# Patient Record
Sex: MALE | Race: WHITE | Employment: OTHER | URBAN - METROPOLITAN AREA
[De-identification: names, ages, dates, MRNs, and addresses within clinical notes are randomized per-mention and may not be internally consistent; named-entity substitution may affect disease eponyms.]

---

## 2017-07-13 ENCOUNTER — HOSPITAL ENCOUNTER (EMERGENCY)
Age: 77
Discharge: HOME | End: 2017-07-13
Attending: EMERGENCY MEDICINE
Payer: MEDICARE

## 2017-07-13 VITALS
BODY MASS INDEX: 24.52 KG/M2 | HEIGHT: 73 IN | HEART RATE: 89 BPM | RESPIRATION RATE: 20 BRPM | OXYGEN SATURATION: 95 % | SYSTOLIC BLOOD PRESSURE: 146 MMHG | TEMPERATURE: 97.7 F | DIASTOLIC BLOOD PRESSURE: 78 MMHG | WEIGHT: 185 LBS

## 2017-07-13 DIAGNOSIS — R33.9 URINARY RETENTION: Primary | ICD-10-CM

## 2017-07-13 LAB
ALBUMIN SERPL BCP-MCNC: 3.7 G/DL (ref 3.5–5)
ALBUMIN/GLOB SERPL: 1 {RATIO} (ref 1.1–2.2)
ALP SERPL-CCNC: 69 U/L (ref 45–117)
ALT SERPL-CCNC: 21 U/L (ref 12–78)
ANION GAP BLD CALC-SCNC: 14 MMOL/L (ref 5–15)
APPEARANCE UR: CLEAR
AST SERPL W P-5'-P-CCNC: 21 U/L (ref 15–37)
BACTERIA URNS QL MICRO: NEGATIVE /HPF
BASOPHILS # BLD AUTO: 0 K/UL (ref 0–0.1)
BASOPHILS # BLD: 0 % (ref 0–1)
BILIRUB SERPL-MCNC: 0.7 MG/DL (ref 0.2–1)
BILIRUB UR QL: NEGATIVE
BUN SERPL-MCNC: 12 MG/DL (ref 6–20)
BUN/CREAT SERPL: 9 (ref 12–20)
CALCIUM SERPL-MCNC: 9.1 MG/DL (ref 8.5–10.1)
CHLORIDE SERPL-SCNC: 105 MMOL/L (ref 97–108)
CO2 SERPL-SCNC: 23 MMOL/L (ref 21–32)
COLOR UR: NORMAL
CREAT SERPL-MCNC: 1.37 MG/DL (ref 0.7–1.3)
EOSINOPHIL # BLD: 0 K/UL (ref 0–0.4)
EOSINOPHIL NFR BLD: 0 % (ref 0–7)
EPITH CASTS URNS QL MICRO: NORMAL /LPF
ERYTHROCYTE [DISTWIDTH] IN BLOOD BY AUTOMATED COUNT: 13.8 % (ref 11.5–14.5)
GLOBULIN SER CALC-MCNC: 3.7 G/DL (ref 2–4)
GLUCOSE SERPL-MCNC: 95 MG/DL (ref 65–100)
GLUCOSE UR STRIP.AUTO-MCNC: NEGATIVE MG/DL
HCT VFR BLD AUTO: 46.4 % (ref 36.6–50.3)
HGB BLD-MCNC: 15.8 G/DL (ref 12.1–17)
HGB UR QL STRIP: NEGATIVE
HYALINE CASTS URNS QL MICRO: NORMAL /LPF (ref 0–5)
KETONES UR QL STRIP.AUTO: NEGATIVE MG/DL
LEUKOCYTE ESTERASE UR QL STRIP.AUTO: NEGATIVE
LYMPHOCYTES # BLD AUTO: 13 % (ref 12–49)
LYMPHOCYTES # BLD: 1.2 K/UL (ref 0.8–3.5)
MCH RBC QN AUTO: 32.6 PG (ref 26–34)
MCHC RBC AUTO-ENTMCNC: 34.1 G/DL (ref 30–36.5)
MCV RBC AUTO: 95.7 FL (ref 80–99)
MONOCYTES # BLD: 0.8 K/UL (ref 0–1)
MONOCYTES NFR BLD AUTO: 9 % (ref 5–13)
NEUTS SEG # BLD: 7.2 K/UL (ref 1.8–8)
NEUTS SEG NFR BLD AUTO: 78 % (ref 32–75)
NITRITE UR QL STRIP.AUTO: NEGATIVE
PH UR STRIP: 6.5 [PH] (ref 5–8)
PLATELET # BLD AUTO: 174 K/UL (ref 150–400)
POTASSIUM SERPL-SCNC: 3.7 MMOL/L (ref 3.5–5.1)
PROT SERPL-MCNC: 7.4 G/DL (ref 6.4–8.2)
PROT UR STRIP-MCNC: NEGATIVE MG/DL
RBC # BLD AUTO: 4.85 M/UL (ref 4.1–5.7)
RBC #/AREA URNS HPF: NORMAL /HPF (ref 0–5)
SODIUM SERPL-SCNC: 142 MMOL/L (ref 136–145)
SP GR UR REFRACTOMETRY: 1.01 (ref 1–1.03)
UROBILINOGEN UR QL STRIP.AUTO: 0.2 EU/DL (ref 0.2–1)
WBC # BLD AUTO: 9.2 K/UL (ref 4.1–11.1)
WBC URNS QL MICRO: NORMAL /HPF (ref 0–4)

## 2017-07-13 PROCEDURE — 96360 HYDRATION IV INFUSION INIT: CPT

## 2017-07-13 PROCEDURE — 99284 EMERGENCY DEPT VISIT MOD MDM: CPT

## 2017-07-13 PROCEDURE — 81001 URINALYSIS AUTO W/SCOPE: CPT | Performed by: EMERGENCY MEDICINE

## 2017-07-13 PROCEDURE — 85025 COMPLETE CBC W/AUTO DIFF WBC: CPT | Performed by: EMERGENCY MEDICINE

## 2017-07-13 PROCEDURE — 36415 COLL VENOUS BLD VENIPUNCTURE: CPT | Performed by: EMERGENCY MEDICINE

## 2017-07-13 PROCEDURE — 74011250636 HC RX REV CODE- 250/636: Performed by: EMERGENCY MEDICINE

## 2017-07-13 PROCEDURE — 77030010545

## 2017-07-13 PROCEDURE — 77030034850

## 2017-07-13 PROCEDURE — 80053 COMPREHEN METABOLIC PANEL: CPT | Performed by: EMERGENCY MEDICINE

## 2017-07-13 PROCEDURE — 51702 INSERT TEMP BLADDER CATH: CPT

## 2017-07-13 RX ADMIN — SODIUM CHLORIDE 500 ML: 900 INJECTION, SOLUTION INTRAVENOUS at 11:39

## 2017-07-13 NOTE — ED NOTES
Shaw bag changed to leg bag, patient/family instructed how to use. Patient ambulated with this RN and walker in the hallway, states he feels much better and is baseline with ambulating.

## 2017-07-13 NOTE — DISCHARGE INSTRUCTIONS
Urinary Retention: Care Instructions  Your Care Instructions    Urinary retention means that you aren't able to urinate. In men, it is often caused by a blockage of the urinary tract from an enlarged prostate gland. In men and women, it can also be caused by an infection or nerve damage. Or it may be a side effect of a medicine. The doctor may have drained the urine from your bladder. If you still have problems passing urine, you may need to use a catheter at home. This is used to empty your bladder until the problem can be fixed. Your doctor may put a catheter in your bladder before you go home. If so, he or she will tell you when to come back to have the catheter removed. The doctor has checked you closely. But problems can develop later. If you notice any problems or new symptoms, get medical treatment right away. Follow-up care is a key part of your treatment and safety. Be sure to make and go to all appointments, and call your doctor if you are having problems. It's also a good idea to know your test results and keep a list of the medicines you take. How can you care for yourself at home? · Take your medicines exactly as prescribed. Call your doctor if you think you are having a problem with your medicine. You will get more details on the specific medicines your doctor prescribes. · Check with your doctor before you use any over-the-counter medicines. Many cold and allergy medicines, for example, can make this problem worse. Make sure your doctor knows all of the medicines, vitamins, supplements, and herbal remedies you take. · Spread out through the day the amount of fluid you drink. Do not drink a lot at bedtime. · Avoid alcohol and caffeine. · If you have been given a catheter, or if one is already in place, follow the instructions you were given. Always wash your hands before and after you handle the catheter. When should you call for help?   Call your doctor now or seek immediate medical care if:  · You cannot urinate at all, or it is getting harder to urinate. · You have symptoms of a urinary tract infection. These may include:  ¨ Pain or burning when you urinate. ¨ A frequent need to urinate without being able to pass much urine. ¨ Pain in the flank, which is just below the rib cage and above the waist on either side of the back. ¨ Blood in your urine. ¨ A fever. Watch closely for changes in your health, and be sure to contact your doctor if:  · You have any problems with your catheter. · You do not get better as expected. Where can you learn more? Go to http://richelle-travis.info/. Enter M244 in the search box to learn more about \"Urinary Retention: Care Instructions. \"  Current as of: August 12, 2016  Content Version: 11.3  © 2699-5189 [employer]. Care instructions adapted under license by [employer] (which disclaims liability or warranty for this information). If you have questions about a medical condition or this instruction, always ask your healthcare professional. [place] [address] any warranty or liability for your use of this information. We hope that we have addressed all of your medical concerns. The examination and treatment you received in the Emergency Department were for an emergent problem and were not intended as complete care. It is important that you follow up with your healthcare provider(s) for ongoing care. If your symptoms worsen or do not improve as expected, and you are unable to reach your usual health care provider(s), you should return to the Emergency Department. Today's healthcare is undergoing tremendous change, and patient satisfaction surveys are one of the many tools to assess the quality of medical care. You may receive a survey from the [employer] regarding your experience in the Emergency Department.   I hope that your experience has been completely positive, particularly the medical care that I provided. As such, please participate in the survey; anything less than excellent does not meet my expectations or intentions. [facility] and [employer] participate in nationally recognized quality of care measures. If your blood pressure is greater than 120/80, as reported below, we urge that you seek medical care to address the potential of high blood pressure, commonly known as hypertension. Hypertension can be hereditary or can be caused by certain medical conditions, pain, stress, or \"white coat syndrome. \"       Please make an appointment with your health care provider(s) for follow up of your Emergency Department visit. VITALS:   Patient Vitals for the past 8 hrs:   Temp Pulse Resp BP SpO2   07/13/17 1016 - - - 188/78 93 %   07/13/17 0954 97.7 °F (36.5 °C) 89 20 (!) 188/107 93 %          Thank you for allowing us to provide you with medical care today. We realize that you have many choices for your emergency care needs. Please choose us in the future for any continued health care needs. [facility], [address] [place] [address].   Office: [phone]            Recent Results (from the past 24 hour(s))   CBC WITH AUTOMATED DIFF    Collection Time: 07/13/17 10:24 AM   Result Value Ref Range    WBC 9.2 4.1 - 11.1 K/uL    RBC 4.85 4.10 - 5.70 M/uL    HGB 15.8 12.1 - 17.0 g/dL    HCT 46.4 36.6 - 50.3 %    MCV 95.7 80.0 - 99.0 FL    MCH 32.6 26.0 - 34.0 PG    MCHC 34.1 30.0 - 36.5 g/dL    RDW 13.8 11.5 - 14.5 %    PLATELET 474 080 - 924 K/uL    NEUTROPHILS 78 (H) 32 - 75 %    LYMPHOCYTES 13 12 - 49 %    MONOCYTES 9 5 - 13 %    EOSINOPHILS 0 0 - 7 %    BASOPHILS 0 0 - 1 %    ABS. NEUTROPHILS 7.2 1.8 - 8.0 K/UL    ABS. LYMPHOCYTES 1.2 0.8 - 3.5 K/UL    ABS. MONOCYTES 0.8 0.0 - 1.0 K/UL    ABS. EOSINOPHILS 0.0 0.0 - 0.4 K/UL    ABS.  BASOPHILS 0.0 0.0 - 0.1 K/UL   METABOLIC PANEL, COMPREHENSIVE    Collection Time: 07/13/17 10:24 AM   Result Value Ref Range    Sodium 142 136 - 145 mmol/L    Potassium 3.7 3.5 - 5.1 mmol/L    Chloride 105 97 - 108 mmol/L    CO2 23 21 - 32 mmol/L    Anion gap 14 5 - 15 mmol/L    Glucose 95 65 - 100 mg/dL    BUN 12 6 - 20 MG/DL    Creatinine 1.37 (H) 0.70 - 1.30 MG/DL    BUN/Creatinine ratio 9 (L) 12 - 20      GFR est AA >60 >60 ml/min/1.73m2    GFR est non-AA 50 (L) >60 ml/min/1.73m2    Calcium 9.1 8.5 - 10.1 MG/DL    Bilirubin, total 0.7 0.2 - 1.0 MG/DL    ALT (SGPT) 21 12 - 78 U/L    AST (SGOT) 21 15 - 37 U/L    Alk. phosphatase 69 45 - 117 U/L    Protein, total 7.4 6.4 - 8.2 g/dL    Albumin 3.7 3.5 - 5.0 g/dL    Globulin 3.7 2.0 - 4.0 g/dL    A-G Ratio 1.0 (L) 1.1 - 2.2     URINALYSIS W/MICROSCOPIC    Collection Time: 07/13/17 10:24 AM   Result Value Ref Range    Color YELLOW/STRAW      Appearance CLEAR CLEAR      Specific gravity 1.011 1.003 - 1.030      pH (UA) 6.5 5.0 - 8.0      Protein NEGATIVE  NEG mg/dL    Glucose NEGATIVE  NEG mg/dL    Ketone NEGATIVE  NEG mg/dL    Bilirubin NEGATIVE  NEG      Blood NEGATIVE  NEG      Urobilinogen 0.2 0.2 - 1.0 EU/dL    Nitrites NEGATIVE  NEG      Leukocyte Esterase NEGATIVE  NEG      WBC 0-4 0 - 4 /hpf    RBC 0-5 0 - 5 /hpf    Epithelial cells FEW FEW /lpf    Bacteria NEGATIVE  NEG /hpf    Hyaline cast 0-2 0 - 5 /lpf       No results found.

## 2017-07-13 NOTE — ED TRIAGE NOTES
Pt c/o being dx yesterday with blood in urine. States passed small amount last night but none since then. Pt appears very uncomfortabe.

## 2017-07-13 NOTE — ED PROVIDER NOTES
HPI Comments: 68 y.o. male with no significant past medical history who presents from home with chief complaint of urinary retention. Pt c/o urinary retention currently, as he passed a small amount of urine last night and nothing since with an associated urinary pressure. Pt reports that 3 days ago started having hematuria, urinary frequency, and difficulty sleeping. Pt denies any hx of urinary problems. Pt has a chronically high PSA and is followed by Dr. Darrel Krabbe. Pt also has a \"neuromuscular problem associated with Sjogren's\" that the wife reports is possibly caused by statins. Pt is on 7.5 mg prednisone on a daily basis for the past year due to difficulty walking. Pt normally uses a walker, and reports that his walking was more difficult than normal today. Pt states he typically can't walk well in the morning until the prednisone kicks in, and by the end of the day as it wears off it becomes difficult to walk again. Pt denies any fever, chills, back pain, or ab pain. There are no other acute medical concerns at this time. PCP: Latasha Haro MD    Note written by Aviva Mcmullen, as dictated by Yazmin Gaxiola. Kasi Nagy MD 10:36 AM      The history is provided by the patient. No  was used. No past medical history on file. No past surgical history on file. No family history on file. Social History     Social History    Marital status:      Spouse name: N/A    Number of children: N/A    Years of education: N/A     Occupational History    Not on file. Social History Main Topics    Smoking status: Not on file    Smokeless tobacco: Not on file    Alcohol use Not on file    Drug use: Not on file    Sexual activity: Not on file     Other Topics Concern    Not on file     Social History Narrative         ALLERGIES: Review of patient's allergies indicates no known allergies. Review of Systems   Constitutional: Negative for chills and fever.    HENT: Negative for ear pain and sore throat. Eyes: Negative for pain. Respiratory: Negative for chest tightness and shortness of breath. Cardiovascular: Negative for chest pain and leg swelling. Gastrointestinal: Negative for abdominal pain, nausea and vomiting. Genitourinary: Positive for difficulty urinating (urinary retention) and hematuria. Negative for dysuria, flank pain and urgency. Musculoskeletal: Positive for gait problem. Negative for back pain. Skin: Negative for rash. Neurological: Negative for headaches. All other systems reviewed and are negative. Vitals:    07/13/17 0954   BP: (!) 188/107   Pulse: 89   Resp: 20   Temp: 97.7 °F (36.5 °C)   SpO2: 93%   Weight: 83.9 kg (185 lb)   Height: 6' 1\" (1.854 m)            Physical Exam   Constitutional: He appears well-developed and well-nourished. No distress. HENT:   Head: Normocephalic and atraumatic. Eyes: Pupils are equal, round, and reactive to light. No scleral icterus. Neck: Normal range of motion. Neck supple. No tracheal deviation present. Cardiovascular: Normal rate, regular rhythm, normal heart sounds and intact distal pulses. Exam reveals no gallop and no friction rub. No murmur heard. Pulmonary/Chest: Effort normal and breath sounds normal. No respiratory distress. He has no wheezes. He has no rales. Abdominal: Soft. He exhibits no distension. There is tenderness (mild suprapubic tenderness). There is no rebound and no guarding. Musculoskeletal: He exhibits no edema. Neurological: He is alert. Skin: Skin is warm and dry. Psychiatric: He has a normal mood and affect. Nursing note and vitals reviewed. Note written by Aviva Newman, as dictated by Janet Maurer. Spike uCeva MD 10:37 AM      MDM  Number of Diagnoses or Management Options  Urinary retention:   Diagnosis management comments:  The patient presents with urinary retention with a differential diagnosis of UTI, BPH, hematuria        ED Course Procedures           11:57 AM  The patient has been reevaluated. The patient is ready for discharge. The patient's signs, symptoms, diagnosis, and discharge instructions have been discussed and the patient/ family has conveyed their understanding. The patient is to follow up as recommended or return to the ED should their symptoms worsen. Plan has been discussed and the patient is in agreement. LABORATORY TESTS:  Recent Results (from the past 12 hour(s))   CBC WITH AUTOMATED DIFF    Collection Time: 07/13/17 10:24 AM   Result Value Ref Range    WBC 9.2 4.1 - 11.1 K/uL    RBC 4.85 4.10 - 5.70 M/uL    HGB 15.8 12.1 - 17.0 g/dL    HCT 46.4 36.6 - 50.3 %    MCV 95.7 80.0 - 99.0 FL    MCH 32.6 26.0 - 34.0 PG    MCHC 34.1 30.0 - 36.5 g/dL    RDW 13.8 11.5 - 14.5 %    PLATELET 459 672 - 875 K/uL    NEUTROPHILS 78 (H) 32 - 75 %    LYMPHOCYTES 13 12 - 49 %    MONOCYTES 9 5 - 13 %    EOSINOPHILS 0 0 - 7 %    BASOPHILS 0 0 - 1 %    ABS. NEUTROPHILS 7.2 1.8 - 8.0 K/UL    ABS. LYMPHOCYTES 1.2 0.8 - 3.5 K/UL    ABS. MONOCYTES 0.8 0.0 - 1.0 K/UL    ABS. EOSINOPHILS 0.0 0.0 - 0.4 K/UL    ABS. BASOPHILS 0.0 0.0 - 0.1 K/UL   METABOLIC PANEL, COMPREHENSIVE    Collection Time: 07/13/17 10:24 AM   Result Value Ref Range    Sodium 142 136 - 145 mmol/L    Potassium 3.7 3.5 - 5.1 mmol/L    Chloride 105 97 - 108 mmol/L    CO2 23 21 - 32 mmol/L    Anion gap 14 5 - 15 mmol/L    Glucose 95 65 - 100 mg/dL    BUN 12 6 - 20 MG/DL    Creatinine 1.37 (H) 0.70 - 1.30 MG/DL    BUN/Creatinine ratio 9 (L) 12 - 20      GFR est AA >60 >60 ml/min/1.73m2    GFR est non-AA 50 (L) >60 ml/min/1.73m2    Calcium 9.1 8.5 - 10.1 MG/DL    Bilirubin, total 0.7 0.2 - 1.0 MG/DL    ALT (SGPT) 21 12 - 78 U/L    AST (SGOT) 21 15 - 37 U/L    Alk.  phosphatase 69 45 - 117 U/L    Protein, total 7.4 6.4 - 8.2 g/dL    Albumin 3.7 3.5 - 5.0 g/dL    Globulin 3.7 2.0 - 4.0 g/dL    A-G Ratio 1.0 (L) 1.1 - 2.2     URINALYSIS W/MICROSCOPIC    Collection Time: 07/13/17 10:24 AM   Result Value Ref Range    Color YELLOW/STRAW      Appearance CLEAR CLEAR      Specific gravity 1.011 1.003 - 1.030      pH (UA) 6.5 5.0 - 8.0      Protein NEGATIVE  NEG mg/dL    Glucose NEGATIVE  NEG mg/dL    Ketone NEGATIVE  NEG mg/dL    Bilirubin NEGATIVE  NEG      Blood NEGATIVE  NEG      Urobilinogen 0.2 0.2 - 1.0 EU/dL    Nitrites NEGATIVE  NEG      Leukocyte Esterase NEGATIVE  NEG      WBC 0-4 0 - 4 /hpf    RBC 0-5 0 - 5 /hpf    Epithelial cells FEW FEW /lpf    Bacteria NEGATIVE  NEG /hpf    Hyaline cast 0-2 0 - 5 /lpf       IMAGING RESULTS:  No orders to display     No results found. MEDICATIONS GIVEN:  Medications   sodium chloride 0.9 % bolus infusion 500 mL (500 mL IntraVENous New Bag 7/13/17 113)       IMPRESSION:  1. Urinary retention        PLAN:  1. There are no discharge medications for this patient. 2.   Follow-up Information     Follow up With Details Comments Beatriz Zapien MD Call today  [address] [place], [place] [phone]      [facility] EMERGENCY DEPT  If symptoms worsen [id number]  [phone]        Return to ED for new or worsening symptoms       Yazmin Gaxiola.  Kasi Nagy MD

## 2017-09-03 ENCOUNTER — HOSPITAL ENCOUNTER (EMERGENCY)
Age: 77
Discharge: HOME | End: 2017-09-03
Attending: EMERGENCY MEDICINE | Admitting: EMERGENCY MEDICINE
Payer: MEDICARE

## 2017-09-03 VITALS
BODY MASS INDEX: 23.19 KG/M2 | RESPIRATION RATE: 18 BRPM | HEIGHT: 73 IN | HEART RATE: 77 BPM | DIASTOLIC BLOOD PRESSURE: 93 MMHG | TEMPERATURE: 98.1 F | WEIGHT: 175 LBS | OXYGEN SATURATION: 95 % | SYSTOLIC BLOOD PRESSURE: 176 MMHG

## 2017-09-03 DIAGNOSIS — R33.9 URINARY RETENTION: Primary | ICD-10-CM

## 2017-09-03 LAB
ALBUMIN SERPL-MCNC: 3.5 G/DL (ref 3.5–5)
ALBUMIN/GLOB SERPL: 1 {RATIO} (ref 1.1–2.2)
ALP SERPL-CCNC: 55 U/L (ref 45–117)
ALT SERPL-CCNC: 16 U/L (ref 12–78)
ANION GAP SERPL CALC-SCNC: 9 MMOL/L (ref 5–15)
APPEARANCE UR: CLEAR
AST SERPL-CCNC: 12 U/L (ref 15–37)
BACTERIA URNS QL MICRO: NEGATIVE /HPF
BASOPHILS # BLD: 0 K/UL (ref 0–0.1)
BASOPHILS NFR BLD: 0 % (ref 0–1)
BILIRUB SERPL-MCNC: 0.6 MG/DL (ref 0.2–1)
BILIRUB UR QL: NEGATIVE
BUN SERPL-MCNC: 13 MG/DL (ref 6–20)
BUN/CREAT SERPL: 10 (ref 12–20)
CALCIUM SERPL-MCNC: 8.4 MG/DL (ref 8.5–10.1)
CHLORIDE SERPL-SCNC: 106 MMOL/L (ref 97–108)
CO2 SERPL-SCNC: 24 MMOL/L (ref 21–32)
COLOR UR: NORMAL
CREAT SERPL-MCNC: 1.25 MG/DL (ref 0.7–1.3)
EOSINOPHIL # BLD: 0.1 K/UL (ref 0–0.4)
EOSINOPHIL NFR BLD: 1 % (ref 0–7)
EPITH CASTS URNS QL MICRO: NORMAL /LPF
ERYTHROCYTE [DISTWIDTH] IN BLOOD BY AUTOMATED COUNT: 13.8 % (ref 11.5–14.5)
GLOBULIN SER CALC-MCNC: 3.5 G/DL (ref 2–4)
GLUCOSE SERPL-MCNC: 97 MG/DL (ref 65–100)
GLUCOSE UR STRIP.AUTO-MCNC: NEGATIVE MG/DL
HCT VFR BLD AUTO: 45.3 % (ref 36.6–50.3)
HGB BLD-MCNC: 15.7 G/DL (ref 12.1–17)
HGB UR QL STRIP: NEGATIVE
HYALINE CASTS URNS QL MICRO: NORMAL /LPF (ref 0–5)
KETONES UR QL STRIP.AUTO: NEGATIVE MG/DL
LEUKOCYTE ESTERASE UR QL STRIP.AUTO: NEGATIVE
LYMPHOCYTES # BLD: 1.6 K/UL (ref 0.8–3.5)
LYMPHOCYTES NFR BLD: 17 % (ref 12–49)
MCH RBC QN AUTO: 33.1 PG (ref 26–34)
MCHC RBC AUTO-ENTMCNC: 34.7 G/DL (ref 30–36.5)
MCV RBC AUTO: 95.4 FL (ref 80–99)
MONOCYTES # BLD: 0.8 K/UL (ref 0–1)
MONOCYTES NFR BLD: 8 % (ref 5–13)
NEUTS SEG # BLD: 7 K/UL (ref 1.8–8)
NEUTS SEG NFR BLD: 74 % (ref 32–75)
NITRITE UR QL STRIP.AUTO: NEGATIVE
PH UR STRIP: 6 [PH] (ref 5–8)
PLATELET # BLD AUTO: 174 K/UL (ref 150–400)
POTASSIUM SERPL-SCNC: 3.6 MMOL/L (ref 3.5–5.1)
PROT SERPL-MCNC: 7 G/DL (ref 6.4–8.2)
PROT UR STRIP-MCNC: NEGATIVE MG/DL
RBC # BLD AUTO: 4.75 M/UL (ref 4.1–5.7)
RBC #/AREA URNS HPF: NORMAL /HPF (ref 0–5)
SODIUM SERPL-SCNC: 139 MMOL/L (ref 136–145)
SP GR UR REFRACTOMETRY: 1.01 (ref 1–1.03)
UROBILINOGEN UR QL STRIP.AUTO: 0.2 EU/DL (ref 0.2–1)
WBC # BLD AUTO: 9.4 K/UL (ref 4.1–11.1)
WBC URNS QL MICRO: NORMAL /HPF (ref 0–4)

## 2017-09-03 PROCEDURE — 80053 COMPREHEN METABOLIC PANEL: CPT | Performed by: NURSE PRACTITIONER

## 2017-09-03 PROCEDURE — 51798 US URINE CAPACITY MEASURE: CPT

## 2017-09-03 PROCEDURE — 51702 INSERT TEMP BLADDER CATH: CPT

## 2017-09-03 PROCEDURE — 81001 URINALYSIS AUTO W/SCOPE: CPT | Performed by: EMERGENCY MEDICINE

## 2017-09-03 PROCEDURE — 99284 EMERGENCY DEPT VISIT MOD MDM: CPT

## 2017-09-03 PROCEDURE — 77030034850

## 2017-09-03 PROCEDURE — 85025 COMPLETE CBC W/AUTO DIFF WBC: CPT | Performed by: NURSE PRACTITIONER

## 2017-09-03 PROCEDURE — 36415 COLL VENOUS BLD VENIPUNCTURE: CPT | Performed by: NURSE PRACTITIONER

## 2017-09-03 PROCEDURE — 77030010545

## 2017-09-03 RX ORDER — PREDNISONE 2.5 MG/1
5 TABLET ORAL DAILY
COMMUNITY

## 2017-09-03 RX ORDER — OXYBUTYNIN CHLORIDE 10 MG/1
10 TABLET, EXTENDED RELEASE ORAL DAILY
COMMUNITY
End: 2017-10-12

## 2017-09-03 RX ORDER — SULFAMETHOXAZOLE AND TRIMETHOPRIM 800; 160 MG/1; MG/1
1 TABLET ORAL 2 TIMES DAILY
COMMUNITY
End: 2017-10-20

## 2017-09-03 NOTE — DISCHARGE INSTRUCTIONS
Urinary Retention: Care Instructions  Your Care Instructions    Urinary retention means that you aren't able to urinate. In men, it is often caused by a blockage of the urinary tract from an enlarged prostate gland. In men and women, it can also be caused by an infection or nerve damage. Or it may be a side effect of a medicine. The doctor may have drained the urine from your bladder. If you still have problems passing urine, you may need to use a catheter at home. This is used to empty your bladder until the problem can be fixed. Your doctor may put a catheter in your bladder before you go home. If so, he or she will tell you when to come back to have the catheter removed. The doctor has checked you closely. But problems can develop later. If you notice any problems or new symptoms, get medical treatment right away. Follow-up care is a key part of your treatment and safety. Be sure to make and go to all appointments, and call your doctor if you are having problems. It's also a good idea to know your test results and keep a list of the medicines you take. How can you care for yourself at home? · Take your medicines exactly as prescribed. Call your doctor if you think you are having a problem with your medicine. You will get more details on the specific medicines your doctor prescribes. · Check with your doctor before you use any over-the-counter medicines. Many cold and allergy medicines, for example, can make this problem worse. Make sure your doctor knows all of the medicines, vitamins, supplements, and herbal remedies you take. · Spread out through the day the amount of fluid you drink. Do not drink a lot at bedtime. · Avoid alcohol and caffeine. · If you have been given a catheter, or if one is already in place, follow the instructions you were given. Always wash your hands before and after you handle the catheter. When should you call for help?   Call your doctor now or seek immediate medical care if:  · You cannot urinate at all, or it is getting harder to urinate. · You have symptoms of a urinary tract infection. These may include:  ¨ Pain or burning when you urinate. ¨ A frequent need to urinate without being able to pass much urine. ¨ Pain in the flank, which is just below the rib cage and above the waist on either side of the back. ¨ Blood in your urine. ¨ A fever. Watch closely for changes in your health, and be sure to contact your doctor if:  · You have any problems with your catheter. · You do not get better as expected. Where can you learn more? Go to http://richelle-travis.info/. Enter M244 in the search box to learn more about \"Urinary Retention: Care Instructions. \"  Current as of: August 12, 2016  Content Version: 11.3  © 0247-6040 [employer]. Care instructions adapted under license by [employer] (which disclaims liability or warranty for this information). If you have questions about a medical condition or this instruction, always ask your healthcare professional. [place] [address] any warranty or liability for your use of this information. 7:48 PM  I have just reevaluated the patient. I have reviewed His vital signs and determined there is currently no worsening in their condition or physical exam.  Results have been reviewed with them and their questions have been answered. We will continue to review further results as they come available.       Merle Caro Pagé FNP-BC

## 2017-09-03 NOTE — ED TRIAGE NOTES
Pt reports being here about 5-6 wks ago for urinary retention. States last night had urinary frequency and was seen today around noon at [facility] and was told the urine was clear. Placed on oxybutynin and had one dose today. States since taking the medication, he has only had a few dribbles of urine. Wife spoke with pt's urology office and they told them that medication should not have caused that. Sent here for further evaluation.

## 2017-09-03 NOTE — ED PROVIDER NOTES
HPI Comments: Doris Silva is a 67 yo WM presenting to ED via car with c/o being here about 5-6 wks ago for urinary retention. States last night had urinary frequency and was seen today around noon at [facility] and was told the urine was clear. Placed on oxybutynin and had one dose today. States since taking the medication, he has only had a few dribbles of urine. Wife spoke with pt's urology office and they told them that medication should not have caused that. Sent here for further evaluation. PCP: Martine Javier MD    There were no other complaints, changes, physical findings at this time. Patient is a 68 y.o. male presenting with inability to urinate. The history is provided by the patient. Urinary Retention    This is a new problem. The current episode started more than 2 days ago. The problem has been gradually worsening. The pain is located in the suprapubic region. The pain is at a severity of 9/10. The pain is severe. Pertinent negatives include no fever, no nausea, no vomiting, no headaches, no arthralgias and no myalgias. The pain is relieved by nothing. No past medical history on file. No past surgical history on file. No family history on file. Social History     Social History    Marital status:      Spouse name: N/A    Number of children: N/A    Years of education: N/A     Occupational History    Not on file. Social History Main Topics    Smoking status: Current Every Day Smoker     Packs/day: 0.50     Years: 40.00    Smokeless tobacco: Never Used    Alcohol use No    Drug use: No    Sexual activity: Not on file     Other Topics Concern    Not on file     Social History Narrative    No narrative on file         ALLERGIES: Review of patient's allergies indicates no known allergies. Review of Systems   Constitutional: Negative. Negative for chills, diaphoresis and fever. HENT: Negative.   Negative for congestion, rhinorrhea and trouble swallowing. Eyes: Negative. Respiratory: Negative. Negative for shortness of breath. Cardiovascular: Negative. Gastrointestinal: Negative. Negative for abdominal pain, nausea and vomiting. Endocrine: Negative. Genitourinary: Positive for decreased urine volume and difficulty urinating. Musculoskeletal: Negative for arthralgias, myalgias, neck pain and neck stiffness. Skin: Negative. Negative for rash. Allergic/Immunologic: Negative. Neurological: Negative. Negative for dizziness, syncope, weakness and headaches. Hematological: Negative. Psychiatric/Behavioral: Negative. Vitals:    09/03/17 1822 09/03/17 1900 09/03/17 1915 09/03/17 1945   BP: (!) 156/106 127/75 135/89 (!) 176/93   Pulse: 77      Resp: 18      Temp: 98.1 °F (36.7 °C)      SpO2: 96% 95% 94% 95%   Weight: 79.4 kg (175 lb)      Height: 6' 1\" (1.854 m)               Physical Exam   Constitutional: He is oriented to person, place, and time. Vital signs are normal. He appears well-developed and well-nourished. Non-toxic appearance. He does not have a sickly appearance. He does not appear ill. HENT:   Head: Normocephalic and atraumatic. Eyes: Conjunctivae, EOM and lids are normal. Pupils are equal, round, and reactive to light. Neck: Trachea normal, normal range of motion and full passive range of motion without pain. Neck supple. Cardiovascular: Normal rate, regular rhythm, normal heart sounds and normal pulses. Pulmonary/Chest: Effort normal and breath sounds normal.   Abdominal: Soft. Normal appearance and bowel sounds are normal. There is tenderness in the suprapubic area. There is no CVA tenderness. Musculoskeletal: Normal range of motion. Neurological: He is alert and oriented to person, place, and time. He has normal strength. GCS eye subscore is 4. GCS verbal subscore is 5. GCS motor subscore is 6. Skin: Skin is warm, dry and intact. Psychiatric: He has a normal mood and affect. His speech is normal and behavior is normal. Judgment and thought content normal. Cognition and memory are normal.   Nursing note and vitals reviewed. MDM  Number of Diagnoses or Management Options  Urinary retention: minor     Amount and/or Complexity of Data Reviewed  Clinical lab tests: ordered    Risk of Complications, Morbidity, and/or Mortality  Presenting problems: low  Diagnostic procedures: minimal  Management options: minimal    Patient Progress  Patient progress: improved    ED Course       Procedures     6:51 PM  I have just reevaluated the patient. I have reviewed His vital signs and determined there is currently no worsening in their condition or physical exam.  Results have been reviewed with them and their questions have been answered. We will continue to review further results as they come available. I have ordered a bladder scan and it show approx 1500 cc of urine. I will order a mariscal cath to drain get a basic set of labs for kidney fxn and plan on d/c him home.     Sharri Layton Pagé FNP-BC    LABORATORY TESTS:  Recent Results (from the past 12 hour(s))   URINALYSIS W/MICROSCOPIC    Collection Time: 09/03/17  6:53 PM   Result Value Ref Range    Color YELLOW/STRAW      Appearance CLEAR CLEAR      Specific gravity 1.011 1.003 - 1.030      pH (UA) 6.0 5.0 - 8.0      Protein NEGATIVE  NEG mg/dL    Glucose NEGATIVE  NEG mg/dL    Ketone NEGATIVE  NEG mg/dL    Bilirubin NEGATIVE  NEG      Blood NEGATIVE  NEG      Urobilinogen 0.2 0.2 - 1.0 EU/dL    Nitrites NEGATIVE  NEG      Leukocyte Esterase NEGATIVE  NEG      WBC 0-4 0 - 4 /hpf    RBC 0-5 0 - 5 /hpf    Epithelial cells FEW FEW /lpf    Bacteria NEGATIVE  NEG /hpf    Hyaline cast 0-2 0 - 5 /lpf   CBC WITH AUTOMATED DIFF    Collection Time: 09/03/17  7:07 PM   Result Value Ref Range    WBC 9.4 4.1 - 11.1 K/uL    RBC 4.75 4.10 - 5.70 M/uL    HGB 15.7 12.1 - 17.0 g/dL    HCT 45.3 36.6 - 50.3 %    MCV 95.4 80.0 - 99.0 FL    MCH 33.1 26.0 - 34.0 PG MCHC 34.7 30.0 - 36.5 g/dL    RDW 13.8 11.5 - 14.5 %    PLATELET 199 263 - 393 K/uL    NEUTROPHILS 74 32 - 75 %    LYMPHOCYTES 17 12 - 49 %    MONOCYTES 8 5 - 13 %    EOSINOPHILS 1 0 - 7 %    BASOPHILS 0 0 - 1 %    ABS. NEUTROPHILS 7.0 1.8 - 8.0 K/UL    ABS. LYMPHOCYTES 1.6 0.8 - 3.5 K/UL    ABS. MONOCYTES 0.8 0.0 - 1.0 K/UL    ABS. EOSINOPHILS 0.1 0.0 - 0.4 K/UL    ABS. BASOPHILS 0.0 0.0 - 0.1 K/UL   METABOLIC PANEL, COMPREHENSIVE    Collection Time: 09/03/17  7:07 PM   Result Value Ref Range    Sodium 139 136 - 145 mmol/L    Potassium 3.6 3.5 - 5.1 mmol/L    Chloride 106 97 - 108 mmol/L    CO2 24 21 - 32 mmol/L    Anion gap 9 5 - 15 mmol/L    Glucose 97 65 - 100 mg/dL    BUN 13 6 - 20 MG/DL    Creatinine 1.25 0.70 - 1.30 MG/DL    BUN/Creatinine ratio 10 (L) 12 - 20      GFR est AA >60 >60 ml/min/1.73m2    GFR est non-AA 56 (L) >60 ml/min/1.73m2    Calcium 8.4 (L) 8.5 - 10.1 MG/DL    Bilirubin, total 0.6 0.2 - 1.0 MG/DL    ALT (SGPT) 16 12 - 78 U/L    AST (SGOT) 12 (L) 15 - 37 U/L    Alk. phosphatase 55 45 - 117 U/L    Protein, total 7.0 6.4 - 8.2 g/dL    Albumin 3.5 3.5 - 5.0 g/dL    Globulin 3.5 2.0 - 4.0 g/dL    A-G Ratio 1.0 (L) 1.1 - 2.2         IMAGING RESULTS:    CT Results  (Last 48 hours)    None        PFT Results  (Last 48 hours)    None        Echo Results  (Last 48 hours)    None        CXR Results  (Last 48 hours)    None        VENOUS DOPPLER results  (Last 48 hours)    None            MEDICATIONS GIVEN:  Medications - No data to display    IMPRESSION:  1. Urinary retention        PLAN:  1. Shaw to leg bag and drain  2. F/u with Dr Darryn Wilson (Gonzalez Aguila) on Thursday   Return to ED if worse    Discharge Note  7:49 PM  The patient is ready for discharge. The patient's signs, symptoms, diagnosis, and discharge instructions have been discussed and the patient has conveyed their understanding. The patient is to follow up as recommended or return to the ER should their symptoms worsen.  Plan has been discussed and the patient is in agreement. Ricardo Reddy Pagé FNP-BC.

## 2017-09-03 NOTE — ED NOTES
Patient denies suprapubic pressure and feels \"much better. \"     Bedside and Verbal shift change report given to Joslyn Mars (oncoming nurse) by Liane Wade (offgoing nurse). Report included the following information SBAR and ED Summary.

## 2017-10-12 ENCOUNTER — HOSPITAL ENCOUNTER (INPATIENT)
Age: 77
LOS: 8 days | Discharge: NURSING, REHAB OR LONG-TERM CARE | DRG: 665 | End: 2017-10-20
Attending: EMERGENCY MEDICINE | Admitting: INTERNAL MEDICINE
Payer: MEDICARE

## 2017-10-12 ENCOUNTER — APPOINTMENT (OUTPATIENT)
Dept: GENERAL RADIOLOGY | Age: 77
DRG: 665 | End: 2017-10-12
Attending: EMERGENCY MEDICINE
Payer: MEDICARE

## 2017-10-12 DIAGNOSIS — R50.9 FEVER, UNSPECIFIED FEVER CAUSE: ICD-10-CM

## 2017-10-12 DIAGNOSIS — N39.0 URINARY TRACT INFECTION ASSOCIATED WITH INDWELLING URETHRAL CATHETER, INITIAL ENCOUNTER (HCC): Primary | ICD-10-CM

## 2017-10-12 DIAGNOSIS — T83.511A URINARY TRACT INFECTION ASSOCIATED WITH INDWELLING URETHRAL CATHETER, INITIAL ENCOUNTER (HCC): Primary | ICD-10-CM

## 2017-10-12 DIAGNOSIS — A41.9 SEPSIS, DUE TO UNSPECIFIED ORGANISM: ICD-10-CM

## 2017-10-12 PROBLEM — R11.2 NAUSEA & VOMITING: Status: ACTIVE | Noted: 2017-10-12

## 2017-10-12 PROBLEM — E86.0 DEHYDRATION: Status: ACTIVE | Noted: 2017-10-12

## 2017-10-12 PROBLEM — R00.0 SINUS TACHYCARDIA: Status: ACTIVE | Noted: 2017-10-12

## 2017-10-12 PROBLEM — N28.9 RENAL INSUFFICIENCY: Status: ACTIVE | Noted: 2017-10-12

## 2017-10-12 PROBLEM — R05.9 COUGH: Status: ACTIVE | Noted: 2017-10-12

## 2017-10-12 PROBLEM — D72.829 LEUKOCYTOSIS: Status: ACTIVE | Noted: 2017-10-12

## 2017-10-12 PROBLEM — R09.02 HYPOXIA: Status: ACTIVE | Noted: 2017-10-12

## 2017-10-12 LAB
ANION GAP SERPL CALC-SCNC: 14 MMOL/L (ref 5–15)
APPEARANCE UR: CLEAR
BACTERIA URNS QL MICRO: ABNORMAL /HPF
BASOPHILS # BLD: 0 K/UL (ref 0–0.1)
BASOPHILS NFR BLD: 0 % (ref 0–1)
BILIRUB UR QL: NEGATIVE
BNP SERPL-MCNC: 119 PG/ML (ref 0–450)
BUN SERPL-MCNC: 16 MG/DL (ref 6–20)
BUN/CREAT SERPL: 10 (ref 12–20)
CALCIUM SERPL-MCNC: 8.6 MG/DL (ref 8.5–10.1)
CHLORIDE SERPL-SCNC: 101 MMOL/L (ref 97–108)
CK SERPL-CCNC: 60 U/L (ref 39–308)
CO2 SERPL-SCNC: 22 MMOL/L (ref 21–32)
COLOR UR: ABNORMAL
CREAT SERPL-MCNC: 1.66 MG/DL (ref 0.7–1.3)
DIFFERENTIAL METHOD BLD: ABNORMAL
EOSINOPHIL # BLD: 0 K/UL (ref 0–0.4)
EOSINOPHIL NFR BLD: 0 % (ref 0–7)
EPITH CASTS URNS QL MICRO: ABNORMAL /LPF
ERYTHROCYTE [DISTWIDTH] IN BLOOD BY AUTOMATED COUNT: 13.9 % (ref 11.5–14.5)
GLUCOSE SERPL-MCNC: 157 MG/DL (ref 65–100)
GLUCOSE UR STRIP.AUTO-MCNC: NEGATIVE MG/DL
HCT VFR BLD AUTO: 44.6 % (ref 36.6–50.3)
HGB BLD-MCNC: 15.2 G/DL (ref 12.1–17)
HGB UR QL STRIP: ABNORMAL
HYALINE CASTS URNS QL MICRO: ABNORMAL /LPF (ref 0–5)
KETONES UR QL STRIP.AUTO: NEGATIVE MG/DL
LACTATE SERPL-SCNC: 3.2 MMOL/L (ref 0.4–2)
LEUKOCYTE ESTERASE UR QL STRIP.AUTO: ABNORMAL
LYMPHOCYTES # BLD: 0.3 K/UL (ref 0.8–3.5)
LYMPHOCYTES NFR BLD: 2 % (ref 12–49)
MAGNESIUM SERPL-MCNC: 1.9 MG/DL (ref 1.6–2.4)
MCH RBC QN AUTO: 33.3 PG (ref 26–34)
MCHC RBC AUTO-ENTMCNC: 34.1 G/DL (ref 30–36.5)
MCV RBC AUTO: 97.8 FL (ref 80–99)
MONOCYTES # BLD: 0.3 K/UL (ref 0–1)
MONOCYTES NFR BLD: 2 % (ref 5–13)
NEUTS SEG # BLD: 12.7 K/UL (ref 1.8–8)
NEUTS SEG NFR BLD: 96 % (ref 32–75)
NITRITE UR QL STRIP.AUTO: POSITIVE
PH UR STRIP: 6 [PH] (ref 5–8)
PLATELET # BLD AUTO: 134 K/UL (ref 150–400)
POTASSIUM SERPL-SCNC: 4.3 MMOL/L (ref 3.5–5.1)
PROT UR STRIP-MCNC: ABNORMAL MG/DL
RBC # BLD AUTO: 4.56 M/UL (ref 4.1–5.7)
RBC #/AREA URNS HPF: ABNORMAL /HPF (ref 0–5)
RBC MORPH BLD: ABNORMAL
SODIUM SERPL-SCNC: 137 MMOL/L (ref 136–145)
SP GR UR REFRACTOMETRY: 1.02 (ref 1–1.03)
TROPONIN I SERPL-MCNC: <0.04 NG/ML
UA: UC IF INDICATED,UAUC: ABNORMAL
UROBILINOGEN UR QL STRIP.AUTO: 0.2 EU/DL (ref 0.2–1)
WBC # BLD AUTO: 13.3 K/UL (ref 4.1–11.1)
WBC MORPH BLD: ABNORMAL
WBC URNS QL MICRO: ABNORMAL /HPF (ref 0–4)

## 2017-10-12 PROCEDURE — 83605 ASSAY OF LACTIC ACID: CPT | Performed by: EMERGENCY MEDICINE

## 2017-10-12 PROCEDURE — 82550 ASSAY OF CK (CPK): CPT | Performed by: EMERGENCY MEDICINE

## 2017-10-12 PROCEDURE — 74011250636 HC RX REV CODE- 250/636: Performed by: INTERNAL MEDICINE

## 2017-10-12 PROCEDURE — 84484 ASSAY OF TROPONIN QUANT: CPT | Performed by: EMERGENCY MEDICINE

## 2017-10-12 PROCEDURE — 87086 URINE CULTURE/COLONY COUNT: CPT | Performed by: EMERGENCY MEDICINE

## 2017-10-12 PROCEDURE — 99284 EMERGENCY DEPT VISIT MOD MDM: CPT

## 2017-10-12 PROCEDURE — 74011250637 HC RX REV CODE- 250/637: Performed by: EMERGENCY MEDICINE

## 2017-10-12 PROCEDURE — 80048 BASIC METABOLIC PNL TOTAL CA: CPT | Performed by: EMERGENCY MEDICINE

## 2017-10-12 PROCEDURE — 87077 CULTURE AEROBIC IDENTIFY: CPT | Performed by: EMERGENCY MEDICINE

## 2017-10-12 PROCEDURE — 71020 XR CHEST PA LAT: CPT

## 2017-10-12 PROCEDURE — 81001 URINALYSIS AUTO W/SCOPE: CPT | Performed by: EMERGENCY MEDICINE

## 2017-10-12 PROCEDURE — 83735 ASSAY OF MAGNESIUM: CPT | Performed by: EMERGENCY MEDICINE

## 2017-10-12 PROCEDURE — 36415 COLL VENOUS BLD VENIPUNCTURE: CPT | Performed by: EMERGENCY MEDICINE

## 2017-10-12 PROCEDURE — 93005 ELECTROCARDIOGRAM TRACING: CPT

## 2017-10-12 PROCEDURE — 83880 ASSAY OF NATRIURETIC PEPTIDE: CPT | Performed by: EMERGENCY MEDICINE

## 2017-10-12 PROCEDURE — 94762 N-INVAS EAR/PLS OXIMTRY CONT: CPT

## 2017-10-12 PROCEDURE — 65660000000 HC RM CCU STEPDOWN

## 2017-10-12 PROCEDURE — 74011000258 HC RX REV CODE- 258: Performed by: EMERGENCY MEDICINE

## 2017-10-12 PROCEDURE — 85025 COMPLETE CBC W/AUTO DIFF WBC: CPT | Performed by: EMERGENCY MEDICINE

## 2017-10-12 PROCEDURE — 74011250636 HC RX REV CODE- 250/636: Performed by: EMERGENCY MEDICINE

## 2017-10-12 PROCEDURE — 87186 SC STD MICRODIL/AGAR DIL: CPT | Performed by: EMERGENCY MEDICINE

## 2017-10-12 PROCEDURE — 87040 BLOOD CULTURE FOR BACTERIA: CPT | Performed by: EMERGENCY MEDICINE

## 2017-10-12 RX ORDER — PREDNISONE 5 MG/1
7.5 TABLET ORAL
Status: DISCONTINUED | OUTPATIENT
Start: 2017-10-13 | End: 2017-10-20 | Stop reason: HOSPADM

## 2017-10-12 RX ORDER — ACETAMINOPHEN 325 MG/1
650 TABLET ORAL
Status: DISCONTINUED | OUTPATIENT
Start: 2017-10-12 | End: 2017-10-20 | Stop reason: HOSPADM

## 2017-10-12 RX ORDER — POLYETHYLENE GLYCOL 3350 17 G/17G
17 POWDER, FOR SOLUTION ORAL DAILY
Status: DISCONTINUED | OUTPATIENT
Start: 2017-10-13 | End: 2017-10-18

## 2017-10-12 RX ORDER — AMOXICILLIN 250 MG
1 CAPSULE ORAL DAILY
Status: DISCONTINUED | OUTPATIENT
Start: 2017-10-13 | End: 2017-10-18

## 2017-10-12 RX ORDER — ONDANSETRON 2 MG/ML
4 INJECTION INTRAMUSCULAR; INTRAVENOUS
Status: DISCONTINUED | OUTPATIENT
Start: 2017-10-12 | End: 2017-10-20 | Stop reason: HOSPADM

## 2017-10-12 RX ORDER — OXYBUTYNIN CHLORIDE 5 MG/1
10 TABLET, EXTENDED RELEASE ORAL DAILY
Status: CANCELLED | OUTPATIENT
Start: 2017-10-13

## 2017-10-12 RX ORDER — SODIUM CHLORIDE 0.9 % (FLUSH) 0.9 %
5-10 SYRINGE (ML) INJECTION AS NEEDED
Status: DISCONTINUED | OUTPATIENT
Start: 2017-10-12 | End: 2017-10-20 | Stop reason: HOSPADM

## 2017-10-12 RX ORDER — DEXTROSE, SODIUM CHLORIDE, AND POTASSIUM CHLORIDE 5; .45; .15 G/100ML; G/100ML; G/100ML
100 INJECTION INTRAVENOUS CONTINUOUS
Status: DISCONTINUED | OUTPATIENT
Start: 2017-10-12 | End: 2017-10-14

## 2017-10-12 RX ORDER — DIPHENHYDRAMINE HCL 25 MG
25 CAPSULE ORAL
Status: DISCONTINUED | OUTPATIENT
Start: 2017-10-12 | End: 2017-10-20 | Stop reason: HOSPADM

## 2017-10-12 RX ORDER — ENOXAPARIN SODIUM 100 MG/ML
40 INJECTION SUBCUTANEOUS EVERY 24 HOURS
Status: DISCONTINUED | OUTPATIENT
Start: 2017-10-12 | End: 2017-10-20

## 2017-10-12 RX ORDER — ACETAMINOPHEN 325 MG/1
975 TABLET ORAL
Status: COMPLETED | OUTPATIENT
Start: 2017-10-12 | End: 2017-10-12

## 2017-10-12 RX ADMIN — ENOXAPARIN SODIUM 40 MG: 40 INJECTION SUBCUTANEOUS at 22:59

## 2017-10-12 RX ADMIN — ACETAMINOPHEN 975 MG: 325 TABLET ORAL at 19:12

## 2017-10-12 RX ADMIN — SODIUM CHLORIDE 2382 ML: 900 INJECTION, SOLUTION INTRAVENOUS at 19:08

## 2017-10-12 RX ADMIN — SODIUM CHLORIDE 1000 ML: 900 INJECTION, SOLUTION INTRAVENOUS at 18:49

## 2017-10-12 RX ADMIN — DEXTROSE MONOHYDRATE, SODIUM CHLORIDE, AND POTASSIUM CHLORIDE 100 ML/HR: 50; 4.5; 1.49 INJECTION, SOLUTION INTRAVENOUS at 23:09

## 2017-10-12 RX ADMIN — CEFTRIAXONE SODIUM 1 G: 1 INJECTION, POWDER, FOR SOLUTION INTRAMUSCULAR; INTRAVENOUS at 19:49

## 2017-10-12 NOTE — IP AVS SNAPSHOT
Lynne Sargent 
 
 
 [address] [place] [address] 
[phone] Patient: Jaylon Harkins MRN: [id number] VALENCIA:[birth year] Current Discharge Medication List  
  
START taking these medications Dose & Instructions Dispensing Information Comments Morning Noon Evening Bedtime  
 ciprofloxacin HCl 500 mg tablet Commonly known as:  CIPRO Your last dose was: Your next dose is:    
   
   
 Dose:  500 mg Take 1 Tab by mouth every twelve (12) hours for 5 days. Quantity:  10 Tab Refills:  0  
     
   
   
   
  
 metoprolol tartrate 25 mg tablet Commonly known as:  LOPRESSOR Your last dose was: Your next dose is:    
   
   
 Dose:  25 mg Take 1 Tab by mouth every twelve (12) hours for 30 days. Quantity:  60 Tab Refills:  0  
     
   
   
   
  
 senna-docusate 8.6-50 mg per tablet Commonly known as:  Miriam Clark Your last dose was: Your next dose is:    
   
   
 Dose:  1 Tab Take 1 Tab by mouth two (2) times a day for 30 days. Quantity:  60 Tab Refills:  0 CONTINUE these medications which have NOT CHANGED Dose & Instructions Dispensing Information Comments Morning Noon Evening Bedtime  
 predniSONE 2.5 mg tablet Commonly known as:  Lisa Jernigan Your last dose was: Your next dose is:    
   
   
 Dose:  7.5 mg Take 7.5 mg by mouth daily (with breakfast). Refills:  0 STOP taking these medications BACTRIM -800 mg per tablet Generic drug:  trimethoprim-sulfamethoxazole Where to Get Your Medications Information on where to get these meds will be given to you by the nurse or doctor. ! Ask your nurse or doctor about these medications  
  ciprofloxacin HCl 500 mg tablet  
 metoprolol tartrate 25 mg tablet  
 senna-docusate 8.6-50 mg per tablet

## 2017-10-12 NOTE — ED TRIAGE NOTES
Pt brought in by EMS for N/V and temp. Recently DX with UTI and started on bactrium. Tmax at home 102.7 on arrival temp was 100.

## 2017-10-12 NOTE — ED PROVIDER NOTES
HPI Comments: 51-year-old white male presents to the emergency department with chills, nausea, cough. Patient has an indwelling Shaw catheter. He saw his PCP several days ago. They called him 2 days ago and said he has a UTI that was Escherichia coli. He was started on Bactrim. He has had one dose of Bactrim last night and one dose this morning. Starting about 4 hours ago patient had chills and shaking all over. He then took Tylenol at about 2 PM. He now is feeling better. He also has been coughing. The coughing has been present the last 24 hours. In addition, patient vomited 2 times this afternoon. No nausea now. Patient denies any shortness of breath or chest pain. No abdominal pain at this time. Per the wife, patient has Sjogren's disease and his speech is chronically slurred because of dry mouth. No tobacco or alcohol use. The history is provided by the patient. Past Medical History:   Diagnosis Date    Sjoegren syndrome ([facility] [address] 04/2016    Urine retention 6wks       Past Surgical History:   Procedure Laterality Date    HX APPENDECTOMY      HX CERVICAL DISKECTOMY      HX HEENT      Zygomatic arch repair    HX HERNIA REPAIR      HX ORTHOPAEDIC      Broken tibia and fibula    HX VASECTOMY           History reviewed. No pertinent family history. Social History     Social History    Marital status:      Spouse name: N/A    Number of children: N/A    Years of education: N/A     Occupational History    Not on file. Social History Main Topics    Smoking status: Current Every Day Smoker     Packs/day: 0.50     Years: 40.00    Smokeless tobacco: Never Used    Alcohol use No    Drug use: No    Sexual activity: Not on file     Other Topics Concern    Not on file     Social History Narrative         ALLERGIES: Review of patient's allergies indicates no known allergies. Review of Systems   Constitutional: Positive for chills and fever. Negative for activity change.    HENT: Positive for congestion. Eyes: Negative for pain. Respiratory: Positive for cough. Negative for shortness of breath. Cardiovascular: Negative for chest pain and leg swelling. Gastrointestinal: Positive for vomiting. Negative for abdominal pain and nausea. Endocrine: Negative for polyuria. Genitourinary: Negative for flank pain and hematuria. Musculoskeletal: Negative for gait problem, neck pain and neck stiffness. Skin: Negative for color change. Allergic/Immunologic: Negative for immunocompromised state. Neurological: Negative for speech difficulty and headaches. Hematological: Does not bruise/bleed easily. Psychiatric/Behavioral: Negative for confusion. All other systems reviewed and are negative. Vitals:    10/12/17 1746   BP: 169/72   Pulse: 95   Resp: 17   Temp: 100 °F (37.8 °C)   SpO2: 100%   Weight: 79.4 kg (175 lb)   Height: 6' 1\" (1.854 m)            Physical Exam   Constitutional: He is oriented to person, place, and time. He appears well-developed and well-nourished. No distress. Elderly and frail, no distress   HENT:   Head: Normocephalic and atraumatic. Right Ear: External ear normal.   Left Ear: External ear normal.   Nose: Nose normal.   Mouth/Throat: Oropharynx is clear and moist. No oropharyngeal exudate. Eyes: EOM are normal. Pupils are equal, round, and reactive to light. Neck: Normal range of motion. Neck supple. No JVD present. No tracheal deviation present. Cardiovascular: Normal rate, regular rhythm, normal heart sounds and intact distal pulses. Exam reveals no gallop and no friction rub. No murmur heard. Pulmonary/Chest: Effort normal and breath sounds normal. No stridor. No respiratory distress. He has no wheezes. He has no rales. Abdominal: Soft. Bowel sounds are normal. He exhibits no distension. There is no tenderness. There is no rebound and no guarding.    No abd tenderness   Genitourinary:   Genitourinary Comments: Shaw catheter in place and leg bag on left leg   Musculoskeletal: Normal range of motion. He exhibits no edema, tenderness or deformity. Neurological: He is alert and oriented to person, place, and time. He has normal reflexes. No cranial nerve deficit. He exhibits normal muscle tone. Coordination normal.   Skin: Skin is warm and dry. No rash noted. He is not diaphoretic. No erythema. Psychiatric: He has a normal mood and affect. His behavior is normal. Judgment and thought content normal.   Nursing note and vitals reviewed. MDM  Number of Diagnoses or Management Options  Fever, unspecified fever cause:   Sepsis, due to unspecified organism [facility]):   Urinary tract infection associated with indwelling urethral catheter, initial encounter [facility]):   Diagnosis management comments: Patient overall looks well and nontoxic. Patient is elderly and frail. Will check blood work, urinalysis, chest x-ray. We'll give IV fluids. We'll reassess when testing is complete. Differential diagnosis includes UTI, dehydration, pneumonia, viral infection.        Amount and/or Complexity of Data Reviewed  Clinical lab tests: ordered and reviewed  Tests in the radiology section of CPT®: ordered and reviewed  Tests in the medicine section of CPT®: ordered and reviewed  Discussion of test results with the performing providers: yes  Decide to obtain previous medical records or to obtain history from someone other than the patient: yes  Obtain history from someone other than the patient: yes  Review and summarize past medical records: yes  Discuss the patient with other providers: yes  Independent visualization of images, tracings, or specimens: yes    Risk of Complications, Morbidity, and/or Mortality  Presenting problems: high  Diagnostic procedures: high  Management options: high    Critical Care  Total time providing critical care: 30-74 minutes (Total critical care time spend exclusive of procedures:  30 min)    Patient Progress  Patient progress: improved    ED Course       Procedures    EKG: NSR, HR 86, LAD, no ST elevations or depressions  Thea Lopez MD    6:59 PM  Pt now spiked a fever.  Will give Tylenol and add sepsis fluid bundle    7:16 PM  Lactate just came back elevated at 3  IVF are running    CXR is clear    UA is infected    Will treat with Rocephin and call the hospitalist for admission    7:18 PM  CONSULT  I spoke to Dr Tamra Moreau who will admit

## 2017-10-12 NOTE — IP AVS SNAPSHOT
[address] [facility] [address] 
 
 
 [address] 100[address] [facility] 
[phone] Patient: Kaitlynn Fay MRN: [id number] TMD:6/0/5409 You are allergic to the following Allergen Reactions Flomax (Tamsulosin) Other (comments) Dizzy Rapaflo (Silodosin) Other (comments) Dizzy Recent Documentation Height Weight BMI Smoking Status 1.854 m 87.4 kg 25.42 kg/m2 Current Every Day Smoker Emergency Contacts Name Discharge Info Relation Home Work Mobile [id number] Zhang Valadez CAREGIVER [3] Spouse [3] 21  About your hospitalization You were admitted on:  October 12, 2017 You last received care in the:  [facility]  MED SURG 2 You were discharged on:  October 20, 2017 Unit phone number:  [phone] Why you were hospitalized Your primary diagnosis was:  Not on File Your diagnoses also included:  Uti (Urinary Tract Infection), Sepsis (Hcc), Leukocytosis, Fever, Urine Retention, Sjoegren Syndrome (Hcc), Renal Insufficiency, Nausea & Vomiting, Cough, Dehydration, Hypoxia, Sinus Tachycardia, Ckd (Chronic Kidney Disease), Stage Iii Providers Seen During Your Hospitalizations Provider Role Specialty Primary office phone Bobby Pichardo MD Attending Provider Emergency Medicine [phone] Brandy Stapleton DO Attending Provider Internal Medicine [phone] Sonia Silva MD Attending Provider Internal Medicine [phone] Your Primary Care Physician (PCP) Primary Care Physician Office Phone Office Fax Eugenio Mast [phone] Follow-up Information Follow up With Details Comments Contact Info [address] [place], [address] 100[address] [facility] 
[phone]  Tita Leggett MD Schedule an appointment as soon as possible for a visit As needed [address] 
 [facility] 
[phone] [address] [place] [address] 400 50 [facility] 
[phone] Current Discharge Medication List  
  
START taking these medications Dose & Instructions Dispensing Information Comments Morning Noon Evening Bedtime  
 ciprofloxacin HCl 500 mg tablet Commonly known as:  CIPRO Your last dose was: Your next dose is:    
   
   
 Dose:  500 mg Take 1 Tab by mouth every twelve (12) hours for 5 days. Quantity:  10 Tab Refills:  0  
     
   
   
   
  
 metoprolol tartrate 25 mg tablet Commonly known as:  LOPRESSOR Your last dose was: Your next dose is:    
   
   
 Dose:  25 mg Take 1 Tab by mouth every twelve (12) hours for 30 days. Quantity:  60 Tab Refills:  0  
     
   
   
   
  
 senna-docusate 8.6-50 mg per tablet Commonly known as:  Bonny Horta Your last dose was: Your next dose is:    
   
   
 Dose:  1 Tab Take 1 Tab by mouth two (2) times a day for 30 days. Quantity:  60 Tab Refills:  0 CONTINUE these medications which have NOT CHANGED Dose & Instructions Dispensing Information Comments Morning Noon Evening Bedtime  
 predniSONE 2.5 mg tablet Commonly known as:  Marcelene Im Your last dose was: Your next dose is:    
   
   
 Dose:  7.5 mg Take 7.5 mg by mouth daily (with breakfast). Refills:  0 STOP taking these medications BACTRIM -800 mg per tablet Generic drug:  trimethoprim-sulfamethoxazole Where to Get Your Medications Information on where to get these meds will be given to you by the nurse or doctor. ! Ask your nurse or doctor about these medications  
  ciprofloxacin HCl 500 mg tablet  
 metoprolol tartrate 25 mg tablet  
 senna-docusate 8.6-50 mg per tablet Discharge Instructions Patient Discharge Instructions [facility] / [id number] : 1940 Admitted 10/12/2017 Discharged: 10/19/2017 Primary Diagnoses Problem List as of 10/19/2017  Date Reviewed: 10/13/2017 Codes Class Noted - Resolved UTI (urinary tract infection) Urine retention Sepsis ([facility] [address] Leukocytosis Fever Renal insufficiency CKD (chronic kidney disease), stage III Nausea & vomiting Cough Dehydration Hypoxia Sinus tachycardia Sjoegren syndrome ([facility] [address] Take Home Medications · It is important that you take the medication exactly as they are prescribed. · Keep your medication in the bottles provided by the pharmacist and keep a list of the medication names, dosages, and times to be taken in your wallet. · Do not take other medications without consulting your doctor. What to do at [facility] Recommended diet: Regular Diet Recommended activity: Activity as tolerated If you experience worse symptoms, please follow up with your PCP or Urology. Follow-up with your PCP in a few weeks Information obtained by : 
I understand that if any problems occur once I am at home I am to contact my physician. I understand and acknowledge receipt of the instructions indicated above. Physician's or R.N.'s Signature                                                                  Date/Time Patient or Representative Signature                                                          Date/Time Discharge Orders None MyChart Announcement  We are excited to announce that we are making your provider's discharge notes available to you in [employer]. You will see these notes when they are completed and signed by the physician that discharged you from your recent hospital stay. If you have any questions or concerns about any information you see in [employer], please call the Health Information Department where you were seen or reach out to your Primary Care Provider for more information about your plan of care. Introducing [facility]! Meka Merrill introduces [employer] patient portal. Now you can access parts of your medical record, email your doctor's office, and request medication refills online. 1. In your internet browser, go to [email or web address] 2. Click on the First Time User? Click Here link in the Sign In box. You will see the New Member Sign Up page. 3. Enter your [employer] Access Code exactly as it appears below. You will not need to use this code after youve completed the sign-up process. If you do not sign up before the expiration date, you must request a new code. · [employer] Access Code: [email or web address] Expires: 1/17/2018 12:43 PM 
 
4. Enter the last four digits of your Social Security Number (xxxx) and Date of Birth (mm/dd/yyyy) as indicated and click Submit. You will be taken to the next sign-up page. 5. Create a [employer] ID. This will be your [employer] login ID and cannot be changed, so think of one that is secure and easy to remember. 6. Create a [employer] password. You can change your password at any time. 7. Enter your Password Reset Question and Answer. This can be used at a later time if you forget your password. 8. Enter your e-mail address. You will receive e-mail notification when new information is available in [address] 9. Click Sign Up. You can now view and download portions of your medical record. 10. Click the Download Summary menu link to download a portable copy of your medical information. If you have questions, please visit the Frequently Asked Questions section of the MyChart website. Remember, MyChart is NOT to be used for urgent needs. For medical emergencies, dial 911. Now available from your iPhone and Android! General Information Please provide this summary of care documentation to your next provider. Patient Signature:  ____________________________________________________________ Date:  ____________________________________________________________  
  
[place] Provider Signature:  ____________________________________________________________ Date:  ____________________________________________________________

## 2017-10-12 NOTE — H&P
[address]  [phone]    Hospitalist Admission Note      NAME:  Krista Etienne   :   1940   MRN:  [id number]     PCP:  Estrella Najera DO     Date/Time:  10/12/2017 7:23 PM          Subjective:     CHIEF COMPLAINT: vomiting and chills    HISTORY OF PRESENT ILLNESS:     Mr. Vel Brennan is a 68 y.o.  male who presented to the Emergency Department complaining of vomiting and chills. Patient is a poor and tangential historian. Wife provides some clarification. Both deny any change in his mental status, and offer no explanation of his diminished mental capacity. Vomiting occurred after a coughing fit today. He is constipated. Chills today. Urology Dx UTI based on urine collected during mariscal change out Monday, which showed E coli, and patient placed on Bactrim yesterday. Symptoms worsened. ER workup shows sepsis criteria, mildly worse renal insufficiency. We will admit him for management. Past Medical History:   Diagnosis Date    CKD (chronic kidney disease), stage III     Sjoegren syndrome ([facility] [address] 2016    Urine retention         Past Surgical History:   Procedure Laterality Date    HX APPENDECTOMY      HX CERVICAL DISKECTOMY      HX HEENT      Zygomatic arch repair    HX HERNIA REPAIR      HX ORTHOPAEDIC      Broken tibia and fibula    HX VASECTOMY         Social History   Substance Use Topics    Smoking status: Current Every Day Smoker     Packs/day: 0.50     Years: 40.00    Smokeless tobacco: Never Used    Alcohol use No        History reviewed. No pertinent family history. No Known Allergies     Prior to Admission medications    Medication Sig Start Date End Date Taking? Authorizing Provider   predniSONE (DELTASONE) 2.5 mg tablet Take 7.5 mg by mouth daily (with breakfast). Phys Juan F, MD   oxybutynin chloride XL (DITROPAN XL) 10 mg CR tablet Take 10 mg by mouth daily.     Phys MD Juan F   trimethoprim-sulfamethoxazole (BACTRIM DS) 160-800 mg per tablet Take 1 Tab by mouth two (2) times a day. Phys Other, MD       Review of Systems:  (bold if positive, if negative)    Gen:  fever, chills,Eyes:  ENT:  CVS:  Pulm: GI:   nausea, emesisconstipation  :    MS:  Skin:  Psych:  Endo:    Hem:  Renal:    Neuro:        Objective:      VITALS:    Vital signs reviewed; most recent are:    Visit Vitals    /59    Pulse 84    Temp (!) 103.5 °F (39.7 °C)    Resp 25    Ht 6' 1\" (1.854 m)    Wt 79.4 kg (175 lb)    SpO2 (!) 88%    BMI 23.09 kg/m2     SpO2 Readings from Last 6 Encounters:   10/12/17 (!) 88%   09/03/17 95%   07/13/17 95%        No intake or output data in the 24 hours ending 10/12/17 1923     Exam:     Physical Exam:    Gen:  Well-developed, well-nourished, in no acute distress  HEENT:  Pink conjunctivae, PERRL, hearing intact to voice, moist mucous membranes  Neck:  Supple, without masses, thyroid non-tender  Resp:  No accessory muscle use, clear breath sounds without wheezes rales or rhonchi  Card:  No murmurs, normal S1, S2 without thrills, bruits or peripheral edema  Abd:  Soft, non-tender, non-distended, normoactive bowel sounds are present, no mass  Lymph:  No cervical or inguinal adenopathy  Musc:  No cyanosis or clubbing  Skin:  No rashes or ulcers, skin turgor is good  Neuro:  Cranial nerves are grossly intact, mild motor weakness, follows commands appropriately  Psych:  Good insight, oriented to person, place and time, alert     Labs:    Recent Labs      10/12/17   1816   WBC  13.3*   HGB  15.2   HCT  44.6   PLT  134*     Recent Labs      10/12/17   1816   NA  137   K  4.3   CL  101   CO2  22   GLU  157*   BUN  16   CREA  1.66*   CA  8.6   MG  1.9     No results found for: GLUCPOC  No results for input(s): PH, PCO2, PO2, HCO3, FIO2 in the last 72 hours. No results for input(s): INR in the last 72 hours.     No lab exists for component: INREXT  All Micro Results Procedure Component Value Units Date/Time    CULTURE, URINE [[id number]] Collected:  10/12/17 1832    Order Status:  Completed Updated:  10/12/17 1913    CULTURE, BLOOD, PAIRED [[id number]]     Order Status:  Sent Specimen:  Blood           I have reviewed previous records       Assessment and Plan:      Complicated UTI (urinary tract infection), due to indwelling mariscal catheter - POA. Presumed E coli. Awaiting sensitivities. Ceftriaxone for now. Consult urology to determine mariscal removal or not. Sepsis / Leukocytosis / Fever / Sinus tachycardia - POA due to UTI. Severe based on elevated lactic, and mild renal insufficiency and hypoxia. Sepsis protocol ordered. Fluid bolus given. Follow lactate. Blood cx. Supportive Care. Abx as above. No sign of SHOCK    Renal insufficiency / Dehydration / CKD (chronic kidney disease), stage III - POA, unclear etiology. GI loss, poor PO intake, effect of Bactrim, decreased renal flow related to sepsis. Monitor with hydration. Nausea & vomiting - POA, likely related to infection or to side effect of bactrim. Prn zofran. Diet if tolerated. Cough / Hypoxia - Mild, POA, may be due to mild aspiration, vs atelectasis, vs metabolic demand. Oxygen as needed. Monitor. Urine retention / Overactive bladder - Chronic, requiring mariscal for UOP, it has been in for 6 weeks. There is a plan for TURP. No longer on oxybutynin. Sjoegren syndrome - Usually take 7.5 mg prednisone daily, but if hypotension or shock develop, would need stress dose steroids.      Telemetry reviewed:   normal sinus rhythm    Risk of deterioration: high      Total time spent with patient: [address] [place] [address] discussed with: Patient, Family, Nursing Staff and >50% of time spent in counseling and coordination of care    Discussed:  Care Plan       ___________________________________________________    Attending Physician: Madhuri Claros MD

## 2017-10-13 LAB
ANION GAP SERPL CALC-SCNC: 7 MMOL/L (ref 5–15)
ATRIAL RATE: 86 BPM
BUN SERPL-MCNC: 13 MG/DL (ref 6–20)
BUN/CREAT SERPL: 9 (ref 12–20)
CALCIUM SERPL-MCNC: 8 MG/DL (ref 8.5–10.1)
CALCULATED P AXIS, ECG09: 57 DEGREES
CALCULATED R AXIS, ECG10: -41 DEGREES
CALCULATED T AXIS, ECG11: 35 DEGREES
CHLORIDE SERPL-SCNC: 108 MMOL/L (ref 97–108)
CO2 SERPL-SCNC: 23 MMOL/L (ref 21–32)
CREAT SERPL-MCNC: 1.47 MG/DL (ref 0.7–1.3)
DIAGNOSIS, 93000: NORMAL
ERYTHROCYTE [DISTWIDTH] IN BLOOD BY AUTOMATED COUNT: 14 % (ref 11.5–14.5)
GLUCOSE SERPL-MCNC: 112 MG/DL (ref 65–100)
HCT VFR BLD AUTO: 37.6 % (ref 36.6–50.3)
HGB BLD-MCNC: 12.4 G/DL (ref 12.1–17)
LACTATE SERPL-SCNC: 1.8 MMOL/L (ref 0.4–2)
MAGNESIUM SERPL-MCNC: 2.3 MG/DL (ref 1.6–2.4)
MCH RBC QN AUTO: 32 PG (ref 26–34)
MCHC RBC AUTO-ENTMCNC: 33 G/DL (ref 30–36.5)
MCV RBC AUTO: 97.2 FL (ref 80–99)
P-R INTERVAL, ECG05: 194 MS
PLATELET # BLD AUTO: 124 K/UL (ref 150–400)
POTASSIUM SERPL-SCNC: 4.2 MMOL/L (ref 3.5–5.1)
Q-T INTERVAL, ECG07: 370 MS
QRS DURATION, ECG06: 78 MS
QTC CALCULATION (BEZET), ECG08: 442 MS
RBC # BLD AUTO: 3.87 M/UL (ref 4.1–5.7)
SODIUM SERPL-SCNC: 138 MMOL/L (ref 136–145)
VENTRICULAR RATE, ECG03: 86 BPM
WBC # BLD AUTO: 13.2 K/UL (ref 4.1–11.1)

## 2017-10-13 PROCEDURE — 85027 COMPLETE CBC AUTOMATED: CPT | Performed by: INTERNAL MEDICINE

## 2017-10-13 PROCEDURE — 83605 ASSAY OF LACTIC ACID: CPT | Performed by: INTERNAL MEDICINE

## 2017-10-13 PROCEDURE — 36415 COLL VENOUS BLD VENIPUNCTURE: CPT | Performed by: INTERNAL MEDICINE

## 2017-10-13 PROCEDURE — 83735 ASSAY OF MAGNESIUM: CPT | Performed by: INTERNAL MEDICINE

## 2017-10-13 PROCEDURE — 74011250637 HC RX REV CODE- 250/637: Performed by: INTERNAL MEDICINE

## 2017-10-13 PROCEDURE — 74011000258 HC RX REV CODE- 258: Performed by: INTERNAL MEDICINE

## 2017-10-13 PROCEDURE — 74011250636 HC RX REV CODE- 250/636: Performed by: INTERNAL MEDICINE

## 2017-10-13 PROCEDURE — 74011636637 HC RX REV CODE- 636/637: Performed by: INTERNAL MEDICINE

## 2017-10-13 PROCEDURE — 65660000000 HC RM CCU STEPDOWN

## 2017-10-13 PROCEDURE — 80048 BASIC METABOLIC PNL TOTAL CA: CPT | Performed by: INTERNAL MEDICINE

## 2017-10-13 RX ADMIN — PREDNISONE 5 MG: 5 TABLET ORAL at 08:35

## 2017-10-13 RX ADMIN — DOCUSATE SODIUM AND SENNOSIDES 1 TABLET: 8.6; 5 TABLET, FILM COATED ORAL at 08:35

## 2017-10-13 RX ADMIN — DEXTROSE MONOHYDRATE, SODIUM CHLORIDE, AND POTASSIUM CHLORIDE 100 ML/HR: 50; 4.5; 1.49 INJECTION, SOLUTION INTRAVENOUS at 18:56

## 2017-10-13 RX ADMIN — POLYETHYLENE GLYCOL 3350 17 G: 17 POWDER, FOR SOLUTION ORAL at 08:35

## 2017-10-13 RX ADMIN — ACETAMINOPHEN 650 MG: 325 TABLET ORAL at 16:19

## 2017-10-13 RX ADMIN — DEXTROSE MONOHYDRATE, SODIUM CHLORIDE, AND POTASSIUM CHLORIDE 100 ML/HR: 50; 4.5; 1.49 INJECTION, SOLUTION INTRAVENOUS at 09:47

## 2017-10-13 RX ADMIN — CEFTRIAXONE SODIUM 1 G: 1 INJECTION, POWDER, FOR SOLUTION INTRAMUSCULAR; INTRAVENOUS at 19:00

## 2017-10-13 RX ADMIN — ENOXAPARIN SODIUM 40 MG: 40 INJECTION SUBCUTANEOUS at 23:28

## 2017-10-13 NOTE — PROGRESS NOTES
See recent office note below      Mr. Charles Brand presents today with his wife for follow-up of BPH with urinary retention. He has been seen by several different physicians including Dr. Jody Pretty and Dr. Yenny Greer. He has had multiple biopsies in his life. He has a notable history of Sjogren's disease and is on long-term steroid therapy. He has been in retention for over a month now. He has been on a 5 alpha reductase in the past but has not taken this for many years. Most recently he was on Flomax. He apparently went into retention after using oxybutynin. Most recent PSA of 8.6 apparently in range for him given previous biopsy results. Options were discussed with Dr. Yenny Greer. He had a renal bladder  ultrasound for prostate volume showing a 79 g gland and an AUA symptom score of 30 with terrible quality of life. PAST MEDICAL HISTORY:    Allergies: FLOMAX (TAMSULOSIN HCL) (Severe)  RAPAFLO (SILODOSIN) (Severe)  DENIES: Latex, Shellfish, X-Ray Dye, Iodine. Medications: PREDNISONE TABS (PREDNISONE TABS) 7.5mg qd    Illnesses: DENIES: Heart Disease, Pacemaker/Defibrillator, Lung Disease, Diabetes, High Blood Pressure, Bowel Problems, Stroke/Seizure, Kidney Problems, Bleeding Problems, HIV, Hepatitis, or Cancer. Surgeries: Vasectomy. Family History: DENIES: Prostate cancer, Kidney cancer, Kidney disease, Kidney stones. Social History: Full Time. . Smoking status: current every day smoker. Drinks alcohol monthly or less. System Review: Admits to: Dry Mouth, Constipation, Lower Extremity Weakness, Dry Skin, Difficulty Walking, Impaired Sex Drive, and Easy Bleeding. DENIES: Unexplained Weight Loss, Dry Eyes, Leg Swelling, Shortness of Breath, Involuntary Urine Loss, Psychiatric Problems, Rash.      EXAMINATION: Vitals: Pulse: 70 BP: 146/91 Weight: 175 lbs Height: 6' 1\" BMI: 23.17 kg/m^2      DIAGNOSTIC STUDIES:   Negative prostate biopsy  09-13-01 Negative prostate biopsy  02-15-10 Negative prostate biopsy    URINALYSIS  Urine Micro not done    AUA Symptom Score Today: 30/35 (Severe), Quality of Life: Terrible    CYSTOSCOPY: The patient was prepped and draped in a sterile fashion. Cystoscopy was performed using a flexible cystoscope. FINDINGS: URETHRA: normal without strictures or lesions. PROSTATE: lateral lobe hypertrophy. There is a small median prostatic lobe. The prostatic urethra shows severe obstruction. BLADDER: normal without lesions. The bladder wall shows moderate trabeculation. The bladder has Inflammation consistent with catheterization. The trigone and ureteral orifices are normal with clear efflux bilaterally. PSA HISTORY  8.63 ng/ml on 09/07/2017  7.92 ng/ml on 02/16/2017  7.4 ng/ml on 08/16/2016    IMPRESSION:    1. URINARY RETENTION (ICD-788.20)    2. 600.01 BPH W/URINARY OBSTRUCTION (ICD-600.01)    3. Elevated prostate specific antigen (PSA) (ICD-790.93)    PLAN: We had a long conversation today regarding the nature of obstruction and the multiple options for treatment. His gland would be at the extreme upper limits for treatment with Urolift. With his retention, I do believe he would be better served by an outlet reduction procedure. I reviewed options and is going to make range proceed with laser vaporization resection of the prostate. Risks and benefits including bleeding and infection, potential impact on continence and potency, worsening of symptoms and need for further procedures were all reviewed. He understands this does not eliminate his ability get prostate cancer in his lifetime. We could consider pre-procedure biopsy but he is content with his previous negative biopsy results and PSA basic stability. He apparently had some bleeding prior to going into retention. Will obtain a renal ultrasound to ensure no obvious renal abnormalities in addition to his enlarged prostate which was likely the source.     The patient was given a verbal/written log of Blood Pressure and recommendations. Moderate Hypertension: Instruct patient to have prompt (1-2 weeks) BP followup. cc: Angelique Marcos MD  Transcribed by Speech to Text Technology    Today's Services  Cysto, E&M Service    Upcoming Orders  Schedule Surgery - Schedule for TURP PROSTATE VAPORIZATION PROCEDURE (PVP) at [facility] under General anesthesia. Requesting Next Available (Me).            Electronically signed by Tete Quintana MD on 10/09/2017 at 11:12 PM    ________________________________________________________________________

## 2017-10-13 NOTE — PROGRESS NOTES
Bedside and Verbal shift change report given to Sushant Purdy RN (oncoming nurse) by Deonna Goodman (offgoing nurse). Report included the following information SBAR, Kardex, MAR, Accordion and Recent Results.

## 2017-10-13 NOTE — CONSULTS
Rheumatology Progress Note    Daily Progress Note: 10/13/2017 4:30 PM    History of the Present Illness    68year old male with Sjogren's syndrome (+RINA, +SSB) who is admitted 10/12/17 for sepsis secondary to urinary tract infection. He has an indwelling mariscal for urinary retention and there are plans to further address this with TURP next week. Prior to admission he had developed vomiting and fever with chills. Since admission he has been treated with ceftriaxone, IFV. Regarding sjogren's syndrome, he has had symptoms of sicca, dental caries, as well as myopathy. Management has been with low-dose prednsione 7.5mg daily and he has been stable per most recent clinic notes. He mentions that his muscle strenght had been improving prior to his current illness. Currently he is feeling better and has been using artifical tear eye drops which help the sicca symptoms. Constipation remains an issue and is being treated.     Past Medical History:   Diagnosis Date    CKD (chronic kidney disease), stage III     Sjoegren syndrome (HCC) 04/2016    Urine retention        Current Facility-Administered Medications   Medication Dose Route Frequency    artificial tears (dextran 70-hypromellose) (NATURAL BALANCE) 0.1-0.3 % ophthalmic solution 2 Drop  2 Drop Both Eyes PRN    sodium chloride (NS) flush 5-10 mL  5-10 mL IntraVENous PRN    cefTRIAXone (ROCEPHIN) 1 g in 0.9% sodium chloride (MBP/ADV) 50 mL  1 g IntraVENous Q24H    predniSONE (DELTASONE) tablet 7.5 mg  7.5 mg Oral DAILY WITH BREAKFAST    dextrose 5% - 0.45% NaCl with KCl 20 mEq/L infusion  100 mL/hr IntraVENous CONTINUOUS    acetaminophen (TYLENOL) tablet 650 mg  650 mg Oral Q4H PRN    diphenhydrAMINE (BENADRYL) capsule 25 mg  25 mg Oral Q4H PRN    ondansetron (ZOFRAN) injection 4 mg  4 mg IntraVENous Q4H PRN    enoxaparin (LOVENOX) injection 40 mg  40 mg SubCUTAneous Q24H    polyethylene glycol (MIRALAX) packet 17 g  17 g Oral DAILY    senna-docusate (PERICOLACE) 8.6-50 mg per tablet 1 Tab  1 Tab Oral DAILY        Review of Systems  Skin:  Warm and dry. no hyperpigmentation, vitiligo, or suspicious lesions  Ears, nose, mouth, throat, and face: positive for dry eyes, dry mouth  Respiratory: negative for cough  Gastrointestinal: negative for nausea  Neurological: positive for weakness    Visit Vitals    /72 (BP 1 Location: Left arm)    Pulse 65    Temp 98 °F (36.7 °C)    Resp 18    Ht 6' 1\" (1.854 m)    Wt 79.4 kg (175 lb)    SpO2 97%    BMI 23.09 kg/m2        Physical Examination  Nose: Nares normal. Septum midline. Mucosa normal. No drainage or sinus tenderness. Eyes: irregular corneal surface, no episcleritis, EOMI  Throat: Lips, mucosa, and tongue normal. Teeth and gums normal and abnormal findings: dry mucosa, no ulceration  Lungs: clear to auscultation bilaterally  Heart: regular rate and rhythm, S1, S2 normal, no murmur, click, rub or gallop  Skin: Skin color, texture, turgor normal. No rashes or lesions  Neurologic: no focal deficits, strength 5/5 hand , leg raise, plantar/dorsiflexion feet  Musculoskeletal - no joint tenderness, deformity or swelling  Tender Points: none      Recent Results (from the past 24 hour(s))   CBC WITH AUTOMATED DIFF    Collection Time: 10/12/17  6:16 PM   Result Value Ref Range    WBC 13.3 (H) 4.1 - 11.1 K/uL    RBC 4.56 4.10 - 5.70 M/uL    HGB 15.2 12.1 - 17.0 g/dL    HCT 44.6 36.6 - 50.3 %    MCV 97.8 80.0 - 99.0 FL    MCH 33.3 26.0 - 34.0 PG    MCHC 34.1 30.0 - 36.5 g/dL    RDW 13.9 11.5 - 14.5 %    PLATELET 894 (L) 792 - 400 K/uL    NEUTROPHILS 96 (H) 32 - 75 %    LYMPHOCYTES 2 (L) 12 - 49 %    MONOCYTES 2 (L) 5 - 13 %    EOSINOPHILS 0 0 - 7 %    BASOPHILS 0 0 - 1 %    ABS. NEUTROPHILS 12.7 (H) 1.8 - 8.0 K/UL    ABS. LYMPHOCYTES 0.3 (L) 0.8 - 3.5 K/UL    ABS. MONOCYTES 0.3 0.0 - 1.0 K/UL    ABS. EOSINOPHILS 0.0 0.0 - 0.4 K/UL    ABS.  BASOPHILS 0.0 0.0 - 0.1 K/UL    DF SMEAR SCANNED      RBC COMMENTS NORMOCYTIC, NORMOCHROMIC      WBC COMMENTS TOXIC GRANULATION     METABOLIC PANEL, BASIC    Collection Time: 10/12/17  6:16 PM   Result Value Ref Range    Sodium 137 136 - 145 mmol/L    Potassium 4.3 3.5 - 5.1 mmol/L    Chloride 101 97 - 108 mmol/L    CO2 22 21 - 32 mmol/L    Anion gap 14 5 - 15 mmol/L    Glucose 157 (H) 65 - 100 mg/dL    BUN 16 6 - 20 MG/DL    Creatinine 1.66 (H) 0.70 - 1.30 MG/DL    BUN/Creatinine ratio 10 (L) 12 - 20      GFR est AA 49 (L) >60 ml/min/1.73m2    GFR est non-AA 40 (L) >60 ml/min/1.73m2    Calcium 8.6 8.5 - 10.1 MG/DL   CK W/ REFLX CKMB    Collection Time: 10/12/17  6:16 PM   Result Value Ref Range    CK 60 39 - 308 U/L   MAGNESIUM    Collection Time: 10/12/17  6:16 PM   Result Value Ref Range    Magnesium 1.9 1.6 - 2.4 mg/dL   NT-PRO BNP    Collection Time: 10/12/17  6:16 PM   Result Value Ref Range    NT pro- 0 - 450 PG/ML   TROPONIN I    Collection Time: 10/12/17  6:16 PM   Result Value Ref Range    Troponin-I, Qt. <0.04 <0.05 ng/mL   LACTIC ACID    Collection Time: 10/12/17  6:16 PM   Result Value Ref Range    Lactic acid 3.2 (HH) 0.4 - 2.0 MMOL/L   URINALYSIS W/ REFLEX CULTURE    Collection Time: 10/12/17  6:32 PM   Result Value Ref Range    Color YELLOW/STRAW      Appearance CLEAR CLEAR      Specific gravity 1.017 1.003 - 1.030      pH (UA) 6.0 5.0 - 8.0      Protein TRACE (A) NEG mg/dL    Glucose NEGATIVE  NEG mg/dL    Ketone NEGATIVE  NEG mg/dL    Bilirubin NEGATIVE  NEG      Blood MODERATE (A) NEG      Urobilinogen 0.2 0.2 - 1.0 EU/dL    Nitrites POSITIVE (A) NEG      Leukocyte Esterase SMALL (A) NEG      WBC 10-20 0 - 4 /hpf    RBC 10-20 0 - 5 /hpf    Epithelial cells FEW FEW /lpf    Bacteria 2+ (A) NEG /hpf    UA:UC IF INDICATED URINE CULTURE ORDERED (A) CNI      Hyaline cast 0-2 0 - 5 /lpf   CULTURE, BLOOD, PAIRED    Collection Time: 10/12/17  6:32 PM   Result Value Ref Range    Special Requests: NO SPECIAL REQUESTS      Culture result: NO GROWTH AFTER 12 HOURS CULTURE, URINE    Collection Time: 10/12/17  6:32 PM   Result Value Ref Range    Special Requests: NO SPECIAL REQUESTS  Reflexed from [id number]        [place] Count >100,000  COLONIES/mL        Culture result: GRAM NEGATIVE RODS (A)     EKG, 12 LEAD, INITIAL    Collection Time: 10/12/17  6:40 PM   Result Value Ref Range    Ventricular Rate 86 BPM    Atrial Rate 86 BPM    P-R Interval 194 ms    QRS Duration 78 ms    Q-T Interval 370 ms    QTC Calculation (Bezet) 442 ms    Calculated P Axis 57 degrees    Calculated R Axis -41 degrees    Calculated T Axis 35 degrees    Diagnosis       Sinus rhythm and PACs. Nonspecific ST and T wave abnormality  Left axis deviation  Abnormal ECG  No previous ECGs available  Confirmed by Syd Hastings M.D., Garfield Johnson ([id number]) on 10/13/2017 9:35:09 AM     LACTIC ACID    Collection Time: 10/13/17  3:18 AM   Result Value Ref Range    Lactic acid 1.8 0.4 - 2.0 MMOL/L   CBC W/O DIFF    Collection Time: 10/13/17  3:18 AM   Result Value Ref Range    WBC 13.2 (H) 4.1 - 11.1 K/uL    RBC 3.87 (L) 4.10 - 5.70 M/uL    HGB 12.4 12.1 - 17.0 g/dL    HCT 37.6 36.6 - 50.3 %    MCV 97.2 80.0 - 99.0 FL    MCH 32.0 26.0 - 34.0 PG    MCHC 33.0 30.0 - 36.5 g/dL    RDW 14.0 11.5 - 14.5 %    PLATELET 215 (L) 042 - 400 K/uL   MAGNESIUM    Collection Time: 10/13/17  3:18 AM   Result Value Ref Range    Magnesium 2.3 1.6 - 2.4 mg/dL   METABOLIC PANEL, BASIC    Collection Time: 10/13/17  3:18 AM   Result Value Ref Range    Sodium 138 136 - 145 mmol/L    Potassium 4.2 3.5 - 5.1 mmol/L    Chloride 108 97 - 108 mmol/L    CO2 23 21 - 32 mmol/L    Anion gap 7 5 - 15 mmol/L    Glucose 112 (H) 65 - 100 mg/dL    BUN 13 6 - 20 MG/DL    Creatinine 1.47 (H) 0.70 - 1.30 MG/DL    BUN/Creatinine ratio 9 (L) 12 - 20      GFR est AA 56 (L) >60 ml/min/1.73m2    GFR est non-AA 46 (L) >60 ml/min/1.73m2    Calcium 8.0 (L) 8.5 - 10.1 MG/DL         Assessment/Plan:     1.   Sjogren's Syndrome   -currently stable   -would continue Prednisone 7.5mg daily as he is on it chronically and at this dose will not significantly impede clearance of infection. Additionally it is unlikely that he would need stress dose steroids for TURP procedure. 2. Long-Term management of Corticosteroids:   -monitoring long term complications, and disease activity, no immediate changes required at this time  2. Sepsis secondary to E. Coli Urinary Tract infection   -improving on Ceftriaxone  3. Constipation   -miralax, hydration      Total time spent with patient: 15 minutes.

## 2017-10-13 NOTE — PROGRESS NOTES
See recent urine culture below from 10/11    Allergies   FLOMAX (TAMSULOSIN HCL) (Severe)  RAPAFLO (SILODOSIN) (Severe)    Date of Collection: 10/09/2017  Office: [facility]  Diagonosis: N39.0 UTI  Comments: davey      Date of Sensitivity:   10/11/2017  Organism 1:   E. coli  [place] count:   confluent  ANTIMICROBIAL SENSITIVITY  ___________________________________________    Levaquin      : S  Ciprofloxacin : S  Bactrim       : S  Gentamycin    : S  Macrobid      : I  Augmentin     : R  Monurol       : S  Ceftin        : S    Bacterial identifications based on the biochemical reactions evidenced by Uricult and colony morphology will result only in presumptive identification. Bacterial variation may occur and atypical strains may be isolated. In instances where a definitive bacterial identification is necessary for proper patient management, a request can be made for the culture paddle to be sent to a reference laboratory for further identification. Current Medications:  PREDNISONE TABS (PREDNISONE TABS) 7.5mg qd          Electronically signed by Paco Newton on 10/11/2017 at 7:32 AM    Electronically signed by Brandan Moody MD on 10/11/2017 at 9:52 PM  ________________________________________________________________________  ROUTED TO PHONE NURSE.       Electronically signed by Saranya Hernandez Phlebotomist on 10/11/2017 at 7:39 AM    ________________________________________________________________________

## 2017-10-13 NOTE — PROGRESS NOTES
10/13/2017 5:02 PM CM spoke with pt to complete assessment and discuss discharge plans. Pt lives with his wife in a 2 story home. He has confirmed his address and also his phone number. There are two steps to enter. Pt's room is on the 1st floor. Pt has been using a three-wheeled rollator for dme. Pt states that he was had been using a cane, but recently had to switch back to using a rollator. PCP is Dr. Parker Pillai. Pt gets meds filled at [address] [place] [address], [phone]. CM will continue to follow and assist as needed.

## 2017-10-13 NOTE — PROGRESS NOTES
Problem: Falls - Risk of  Goal: *Absence of Falls  Document Marlo Fall Risk and appropriate interventions in the flowsheet.    Outcome: Progressing Towards Goal  Fall Risk Interventions:  Mobility Interventions: Utilize walker, cane, or other assitive device

## 2017-10-13 NOTE — PROGRESS NOTES
Bedside and Verbal shift change report given to [facility] RN (oncoming nurse) by Lita Pryor RN (offgoing nurse). Report included the following information SBAR, Kardex, Accordion and Recent Results.

## 2017-10-13 NOTE — PROGRESS NOTES
[facility]: MED RECONCILIATION    Comments/Recommendations:     Confirmed pt not taking Rapaflo or Flomax, Lexapro    Medications removed:  · Oxybutynin XL 10mg daily    Information obtained from: Medication bottles, spouse, patient, RxQuery    Allergies: Flomax [tamsulosin] and Rapaflo [silodosin]    Prior to Admission Medications:     Prior to Admission Medications   Prescriptions Last Dose Informant Patient Reported? Taking?   predniSONE (DELTASONE) 2.5 mg tablet 10/12/2017 at AM  Yes Yes   Sig: Take 7.5 mg by mouth daily (with breakfast). trimethoprim-sulfamethoxazole (BACTRIM DS) 160-800 mg per tablet 10/12/2017 at AM  Yes Yes   Sig: Take 1 Tab by mouth two (2) times a day. Facility-Administered Medications: None          Thank you,  Trever Choudhary, Pharm. D.

## 2017-10-13 NOTE — PROGRESS NOTES
TRANSFER - IN REPORT:    Verbal report received from Bertrum Hodgkin, [facility] (name) on Jossue Rush  being received from ED(unit) for routine progression of care      Report consisted of patients Situation, Background, Assessment and   Recommendations(SBAR). Information from the following report(s) SBAR, Kardex, ED Summary and Recent Results was reviewed with the receiving nurse. Opportunity for questions and clarification was provided. Assessment completed upon patients arrival to unit and care assumed.      Primary Nurse Giovanna Mcbride RN and Michael Haas RN performed a dual skin assessment on this patient Impairment noted- see wound doc flow sheet  Tuan score is 23

## 2017-10-13 NOTE — ED NOTES
TRANSFER - OUT REPORT:    Verbal report given to [facility] (name) on Charlotte House  being transferred to [facility] (unit) for routine progression of care       Report consisted of patients Situation, Background, Assessment and   Recommendations(SBAR). Information from the following report(s) SBAR and ED Summary was reviewed with the receiving nurse. Lines:   Peripheral IV 10/12/17 Right Arm (Active)   Site Assessment Clean, dry, & intact 10/12/2017  5:47 PM   Phlebitis Assessment 0 10/12/2017  5:47 PM   Dressing Status Clean, dry, & intact 10/12/2017  5:47 PM   Dressing Type Transparent 10/12/2017  5:47 PM   Hub Color/Line Status Pink 10/12/2017  5:47 PM   Alcohol Cap Used No 10/12/2017  5:47 PM       Peripheral IV 10/12/17 Right Forearm (Active)   Site Assessment Clean, dry, & intact 10/12/2017  6:25 PM   Phlebitis Assessment 0 10/12/2017  6:25 PM   Infiltration Assessment 0 10/12/2017  6:25 PM   Dressing Status Clean, dry, & intact 10/12/2017  6:25 PM   Dressing Type Tape;Transparent 10/12/2017  6:25 PM   Hub Color/Line Status Pink;Flushed;Patent 10/12/2017  6:25 PM   Action Taken Blood drawn 10/12/2017  6:25 PM        Opportunity for questions and clarification was provided.       Patient transported with:   [employer]

## 2017-10-13 NOTE — PROGRESS NOTES
[facility] [address]  [address] [place] [address], [address] [place] [address]  [phone]      Medical Progress Note      NAME: Aries Staley   :  1940  MRM:  [id number]    Date/Time: 10/13/2017  1:03 PM       Assessment and Plan:     Complicated UTI (urinary tract infection), due to indwelling mariscal catheter -  Presumed E coli. and pan-sens per patient's wife. Ceftriaxone for now while monitoring cx, can likely de-escalate in 24 hours. Urology consult appreciated.     Sepsis / Leukocytosis / Fever / Sinus tachycardia - POA due to UTI. Severe based on elevated lactic, and mild renal insufficiency and hypoxia. Improved at present. Continue IVFs, abx, monitoring cx.     Renal insufficiency / Dehydration / CKD (chronic kidney disease), stage III - POA, multifactorial due to GI loss, poor PO intake, effect of Bactrim, decreased renal flow related to sepsis. Continue IVF. Monitor     Nausea & vomiting - POA, Improved. Continue anti-emetics and diet as tolerated     Cough / Hypoxia - Mild, POA, may be due to mild aspiration, vs atelectasis, vs metabolic demand. Resolved     Urine retention / Overactive bladder - Chronic, requiring mariscal for UOP. Urology discussing TURP next week at [facility]. I think this is appropriate.     Sjoegren syndrome - Usually take 7.5 mg prednisone daily but sx worsening and he was given instructions to dose reduce before surgery by Dr. Marisela Balnk. Not sure if sx are worsening due to severe sepsis or not. Pred of 7.5 shouldn't cause HPA suppression. Would appreciate some input from our rheum team.       Subjective:     Chief Complaint:  Patient seen and examined. Chart reviewed. Patient reports feeling a little better but his speech is getting worse and having dry/blurry eyes. ROS:  (bold if positive, if negative)      Tolerating PT  Tolerating Diet        Objective:     Last 24hrs VS reviewed since prior progress note.  Most recent are:    Visit Vitals    /70 (BP 1 Location: Left arm, BP Patient Position: At rest)    Pulse 63    Temp 98.2 °F (36.8 °C)    Resp 20    Ht 6' 1\" (1.854 m)    Wt 79.4 kg (175 lb)    SpO2 97%    BMI 23.09 kg/m2     SpO2 Readings from Last 6 Encounters:   10/13/17 97%   09/03/17 95%   07/13/17 95%          Intake/Output Summary (Last 24 hours) at 10/13/17 1303  Last data filed at 10/13/17 1020   Gross per 24 hour   Intake                0 ml   Output             2335 ml   Net            -2335 ml        Physical Exam:    Gen:  Well-developed, well-nourished, in no acute distress  HEENT:  Pink conjunctivae, PERRL, hearing intact to voice, moist mucous membranes  Neck:  Supple, without masses, thyroid non-tender  Resp:  No accessory muscle use, clear breath sounds without wheezes rales or rhonchi  Card:  No murmurs, normal S1, S2 without thrills, bruits or peripheral edema  Abd:  Soft, non-tender, non-distended, normoactive bowel sounds are present, no palpable organomegaly and no detectable hernias  Lymph:  No cervical or inguinal adenopathy  Musc:  No cyanosis or clubbing  Skin:  No rashes or ulcers, skin turgor is good  Neuro:  Cranial nerves are grossly intact, no focal motor weakness, follows commands appropriately  Psych:  Good insight, oriented to person, place and time, alert    __________________________________________________________________  Medications Reviewed: (see below)  Medications:     Current Facility-Administered Medications   Medication Dose Route Frequency    sodium chloride (NS) flush 5-10 mL  5-10 mL IntraVENous PRN    cefTRIAXone (ROCEPHIN) 1 g in 0.9% sodium chloride (MBP/ADV) 50 mL  1 g IntraVENous Q24H    predniSONE (DELTASONE) tablet 7.5 mg  7.5 mg Oral DAILY WITH BREAKFAST    dextrose 5% - 0.45% NaCl with KCl 20 mEq/L infusion  100 mL/hr IntraVENous CONTINUOUS    acetaminophen (TYLENOL) tablet 650 mg  650 mg Oral Q4H PRN    diphenhydrAMINE (BENADRYL) capsule 25 mg  25 mg Oral Q4H PRN    ondansetron (ZOFRAN) injection 4 mg  4 mg IntraVENous Q4H PRN    enoxaparin (LOVENOX) injection 40 mg  40 mg SubCUTAneous Q24H    polyethylene glycol (MIRALAX) packet 17 g  17 g Oral DAILY    senna-docusate (PERICOLACE) 8.6-50 mg per tablet 1 Tab  1 Tab Oral DAILY        Lab Data Reviewed: (see below)  Lab Review:     Recent Labs      10/13/17   0318  10/12/17   1816   WBC  13.2*  13.3*   HGB  12.4  15.2   HCT  37.6  44.6   PLT  124*  134*     Recent Labs      10/13/17   0318  10/12/17   1816   NA  138  137   K  4.2  4.3   CL  108  101   CO2  23  22   GLU  112*  157*   BUN  13  16   CREA  1.47*  1.66*   CA  8.0*  8.6   MG  2.3  1.9     No results found for: GLUCPOC  No results for input(s): PH, PCO2, PO2, HCO3, FIO2 in the last 72 hours. No results for input(s): INR in the last 72 hours. No lab exists for component: INREXT  All Micro Results     Procedure Component Value Units Date/Time    CULTURE, BLOOD, PAIRED [[id number]] Collected:  10/12/17 1832    Order Status:  Completed Specimen:  Blood Updated:  10/13/17 0865     Special Requests: NO SPECIAL REQUESTS        Culture result: NO GROWTH AFTER 12 HOURS       CULTURE, URINE [[id number]] Collected:  10/12/17 1832    Order Status:  Completed Updated:  10/12/17 0567          I have reviewed notes of prior 24hr.     Other pertinent lab: None    Total time spent with patient: 35 minutes                  Care Plan discussed with: Patient, Family, Nursing Staff, Consultant/Specialist and >50% of time spent in counseling and coordination of care    Discussed:  Care Plan and D/C Planning    Prophylaxis:  Lovenox    Disposition:  Home w/Family           ___________________________________________________    Attending Physician: [address] [place] [address],

## 2017-10-13 NOTE — CONSULTS
Requesting Provider: Ines Rae DO  Reason for Consultation: retention / fever  Pre-existing [facility] Patient:   No                Patient: Kike Raza MRN: [id number]  SSN: [id number]    YOB: 1940  Age: 68 y.o. Sex: male     Location: [facility]       Code Status: Full Code   PCP: Nicole Najera DO  - [phone]   Emergency Contact:  Primary Emergency Contact: Carole Miramontes, Home Phone: [phone]   Race/Bahai/Language: WHITE OR Oracle He / Bronsonconchisadrian Gallardo / Oval Monica   Payor: Payor: Sonya Crocker / Plan: Preggers / Product Type: Medicare /    Prior Admission Data: 9/3/17 [facility] EMERGENCY DEPT     Hospitalized:  Hospital Day: 2 - Admitted 10/12/2017  5:38 PM   POD # * No surgery found *  by * Surgery not found * - Blood Loss: * No surgery found * * Surgery not found *     CONSULTANTS  IP CONSULT TO UROLOGY   ADMISSION DIAGNOSES    ICD-10-CM ICD-9-CM   1. Urinary tract infection associated with indwelling urethral catheter, initial encounter ([facility] T83.511A 996.64    N39.0 599.0   2. Fever, unspecified fever cause R50.9 780.60   3. Sepsis, due to unspecified organism ([facility].) A41.9 038.9     995.91         Assessment/Plan:     · Continue ivabx for now, follow up inpatient cultures and adjust as needed, if cx neg would send on empiric ceftin po. · Will discuss with vidal timing of TURP     CC: Nausea   HPI: He is a 68 y.o. male admitted with fever post cysto and catheter change earlier this week, feels better. On ceftriaxone, was started on bactrim. Urine culture in chart. Pt feels better and fever down.  No bleeding,     Procedure planned in 1 week    Temp (24hrs), Av.6 °F (37.6 °C), Min:98 °F (36.7 °C), Max:103.5 °F (39.7 °C)    Creatinine   Date/Time Value Ref Range Status   10/13/2017 03:18 AM 1.47 (H) 0.70 - 1.30 MG/DL Final   10/12/2017 06:16 PM 1.66 (H) 0.70 - 1.30 MG/DL Final   2017 07:07 PM 1.25 0.70 - 1.30 MG/DL Final   2017 10:24 AM 1.37 (H) 0.70 - 1.30 MG/DL Final     Current Antimicrobial Therapy (168h ago through future)    Ordered     Start Stop    10/12/17 1919  cefTRIAXone (ROCEPHIN) 1 g in 0.9% sodium chloride (MBP/ADV) 50 mL  1 g,   IntraVENous,   EVERY 24 HOURS      10/13/17 2000 --        Diet: DIET CARDIAC Regular -    Urinary Status: [place]     Labs     Lab Results   Component Value Date/Time    Lactic acid 1.8 10/13/2017 03:18 AM    Lactic acid 3.2 10/12/2017 06:16 PM    WBC 13.2 10/13/2017 03:18 AM    HCT 37.6 10/13/2017 03:18 AM    PLATELET 962 55/26/7667 03:18 AM    Sodium 138 10/13/2017 03:18 AM    Potassium 4.2 10/13/2017 03:18 AM    Chloride 108 10/13/2017 03:18 AM    CO2 23 10/13/2017 03:18 AM    BUN 13 10/13/2017 03:18 AM    Creatinine 1.47 10/13/2017 03:18 AM    Glucose 112 10/13/2017 03:18 AM    Calcium 8.0 10/13/2017 03:18 AM    Magnesium 2.3 10/13/2017 03:18 AM     UA: Lab Results   Component Value Date/Time    Color YELLOW/STRAW 10/12/2017 06:32 PM    Appearance CLEAR 10/12/2017 06:32 PM    Specific gravity 1.017 10/12/2017 06:32 PM    pH (UA) 6.0 10/12/2017 06:32 PM    Protein TRACE 10/12/2017 06:32 PM    Glucose NEGATIVE  10/12/2017 06:32 PM    Ketone NEGATIVE  10/12/2017 06:32 PM    Bilirubin NEGATIVE  10/12/2017 06:32 PM    Urobilinogen 0.2 10/12/2017 06:32 PM    Nitrites POSITIVE 10/12/2017 06:32 PM    Leukocyte Esterase SMALL 10/12/2017 06:32 PM    Epithelial cells FEW 10/12/2017 06:32 PM    Bacteria 2+ 10/12/2017 06:32 PM    WBC 10-20 10/12/2017 06:32 PM    RBC 10-20 10/12/2017 06:32 PM     Imaging     No results found for this or any previous visit. US Results (most recent):  No results found for this or any previous visit.     Cultures     All Micro Results     Procedure Component Value Units Date/Time    CULTURE, URINE [[id number]] Collected:  10/12/17 1832    Order Status:  Completed Updated:  10/12/17 2254    CULTURE, BLOOD, PAIRED [[id number]] Collected:  10/12/17 1832    Order Status:  Completed Specimen: Blood Updated:  10/12/17 2018           Past History: (Complete 2+/3 areas)     Allergies   Allergen Reactions    Flomax [Tamsulosin] Other (comments)     Dizzy    Rapaflo [Silodosin] Other (comments)     Dizzy      Current Facility-Administered Medications   Medication Dose Route Frequency    sodium chloride (NS) flush 5-10 mL  5-10 mL IntraVENous PRN    cefTRIAXone (ROCEPHIN) 1 g in 0.9% sodium chloride (MBP/ADV) 50 mL  1 g IntraVENous Q24H    predniSONE (DELTASONE) tablet 7.5 mg  7.5 mg Oral DAILY WITH BREAKFAST    dextrose 5% - 0.45% NaCl with KCl 20 mEq/L infusion  100 mL/hr IntraVENous CONTINUOUS    acetaminophen (TYLENOL) tablet 650 mg  650 mg Oral Q4H PRN    diphenhydrAMINE (BENADRYL) capsule 25 mg  25 mg Oral Q4H PRN    ondansetron (ZOFRAN) injection 4 mg  4 mg IntraVENous Q4H PRN    enoxaparin (LOVENOX) injection 40 mg  40 mg SubCUTAneous Q24H    polyethylene glycol (MIRALAX) packet 17 g  17 g Oral DAILY    senna-docusate (PERICOLACE) 8.6-50 mg per tablet 1 Tab  1 Tab Oral DAILY    Prior to Admission medications    Medication Sig Start Date End Date Taking? Authorizing Provider   predniSONE (DELTASONE) 2.5 mg tablet Take 7.5 mg by mouth daily (with breakfast). Yes Phys Other, MD   trimethoprim-sulfamethoxazole (BACTRIM DS) 160-800 mg per tablet Take 1 Tab by mouth two (2) times a day. Yes Phys Other, MD        PMHx:  has a past medical history of CKD (chronic kidney disease), stage III; Sjoegren syndrome ([facility] (04/2016); and Urine retention. PSurgHx:  has a past surgical history that includes cervical diskectomy; heent; orthopaedic; appendectomy; hernia repair; and vasectomy. PSocHx:  reports that he has been smoking. He has a 20.00 pack-year smoking history. He has never used smokeless tobacco. He reports that he does not drink alcohol or use illicit drugs.    ROS:  (Complete - 10 systems) - positives are bolded : Weightloss (Constitutional), Dry mouth (ENMT), Chest pain (CV), SOB (Respiratory), Constipation (GI), Weakness (MS), Pallor (Skin), TIA Sx (Neuro), Confusion (Psych), Easy bruising (Heme)    Physical Exam: (Comprehesive - 8+ 1995 Systems)     (1) Constitutional:  FIO2:   on SpO2: O2 Sat (%): 98 %  O2 Device: Room air    Patient Vitals for the past 24 hrs:   BP Temp Pulse Resp SpO2 Height Weight   10/13/17 0256 105/58 98.1 °F (36.7 °C) 67 20 98 % - -   10/12/17 2355 102/62 98.5 °F (36.9 °C) 71 18 96 % - -   10/12/17 2120 151/89 98 °F (36.7 °C) 88 18 94 % - -   10/12/17 2045 128/65 - 75 19 93 % - -   10/12/17 2017 99/48 - 83 22 93 % - -   10/12/17 2000 114/53 - 92 20 93 % - -   10/12/17 1900 133/59 - 84 25 (!) 88 % - -   10/12/17 1845 - (!) 103.5 °F (39.7 °C) 86 (!) 34 90 % - -   10/12/17 1746 169/72 100 °F (37.8 °C) 95 17 100 % 6' 1\" (1.854 m) 79.4 kg (175 lb)         Date 10/12/17 0700 - 10/13/17 0659 10/13/17 0700 - 10/14/17 0659   Shift 0532-4148 4135-2046 24 Hour Total 2636-9201 8179-8304 24 Hour Total   I  N  T  A  K  E   Shift Total  (mL/kg)         O  U  T  P  U  T   Urine  (mL/kg/hr)  1575  (1.7) 1575  (0.8)         Urine Voided  1575 1575       Shift Total  (mL/kg)  1575  (19.8) 1575  (19.8)      NET  -1575 -1575      Weight (kg) 79.4 79.4 79.4 79.4 79.4 79.4      (2) ENMT:   moist mucous membranes   (3) Respiratory:  breathing easily   (4) GI:  no abdominal masses, tenderness, no hepatosplenomegaly, no ventral hernia, stool for occult blood not indicated as urologist.   (5) :   Normal, urine clear   (6) Lymphatic:  no adenopathy   (7) Muscloskeletal:  no gross deformity   (8) Skin:  no rash   (9) Neuro:  no focal deficits      Signed By: Vanita Eubanks MD  - October 13, 2017

## 2017-10-14 LAB
ANION GAP SERPL CALC-SCNC: 11 MMOL/L (ref 5–15)
BACTERIA SPEC CULT: ABNORMAL
BACTERIA SPEC CULT: ABNORMAL
BASOPHILS # BLD: 0 K/UL (ref 0–0.1)
BASOPHILS NFR BLD: 0 % (ref 0–1)
BUN SERPL-MCNC: 11 MG/DL (ref 6–20)
BUN/CREAT SERPL: 8 (ref 12–20)
CALCIUM SERPL-MCNC: 8.5 MG/DL (ref 8.5–10.1)
CC UR VC: ABNORMAL
CHLORIDE SERPL-SCNC: 107 MMOL/L (ref 97–108)
CO2 SERPL-SCNC: 21 MMOL/L (ref 21–32)
CREAT SERPL-MCNC: 1.32 MG/DL (ref 0.7–1.3)
EOSINOPHIL # BLD: 0 K/UL (ref 0–0.4)
EOSINOPHIL NFR BLD: 0 % (ref 0–7)
ERYTHROCYTE [DISTWIDTH] IN BLOOD BY AUTOMATED COUNT: 13.9 % (ref 11.5–14.5)
GLUCOSE SERPL-MCNC: 118 MG/DL (ref 65–100)
HCT VFR BLD AUTO: 39.7 % (ref 36.6–50.3)
HGB BLD-MCNC: 13.3 G/DL (ref 12.1–17)
LYMPHOCYTES # BLD: 1 K/UL (ref 0.8–3.5)
LYMPHOCYTES NFR BLD: 11 % (ref 12–49)
MAGNESIUM SERPL-MCNC: 2.1 MG/DL (ref 1.6–2.4)
MCH RBC QN AUTO: 32.8 PG (ref 26–34)
MCHC RBC AUTO-ENTMCNC: 33.5 G/DL (ref 30–36.5)
MCV RBC AUTO: 97.8 FL (ref 80–99)
MONOCYTES # BLD: 0.9 K/UL (ref 0–1)
MONOCYTES NFR BLD: 11 % (ref 5–13)
NEUTS SEG # BLD: 6.7 K/UL (ref 1.8–8)
NEUTS SEG NFR BLD: 78 % (ref 32–75)
PHOSPHATE SERPL-MCNC: 3.7 MG/DL (ref 2.6–4.7)
PLATELET # BLD AUTO: 115 K/UL (ref 150–400)
POTASSIUM SERPL-SCNC: 4.4 MMOL/L (ref 3.5–5.1)
RBC # BLD AUTO: 4.06 M/UL (ref 4.1–5.7)
SERVICE CMNT-IMP: ABNORMAL
SODIUM SERPL-SCNC: 139 MMOL/L (ref 136–145)
WBC # BLD AUTO: 8.7 K/UL (ref 4.1–11.1)

## 2017-10-14 PROCEDURE — 80048 BASIC METABOLIC PNL TOTAL CA: CPT | Performed by: INTERNAL MEDICINE

## 2017-10-14 PROCEDURE — 74011250637 HC RX REV CODE- 250/637: Performed by: INTERNAL MEDICINE

## 2017-10-14 PROCEDURE — 83735 ASSAY OF MAGNESIUM: CPT | Performed by: INTERNAL MEDICINE

## 2017-10-14 PROCEDURE — 87040 BLOOD CULTURE FOR BACTERIA: CPT | Performed by: INTERNAL MEDICINE

## 2017-10-14 PROCEDURE — 74011636637 HC RX REV CODE- 636/637: Performed by: INTERNAL MEDICINE

## 2017-10-14 PROCEDURE — 85025 COMPLETE CBC W/AUTO DIFF WBC: CPT | Performed by: INTERNAL MEDICINE

## 2017-10-14 PROCEDURE — 74011000258 HC RX REV CODE- 258: Performed by: INTERNAL MEDICINE

## 2017-10-14 PROCEDURE — 84100 ASSAY OF PHOSPHORUS: CPT | Performed by: INTERNAL MEDICINE

## 2017-10-14 PROCEDURE — 65660000000 HC RM CCU STEPDOWN

## 2017-10-14 PROCEDURE — 74011250636 HC RX REV CODE- 250/636: Performed by: INTERNAL MEDICINE

## 2017-10-14 PROCEDURE — 36415 COLL VENOUS BLD VENIPUNCTURE: CPT | Performed by: INTERNAL MEDICINE

## 2017-10-14 RX ADMIN — PREDNISONE 7.5 MG: 5 TABLET ORAL at 08:32

## 2017-10-14 RX ADMIN — ALUMINUM HYDROXIDE AND MAGNESIUM HYDROXIDE 30 ML: 200; 200 SUSPENSION ORAL at 21:58

## 2017-10-14 RX ADMIN — ENOXAPARIN SODIUM 40 MG: 40 INJECTION SUBCUTANEOUS at 21:59

## 2017-10-14 RX ADMIN — POLYETHYLENE GLYCOL 3350 17 G: 17 POWDER, FOR SOLUTION ORAL at 08:32

## 2017-10-14 RX ADMIN — DEXTROSE MONOHYDRATE, SODIUM CHLORIDE, AND POTASSIUM CHLORIDE 100 ML/HR: 50; 4.5; 1.49 INJECTION, SOLUTION INTRAVENOUS at 05:20

## 2017-10-14 RX ADMIN — Medication 10 ML: at 21:59

## 2017-10-14 RX ADMIN — DOCUSATE SODIUM AND SENNOSIDES 1 TABLET: 8.6; 5 TABLET, FILM COATED ORAL at 08:32

## 2017-10-14 RX ADMIN — CEFTRIAXONE SODIUM 1 G: 1 INJECTION, POWDER, FOR SOLUTION INTRAMUSCULAR; INTRAVENOUS at 19:34

## 2017-10-14 NOTE — ROUTINE PROCESS
Bedside and Verbal shift change report given to Krunla Chiu (oncoming nurse) by Moshe Hawley (offgoing nurse). Report included the following information SBAR, Kardex, Intake/Output and Recent Results.

## 2017-10-14 NOTE — PROGRESS NOTES
Barber Jacobo [facility]  [address] [facility], [address] [place] [address]  [phone]      Medical Progress Note      NAME: Dexter More   :  1940  MRM:  [id number]    Date/Time: 10/14/2017  1:03 PM       Assessment and Plan:     Complicated UTI (urinary tract infection), due to indwelling mariscal catheter / Gram negative milton bacteremia -  Presumed E coli. and pan-sens per patient's wife. Ceftriaxone for now while monitoring cx. Repeat surveillance cultures. If pan-sensitive on our cultures, he can receive oral abx after 3 days of parenteral abx. Urology consult appreciated. Awaiting update on TURP timing     Sepsis / Leukocytosis / Fever / Sinus tachycardia - POA due to UTI. Severe based on elevated lactic, and mild renal insufficiency and hypoxia. Improved at present. Continue IVFs, abx, monitoring cx.     Renal insufficiency / Dehydration / CKD (chronic kidney disease), stage III - POA, multifactorial due to GI loss, poor PO intake, effect of Bactrim, decreased renal flow related to sepsis. Continue IVF. Monitor     Nausea & vomiting - POA, Improved. Continue anti-emetics and diet as tolerated     Cough / Hypoxia - Mild, POA, may be due to mild aspiration, vs atelectasis, vs metabolic demand. Resolved     Urine retention / Overactive bladder - Chronic, requiring mariscal for UOP. Urology discussing TURP next week at [facility]. I think this is appropriate.     Sjoegren syndrome - Continue 7.5 mg prednisone usha-operatively. Appreciate rheumatology's input       Subjective:     Chief Complaint:  Patient seen and examined. Chart reviewed. Feels well this AM.    ROS:  (bold if positive, if negative)      Tolerating PT  Tolerating Diet        Objective:     Last 24hrs VS reviewed since prior progress note.  Most recent are:    Visit Vitals    /85 (BP 1 Location: Left arm, BP Patient Position: At rest)    Pulse 80    Temp 98.1 °F (36.7 °C)    Resp 18    Ht 6' 1\" (1.854 m)    Wt 81 kg (178 lb 9.2 oz)    SpO2 95%    BMI 23.56 kg/m2     SpO2 Readings from Last 6 Encounters:   10/14/17 95%   09/03/17 95%   07/13/17 95%            Intake/Output Summary (Last 24 hours) at 10/14/17 7146  Last data filed at 10/14/17 0421   Gross per 24 hour   Intake                0 ml   Output             4590 ml   Net            -4590 ml        Physical Exam:    Gen:  Well-developed, well-nourished, in no acute distress  HEENT:  Pink conjunctivae, PERRL, hearing intact to voice, moist mucous membranes  Neck:  Supple, without masses, thyroid non-tender  Resp:  No accessory muscle use, clear breath sounds without wheezes rales or rhonchi  Card:  No murmurs, normal S1, S2 without thrills, bruits or peripheral edema  Abd:  Soft, non-tender, non-distended, normoactive bowel sounds are present, no palpable organomegaly and no detectable hernias  Lymph:  No cervical or inguinal adenopathy  Musc:  No cyanosis or clubbing  Skin:  No rashes or ulcers, skin turgor is good  Neuro:  Cranial nerves are grossly intact, no focal motor weakness, follows commands appropriately  Psych:  Good insight, oriented to person, place and time, alert    __________________________________________________________________  Medications Reviewed: (see below)  Medications:     Current Facility-Administered Medications   Medication Dose Route Frequency    artificial tears (dextran 70-hypromellose) (NATURAL BALANCE) 0.1-0.3 % ophthalmic solution 2 Drop  2 Drop Both Eyes PRN    sodium chloride (NS) flush 5-10 mL  5-10 mL IntraVENous PRN    cefTRIAXone (ROCEPHIN) 1 g in 0.9% sodium chloride (MBP/ADV) 50 mL  1 g IntraVENous Q24H    predniSONE (DELTASONE) tablet 7.5 mg  7.5 mg Oral DAILY WITH BREAKFAST    dextrose 5% - 0.45% NaCl with KCl 20 mEq/L infusion  100 mL/hr IntraVENous CONTINUOUS    acetaminophen (TYLENOL) tablet 650 mg  650 mg Oral Q4H PRN    diphenhydrAMINE (BENADRYL) capsule 25 mg  25 mg Oral Q4H PRN    ondansetron (ZOFRAN) injection 4 mg  4 mg IntraVENous Q4H PRN    enoxaparin (LOVENOX) injection 40 mg  40 mg SubCUTAneous Q24H    polyethylene glycol (MIRALAX) packet 17 g  17 g Oral DAILY    senna-docusate (PERICOLACE) 8.6-50 mg per tablet 1 Tab  1 Tab Oral DAILY        Lab Data Reviewed: (see below)  Lab Review:     Recent Labs      10/14/17   0331  10/13/17   0318  10/12/17   1816   WBC  8.7  13.2*  13.3*   HGB  13.3  12.4  15.2   HCT  39.7  37.6  44.6   PLT  115*  124*  134*     Recent Labs      10/14/17   0331  10/13/17   0318  10/12/17   1816   NA  139  138  137   K  4.4  4.2  4.3   CL  107  108  101   CO2  21  23  22   GLU  118*  112*  157*   BUN  11  13  16   CREA  1.32*  1.47*  1.66*   CA  8.5  8.0*  8.6   MG  2.1  2.3  1.9   PHOS  3.7   --    --      No results found for: GLUCPOC  No results for input(s): PH, PCO2, PO2, HCO3, FIO2 in the last 72 hours. No results for input(s): INR in the last 72 hours. No lab exists for component: INREXT, INREXT  All Micro Results     Procedure Component Value Units Date/Time    CULTURE, BLOOD, PAIRED [[id number]]  (Abnormal) Collected:  10/12/17 1832    Order Status:  Completed Specimen:  Blood Updated:  10/13/17 2032     Special Requests: NO SPECIAL REQUESTS        Culture result:         GRAM NEGATIVE RODS GROWING IN 1 OF 4 BOTTLES DRAWN ( HAND NO. 2 SITE) (A)              PRELIMINARY REPORT OF GRAM NEGATIVE RODS GROWING IN 1 OF 4 BOTTLES DRAWN CALLED TO AND READ BACK BY MS YISEL RN ON 10/13/17 AT 2030 ([facility]). MS (A)              REMAINING BOTTLE(S) HAS/HAVE NO GROWTH SO FAR    CULTURE, URINE [[id number]]  (Abnormal) Collected:  10/12/17 1832    Order Status:  Completed Specimen:  Urine Updated:  10/13/17 1309     Special Requests: --        NO SPECIAL REQUESTS  Reflexed from [id number]       [place] Count --        >100,000  COLONIES/mL       Culture result: GRAM NEGATIVE RODS (A)             I have reviewed notes of prior 24hr.     Other pertinent lab: None    Total time spent with patient: 40 minutes                  Care Plan discussed with: Patient, Family, Nursing Staff, Consultant/Specialist and >50% of time spent in counseling and coordination of care    Discussed:  Care Plan and D/C Planning    Prophylaxis:  Lovenox    Disposition:  Home w/Family           ___________________________________________________    Attending Physician: Davon Guerin DO

## 2017-10-14 NOTE — PROGRESS NOTES
Doing better. WBC 8.7. Creatinine 1.32. Gram negative rods in urine, blood cxs NGTD. Plan is for Greenlight PVP by Dr. Jacqui Finley when appropriately treated.

## 2017-10-14 NOTE — PROGRESS NOTES
Bedside shift change report given to Alysa Ugarte RN (oncoming nurse) by Lanny Isidro, Student RN (offgoing nurse). Report included the following information SBAR, Kardex, Intake/Output, MAR, Accordion, Recent Results and Cardiac Rhythm Sinus arrythmia.

## 2017-10-15 LAB
ANION GAP SERPL CALC-SCNC: 10 MMOL/L (ref 5–15)
BASOPHILS # BLD: 0 K/UL (ref 0–0.1)
BASOPHILS NFR BLD: 0 % (ref 0–1)
BUN SERPL-MCNC: 12 MG/DL (ref 6–20)
BUN/CREAT SERPL: 9 (ref 12–20)
CALCIUM SERPL-MCNC: 8.5 MG/DL (ref 8.5–10.1)
CHLORIDE SERPL-SCNC: 103 MMOL/L (ref 97–108)
CO2 SERPL-SCNC: 23 MMOL/L (ref 21–32)
CREAT SERPL-MCNC: 1.27 MG/DL (ref 0.7–1.3)
EOSINOPHIL # BLD: 0.1 K/UL (ref 0–0.4)
EOSINOPHIL NFR BLD: 1 % (ref 0–7)
ERYTHROCYTE [DISTWIDTH] IN BLOOD BY AUTOMATED COUNT: 13.9 % (ref 11.5–14.5)
GLUCOSE SERPL-MCNC: 125 MG/DL (ref 65–100)
HCT VFR BLD AUTO: 41.8 % (ref 36.6–50.3)
HGB BLD-MCNC: 14.1 G/DL (ref 12.1–17)
LYMPHOCYTES # BLD: 1.4 K/UL (ref 0.8–3.5)
LYMPHOCYTES NFR BLD: 18 % (ref 12–49)
MAGNESIUM SERPL-MCNC: 2.2 MG/DL (ref 1.6–2.4)
MCH RBC QN AUTO: 32.4 PG (ref 26–34)
MCHC RBC AUTO-ENTMCNC: 33.7 G/DL (ref 30–36.5)
MCV RBC AUTO: 96.1 FL (ref 80–99)
MONOCYTES # BLD: 0.7 K/UL (ref 0–1)
MONOCYTES NFR BLD: 10 % (ref 5–13)
NEUTS SEG # BLD: 5.3 K/UL (ref 1.8–8)
NEUTS SEG NFR BLD: 71 % (ref 32–75)
PHOSPHATE SERPL-MCNC: 3.4 MG/DL (ref 2.6–4.7)
PLATELET # BLD AUTO: 134 K/UL (ref 150–400)
POTASSIUM SERPL-SCNC: 3.7 MMOL/L (ref 3.5–5.1)
RBC # BLD AUTO: 4.35 M/UL (ref 4.1–5.7)
SODIUM SERPL-SCNC: 136 MMOL/L (ref 136–145)
WBC # BLD AUTO: 7.4 K/UL (ref 4.1–11.1)

## 2017-10-15 PROCEDURE — 36415 COLL VENOUS BLD VENIPUNCTURE: CPT | Performed by: INTERNAL MEDICINE

## 2017-10-15 PROCEDURE — 74011000258 HC RX REV CODE- 258: Performed by: INTERNAL MEDICINE

## 2017-10-15 PROCEDURE — 85025 COMPLETE CBC W/AUTO DIFF WBC: CPT | Performed by: INTERNAL MEDICINE

## 2017-10-15 PROCEDURE — 74011250636 HC RX REV CODE- 250/636: Performed by: INTERNAL MEDICINE

## 2017-10-15 PROCEDURE — 97530 THERAPEUTIC ACTIVITIES: CPT

## 2017-10-15 PROCEDURE — 74011250637 HC RX REV CODE- 250/637: Performed by: INTERNAL MEDICINE

## 2017-10-15 PROCEDURE — 97161 PT EVAL LOW COMPLEX 20 MIN: CPT

## 2017-10-15 PROCEDURE — 84100 ASSAY OF PHOSPHORUS: CPT | Performed by: INTERNAL MEDICINE

## 2017-10-15 PROCEDURE — 65660000000 HC RM CCU STEPDOWN

## 2017-10-15 PROCEDURE — 74011636637 HC RX REV CODE- 636/637: Performed by: INTERNAL MEDICINE

## 2017-10-15 PROCEDURE — 93005 ELECTROCARDIOGRAM TRACING: CPT

## 2017-10-15 PROCEDURE — 83735 ASSAY OF MAGNESIUM: CPT | Performed by: INTERNAL MEDICINE

## 2017-10-15 PROCEDURE — 80048 BASIC METABOLIC PNL TOTAL CA: CPT | Performed by: INTERNAL MEDICINE

## 2017-10-15 PROCEDURE — 87040 BLOOD CULTURE FOR BACTERIA: CPT | Performed by: INTERNAL MEDICINE

## 2017-10-15 RX ORDER — METOPROLOL TARTRATE 25 MG/1
25 TABLET, FILM COATED ORAL EVERY 12 HOURS
Status: DISCONTINUED | OUTPATIENT
Start: 2017-10-15 | End: 2017-10-20 | Stop reason: HOSPADM

## 2017-10-15 RX ORDER — FACIAL-BODY WIPES
10 EACH TOPICAL DAILY PRN
Status: DISCONTINUED | OUTPATIENT
Start: 2017-10-15 | End: 2017-10-20 | Stop reason: HOSPADM

## 2017-10-15 RX ADMIN — Medication 10 ML: at 21:00

## 2017-10-15 RX ADMIN — PREDNISONE 7.5 MG: 5 TABLET ORAL at 08:50

## 2017-10-15 RX ADMIN — DOCUSATE SODIUM AND SENNOSIDES 1 TABLET: 8.6; 5 TABLET, FILM COATED ORAL at 08:50

## 2017-10-15 RX ADMIN — POLYETHYLENE GLYCOL 3350 17 G: 17 POWDER, FOR SOLUTION ORAL at 08:50

## 2017-10-15 RX ADMIN — METOPROLOL TARTRATE 25 MG: 25 TABLET ORAL at 16:42

## 2017-10-15 RX ADMIN — ENOXAPARIN SODIUM 40 MG: 40 INJECTION SUBCUTANEOUS at 21:00

## 2017-10-15 RX ADMIN — LACTULOSE 20 G: 20 SOLUTION ORAL at 13:17

## 2017-10-15 RX ADMIN — CEFTRIAXONE SODIUM 1 G: 1 INJECTION, POWDER, FOR SOLUTION INTRAMUSCULAR; INTRAVENOUS at 20:17

## 2017-10-15 RX ADMIN — ALUMINUM HYDROXIDE AND MAGNESIUM HYDROXIDE 30 ML: 200; 200 SUSPENSION ORAL at 20:57

## 2017-10-15 NOTE — PROGRESS NOTES
Spoke to dr Maryanne Chaves twice regarding patients heart rate   ordered EKG dr notified of rhythm ordered metoprolol to control heart rate and cardiology consult and echo.

## 2017-10-15 NOTE — PROGRESS NOTES
6442  Bedside and Verbal shift change report given to Chuy Arenas, RN (oncoming nurse) by Kathy Soriano RN (offgoing nurse). Report included the following information SBAR, Kardex, Intake/Output, MAR, Recent Results, Med Rec Status and Cardiac Rhythm NSR.     0105  Received call from [facility] in the [facility] lab. Patient's blood cultures flagged and are growing gram negative rods. Dr. Anna Chavez made aware. 2210  Pt complaining of heartburn. Dr. Anna Chavez stated to order Maalox every 6 hours as needed for heartburn/indigestion. Will continue to monitor.

## 2017-10-15 NOTE — PROGRESS NOTES
Bedside and Verbal shift change report given to Emanuel aldana  (oncoming nurse) by Tana Francois  (offgoing nurse). Report included the following information SBAR and Kardex.

## 2017-10-15 NOTE — PROGRESS NOTES
Problem: Mobility Impaired (Adult and Pediatric)  Goal: *Acute Goals and Plan of Care (Insert Text)  Physical Therapy Goals  Initiated 10/15/2017  1. Patient will transfer from bed to chair and chair to bed with modified independence using the least restrictive device within 7 day(s). 3. Patient will perform sit to stand with modified independence within 7 day(s). 4. Patient will ambulate with modified independence for 300feet with the least restrictive device within 7 day(s). 5. Patient will ascend/descend 2 stairs with 1 handrail(s) with modified independence within 7 day(s). PHYSICAL THERAPY EVALUATION  Patient: Jossue Rush (73 y.o. male)  Date: 10/15/2017  Primary Diagnosis: UTI (urinary tract infection)        Precautions:          ASSESSMENT :  Based on the objective data described below, the patient presents with decreased ambulation and activity tolerance with self-report of feeling weak and below his baseline regarding his functional mobility independence since admission for septic UTI. PTA patient used a tri-wheel rollator walker or SPC for ambulation and was Mod Indep with all functional mobility and self-care, drives and interacts in the community. Patient presents today at a indep level for bed mobility, Supervision with transfers using rollator and SBA for ambulation x 100'. Patient declined to attempt further ambulation due to fear of \"getting too tired for the rest of the day\". Attempted to complete [place] but patient talking over this PT and not following the directions well. Patient believes he needs to go to rehab upon d/c from hospital but PT had long discussion regarding his functional status and that he is not debilitated enough to warrant IP rehab. Based on limited presentation today, patient exhibits mild decline in activity tolerance with some ataxic LE movement during gait R>L.   Patient also presents with safety concerns with transfers using his rollator with improper hand placement in sitting and standing but when educated on this, stated he would \"take under consideration what you're advising\". Also discussed trialing him with a regular RW vs his triwheel rollator but patient again not very receptive to this. Patient will benefit from skilled PT to further test his balance and work to improve his activity tolerance and he may require Home Health PT evaluation upon discharge. Discussed this with patient and he again expressed concern regarding his ability to be alone. Will benefit from education together with his wife regarding his abilities at this time and recommendations. Patient will benefit from skilled intervention to address the above impairments. Patients rehabilitation potential is considered to be Excellent  Factors which may influence rehabilitation potential include:   [ ]         None noted  [ ]         Mental ability/status  [ ]         Medical condition  [ ]         Home/family situation and support systems  [ ]         Safety awareness  [ ]         Pain tolerance/management  [ ]         Other:        PLAN :  Recommendations and Planned Interventions:  [ ]           Bed Mobility Training             [ ]    Neuromuscular Re-Education  [ ]           Transfer Training                   [ ]    Orthotic/Prosthetic Training  [ ]           Gait Training                         [ ]    Modalities  [ ]           Therapeutic Exercises           [ ]    Edema Management/Control  [ ]           Therapeutic Activities            [ ]    Patient and Family Training/Education  [ ]           Other (comment):     Frequency/Duration: Patient will be followed by physical therapy  5 times a week to address goals. Discharge Recommendations: Home Health vs. None  Further Equipment Recommendations for Discharge: Possibly RW       SUBJECTIVE:   Patient stated I'll take what you're saying into consideration.       OBJECTIVE DATA SUMMARY:   HISTORY:    Past Medical History:   Diagnosis Date    CKD (chronic kidney disease), stage III      Sjoegren syndrome ([facility] [address] 04/2016    Urine retention       Past Surgical History:   Procedure Laterality Date    HX APPENDECTOMY        HX CERVICAL DISKECTOMY        HX HEENT         Zygomatic arch repair    HX HERNIA REPAIR        HX ORTHOPAEDIC         Broken tibia and fibula    HX VASECTOMY         Prior Level of Function/Home Situation: Lives on first level of two level home, mod Indep with rollator or SPC  Personal factors and/or comorbidities impacting plan of care:      Home Situation  Home Environment: Private residence  # Steps to Enter: 2  One/Two Story Residence: Two story, live on 1st floor  Living Alone: No  Support Systems: Spouse/Significant Other/Partner  Patient Expects to be Discharged to[de-identified] Private residence  Current DME Used/Available at Home: Tuan Canavan, rollator  Tub or Shower Type: Shower     EXAMINATION/PRESENTATION/DECISION MAKING:   Critical Behavior:  Neurologic State: Alert  Orientation Level: Oriented X4        Hearing: Auditory  Auditory Impairment: Hard of hearing, left side  Skin:  Intact  Edema: None  Range Of Motion:  AROM: Within functional limits           PROM: Within functional limits           Strength:    Strength: Within functional limits                    Tone & Sensation:   Tone: Normal              Sensation: Intact               Coordination:  Coordination: Generally decreased, functional  Vision:      Functional Mobility:  Bed Mobility:  Rolling: Independent  Supine to Sit: Independent  Sit to Supine: Independent     Transfers:  Sit to Stand: Supervision (safety concerns with rollator)  Stand to Sit: Supervision (safety concerns with rollator)        Bed to Chair: Supervision              Balance:   Sitting: Intact  Standing: Intact; With support  Standing - Static: Good  Standing - Dynamic : Fair  Ambulation/Gait Training:  Distance (ft): 100 Feet (ft)  Assistive Device: Gait belt;Walker, rollator  Ambulation - Level of Assistance: Stand-by asssistance        Gait Abnormalities: Decreased step clearance                 Step Length: Right shortened;Left shortened                                           Stairs: Therapeutic Exercises:   None     Functional Measure:  Timed up and go:      Timed Get Up And Go Test: 13      Timed Up and Go and G-code impairment scale:  Percentage of Impairment CH     0%    CI     1-19% CJ     20-39% CK     40-59% CL     60-79% CM     80-99% CN      100%   Timed   Score 0-56 10 11-12 13-14 15-16 17-18 19 20          < than 10 seconds=Normal  Greater then 13.5 seconds (in elderly)=Increased fall risk   Tiarra Posada [place] M. Predicting the probability for falls in community dwelling older adults using the Timed Up and Go Test. Phys Ther. 2000;80:896-903. G codes: In compliance with CMSs Claims Based Outcome Reporting, the following G-code set was chosen for this patient based on their primary functional limitation being treated: The outcome measure chosen to determine the severity of the functional limitation was the TUG with a score of 13 sec which was correlated with the impairment scale.       · Mobility - Walking and Moving Around:               - CURRENT STATUS:    CJ - 20%-39% impaired, limited or restricted               - GOAL STATUS:           CI - 1%-19% impaired, limited or restricted               - D/C STATUS:                       ---------------To be determined---------------      Physical Therapy Evaluation Charge Determination   History Examination Presentation Decision-Making   MEDIUM  Complexity : 1-2 comorbidities / personal factors will impact the outcome/ POC  LOW Complexity : 1-2 Standardized tests and measures addressing body structure, function, activity limitation and / or participation in recreation  LOW Complexity : Stable, uncomplicated  Other outcome measures TUG 13  MEDIUM      Based on the above components, the patient evaluation is determined to be of the following complexity level: LOW      Pain:  Pain Scale 1: Numeric (0 - 10)  Pain Intensity 1: 0              Activity Tolerance:   Reduced from baseline, 100' ambulation     Please refer to the flowsheet for vital signs taken during this treatment. After treatment:   [X]         Patient left in no apparent distress sitting up in chair  [ ]         Patient left in no apparent distress in bed  [X]         Call bell left within reach  [X]         Nursing notified  [X]         Caregiver present  [ ]         Bed alarm activated      COMMUNICATION/EDUCATION:   The patients plan of care was discussed with: Registered Nurse.  [X]         Fall prevention education was provided and the patient/caregiver indicated understanding. [ ]         Patient/family have participated as able in goal setting and plan of care. [X]         Patient/family agree to work toward stated goals and plan of care. [ ]         Patient understands intent and goals of therapy, but is neutral about his/her participation. [ ]         Patient is unable to participate in goal setting and plan of care.      Thank you for this referral.  Erica Estrada, PT   Time Calculation: 32 mins

## 2017-10-15 NOTE — PROGRESS NOTES
Barber Jacobo [facility] [address]  [address] [facility], [address] [place] [address]  [phone]      Medical Progress Note      NAME: Dexter More   :  1940  MRM:  [id number]    Date/Time: 10/15/2017  1:03 PM       Assessment and Plan:     Complicated UTI (urinary tract infection), due to indwelling mariscal catheter / Gram negative milton bacteremia - Ucx with klebsiella species x 2, BCx only with 2/4 K. Oxytoca. Continue IV ceftriaxone. Repeat surveillance cultures. ID consult tomorrow and likely PICC line. Urology consult appreciated. Awaiting update on TURP timing (query about mariscal exchage and TURP timing with IV abx)     Sepsis / Leukocytosis / Fever / Sinus tachycardia - POA due to UTI. Severe based on elevated lactic, and mild renal insufficiency and hypoxia. Improved. Abx as above.     Renal insufficiency / Dehydration / CKD (chronic kidney disease), stage III - POA, multifactorial due to GI loss, poor PO intake, effect of Bactrim, decreased renal flow related to sepsis. Resolved    Cough / Hypoxia - Mild, POA, may be due to mild aspiration, vs atelectasis, vs metabolic demand. Resolved     Urine retention / Overactive bladder - Chronic, requiring mariscal for UOP. Urology discussing TURP next week at [facility]. I think this is appropriate.     Sjoegren syndrome - Continue 7.5 mg prednisone usha-operatively. Appreciate rheumatology's input       Subjective:     Chief Complaint:  Patient seen and examined. Chart reviewed. Feels well this AM. Case discussed at length with wife and patient. All questions answered    ROS:  (bold if positive, if negative)      Tolerating PT  Tolerating Diet        Objective:     Last 24hrs VS reviewed since prior progress note.  Most recent are:    Visit Vitals    /75 (BP 1 Location: Left arm, BP Patient Position: At rest)    Pulse 72    Temp 97.8 °F (36.6 °C)    Resp 16    Ht 6' 1\" (1.854 m)    Wt 81 kg (178 lb 9.2 oz)    SpO2 94%    BMI 23.56 kg/m2 SpO2 Readings from Last 6 Encounters:   10/15/17 94%   09/03/17 95%   07/13/17 95%            Intake/Output Summary (Last 24 hours) at 10/15/17 1239  Last data filed at 10/15/17 0931   Gross per 24 hour   Intake                0 ml   Output             1625 ml   Net            -1625 ml        Physical Exam:    Gen:  Well-developed, well-nourished, in no acute distress  HEENT:  Pink conjunctivae, PERRL, hearing intact to voice, moist mucous membranes  Neck:  Supple, without masses, thyroid non-tender  Resp:  No accessory muscle use, clear breath sounds without wheezes rales or rhonchi  Card:  No murmurs, normal S1, S2 without thrills, bruits or peripheral edema  Abd:  Soft, non-tender, non-distended, normoactive bowel sounds are present, no palpable organomegaly and no detectable hernias  Lymph:  No cervical or inguinal adenopathy  Musc:  No cyanosis or clubbing  Skin:  No rashes or ulcers, skin turgor is good  Neuro:  Cranial nerves are grossly intact, no focal motor weakness, follows commands appropriately  Psych:  Good insight, oriented to person, place and time, alert    __________________________________________________________________  Medications Reviewed: (see below)  Medications:     Current Facility-Administered Medications   Medication Dose Route Frequency    lactulose (CHRONULAC) solution 20 g  30 mL Oral ONCE    bisacodyl (DULCOLAX) suppository 10 mg  10 mg Rectal DAILY PRN    aluminum-magnesium hydroxide (MAALOX) oral suspension 30 mL  30 mL Oral Q6H PRN    artificial tears (dextran 70-hypromellose) (NATURAL BALANCE) 0.1-0.3 % ophthalmic solution 2 Drop  2 Drop Both Eyes PRN    sodium chloride (NS) flush 5-10 mL  5-10 mL IntraVENous PRN    cefTRIAXone (ROCEPHIN) 1 g in 0.9% sodium chloride (MBP/ADV) 50 mL  1 g IntraVENous Q24H    predniSONE (DELTASONE) tablet 7.5 mg  7.5 mg Oral DAILY WITH BREAKFAST    acetaminophen (TYLENOL) tablet 650 mg  650 mg Oral Q4H PRN    diphenhydrAMINE (BENADRYL) capsule 25 mg  25 mg Oral Q4H PRN    ondansetron (ZOFRAN) injection 4 mg  4 mg IntraVENous Q4H PRN    enoxaparin (LOVENOX) injection 40 mg  40 mg SubCUTAneous Q24H    polyethylene glycol (MIRALAX) packet 17 g  17 g Oral DAILY    senna-docusate (PERICOLACE) 8.6-50 mg per tablet 1 Tab  1 Tab Oral DAILY        Lab Data Reviewed: (see below)  Lab Review:     Recent Labs      10/15/17   0443  10/14/17   0331  10/13/17   0318   WBC  7.4  8.7  13.2*   HGB  14.1  13.3  12.4   HCT  41.8  39.7  37.6   PLT  134*  115*  124*     Recent Labs      10/15/17   0443  10/14/17   0331  10/13/17   0318   NA  136  139  138   K  3.7  4.4  4.2   CL  103  107  108   CO2  23  21  23   GLU  125*  118*  112*   BUN  12  11  13   CREA  1.27  1.32*  1.47*   CA  8.5  8.5  8.0*   MG  2.2  2.1  2.3   PHOS  3.4  3.7   --      No results found for: GLUCPOC  No results for input(s): PH, PCO2, PO2, HCO3, FIO2 in the last 72 hours. No results for input(s): INR in the last 72 hours. No lab exists for component: INREXT, INREXT  All Micro Results     Procedure Component Value Units Date/Time    CULTURE, BLOOD, PAIRED [[id number]]     Order Status:  Sent Specimen:  Blood     CULTURE, BLOOD, PAIRED [[id number]]  (Abnormal)  (Susceptibility) Collected:  10/12/17 1832    Order Status:  Completed Specimen:  Blood Updated:  10/15/17 0920     Special Requests: NO SPECIAL REQUESTS        Culture result:         KLEBSIELLA OXYTOCA GROWING IN 1 OF 4 BOTTLES DRAWN . ..(SITE= [facility]) (A)              PRELIMINARY REPORT OF GRAM NEGATIVE RODS GROWING IN 1 OF 4 BOTTLES DRAWN CALLED TO AND READ BACK BY MS YISEL RN ON 10/13/17 AT 2030 ([facility] 529). MS (A)              GRAM NEGATIVE RODS GROWING IN 2 OF 4 BOTTLES DRAWN ( SITE = R ) (A)              PRELIMINARY REPORT OF GRAM NEGATIVE RODS GROWING IN 2 OF 4 BOTTLES DRAWN CALLED TO AND READ BACK BY MS DEYSI PARTIDA [facility] 5MS2) ON 10/15/17 AT 0107.  HH (A)              REMAINING BOTTLE(S) HAS/HAVE NO GROWTH SO FAR CULTURE, BLOOD, PAIRED [[id number]] Collected:  10/14/17 1508    Order Status:  Completed Specimen:  Blood Updated:  10/15/17 0805     Special Requests: NO SPECIAL REQUESTS        Culture result: NO GROWTH AFTER 13 HOURS       CULTURE, URINE [[id number]]  (Abnormal)  (Susceptibility) Collected:  10/12/17 1832    Order Status:  Completed Specimen:  Urine Updated:  10/14/17 0923     Special Requests: --        NO SPECIAL REQUESTS  Reflexed from [id number]       [place] Count --        >100,000  COLONIES/mL       Culture result: KLEBSIELLA PNEUMONIAE (A)         KLEBSIELLA OXYTOCA (A)             I have reviewed notes of prior 24hr.     Other pertinent lab: None    Total time spent with patient: 45 minutes                  Care Plan discussed with: Patient, Family, Nursing Staff and >50% of time spent in counseling and coordination of care    Discussed:  Care Plan and D/C Planning    Prophylaxis:  Lovenox    Disposition:  Home w/Family           ___________________________________________________    Attending Physician: [address] [place] [address], DO

## 2017-10-16 LAB
ANION GAP SERPL CALC-SCNC: 10 MMOL/L (ref 5–15)
ATRIAL RATE: 122 BPM
BUN SERPL-MCNC: 14 MG/DL (ref 6–20)
BUN/CREAT SERPL: 12 (ref 12–20)
CALCIUM SERPL-MCNC: 8.3 MG/DL (ref 8.5–10.1)
CALCULATED R AXIS, ECG10: -40 DEGREES
CALCULATED T AXIS, ECG11: 44 DEGREES
CHLORIDE SERPL-SCNC: 103 MMOL/L (ref 97–108)
CO2 SERPL-SCNC: 24 MMOL/L (ref 21–32)
CREAT SERPL-MCNC: 1.17 MG/DL (ref 0.7–1.3)
DIAGNOSIS, 93000: NORMAL
GLUCOSE SERPL-MCNC: 118 MG/DL (ref 65–100)
MAGNESIUM SERPL-MCNC: 2.3 MG/DL (ref 1.6–2.4)
PHOSPHATE SERPL-MCNC: 3.6 MG/DL (ref 2.6–4.7)
POTASSIUM SERPL-SCNC: 3.8 MMOL/L (ref 3.5–5.1)
Q-T INTERVAL, ECG07: 304 MS
QRS DURATION, ECG06: 84 MS
QTC CALCULATION (BEZET), ECG08: 399 MS
SODIUM SERPL-SCNC: 137 MMOL/L (ref 136–145)
VENTRICULAR RATE, ECG03: 104 BPM

## 2017-10-16 PROCEDURE — 74011250636 HC RX REV CODE- 250/636: Performed by: INTERNAL MEDICINE

## 2017-10-16 PROCEDURE — 83735 ASSAY OF MAGNESIUM: CPT | Performed by: INTERNAL MEDICINE

## 2017-10-16 PROCEDURE — 74011250637 HC RX REV CODE- 250/637: Performed by: INTERNAL MEDICINE

## 2017-10-16 PROCEDURE — 97165 OT EVAL LOW COMPLEX 30 MIN: CPT | Performed by: OCCUPATIONAL THERAPIST

## 2017-10-16 PROCEDURE — 65660000000 HC RM CCU STEPDOWN

## 2017-10-16 PROCEDURE — 36415 COLL VENOUS BLD VENIPUNCTURE: CPT | Performed by: INTERNAL MEDICINE

## 2017-10-16 PROCEDURE — 93306 TTE W/DOPPLER COMPLETE: CPT

## 2017-10-16 PROCEDURE — 74011000258 HC RX REV CODE- 258: Performed by: INTERNAL MEDICINE

## 2017-10-16 PROCEDURE — 74011636637 HC RX REV CODE- 636/637: Performed by: INTERNAL MEDICINE

## 2017-10-16 PROCEDURE — 97535 SELF CARE MNGMENT TRAINING: CPT | Performed by: OCCUPATIONAL THERAPIST

## 2017-10-16 PROCEDURE — 80048 BASIC METABOLIC PNL TOTAL CA: CPT | Performed by: INTERNAL MEDICINE

## 2017-10-16 PROCEDURE — 84100 ASSAY OF PHOSPHORUS: CPT | Performed by: INTERNAL MEDICINE

## 2017-10-16 RX ORDER — FACIAL-BODY WIPES
10 EACH TOPICAL ONCE
Status: COMPLETED | OUTPATIENT
Start: 2017-10-16 | End: 2017-10-16

## 2017-10-16 RX ADMIN — BISACODYL 10 MG: 10 SUPPOSITORY RECTAL at 16:15

## 2017-10-16 RX ADMIN — ENOXAPARIN SODIUM 40 MG: 40 INJECTION SUBCUTANEOUS at 20:32

## 2017-10-16 RX ADMIN — ONDANSETRON 4 MG: 2 INJECTION INTRAMUSCULAR; INTRAVENOUS at 05:28

## 2017-10-16 RX ADMIN — ALUMINUM HYDROXIDE AND MAGNESIUM HYDROXIDE 30 ML: 200; 200 SUSPENSION ORAL at 12:32

## 2017-10-16 RX ADMIN — DOCUSATE SODIUM AND SENNOSIDES 1 TABLET: 8.6; 5 TABLET, FILM COATED ORAL at 09:14

## 2017-10-16 RX ADMIN — BISACODYL 10 MG: 10 SUPPOSITORY RECTAL at 09:22

## 2017-10-16 RX ADMIN — POLYETHYLENE GLYCOL 3350 17 G: 17 POWDER, FOR SOLUTION ORAL at 09:13

## 2017-10-16 RX ADMIN — METOPROLOL TARTRATE 25 MG: 25 TABLET ORAL at 20:33

## 2017-10-16 RX ADMIN — Medication 10 ML: at 19:40

## 2017-10-16 RX ADMIN — CEFTRIAXONE 2 G: 2 INJECTION, POWDER, FOR SOLUTION INTRAMUSCULAR; INTRAVENOUS at 19:39

## 2017-10-16 RX ADMIN — Medication 10 ML: at 05:28

## 2017-10-16 RX ADMIN — PREDNISONE 7.5 MG: 5 TABLET ORAL at 09:14

## 2017-10-16 RX ADMIN — METOPROLOL TARTRATE 25 MG: 25 TABLET ORAL at 09:15

## 2017-10-16 NOTE — WOUND CARE
Wound care consult:  Initial visit for skin breakdown to sacral crease, alert. No distress, mobile in the bed and room. Assessment  All skin folds and bony prominences assessed, turned with staff assistance. Sacral crease- linear area of partial thickness skin loss within the sacral crease related to moisture. Bilateral buttocks area red,intact and blanching with light red rash, related to moisture, recent fever and incontinence. Heels and elbows intact. Treatment  Zinc applied to sacral crease, moisture barrier to buttocks, education given to patient regarding skin care. Repositioned in bed- off loading cushion under patient. Recommendations/Plan  Incontinent care-  Apply Zinc to sacral crease, moisture barrier as needed to buttocks   Will follow, reconsult as needed.   Audelia Lara

## 2017-10-16 NOTE — PROGRESS NOTES
Bedside and Verbal shift change report given to [facility] Felice (oncoming nurse) by Jo Ann Romero (offgoing nurse). Report included the following information SBAR, Kardex, Procedure Summary, Intake/Output, MAR and Recent Results.

## 2017-10-16 NOTE — PROGRESS NOTES
Bedside and Verbal shift change report given to SIMONE Oliver (oncoming nurse) by Janelle Jones. Arielle Shaw (offgoing nurse). Report included the following information SBAR, Kardex, Intake/Output and MAR.

## 2017-10-16 NOTE — CONSULTS
Meg Santamaria MD. [facility]              Patient: Mariya Verdin  : 1940      Today's Date: 10/16/2017          HISTORY OF PRESENT ILLNESS:     History of Present Illness:  Reason for Consult: New onset atrial fibrillation v. SVT with exertion    Mr. Patricia Mcknight is a 68 y.o. With a history of CKD, Sjoegren's syndrome who presents on 10/12/17 with vomiting and chills and is diagnosed with a complicated UTI. Cardiology asked to see for SVT noted yesterday on tele. Mr. Patricia Mcknight denies a history of heart problems or arrhythmias (no afib). He could not tell heart was racing when it was fast on tele. He is doing better from his sepsis overall. No CP or SOB. PAST MEDICAL HISTORY:     Past Medical History:   Diagnosis Date    CKD (chronic kidney disease), stage III     Sjoegren syndrome ([facility] [address] 2016    Urine retention          Past Surgical History:   Procedure Laterality Date    HX APPENDECTOMY      HX CERVICAL DISKECTOMY      HX HEENT      Zygomatic arch repair    HX HERNIA REPAIR      HX ORTHOPAEDIC      Broken tibia and fibula    HX VASECTOMY         MEDICATIONS:     Prior to Admission medications    Medication Sig Start Date End Date Taking? Authorizing Provider   predniSONE (DELTASONE) 2.5 mg tablet Take 7.5 mg by mouth daily (with breakfast).        Cecily Rogel MD   oxybutynin chloride XL (DITROPAN XL) 10 mg CR tablet Take 10 mg by mouth daily.       Cecily Rogel MD   trimethoprim-sulfamethoxazole (BACTRIM DS) 160-800 mg per tablet Take 1 Tab by mouth two (2) times a day.       Cecily Rogel MD        Allergies   Allergen Reactions    Flomax [Tamsulosin] Other (comments)     Dizzy    Rapaflo [Silodosin] Other (comments)     Dizzy             SOCIAL HISTORY:     Social History   Substance Use Topics    Smoking status: Current Every Day Smoker     Packs/day: 0.50     Years: 40.00    Smokeless tobacco: Never Used    Alcohol use No           FAMILY HISTORY:     Dad with CAD          REVIEW OF SYMPTOMS:     Review of Symptoms:  Constitutional: + fever, chills on admission   HEENT: Negative for nosebleeds, tinnitus, and vision changes. Respiratory: Negative for cough, wheezing  Cardiovascular: Negative for orthopnea, claudication, syncope, and PND. Gastrointestinal: Negative for abdominal pain,  , melena. Genitourinary: + UTI   Musculoskeletal: Negative for myalgias. Skin: Negative for rash  Heme: No problems bleeding. Neurological: Negative for speech change and focal weakness. PHYSICAL EXAM:     Physical Exam:  Visit Vitals    /75 (BP 1 Location: Left arm, BP Patient Position: At rest)    Pulse 77    Temp 97.9 °F (36.6 °C)    Resp 18    Ht 6' 1\" (1.854 m)    Wt 174 lb 2.6 oz (79 kg)    SpO2 94%    BMI 22.98 kg/m2     Patient appears generally well, mood and affect are appropriate and pleasant. HEENT:  Hearing intact, non-icteric, normocephalic, atraumatic. Neck Exam: Supple, No JVD or carotid bruits. Lung Exam: Clear to auscultation, even breath sounds. Cardiac Exam: Regular rate and rhythm with no murmur  Abdomen: Soft, non-tender, normal bowel sounds. No bruits or masses. Extremities: Moves all ext well. No lower extremity edema. Vascular: 2+ dorsalis pedis pulses bilaterally.   Psych: Appropriate affect  Neuro - Grossly intact          LABS / OTHER STUDIES:       Recent Results (from the past 24 hour(s))   CULTURE, BLOOD, PAIRED    Collection Time: 10/15/17  2:41 PM   Result Value Ref Range    Special Requests: NO SPECIAL REQUESTS      Culture result: NO GROWTH AFTER 15 HOURS     EKG, 12 LEAD, INITIAL    Collection Time: 10/15/17  3:23 PM   Result Value Ref Range    Ventricular Rate 104 BPM    Atrial Rate 122 BPM    QRS Duration 84 ms    Q-T Interval 304 ms    QTC Calculation (Bezet) 399 ms    Calculated R Axis -40 degrees    Calculated T Axis 44 degrees    Diagnosis       Probably Multifocal atrial tachycardia  Left axis deviation  Nonspecific ST abnormality  Abnormal ECG    Reconfirmed by Chely Leon MD., Hilary ([id number]) on 10/16/2017 43:33:48 AM     METABOLIC PANEL, BASIC    Collection Time: 10/16/17  2:53 AM   Result Value Ref Range    Sodium 137 136 - 145 mmol/L    Potassium 3.8 3.5 - 5.1 mmol/L    Chloride 103 97 - 108 mmol/L    CO2 24 21 - 32 mmol/L    Anion gap 10 5 - 15 mmol/L    Glucose 118 (H) 65 - 100 mg/dL    BUN 14 6 - 20 MG/DL    Creatinine 1.17 0.70 - 1.30 MG/DL    BUN/Creatinine ratio 12 12 - 20      GFR est AA >60 >60 ml/min/1.73m2    GFR est non-AA >60 >60 ml/min/1.73m2    Calcium 8.3 (L) 8.5 - 10.1 MG/DL   MAGNESIUM    Collection Time: 10/16/17  2:53 AM   Result Value Ref Range    Magnesium 2.3 1.6 - 2.4 mg/dL   PHOSPHORUS    Collection Time: 10/16/17  2:53 AM   Result Value Ref Range    Phosphorus 3.6 2.6 - 4.7 MG/DL       No results found for: TSH, TSH2, TSH3, TSHP, TSHEXT        CARDIAC DIAGNOSTICS:     Cardiac Evaluation Includes:    EKG 10/12/17 - NSR  EKG 10/15/17 - probably multifocal atrial tach    Echo 10/16/17 - PRELIM - normal LV and RV size and function       ASSESSMENT AND PLAN:     Assessment and Plan:  1) SVT  - I reviewed EKG and tele and do not see any evidence for afib. - He has had frequent PAC's, a regular rhythm SVT (atrial tach?), and possibly brief MAT  - Agree with adding metoprolol 25 mg BID for rate control  - Prelim echo findings show normal LV / RV size and function   - Check TSH  - Follow on Telemetry   - May consider a 30 day event monitor as an outpatient if he has more SVT despite BB while here     2) Smoking cessation advised     3) Will follow. Trevor Nunez MD, [address]  [address], [address]  [place], [address] [place] [address]  Ph: [phone]   Ph [phone]

## 2017-10-16 NOTE — CONSULTS
Barber Jacobo [facility]   [address] [facility], [address] [facility]   [address] [facility]       Name:  Norma Valencia   MR#:  [id number]   :  1940   Account #:  [de-identified]    Date of Consultation:  10/16/2017   Date of Adm:  10/12/2017       REQUESTING PHYSICIAN: Dr. Emre Hollis: Vomiting and chills. HISTORY OF PRESENT ILLNESS: The patient is a 80-year-old male   with a history of Sjogren disease on chronic prednisone, chronic kidney   disease, and urinary retention, who was admitted to [facility] on 10/12/2017 with the aforementioned complaint. The   patient had a Shaw catheter placed approximately 7 weeks ago in the   emergency department due to urinary retention. He was seen just prior   to admission by his urologist where he was diagnosed with Escherichia   coli urinary tract infection. The patient was started on trimethoprim   sulfamethoxazole. He took 2 doses of this medication and developed   vomiting and chills. He was admitted to [facility] for   further evaluation and treatment. Workup has revealed polymicrobial   gram-negative milton bacteremia due to urinary tract infection. Neurology   is following and a GreenLight procedure is planned once he is   medically stable. The patient is currently on ceftriaxone. The infectious   disease service has been asked to assist with antibiotic management. PAST MEDICAL HISTORY: Chronic kidney disease, Sjogren   syndrome, on chronic prednisone, and urinary retention. PAST SURGICAL HISTORY: Appendectomy, diskectomy zygomatic   arch repair, hernia repair, repair of broken tibia and fibula, vasectomy. SOCIAL HISTORY: The patient is a current tobacco smoker. No   alcohol or illicit drug use. FAMILY HISTORY: Unremarkable. ALLERGIES: NO KNOWN ANTIBIOTIC ALLERGIES. REVIEW OF SYSTEMS: As per the HPI. Remainder of 12 system   review of systems is unremarkable.     LABORATORY DATA: White blood cell count 7400, platelet count   532,968. Urinalysis positive for leukocyte esterase with 10-20 white   cells per high-power field. Creatinine is 1.17. Creatinine at time of   admission was 1.6. Blood cultures are growing Klebsiella oxytoca,   which is pansensitive and another gram-negative milton, which has not   yet been identified in the blood. Urine culture is growing Klebsiella   pneumoniae and Klebsiella oxytoca, both of these organisms are   pansensitive. PHYSICAL EXAMINATION   VITAL SIGNS: Temperature 97.9, maximum temperature is less than   100, heart rate 77 beats per minute, blood pressure 132/75, oxygen   saturation 94% on room air. GENERAL: Alert, in no acute distress. HEENT: Normocephalic, atraumatic. Pupils equal and reactive to light. No oropharyngeal lesions noted. CARDIOVASCULAR: Heart regular rate and rhythm. No murmurs,   gallops, or rubs. PULMONARY: Clear to auscultation bilaterally. ABDOMEN: Soft, nontender, nondistended. GENITOURINARY: No suprapubic tenderness. There is a Shaw   catheter in place with straw-colored urine. EXTREMITIES: No clubbing, cyanosis or edema. SKIN: Multiple scabbed lesions on the lower extremities. No cellulitic   changes noted. ASSESSMENT AND PLAN:   1. Klebsiella oxytoca and a second gram-negative milton Shaw catheter   associated urosepsis. Shaw catheter has been changed. Repeat blood   cultures reveal no growth to date. Agree with ceftriaxone. The dose   was increased to 2 grams daily today. We will await identification of   second gram-negative milton. Recommend continuation of intravenous   ceftriaxone until the urological surgery has been completed. At that   point, the patient may complete a 14-day course of antibiotics with oral   ciprofloxacin for a total of 14 days of therapy from the time the   urological procedure has been completed. 2. Urinary retention. A GreenLight is planned by Urology.  Recommend   delaying procedure until 10/19/2017. At that point, his blood cultures   will have been sterile for over 5 days. This was discussed at length   with the patient at bedside. 3. Immunosuppressed host. The patient is on chronic prednisone in the   outpatient setting. 4. Acute kidney injury on chronic kidney disease. Will continue to   monitor closely while on antibiotics. The patient's renal function is   improving. Thank you for allowing me to participate in the care of this patient.         DO KAYE Ramey / YONAS   D:  10/16/2017   10:07   T:  10/16/2017   10:28   Job #:  [id number]

## 2017-10-16 NOTE — PROGRESS NOTES
PHYSICAL THERAPY:Pt adamantly declined PT this AM.Pt reports doing to much with mobility last treatment and is uncomfortable working with PT at this time. PT will talk with nurse and await request for PT to continue.

## 2017-10-16 NOTE — PROGRESS NOTES
Nutrition Assessment:    RECOMMENDATIONS/INTERVENTION(S):   Continue Cardiac diet  Add Ensure Enlive BID - breakfast and lunch  Monitor PO intakes, wt,     ASSESSMENT:   10/16: 68 yr old male admitted for vomiting and chills. PMhx: Sjoegren syndrome, UTI, CKD3. Pt states appetite is poor. Wt loss of 12 lbs(6.5%) in the last 6 weeks. Poor PO intakes PTA. Pt agreeable to Ensure Enlive BID for breakfast and lunch. Pt constipated. RN came to room for suppository. No chew/swallow difficulties. SUBJECTIVE/OBJECTIVE:   Diet Order: Cardiac  % Eaten:  No data found. Pertinent Medications: [x] Reviewed    Chemistries:  Lab Results   Component Value Date/Time    Sodium 137 10/16/2017 02:53 AM    Potassium 3.8 10/16/2017 02:53 AM    Chloride 103 10/16/2017 02:53 AM    CO2 24 10/16/2017 02:53 AM    Anion gap 10 10/16/2017 02:53 AM    Glucose 118 10/16/2017 02:53 AM    BUN 14 10/16/2017 02:53 AM    Creatinine 1.17 10/16/2017 02:53 AM    BUN/Creatinine ratio 12 10/16/2017 02:53 AM    GFR est AA >60 10/16/2017 02:53 AM    GFR est non-AA >60 10/16/2017 02:53 AM    Calcium 8.3 10/16/2017 02:53 AM    AST (SGOT) 12 09/03/2017 07:07 PM    Alk. phosphatase 55 09/03/2017 07:07 PM    Protein, total 7.0 09/03/2017 07:07 PM    Albumin 3.5 09/03/2017 07:07 PM    Globulin 3.5 09/03/2017 07:07 PM    A-G Ratio 1.0 09/03/2017 07:07 PM    ALT (SGPT) 16 09/03/2017 07:07 PM      Anthropometrics: Height: 6' 1\" (185.4 cm) Weight: 79 kg (174 lb 2.6 oz)    IBW (%IBW):   ( ) UBW (%UBW):   (  %)    BMI: Body mass index is 22.98 kg/(m^2). This BMI is indicative of:   [] Underweight    [x] Normal    [] Overweight    []  Obesity    []  Extreme Obesity (BMI>40)  Estimated Nutrition Needs (Based on): 2039 Kcals/day , 79 g (-87g/day(1.0-1.1g/kg)) Protein  Carbohydrate:  At Least 130 g/day  Fluids: 2050 mL/day    Last BM: 10/11  [x]Active     []Hyperactive  []Hypoactive       [] Absent   BS  Skin:    [] Intact   [] Incision  [] Breakdown   [] DTI   [] Tears/Excoriation/Abrasion  []Edema [] Other:    Wt Readings from Last 30 Encounters:   10/16/17 79 kg (174 lb 2.6 oz)   09/03/17 79.4 kg (175 lb)   07/13/17 83.9 kg (185 lb)      NUTRITION DIAGNOSES:   Problem:  Inadequate energy intake     Etiology: related to decreased ability to consume sufficient energy     Signs/Symptoms: as evidenced by poor appetite, poor PO intakes, wt loss      NUTRITION INTERVENTIONS:  Meals/Snacks: General/healthful diet   Supplements: Commercial supplement              GOAL:   Pt will consume >50% of meals and ONS within 3-5 days    Cultural, Mormon, or Ethnic Dietary Needs:       LEARNING NEEDS (Diet, Food/Nutrient-Drug Interaction):    [x] None Identified   [] Identified and Education Provided/Documented   [] Identified and Pt declined/was not appropriate      [x] Interdisciplinary Care Plan Reviewed/Documented    [x] Discharge Needs:   TBD   [] No Nutrition Related Discharge Needs    NUTRITION RISK:   Pt Is At Nutrition Risk  [x]     No Nutrition Risk Identified  []       PT SEEN FOR:    []  MD Consult: []Calorie Count      []Diabetic Diet Education        []Diet Education     []Electrolyte Management     []General Nutrition Management and Supplements     []Management of Tube Feeding     []TPN Recommendations    []  RN Referral:  []MST score >=2     []Enteral/Parenteral Nutrition PTA     []Pregnant: Gestational DM or Multigestation                 [] Pressure Ulcer      []  Low BMI      []  Length of Stay       [] Dysphagia Diet         [] Ventilator      [x]  Meals refused     Previous Recommendations:   [] Implemented          [] Not Implemented          [x] Not Applicable    Previous Goal:   [] Met              [] Progressing Towards Goal              [] Not Progressing Towards Goal   [x] Not Applicable              Marissa Leone, [address] [place] [address]   Pager [phone]

## 2017-10-16 NOTE — PROGRESS NOTES
Barber Jacobo [facility]  [address] [facility], [address] [place] [address]  [phone]      Medical Progress Note      NAME: Dorie Herr   :  1940  MRM:  [id number]    Date/Time: 10/16/2017  1:03 PM       Assessment and Plan:     Complicated UTI (urinary tract infection), due to indwelling mariscal catheter / Gram negative milton bacteremia. Ucx with klebsiella species x 2, BCx only with 2/4 K. Oxytoca. Continue IV ceftriaxone. Surveillance cultures are clear. ID consult today. Urology consult appreciated. Will get Greenlight procedure on Thursday with urology (Okay with myself and ID). Atrial fibrillation with rapid ventricular response v. MAT. Cardiology consult. TTE. Started metorpolol. Would need to discuss anticoag but defer to cardiology     Sepsis / Leukocytosis / Fever / Sinus tachycardia - POA due to UTI. Improved. Abx as above.     Renal insufficiency / Dehydration / CKD (chronic kidney disease), stage III - POA, multifactorial. Resolved     Urine retention / Overactive bladder - Chronic, requiring mariscal for UOP. Needs TURP. Plan for inpatient here at [place] [id number] syndrome - Continue 7.5 mg prednisone usha-operatively. Appreciate rheumatology's input       Subjective:     Chief Complaint:  Patient seen and examined. Chart reviewed. Feels frustrated this AM. Case discussed at length with wife and patient. All questions answered    ROS:  (bold if positive, if negative)      Tolerating PT  Tolerating Diet        Objective:     Last 24hrs VS reviewed since prior progress note.  Most recent are:    Visit Vitals    /75 (BP 1 Location: Left arm, BP Patient Position: At rest)    Pulse 77    Temp 97.9 °F (36.6 °C)    Resp 18    Ht 6' 1\" (1.854 m)    Wt 79 kg (174 lb 2.6 oz)    SpO2 94%    BMI 22.98 kg/m2     SpO2 Readings from Last 6 Encounters:   10/16/17 94%   17 95%   17 95%            Intake/Output Summary (Last 24 hours) at 10/16/17 1043  Last data filed at 10/16/17 0521   Gross per 24 hour   Intake                0 ml   Output              650 ml   Net             -650 ml        Physical Exam:    Gen:  Well-developed, well-nourished, in no acute distress  HEENT:  Pink conjunctivae, PERRL, hearing intact to voice, moist mucous membranes  Neck:  Supple, without masses, thyroid non-tender  Resp:  No accessory muscle use, clear breath sounds without wheezes rales or rhonchi  Card:  Irregular No murmurs, normal S1, S2 without thrills, bruits or peripheral edema  Abd:  Soft, non-tender, non-distended, normoactive bowel sounds are present, no palpable organomegaly and no detectable hernias  Lymph:  No cervical or inguinal adenopathy  Musc:  No cyanosis or clubbing  Skin:  No rashes or ulcers, skin turgor is good  Neuro:  Cranial nerves are grossly intact, no focal motor weakness, follows commands appropriately  Psych:  Good insight, oriented to person, place and time, alert    __________________________________________________________________  Medications Reviewed: (see below)  Medications:     Current Facility-Administered Medications   Medication Dose Route Frequency    cefTRIAXone (ROCEPHIN) 2 g in 0.9% sodium chloride (MBP/ADV) 50 mL  2 g IntraVENous Q24H    bisacodyl (DULCOLAX) suppository 10 mg  10 mg Rectal DAILY PRN    metoprolol tartrate (LOPRESSOR) tablet 25 mg  25 mg Oral Q12H    aluminum-magnesium hydroxide (MAALOX) oral suspension 30 mL  30 mL Oral Q6H PRN    artificial tears (dextran 70-hypromellose) (NATURAL BALANCE) 0.1-0.3 % ophthalmic solution 2 Drop  2 Drop Both Eyes PRN    sodium chloride (NS) flush 5-10 mL  5-10 mL IntraVENous PRN    predniSONE (DELTASONE) tablet 7.5 mg  7.5 mg Oral DAILY WITH BREAKFAST    acetaminophen (TYLENOL) tablet 650 mg  650 mg Oral Q4H PRN    diphenhydrAMINE (BENADRYL) capsule 25 mg  25 mg Oral Q4H PRN    ondansetron (ZOFRAN) injection 4 mg  4 mg IntraVENous Q4H PRN    enoxaparin (LOVENOX) injection 40 mg  40 mg SubCUTAneous Q24H    polyethylene glycol (MIRALAX) packet 17 g  17 g Oral DAILY    senna-docusate (PERICOLACE) 8.6-50 mg per tablet 1 Tab  1 Tab Oral DAILY        Lab Data Reviewed: (see below)  Lab Review:     Recent Labs      10/15/17   0443  10/14/17   0331   WBC  7.4  8.7   HGB  14.1  13.3   HCT  41.8  39.7   PLT  134*  115*     Recent Labs      10/16/17   0253  10/15/17   0443  10/14/17   0331   NA  137  136  139   K  3.8  3.7  4.4   CL  103  103  107   CO2  24  23  21   GLU  118*  125*  118*   BUN  14  12  11   CREA  1.17  1.27  1.32*   CA  8.3*  8.5  8.5   MG  2.3  2.2  2.1   PHOS  3.6  3.4  3.7     No results found for: GLUCPOC  No results for input(s): PH, PCO2, PO2, HCO3, FIO2 in the last 72 hours. No results for input(s): INR in the last 72 hours. No lab exists for component: INREXT, INREXT  All Micro Results     Procedure Component Value Units Date/Time    CULTURE, BLOOD, PAIRED [[id number]]  (Abnormal)  (Susceptibility) Collected:  10/12/17 1832    Order Status:  Completed Specimen:  Blood Updated:  10/16/17 1042     Special Requests: NO SPECIAL REQUESTS        Culture result:         KLEBSIELLA OXYTOCA GROWING IN 2 OF 4 BOTTLES DRAWN . ..(SITE= L HAND NO.2 AND R FA) (A)              PRELIMINARY REPORT OF GRAM NEGATIVE RODS GROWING IN 1 OF 4 BOTTLES DRAWN CALLED TO AND READ BACK BY MS YISEL RN ON 10/13/17 AT 2030 ([address] [address]). MS (A)              PRELIMINARY REPORT OF GRAM NEGATIVE RODS GROWING IN 2 OF 4 BOTTLES DRAWN CALLED TO AND READ BACK BY MS DEYSI PARTIDA [facility] 5MS2) ON 10/15/17 AT 0107.  HH (A)              REMAINING BOTTLE(S) HAS/HAVE NO GROWTH SO FAR    CULTURE, BLOOD, PAIRED [[id number]] Collected:  10/14/17 1508    Order Status:  Completed Specimen:  Blood Updated:  10/16/17 0854     Special Requests: NO SPECIAL REQUESTS        Culture result: NO GROWTH 2 DAYS       CULTURE, BLOOD, PAIRED [[id number]] Collected:  10/15/17 1441    Order Status:  Completed Specimen:  Blood Updated: 10/16/17 0854     Special Requests: NO SPECIAL REQUESTS        Culture result: NO GROWTH AFTER 15 HOURS       CULTURE, URINE [[id number]]  (Abnormal)  (Susceptibility) Collected:  10/12/17 1832    Order Status:  Completed Specimen:  Urine Updated:  10/14/17 0923     Special Requests: --        NO SPECIAL REQUESTS  Reflexed from [id number]       [place] Count --        >100,000  COLONIES/mL       Culture result: KLEBSIELLA PNEUMONIAE (A)         KLEBSIELLA OXYTOCA (A)             I have reviewed notes of prior 24hr.     Other pertinent lab: None    Total time spent with patient: 45 minutes                  Care Plan discussed with: Patient, Family, Nursing Staff, Consultant/Specialist and >50% of time spent in counseling and coordination of care    Discussed:  Care Plan and D/C Planning    Prophylaxis:  Lovenox    Disposition:  Home w/Family           ___________________________________________________    Attending Physician: Ryder Hicks DO

## 2017-10-16 NOTE — PROGRESS NOTES
Problem: Self Care Deficits Care Plan (Adult)  Goal: *Acute Goals and Plan of Care (Insert Text)  Occupational Therapy Goals  Initiated 10/16/2017  1. Patient will perform grooming standing at sink for at least 8 minutes with supervision/set-up within 7 day(s). 2. Patient will perform lower body dressing including gathering his clothing with supervision/set-up within 7 day(s). 3. Patient will perform toilet transfers with supervision/set-up using best DME for safety within 7 day(s). 4. Patient will perform all aspects of toileting with supervision/set-up within 7 day(s). 5. Patient will participate in upper extremity therapeutic exercise/activities with independence for 15 minutes within 7 day(s). 6. Patient will utilize energy conservation techniques during functional activities with verbal and visual cues within 7 day(s). OCCUPATIONAL THERAPY EVALUATION  Patient: Charlotte House (82 y.o. male)  Date: 10/16/2017  Primary Diagnosis: UTI (urinary tract infection)        Precautions:  Fall      ASSESSMENT :  Based on the objective data described below, the patient presents with irritable affect, decreased strength, endurance, mobility, balance in standing with ataxia noted and safety. He currently requires up to min A for LE ADLs, toileting and functional mobility. He utilizes a 3 wheeled rollator at home for mobility, requires increased time to perform ADLs and gets assist from his wife with IADLs and driving. Pt and his wife state pt is scheduled for a TURP procedure later this week. They are requesting rehab at discharge due to increasing weakness, impaired balance and poor activity tolerance. Will make further recommendations for discharge after sx. Patient will benefit from skilled intervention to address the above impairments.   Patients rehabilitation potential is considered to be Guarded  Factors which may influence rehabilitation potential include:   [ ]             None noted  [ ] Mental ability/status  [X]             Medical condition  [ ]             Home/family situation and support systems  [X]             Safety awareness  [ ]             Pain tolerance/management  [ ]             Other:        PLAN :  Recommendations and Planned Interventions:  [X]               Self Care Training                  [X]        Therapeutic Activities  [X]               Functional Mobility Training    [ ]        Cognitive Retraining  [X]               Therapeutic Exercises           [X]        Endurance Activities  [X]               Balance Training                   [X]        Neuromuscular Re-Education  [ ]               Visual/Perceptual Training     [X]   Home Safety Training  [X]               Patient Education                 [X]        Family Training/Education  [ ]               Other (comment):     Frequency/Duration: Patient will be followed by occupational therapy 3 times a week to address goals. Discharge Recommendations: To Be Determined  Further Equipment Recommendations for Discharge: TBD       SUBJECTIVE:   Patient stated Elan Pena had a very bad experience with PT yesterday.       OBJECTIVE DATA SUMMARY:   HISTORY:   Past Medical History:   Diagnosis Date    CKD (chronic kidney disease), stage III      Sjoegren syndrome ([facility] [address] 04/2016    Urine retention       Past Surgical History:   Procedure Laterality Date    HX APPENDECTOMY        HX CERVICAL DISKECTOMY        HX HEENT         Zygomatic arch repair    HX HERNIA REPAIR        HX ORTHOPAEDIC         Broken tibia and fibula    HX VASECTOMY            Prior Level of Function/Home Situation: He utilizes a 3 wheeled rollator at home for mobility, requires increased time to perform ADLs and gets assist from his wife with IADLs and driving.    Home Situation  Home Environment: Private residence  # Steps to Enter: 2  Rails to Enter: Yes  Hand Rails : Right  One/Two Story Residence: Two story, live on 1st floor  Living Alone: No  Support Systems: Spouse/Significant Other/Partner  Patient Expects to be Discharged to[de-identified] Private residence  Current DME Used/Available at Home: Macky Kussmaul, straight, Walker, rollator  Tub or Shower Type: Shower  [X]  Right hand dominant             [ ]  Left hand dominant     EXAMINATION OF PERFORMANCE DEFICITS:  Cognitive/Behavioral Status:  Neurologic State: Alert  Orientation Level: Oriented X4  Cognition: Follows commands  Perception: Appears intact  Perseveration: No perseveration noted  Safety/Judgement: Awareness of environment; Fall prevention;Decreased awareness of need for safety; Insight into deficits     Hearing: Auditory  Auditory Impairment: Hard of hearing, left side     Vision/Perceptual:    Acuity: Able to read clock/calendar on wall without difficulty          Range of Motion:  AROM: Generally decreased, functional  PROM: Generally decreased, functional                       Strength:  Strength: Generally decreased, functional                 Coordination:  Coordination: Generally decreased, functional  Fine Motor Skills-Upper: Left Impaired;Right Impaired    Gross Motor Skills-Upper: Left Intact; Right Intact     Tone & Sensation:  Tone: Normal  Sensation: Intact                       Balance:  Sitting: Intact  Standing: Impaired  Standing - Static: Fair;Constant support  Standing - Dynamic : Fair     Functional Mobility and Transfers for ADLs:  Bed Mobility:  Rolling: Supervision  Supine to Sit: Supervision  Sit to Supine: Supervision  Scooting: Supervision     Transfers:  Sit to Stand: Minimum assistance; Additional time;Assist x1 (ataxia and LOB posteriorly noted)  Stand to Sit: Contact guard assistance; Additional time;Assist x1  Toilet Transfer : Minimum assistance; Additional time;Assist x1 (ataxia noted with impaired balance- uses 3 wheeled rollator)     ADL Assessment:  Feeding: Modified independent     Oral Facial Hygiene/Grooming: Setup; Additional time (seated)     Bathing: Minimum assistance; Additional time;Assist x1 (A for safety with standing balance)     Upper Body Dressing: Setup; Additional time     Lower Body Dressing: Minimum assistance; Additional time;Assist x1 (A for safety with standing balance)     Toileting: Minimum assistance; Additional time;Assist x1 (pt has mariscal- A for safety with standing)                 ADL Intervention and task modifications:  Patient was educated on the benefits of maintaining activity tolerance, functional mobility, and independence with self care tasks during acute stay. Encouraged patient to be out of bed for all meals, perform daily ADLs (as approved by RN/MD regarding bathing etc), performing functional mobility to/from bathroom, and increasing time OOB daily with assist. Patient educated about the importance of maintaining activity tolerance to ensure safe return home and to baseline. Patient verbalized understanding of education. Cognitive Retraining  Safety/Judgement: Awareness of environment; Fall prevention;Decreased awareness of need for safety; Insight into deficits     Functional Measure:  Barthel Index:      Bathin  Bladder: 0  Bowels: 10  Groomin  Dressin  Feeding: 10  Mobility: 0  Stairs: 0  Toilet Use: 5  Transfer (Bed to Chair and Back): 10  Total: 45         Barthel and G-code impairment scale:  Percentage of impairment CH  0% CI  1-19% CJ  20-39% CK  40-59% CL  60-79% CM  80-99% CN  100%   Barthel Score 0-100 100 99-80 79-60 59-40 20-39 1-19    0   Barthel Score 0-20 20 17-19 13-16 9-12 5-8 1-4 0      The Barthel ADL Index: Guidelines  1. The index should be used as a record of what a patient does, not as a record of what a patient could do. 2. The main aim is to establish degree of independence from any help, physical or verbal, however minor and for whatever reason. 3. The need for supervision renders the patient not independent. 4. A patient's performance should be established using the best available evidence.  Asking the patient, friends/relatives and nurses are the usual sources, but direct observation and common sense are also important. However direct testing is not needed. 5. Usually the patient's performance over the preceding 24-48 hours is important, but occasionally longer periods will be relevant. 6. Middle categories imply that the patient supplies over 50 per cent of the effort. 7. Use of aids to be independent is allowed. Darryle Chant., Barthel, D.W. ([id number]). Functional evaluation: the Barthel Index. [facility] (14)2. Malachi Carrasquillo mann DOM Otto, Carlos Tsai., Niraj Gonzalez., [place], [facility] (1999). Measuring the change indisability after inpatient rehabilitation; comparison of the responsiveness of the Barthel Index and Functional Christian Measure. Journal of Neurology, Neurosurgery, and Psychiatry, 66(4), 405-007. LORNA Adler, RONY Steele, & Susan Marks M.A. (2004.) Assessment of post-stroke quality of life in cost-effectiveness studies: The usefulness of the Barthel Index and the EuroQoL-5D. Quality of Life Research, 13, 822-60         G codes: In compliance with CMSs Claims Based Outcome Reporting, the following G-code set was chosen for this patient based on their primary functional limitation being treated: The outcome measure chosen to determine the severity of the functional limitation was the Barthel Index with a score of 45/100 which was correlated with the impairment scale.       · Self Care:               - CURRENT STATUS:    CK - 40%-59% impaired, limited or restricted               - GOAL STATUS:           CJ - 20%-39% impaired, limited or restricted               - D/C STATUS:                       ---------------To be determined---------------      Occupational Therapy Evaluation Charge Determination   History Examination Decision-Making   LOW Complexity : Brief history review  MEDIUM Complexity : 3-5 performance deficits relating to physical, cognitive , or psychosocial skils that result in activity limitations and / or participation restrictions LOW Complexity : No comorbidities that affect functional and no verbal or physical assistance needed to complete eval tasks       Based on the above components, the patient evaluation is determined to be of the following complexity level: LOW   Pain:  Pain Scale 1: Numeric (0 - 10)  Pain Intensity 1: 0              Activity Tolerance:   Fair  Please refer to the flowsheet for vital signs taken during this treatment. After treatment:   [ ] Patient left in no apparent distress sitting up in chair  [X] Patient left in no apparent distress in bed  [X] Call bell left within reach  [X] Nursing notified  [X] Caregiver present- pt's wife  [ ] Bed alarm activated      COMMUNICATION/EDUCATION:   The patients plan of care was discussed with: Physical Therapy Assistant and Registered Nurse.  [X] Home safety education was provided and the patient/caregiver indicated understanding. [X] Patient/family have participated as able in goal setting and plan of care. [X] Patient/family agree to work toward stated goals and plan of care. [ ] Patient understands intent and goals of therapy, but is neutral about his/her participation. [ ] Patient is unable to participate in goal setting and plan of care. This patients plan of care is appropriate for delegation to [facility].      Thank you for this referral.  Carole Mojica OT  Time Calculation: 28 mins

## 2017-10-16 NOTE — PROGRESS NOTES
Urology Progress Note    Subjective:     Daily Progress Note: 10/16/2017 7:47 AM    Gabriele Crowell is doing good. He reports pain is absent. He has no new complaints. He is tolerating a solid diet and ambulating with assistance. Indwelling catheter is draining well. Objective:     Visit Vitals    /75 (BP 1 Location: Left arm, BP Patient Position: At rest)    Pulse 77    Temp 97.9 °F (36.6 °C)    Resp 18    Ht 6' 1\" (1.854 m)    Wt 79 kg (174 lb 2.6 oz)    SpO2 94%    BMI 22.98 kg/m2        Temp (24hrs), Av.1 °F (36.7 °C), Min:97.5 °F (36.4 °C), Max:98.9 °F (37.2 °C)      Intake and Output:  10/14 1901 - 10/16 0700  In: -   Out: 9375 [Urine:1175]       Physical Exam:   General appearance: alert, cooperative, no distress, appears stated age  Abdomen: soft, non-tender. No masses,  no organomegaly     Lab/Data Review:  BMP:   Lab Results   Component Value Date/Time     10/16/2017 02:53 AM    K 3.8 10/16/2017 02:53 AM     10/16/2017 02:53 AM    CO2 24 10/16/2017 02:53 AM    AGAP 10 10/16/2017 02:53 AM     (H) 10/16/2017 02:53 AM    BUN 14 10/16/2017 02:53 AM    CREA 1.17 10/16/2017 02:53 AM    GFRAA >60 10/16/2017 02:53 AM    GFRNA >60 10/16/2017 02:53 AM     CBC: No results found for: WBC, HGB, HGBEXT, HCT, HCTEXT, PLT, PLTEXT, HGBEXT, HCTEXT, PLTEXT    Assessment/Plan:     Active Problems:    UTI (urinary tract infection) (10/12/2017)      Sjoegren syndrome ([facility] [address] (2016)      Urine retention ()      Sepsis ([facility] [address] (10/12/2017)      Leukocytosis (10/12/2017)      Fever (10/12/2017)      Renal insufficiency (10/12/2017)      CKD (chronic kidney disease), stage III ()      Nausea & vomiting (10/12/2017)      Cough (10/12/2017)      Dehydration (10/12/2017)      Hypoxia (10/12/2017)      Sinus tachycardia (10/12/2017)        Plan:  Doing well. Plan had been for Greenlight procedure on prostate on Thursday.   With events and his immunocompromised state, he will likely need longer course of antibiotics until procedure and then through procedure. Apparently, ID has been consulted and we will wait for their recommendation for duration and modality (oral/IV) of treatment. Will reschedule around this.       Signed By: Vaibhav Villanueva MD                         October 16, 2017

## 2017-10-17 LAB
ANION GAP SERPL CALC-SCNC: 7 MMOL/L (ref 5–15)
BASOPHILS # BLD: 0 K/UL (ref 0–0.1)
BASOPHILS NFR BLD: 0 % (ref 0–1)
BUN SERPL-MCNC: 19 MG/DL (ref 6–20)
BUN/CREAT SERPL: 13 (ref 12–20)
CALCIUM SERPL-MCNC: 8.9 MG/DL (ref 8.5–10.1)
CHLORIDE SERPL-SCNC: 105 MMOL/L (ref 97–108)
CO2 SERPL-SCNC: 29 MMOL/L (ref 21–32)
CREAT SERPL-MCNC: 1.42 MG/DL (ref 0.7–1.3)
DIFFERENTIAL METHOD BLD: NORMAL
EOSINOPHIL # BLD: 0 K/UL (ref 0–0.4)
EOSINOPHIL NFR BLD: 0 % (ref 0–7)
ERYTHROCYTE [DISTWIDTH] IN BLOOD BY AUTOMATED COUNT: 13.7 % (ref 11.5–14.5)
GLUCOSE SERPL-MCNC: 99 MG/DL (ref 65–100)
HCT VFR BLD AUTO: 44 % (ref 36.6–50.3)
HGB BLD-MCNC: 14.7 G/DL (ref 12.1–17)
LYMPHOCYTES # BLD: 3 K/UL (ref 0.8–3.5)
LYMPHOCYTES NFR BLD: 28 % (ref 12–49)
MAGNESIUM SERPL-MCNC: 2.4 MG/DL (ref 1.6–2.4)
MCH RBC QN AUTO: 32.2 PG (ref 26–34)
MCHC RBC AUTO-ENTMCNC: 33.4 G/DL (ref 30–36.5)
MCV RBC AUTO: 96.5 FL (ref 80–99)
MONOCYTES # BLD: 1 K/UL (ref 0–1)
MONOCYTES NFR BLD: 9 % (ref 5–13)
NEUTS SEG # BLD: 6.7 K/UL (ref 1.8–8)
NEUTS SEG NFR BLD: 63 % (ref 32–75)
PHOSPHATE SERPL-MCNC: 4.5 MG/DL (ref 2.6–4.7)
PLATELET # BLD AUTO: 165 K/UL (ref 150–400)
POTASSIUM SERPL-SCNC: 4.5 MMOL/L (ref 3.5–5.1)
RBC # BLD AUTO: 4.56 M/UL (ref 4.1–5.7)
RBC MORPH BLD: NORMAL
SODIUM SERPL-SCNC: 141 MMOL/L (ref 136–145)
TSH SERPL DL<=0.05 MIU/L-ACNC: 3.39 UIU/ML (ref 0.36–3.74)
WBC # BLD AUTO: 10.7 K/UL (ref 4.1–11.1)
WBC MORPH BLD: NORMAL

## 2017-10-17 PROCEDURE — 65660000000 HC RM CCU STEPDOWN

## 2017-10-17 PROCEDURE — 85025 COMPLETE CBC W/AUTO DIFF WBC: CPT | Performed by: INTERNAL MEDICINE

## 2017-10-17 PROCEDURE — 74011250636 HC RX REV CODE- 250/636: Performed by: INTERNAL MEDICINE

## 2017-10-17 PROCEDURE — 84100 ASSAY OF PHOSPHORUS: CPT | Performed by: INTERNAL MEDICINE

## 2017-10-17 PROCEDURE — 74011250637 HC RX REV CODE- 250/637: Performed by: INTERNAL MEDICINE

## 2017-10-17 PROCEDURE — 36415 COLL VENOUS BLD VENIPUNCTURE: CPT | Performed by: INTERNAL MEDICINE

## 2017-10-17 PROCEDURE — 80048 BASIC METABOLIC PNL TOTAL CA: CPT | Performed by: INTERNAL MEDICINE

## 2017-10-17 PROCEDURE — 74011000258 HC RX REV CODE- 258: Performed by: INTERNAL MEDICINE

## 2017-10-17 PROCEDURE — 76450000000

## 2017-10-17 PROCEDURE — 97116 GAIT TRAINING THERAPY: CPT

## 2017-10-17 PROCEDURE — 84443 ASSAY THYROID STIM HORMONE: CPT | Performed by: NURSE PRACTITIONER

## 2017-10-17 PROCEDURE — 97530 THERAPEUTIC ACTIVITIES: CPT

## 2017-10-17 PROCEDURE — 83735 ASSAY OF MAGNESIUM: CPT | Performed by: INTERNAL MEDICINE

## 2017-10-17 PROCEDURE — 74011636637 HC RX REV CODE- 636/637: Performed by: INTERNAL MEDICINE

## 2017-10-17 RX ORDER — DEXTROSE, SODIUM CHLORIDE, AND POTASSIUM CHLORIDE 5; .45; .15 G/100ML; G/100ML; G/100ML
50 INJECTION INTRAVENOUS CONTINUOUS
Status: DISCONTINUED | OUTPATIENT
Start: 2017-10-17 | End: 2017-10-20

## 2017-10-17 RX ADMIN — PREDNISONE 7.5 MG: 5 TABLET ORAL at 08:20

## 2017-10-17 RX ADMIN — CEFTRIAXONE 2 G: 2 INJECTION, POWDER, FOR SOLUTION INTRAMUSCULAR; INTRAVENOUS at 20:50

## 2017-10-17 RX ADMIN — Medication 10 ML: at 07:09

## 2017-10-17 RX ADMIN — METOPROLOL TARTRATE 25 MG: 25 TABLET ORAL at 08:21

## 2017-10-17 RX ADMIN — METOPROLOL TARTRATE 25 MG: 25 TABLET ORAL at 20:50

## 2017-10-17 RX ADMIN — POLYETHYLENE GLYCOL 3350 17 G: 17 POWDER, FOR SOLUTION ORAL at 08:20

## 2017-10-17 RX ADMIN — DEXTROSE MONOHYDRATE, SODIUM CHLORIDE, AND POTASSIUM CHLORIDE 50 ML/HR: 50; 4.5; 1.49 INJECTION, SOLUTION INTRAVENOUS at 15:10

## 2017-10-17 RX ADMIN — ALUMINUM HYDROXIDE AND MAGNESIUM HYDROXIDE 30 ML: 200; 200 SUSPENSION ORAL at 15:07

## 2017-10-17 RX ADMIN — ENOXAPARIN SODIUM 40 MG: 40 INJECTION SUBCUTANEOUS at 21:32

## 2017-10-17 RX ADMIN — BISACODYL 10 MG: 10 SUPPOSITORY RECTAL at 18:07

## 2017-10-17 RX ADMIN — DOCUSATE SODIUM AND SENNOSIDES 1 TABLET: 8.6; 5 TABLET, FILM COATED ORAL at 08:21

## 2017-10-17 NOTE — PROGRESS NOTES
Barber Jacobo [facility] [address]  [address] [facility], [address] [place] [address]  [phone]      Medical Progress Note      NAME: Sonam Mckinnon   :  1940  MRM:  [id number]    Date/Time: 10/17/2017  11:12 AM       Assessment and Plan:     Complicated UTI (urinary tract infection), due to indwelling mariscal catheter - POA, 2 species of klebsiella. Pan sensitive. Ceftriaxone while here and cipro at discharge. He is CLEARED for urological procedure. Klebsiella oxytoxa bacteremia - Continue IV ceftriaxone. Surveillance cultures are clear. CLEARED for Greenlight procedure on Thursday with urology.     Tachycardia - Per cardiology, not atrial fibrillation with rapid ventricular response. Unremarkable TTE. Started metoprolol, but I will lower dose.       Urine retention / Overactive bladder - Chronic, requiring mariscal for UOP. Needs TURP. Discharge delayed awaiting urology procedure. He should be fine to discharge after procedure. If he requires hospitalization due to procedure, we'd be happy to transfer is care onto the Urology service.      Sepsis / Leukocytosis / Fever / Sinus tachycardia - POA due to UTI. Improved. Abx as above.      Renal insufficiency / Dehydration / CKD (chronic kidney disease), stage III - POA, multifactorial. Resolved      Sjoegren syndrome - Continue 7.5 mg prednisone usha-operatively. Appreciate rheumatology's input       Subjective:     Chief Complaint:  Feels well. Wants to go home. ROS:  (bold if positive, if negative)      Tolerating PT   Tolerating Diet        Objective:     Last 24hrs VS reviewed since prior progress note.  Most recent are:    Visit Vitals    /86 (BP 1 Location: Left arm, BP Patient Position: At rest)    Pulse 63    Temp 98 °F (36.7 °C)    Resp 20    Ht 6' 1\" (1.854 m)    Wt 75.8 kg (167 lb 1.7 oz)    SpO2 95%    BMI 22.05 kg/m2     SpO2 Readings from Last 6 Encounters:   10/17/17 95%   17 95%   17 95% Intake/Output Summary (Last 24 hours) at 10/17/17 1112  Last data filed at 10/17/17 0708   Gross per 24 hour   Intake               50 ml   Output              700 ml   Net             -650 ml        Physical Exam:    Gen:  Well-developed, well-nourished, in no acute distress  HEENT:  Pink conjunctivae, PERRL, hearing intact to voice, moist mucous membranes  Neck:  Supple, without masses, thyroid non-tender  Resp:  No accessory muscle use, clear breath sounds without wheezes rales or rhonchi  Card:  No murmurs, normal S1, S2 without thrills, bruits or peripheral edema  Abd:  Soft, non-tender, non-distended, normoactive bowel sounds are present, no mass  Lymph:  No cervical or inguinal adenopathy  Musc:  No cyanosis or clubbing  Skin:  No rashes or ulcers, skin turgor is good  Neuro:  Cranial nerves are grossly intact, no focal motor weakness, follows commands appropriately  Psych:  Good insight, oriented to person, place and time, alert    Telemetry reviewed:   normal sinus rhythm  __________________________________________________________________  Medications Reviewed: (see below)  Medications:     Current Facility-Administered Medications   Medication Dose Route Frequency    cefTRIAXone (ROCEPHIN) 2 g in 0.9% sodium chloride (MBP/ADV) 50 mL  2 g IntraVENous Q24H    bisacodyl (DULCOLAX) suppository 10 mg  10 mg Rectal DAILY PRN    metoprolol tartrate (LOPRESSOR) tablet 25 mg  25 mg Oral Q12H    aluminum-magnesium hydroxide (MAALOX) oral suspension 30 mL  30 mL Oral Q6H PRN    artificial tears (dextran 70-hypromellose) (NATURAL BALANCE) 0.1-0.3 % ophthalmic solution 2 Drop  2 Drop Both Eyes PRN    sodium chloride (NS) flush 5-10 mL  5-10 mL IntraVENous PRN    predniSONE (DELTASONE) tablet 7.5 mg  7.5 mg Oral DAILY WITH BREAKFAST    acetaminophen (TYLENOL) tablet 650 mg  650 mg Oral Q4H PRN    diphenhydrAMINE (BENADRYL) capsule 25 mg  25 mg Oral Q4H PRN    ondansetron (ZOFRAN) injection 4 mg  4 mg IntraVENous Q4H PRN    enoxaparin (LOVENOX) injection 40 mg  40 mg SubCUTAneous Q24H    polyethylene glycol (MIRALAX) packet 17 g  17 g Oral DAILY    senna-docusate (PERICOLACE) 8.6-50 mg per tablet 1 Tab  1 Tab Oral DAILY        Lab Data Reviewed: (see below)  Lab Review:     Recent Labs      10/17/17   0408  10/15/17   0443   WBC  10.7  7.4   HGB  14.7  14.1   HCT  44.0  41.8   PLT  165  134*     Recent Labs      10/17/17   0408  10/16/17   0253  10/15/17   0443   NA  141  137  136   K  4.5  3.8  3.7   CL  105  103  103   CO2  29  24  23   GLU  99  118*  125*   BUN  19  14  12   CREA  1.42*  1.17  1.27   CA  8.9  8.3*  8.5   MG  2.4  2.3  2.2   PHOS  4.5  3.6  3.4     No results found for: GLUCPOC  No results for input(s): PH, PCO2, PO2, HCO3, FIO2 in the last 72 hours. No results for input(s): INR in the last 72 hours. No lab exists for component: INREXT  All Micro Results     Procedure Component Value Units Date/Time    CULTURE, BLOOD, PAIRED [[id number]] Collected:  10/15/17 1441    Order Status:  Completed Specimen:  Blood Updated:  10/17/17 0950     Special Requests: NO SPECIAL REQUESTS        Culture result: NO GROWTH 2 DAYS       CULTURE, BLOOD, PAIRED [[id number]] Collected:  10/14/17 1508    Order Status:  Completed Specimen:  Blood Updated:  10/17/17 0950     Special Requests: NO SPECIAL REQUESTS        Culture result: NO GROWTH 3 DAYS       CULTURE, BLOOD, PAIRED [[id number]]  (Abnormal)  (Susceptibility) Collected:  10/12/17 1832    Order Status:  Completed Specimen:  Blood Updated:  10/16/17 1042     Special Requests: NO SPECIAL REQUESTS        Culture result:         KLEBSIELLA OXYTOCA GROWING IN 2 OF 4 BOTTLES DRAWN . ..(SITE= L HAND NO.2 AND R FA) (A)              PRELIMINARY REPORT OF GRAM NEGATIVE RODS GROWING IN 1 OF 4 BOTTLES DRAWN CALLED TO AND READ BACK BY MS YISEL RN ON 10/13/17 AT 2030 ([facility] 529).  MS (A)              PRELIMINARY REPORT OF GRAM NEGATIVE RODS GROWING IN 2 OF 4 BOTTLES DRAWN CALLED TO AND READ BACK BY MS DEYSI PARTIDA [facility] 5MS2) ON 10/15/17 AT 0107. HH (A)              REMAINING BOTTLE(S) HAS/HAVE NO GROWTH SO FAR    CULTURE, URINE [[id number]]  (Abnormal)  (Susceptibility) Collected:  10/12/17 1832    Order Status:  Completed Specimen:  Urine Updated:  10/14/17 0923     Special Requests: --        NO SPECIAL REQUESTS  Reflexed from [id number]       [place] Count --        >100,000  COLONIES/mL       Culture result: KLEBSIELLA PNEUMONIAE (A)         KLEBSIELLA OXYTOCA (A)             I have reviewed notes of prior 24hr.     Other pertinent lab: none    Total time spent with patient: [address] discussed with: Patient, Family, Care Manager, Nursing Staff, Consultant/Specialist and >50% of time spent in counseling and coordination of care    Discussed:  Care Plan and D/C Planning    Prophylaxis:  H2B/PPI    Disposition:  Home w/Family           ___________________________________________________    Attending Physician: Nichelle Vyas MD

## 2017-10-17 NOTE — PROGRESS NOTES
Problem: Mobility Impaired (Adult and Pediatric)  Goal: *Acute Goals and Plan of Care (Insert Text)  Physical Therapy Goals  Initiated 10/15/2017  1. Patient will transfer from bed to chair and chair to bed with modified independence using the least restrictive device within 7 day(s). 3. Patient will perform sit to stand with modified independence within 7 day(s). 4. Patient will ambulate with modified independence for 300feet with the least restrictive device within 7 day(s). 5. Patient will ascend/descend 2 stairs with 1 handrail(s) with modified independence within 7 day(s). PHYSICAL THERAPY TREATMENT  Patient: Nathan Wong (1 y.o. male)  Date: 10/17/2017  Diagnosis: UTI (urinary tract infection)  BPH OBSTRUCTION <principal problem not specified>  Procedure(s) (LRB):  TRANSURETHRAL RESECTION OF PROSTATE WITH GREENLIGHT (N/A)    Precautions: Fall      ASSESSMENT:  Pt received supine in bed, wife present and RN encouraging pt to get OOB with PT. Pt initially resistant as he felt PT has worked him too hard over the weekend and he was very anxious. Reviewed goals of therapy and set activity within his tolerance and pt agreeable. Ambulated with Min A in room with HHA d/t instability and weakness. CGA in hallway when holding onto wall rail and Min A between wall rail gaps. Pt demonstrating fair balance and very unsure of his own balance. Pt reports he was working out at [employer] and was able to perform 30 minutes of continuous cardio and strength training. He is well below this level of activity, highly anxious(about hospital course, infection and what to expect in surgery) and at an increased fall risk. Anticipate pt will recover quickly but at this time would recommend short rehab stay to increase independence, strength and activity tolerance pending progress after surgery.     Progression toward goals:  [X]    Improving appropriately and progressing toward goals  [ ]    Improving slowly and progressing toward goals  [ ]    Not making progress toward goals and plan of care will be adjusted       PLAN:  Patient continues to benefit from skilled intervention to address the above impairments. Continue treatment per established plan of care. Discharge Recommendations:  Rehab  Further Equipment Recommendations for Discharge:  TBD-none at this time       SUBJECTIVE:   Patient stated I have this disease now and I just don't know what to expect.       OBJECTIVE DATA SUMMARY:   Critical Behavior:  Neurologic State: Alert, Appropriate for age  Orientation Level: Oriented X4, Appropriate for age  Cognition: Appropriate decision making, Follows commands  Safety/Judgement: Awareness of environment, Fall prevention, Decreased awareness of need for safety, Insight into deficits  Functional Mobility Training:  Bed Mobility:     Supine to Sit: Supervision  Sit to Supine: Supervision           Transfers:  Sit to Stand: Minimum assistance (Min A to steady without AD)  Stand to Sit: Contact guard assistance                             Balance:  Sitting: Intact  Standing: Impaired  Standing - Static: Fair  Standing - Dynamic : Fair  Ambulation/Gait Training:  Distance (ft): 100 Feet (ft)  Assistive Device: Gait belt; Other (comment) (HHA)  Ambulation - Level of Assistance: Minimal assistance;Assist x1        Gait Abnormalities: Decreased step clearance;Trunk sway increased                 Step Length: Right shortened;Left shortened                 Activity Tolerance:   Good  Please refer to the flowsheet for vital signs taken during this treatment.   After treatment:   [ ]    Patient left in no apparent distress sitting up in chair  [X]    Patient left in no apparent distress in bed  [X]    Call bell left within reach  [X]    Nursing notified  [X]    Caregiver present  [X]    Bed alarm activated      COMMUNICATION/COLLABORATION:   The patients plan of care was discussed with: Registered Nurse     Africa Mary, PT Time Calculation: 31 mins

## 2017-10-17 NOTE — PROGRESS NOTES
Problem: Falls - Risk of  Goal: *Absence of Falls  Document Marlo Fall Risk and appropriate interventions in the flowsheet.    Outcome: Progressing Towards Goal  Fall Risk Interventions:  Mobility Interventions: Bed/chair exit alarm, Patient to call before getting OOB           Medication Interventions: Bed/chair exit alarm, Patient to call before getting OOB, Teach patient to arise slowly     Elimination Interventions: Call light in reach, Toileting schedule/hourly rounds     History of Falls Interventions: Bed/chair exit alarm

## 2017-10-17 NOTE — PROGRESS NOTES
Met with pt to discuss discharge plan. Pt reported that he has been active in the past.  Pt stated that \"I have an autoimmune disease and with exercise I'm able to improve my strength and mobility. \" He is presently interested in rehab or some sort of PT post discharge. Pt is scheduled for a TURP procedure; will wait for PT recommendations following the procedure. CM will follow and assist with discharge needs.   Karn Klinefelter, LCSW

## 2017-10-17 NOTE — PROGRESS NOTES
[facility] Infectious Diseases Progress Note  Karla Miller MD,             Toyin Soares MD,          Sharitajair Baljit DO     [address] [place] [address]    [address], [address]  [phone]  Fax    10/17/2017      Assessment & Plan:   1. Klebsiella oxytoca Shaw catheter associated urosepsis with bacteremia. . Shaw catheter has been changed. Repeat blood cultures NGSF. Continue ceftriaxone while hospitalized. May deescalate to po cipro at discharge. Will need to discuss with cardiology in light of SVT  2. Urinary retention. A GreenLight procedure  is planned by Urology. Recommend delaying procedure until 10/19/2017.  3. 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 4. Acute kidney injury on chronic kidney disease. Will continue to monitor closely while on antibiotics. 5. Rash. Suspect miliaria rubra, NOT drug rash          Subjective:     No complaints    Objective:     Vitals:   Visit Vitals    /71 (BP 1 Location: Left arm, BP Patient Position: At rest)    Pulse 92    Temp 97.9 °F (36.6 °C)    Resp 18    Ht 6' 1\" (1.854 m)    Wt 75.8 kg (167 lb 1.7 oz)    SpO2 96%    BMI 22.05 kg/m2        Tmax:  Temp (24hrs), Av.2 °F (36.8 °C), Min:97.9 °F (36.6 °C), Max:98.7 °F (37.1 °C)      Exam:   Patient is intubated:  no    Physical Examination:   GENERAL ASSESSMENT: NAD  HEENT:Nontraumatic NCAT  CHEST: CTA  HEART: S1S2 RRR  ABDOMEN: Soft,NT,ND  :Shaw: yes  EXTREMITY: no edema  NEURO:Grossly non focal  SKIN: Pustular rash on back    Labs:        No lab exists for component: ITNL   No results for input(s): CPK, CKMB, TROIQ in the last 72 hours.   Recent Labs      10/17/17   0408  10/16/17   0253  10/15/17   0443   NA  141  137  136   K  4.5  3.8  3.7   CL  105  103  103   CO2  29  24  23   BUN  19  14  12   CREA  1.42*  1.17  1.27   GLU  99  118*  125*   PHOS  4.5  3.6  3.4   MG  2.4  2.3  2.2   WBC  10.7   --   7.4   HGB  14.7   -- 14.1   HCT  44.0   --   41.8   PLT  165   --   134*     No results for input(s): INR, PTP, APTT in the last 72 hours. No lab exists for component: INREXT  Needs: urine analysis, urine sodium, protein and creatinine  No results found for: NAVJOT, CREAU      Cultures:     No results found for: SDES  Lab Results   Component Value Date/Time    Culture result: NO GROWTH AFTER 15 HOURS 10/15/2017 02:41 PM    Culture result: NO GROWTH 2 DAYS 10/14/2017 03:08 PM    Culture result:  10/12/2017 06:32 PM     KLEBSIELLA OXYTOCA GROWING IN 2 OF 4 BOTTLES DRAWN . ..(SITE= L HAND NO.2 AND R FA)    Culture result:  10/12/2017 06:32 PM     PRELIMINARY REPORT OF GRAM NEGATIVE RODS GROWING IN 1 OF 4 BOTTLES DRAWN CALLED TO AND READ BACK BY MS YISEL RN ON 10/13/17 AT 2030 ([facility] 529). MS    Culture result:  10/12/2017 06:32 PM     PRELIMINARY REPORT OF GRAM NEGATIVE RODS GROWING IN 2 OF 4 BOTTLES DRAWN CALLED TO AND READ BACK BY MS DEYSI PARTIDA [facility] 5MS2) ON 10/15/17 AT 0107.  [facility]    Culture result: REMAINING BOTTLE(S) HAS/HAVE NO GROWTH SO FAR 10/12/2017 06:32 PM    Culture result: KLEBSIELLA PNEUMONIAE 10/12/2017 06:32 PM    Culture result: KLEBSIELLA OXYTOCA 10/12/2017 06:32 PM       Radiology:     Medications       Current Facility-Administered Medications   Medication Dose Route Frequency Last Dose    cefTRIAXone (ROCEPHIN) 2 g in 0.9% sodium chloride (MBP/ADV) 50 mL  2 g IntraVENous Q24H 2 g at 10/16/17 1939    bisacodyl (DULCOLAX) suppository 10 mg  10 mg Rectal DAILY PRN 10 mg at 10/16/17 3538    metoprolol tartrate (LOPRESSOR) tablet 25 mg  25 mg Oral Q12H 25 mg at 10/17/17 0821    aluminum-magnesium hydroxide (MAALOX) oral suspension 30 mL  30 mL Oral Q6H PRN 30 mL at 10/16/17 1232    artificial tears (dextran 70-hypromellose) (NATURAL BALANCE) 0.1-0.3 % ophthalmic solution 2 Drop  2 Drop Both Eyes PRN      sodium chloride (NS) flush 5-10 mL  5-10 mL IntraVENous PRN 10 mL at 10/17/17 0709    predniSONE ([facility]) tablet 7.5 mg  7.5 mg Oral DAILY WITH BREAKFAST 7.5 mg at 10/17/17 0820    acetaminophen (TYLENOL) tablet 650 mg  650 mg Oral Q4H  mg at 10/13/17 1619    diphenhydrAMINE (BENADRYL) capsule 25 mg  25 mg Oral Q4H PRN      ondansetron (ZOFRAN) injection 4 mg  4 mg IntraVENous Q4H PRN 4 mg at 10/16/17 0528    enoxaparin (LOVENOX) injection 40 mg  40 mg SubCUTAneous Q24H 40 mg at 10/16/17 2032    polyethylene glycol (MIRALAX) packet 17 g  17 g Oral DAILY 17 g at 10/17/17 0820    senna-docusate (PERICOLACE) 8.6-50 mg per tablet 1 Tab  1 Tab Oral DAILY 1 Tab at 10/17/17 0242           Case discussed with:      Salvador Haynes DO

## 2017-10-17 NOTE — PROGRESS NOTES
Bedside and Verbal shift change report given to [address] (oncoming nurse) by Bryan Moya (offgoing nurse). Report included the following information SBAR, Kardex, Procedure Summary, Intake/Output, Recent Results and Med Rec Status.

## 2017-10-17 NOTE — PROGRESS NOTES
Bedside and Verbal shift change report given to Anni Lane (oncoming nurse) by Emre VALDOVINOS RN(offgoing nurse). Report included the following information SBAR and Kardex.

## 2017-10-17 NOTE — PROGRESS NOTES
Pt voiced concern about blood pressure medication (Metoprolol). Reviewed BP elevated 148/71, normal range, pt verbalized understanding. Pt and spouse voiced concern about HR of 189 after BM 10/16. Jae Abraham RN notified.

## 2017-10-17 NOTE — PROGRESS NOTES
Cardiology Progress Note       5th floor  NAME:  Gabriele Crowell   :   1940   MRN:   [id number]     Assessment/Plan:   1. SVT:     - likley triggered by UTI  -  no evidence of a fib . -  cont metoprolol 25 mg BID  - ECHO shows nml LVEF  -  ck TSH    2. UTI:   - per ID/attending  - TUR later this week for urinary retention          Will see prn/pls call if needed . CARDIOLOGY ATTENDING  Patient personally seen and examined. All the elements of history and examination were personally performed. Assessment and plan was discussed and agree as written above    Had brief episode of SVT today, but HR overall OK. Still did not see Afib on telemetry. Continue BB. Will sign off and see him back as an outpatient. Arias Pa MD, [facility]          Subjective:   Mr. Mixon is a 68 y.o. With a history of CKD, Sjoegren's syndrome who presented on 10/12/17 with vomiting and chills and is diagnosed with a complicated UTI. Cardiology asked to see for SVT noted yesterday on tele. Mr. Harman Farah denies a history of heart problems or arrhythmias (no afib). He could not tell heart was racing when it was fast on tele. No CP or SOB.             Cardiac ROS: Patient denies any exertional chest pain, dyspnea, palpitations, syncope, orthopnea, edema or paroxysmal nocturnal dyspnea. Review of Systems: No nausea, indigestion, vomiting, pain, cough, sputum. No bleeding. Taking po. Appetite ok.            Objective:     Visit Vitals    /86 (BP 1 Location: Left arm, BP Patient Position: At rest)    Pulse 63    Temp 98 °F (36.7 °C)    Resp 20    Ht 6' 1\" (1.854 m)    Wt 167 lb 1.7 oz (75.8 kg)    SpO2 95%    BMI 22.05 kg/m2      O2 Device: Room air    Temp (24hrs), Av.1 °F (36.7 °C), Min:97.9 °F (36.6 °C), Max:98.7 °F (37.1 °C)      10/17 0701 - 10/17 1900  In: -   Out: 200 [Urine:200]    10/15 1901 - 10/17 0700  In: 50 [I.V.:50]  Out: 1500 [Urine:1500]     TELE:  brief self limiting bursts SVT      General: AAOx3 cooperative, no acute distress. Weak   Skin: petechiae on arms. HEENT: Atraumatic. Pink and moist.  Anicteric sclerae. Neck : Supple, no thyromegaly. Lungs: CTA bilaterally. No wheezing/rhonchi/rales. Heart: Regular rhythm, no murmur, no rubs, no gallops. No JVD. No carotid bruits. Abdomen: Soft, non-distended, non-tender. + Bowel sounds. No bruits. : mariscal > leg bag  Extremities: No edema, no clubbing, no cyanosis. No calf tenderness  Neurologic: Grossly intact. Alert and oriented X 3. No acute neurological distress. Psych: Good insight. Not anxious or agitated. Care Plan discussed with:    Comments   Patient x    Family  x spouse   RN x    Care Manager                    Consultant:          Data Review:     No lab exists for component: ITNL   No results for input(s): CPK, CKMB, TROIQ in the last 72 hours. Recent Labs      10/17/17   0408  10/16/17   0253  10/15/17   0443   NA  141  137  136   K  4.5  3.8  3.7   CL  105  103  103   CO2  29  24  23   BUN  19  14  12   CREA  1.42*  1.17  1.27   GLU  99  118*  125*   PHOS  4.5  3.6  3.4   MG  2.4  2.3  2.2   WBC  10.7   --   7.4   HGB  14.7   --   14.1   HCT  44.0   --   41.8   PLT  165   --   134*     No results for input(s): INR, PTP, APTT in the last 72 hours.     No lab exists for component: INREXT    Medications reviewed  Current Facility-Administered Medications   Medication Dose Route Frequency    cefTRIAXone (ROCEPHIN) 2 g in 0.9% sodium chloride (MBP/ADV) 50 mL  2 g IntraVENous Q24H    bisacodyl (DULCOLAX) suppository 10 mg  10 mg Rectal DAILY PRN    metoprolol tartrate (LOPRESSOR) tablet 25 mg  25 mg Oral Q12H    aluminum-magnesium hydroxide (MAALOX) oral suspension 30 mL  30 mL Oral Q6H PRN    artificial tears (dextran 70-hypromellose) (NATURAL BALANCE) 0.1-0.3 % ophthalmic solution 2 Drop  2 Drop Both Eyes PRN    sodium chloride (NS) flush 5-10 mL  5-10 mL IntraVENous PRN    predniSONE (DELTASONE) tablet 7.5 mg  7.5 mg Oral DAILY WITH BREAKFAST    acetaminophen (TYLENOL) tablet 650 mg  650 mg Oral Q4H PRN    diphenhydrAMINE (BENADRYL) capsule 25 mg  25 mg Oral Q4H PRN    ondansetron (ZOFRAN) injection 4 mg  4 mg IntraVENous Q4H PRN    enoxaparin (LOVENOX) injection 40 mg  40 mg SubCUTAneous Q24H    polyethylene glycol (MIRALAX) packet 17 g  17 g Oral DAILY    senna-docusate (PERICOLACE) 8.6-50 mg per tablet 1 Tab  1 Tab Oral DAILY         [facility], NP

## 2017-10-17 NOTE — WOUND CARE
Wound care follow up  Request from spouse to discuss skin care. Products and off loading reviewed with good understanding. Patient now more mobile and off loading, using skin care products ordered.   Will follow  [address]

## 2017-10-17 NOTE — PROGRESS NOTES
Urology Progress Note    Subjective:     Daily Progress Note: 10/17/2017 5:05 PM    Ese Rodríguez is doing excellent. He reports pain is absent. He has no new complaints. He is tolerating a solid diet and ambulating with assistance. Indwelling catheter is draining well. Objective:     Visit Vitals    /70 (BP 1 Location: Left arm, BP Patient Position: At rest)    Pulse 66    Temp 98.2 °F (36.8 °C)    Resp 18    Ht 6' 1\" (1.854 m)    Wt 75.8 kg (167 lb 1.7 oz)    SpO2 95%    BMI 22.05 kg/m2        Temp (24hrs), Av.2 °F (36.8 °C), Min:97.9 °F (36.6 °C), Max:98.7 °F (37.1 °C)      Intake and Output:  10/15 1901 - 10/17 0700  In: 50 [I.V.:50]  Out: 1500 [Urine:1500]  10/17 07 - 10/17 1900  In: -   Out: 700 [Urine:700]    Physical Exam:   General appearance: alert, cooperative, no distress, appears stated age  Abdomen: soft, non-tender. No masses,  no organomegaly    Lab/Data Review:  BMP:   Lab Results   Component Value Date/Time     10/17/2017 04:08 AM    K 4.5 10/17/2017 04:08 AM     10/17/2017 04:08 AM    CO2 29 10/17/2017 04:08 AM    AGAP 7 10/17/2017 04:08 AM    GLU 99 10/17/2017 04:08 AM    BUN 19 10/17/2017 04:08 AM    CREA 1.42 (H) 10/17/2017 04:08 AM    GFRAA 59 (L) 10/17/2017 04:08 AM    GFRNA 48 (L) 10/17/2017 04:08 AM     CBC:   Lab Results   Component Value Date/Time    WBC 10.7 10/17/2017 04:08 AM    HGB 14.7 10/17/2017 04:08 AM    HCT 44.0 10/17/2017 04:08 AM     10/17/2017 04:08 AM       Assessment/Plan:     Active Problems:    UTI (urinary tract infection) (10/12/2017)      Sjoegren syndrome ([address] (2016)      Urine retention ()      Sepsis ([facility] [address] (10/12/2017)      Leukocytosis (10/12/2017)      Fever (10/12/2017)      Renal insufficiency (10/12/2017)      CKD (chronic kidney disease), stage III ()      Nausea & vomiting (10/12/2017)      Cough (10/12/2017)      Dehydration (10/12/2017)      Hypoxia (10/12/2017)      Sinus tachycardia (10/12/2017)     BPH with retention    Plan:  Doing well and continue with treatment    Pt cleared for Greenlight on Thursday. Discussed again with patient. Continue Abx through surgery.       Signed By: Tita Leggett MD                         October 17, 2017

## 2017-10-17 NOTE — PROGRESS NOTES
Bedside and Verbal shift change report given to Kelly Grey (oncoming nurse) by Deb Guzman (offgoing nurse). Report included the following information SBAR, Kardex, Intake/Output, MAR and Cardiac Rhythm NSR 66.

## 2017-10-18 ENCOUNTER — ANESTHESIA EVENT (OUTPATIENT)
Dept: SURGERY | Age: 77
DRG: 665 | End: 2017-10-18
Payer: MEDICARE

## 2017-10-18 LAB
ANION GAP SERPL CALC-SCNC: 10 MMOL/L (ref 5–15)
BACTERIA SPEC CULT: ABNORMAL
BUN SERPL-MCNC: 18 MG/DL (ref 6–20)
BUN/CREAT SERPL: 15 (ref 12–20)
CALCIUM SERPL-MCNC: 8.5 MG/DL (ref 8.5–10.1)
CHLORIDE SERPL-SCNC: 104 MMOL/L (ref 97–108)
CO2 SERPL-SCNC: 25 MMOL/L (ref 21–32)
CREAT SERPL-MCNC: 1.21 MG/DL (ref 0.7–1.3)
GLUCOSE SERPL-MCNC: 104 MG/DL (ref 65–100)
MAGNESIUM SERPL-MCNC: 2.3 MG/DL (ref 1.6–2.4)
POTASSIUM SERPL-SCNC: 4.2 MMOL/L (ref 3.5–5.1)
SERVICE CMNT-IMP: ABNORMAL
SODIUM SERPL-SCNC: 139 MMOL/L (ref 136–145)

## 2017-10-18 PROCEDURE — 74011250637 HC RX REV CODE- 250/637: Performed by: INTERNAL MEDICINE

## 2017-10-18 PROCEDURE — 80048 BASIC METABOLIC PNL TOTAL CA: CPT | Performed by: INTERNAL MEDICINE

## 2017-10-18 PROCEDURE — 97535 SELF CARE MNGMENT TRAINING: CPT | Performed by: OCCUPATIONAL THERAPIST

## 2017-10-18 PROCEDURE — 74011250636 HC RX REV CODE- 250/636: Performed by: INTERNAL MEDICINE

## 2017-10-18 PROCEDURE — 74011636637 HC RX REV CODE- 636/637: Performed by: INTERNAL MEDICINE

## 2017-10-18 PROCEDURE — 74011000258 HC RX REV CODE- 258: Performed by: INTERNAL MEDICINE

## 2017-10-18 PROCEDURE — 83735 ASSAY OF MAGNESIUM: CPT | Performed by: INTERNAL MEDICINE

## 2017-10-18 PROCEDURE — 97116 GAIT TRAINING THERAPY: CPT

## 2017-10-18 PROCEDURE — 65660000000 HC RM CCU STEPDOWN

## 2017-10-18 PROCEDURE — 36415 COLL VENOUS BLD VENIPUNCTURE: CPT | Performed by: INTERNAL MEDICINE

## 2017-10-18 RX ORDER — AMOXICILLIN 250 MG
1 CAPSULE ORAL 2 TIMES DAILY
Status: DISCONTINUED | OUTPATIENT
Start: 2017-10-18 | End: 2017-10-20 | Stop reason: HOSPADM

## 2017-10-18 RX ORDER — POLYETHYLENE GLYCOL 3350 17 G/17G
17 POWDER, FOR SOLUTION ORAL 3 TIMES DAILY
Status: DISCONTINUED | OUTPATIENT
Start: 2017-10-18 | End: 2017-10-20 | Stop reason: HOSPADM

## 2017-10-18 RX ADMIN — DEXTROSE MONOHYDRATE, SODIUM CHLORIDE, AND POTASSIUM CHLORIDE 50 ML/HR: 50; 4.5; 1.49 INJECTION, SOLUTION INTRAVENOUS at 13:10

## 2017-10-18 RX ADMIN — ALUMINUM HYDROXIDE AND MAGNESIUM HYDROXIDE 30 ML: 200; 200 SUSPENSION ORAL at 21:28

## 2017-10-18 RX ADMIN — POLYETHYLENE GLYCOL 3350 17 G: 17 POWDER, FOR SOLUTION ORAL at 09:34

## 2017-10-18 RX ADMIN — METOPROLOL TARTRATE 25 MG: 25 TABLET ORAL at 09:34

## 2017-10-18 RX ADMIN — CEFTRIAXONE 2 G: 2 INJECTION, POWDER, FOR SOLUTION INTRAMUSCULAR; INTRAVENOUS at 20:58

## 2017-10-18 RX ADMIN — DOCUSATE SODIUM AND SENNOSIDES 1 TABLET: 8.6; 5 TABLET, FILM COATED ORAL at 09:34

## 2017-10-18 RX ADMIN — ALUMINUM HYDROXIDE AND MAGNESIUM HYDROXIDE 30 ML: 200; 200 SUSPENSION ORAL at 01:21

## 2017-10-18 RX ADMIN — PREDNISONE 7.5 MG: 5 TABLET ORAL at 09:34

## 2017-10-18 RX ADMIN — POLYETHYLENE GLYCOL 3350 17 G: 17 POWDER, FOR SOLUTION ORAL at 13:10

## 2017-10-18 RX ADMIN — METOPROLOL TARTRATE 25 MG: 25 TABLET ORAL at 20:59

## 2017-10-18 RX ADMIN — DOCUSATE SODIUM AND SENNOSIDES 1 TABLET: 8.6; 5 TABLET, FILM COATED ORAL at 20:59

## 2017-10-18 NOTE — PROGRESS NOTES
[facility] Infectious Diseases Progress Note  Tammy Steinberg MD,             Cedric Perez MD,          Kingsley Perez DO     [address] [place] [address]    [address], [address]  [phone]  Fax    10/18/2017      Assessment & Plan:   1. Klebsiella oxytoca mariscal catheter associated urosepsis with bacteremia. . Mariscal catheter has been changed. Repeat blood cultures NGSF. Continue ceftriaxone while hospitalized. May deescalate to po cipro vs continue ctx at discharge depending on dispo. May go to [address] [place] [address]? 2. Urinary retention. A GreenLight procedure  is planned by Urology. Recommend delaying procedure until 10/19/2017.  3. 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 4. Acute kidney injury on chronic kidney disease. Will continue to monitor closely while on antibiotics. 5. Rash. Suspect miliaria rubra, NOT drug rash          Subjective:     No complaints    Objective:     Vitals:   Visit Vitals    /80 (BP 1 Location: Left arm, BP Patient Position: At rest)    Pulse 84    Temp 97.9 °F (36.6 °C)    Resp 16    Ht 6' 1\" (1.854 m)    Wt 75.8 kg (167 lb 1.7 oz)    SpO2 92%    BMI 22.05 kg/m2        Tmax:  Temp (24hrs), Av °F (36.7 °C), Min:97.7 °F (36.5 °C), Max:98.3 °F (36.8 °C)      Exam:   Patient is intubated:  no    Physical Examination:   GENERAL ASSESSMENT: NAD  HEENT:Nontraumatic NCAT  CHEST: CTA  HEART: S1S2 RRR  ABDOMEN: Soft,NT,ND  :Mariscal: yes  EXTREMITY: no edema  NEURO:Grossly non focal  SKIN: Pustular rash on back    Labs:        No lab exists for component: ITNL   No results for input(s): CPK, CKMB, TROIQ in the last 72 hours.   Recent Labs      10/18/17   0345  10/17/17   0408  10/16/17   0253   NA  139  141  137   K  4.2  4.5  3.8   CL  104  105  103   CO2  25  29  24   BUN  18  19  14   CREA  1.21  1.42*  1.17   GLU  104*  99  118*   PHOS   --   4.5  3.6   MG  2.3  2.4  2.3   WBC   --   10.7   --    HGB   -- 14. 7   --    HCT   --   44.0   --    PLT   --   165   --      No results for input(s): INR, PTP, APTT in the last 72 hours. No lab exists for component: INREXT, INREXT  Needs: urine analysis, urine sodium, protein and creatinine  No results found for: ANVJOT, CREAU      Cultures:     No results found for: SDES  Lab Results   Component Value Date/Time    Culture result: NO GROWTH 3 DAYS 10/15/2017 02:41 PM    Culture result: NO GROWTH 4 DAYS 10/14/2017 03:08 PM    Culture result:  10/12/2017 06:32 PM     KLEBSIELLA OXYTOCA GROWING IN 2 OF 4 BOTTLES DRAWN . ..(SITE= L HAND NO.2 AND R FA)    Culture result:  10/12/2017 06:32 PM     PRELIMINARY REPORT OF GRAM NEGATIVE RODS GROWING IN 1 OF 4 BOTTLES DRAWN CALLED TO AND READ BACK BY MS YISEL RN ON 10/13/17 AT 2030 ([facility] 529). MS    Culture result:  10/12/2017 06:32 PM     PRELIMINARY REPORT OF GRAM NEGATIVE RODS GROWING IN 2 OF 4 BOTTLES DRAWN CALLED TO AND READ BACK BY MS DEYSI PARTIDA [facility] 5MS2) ON 10/15/17 AT 0107.  [facility]    Culture result: REMAINING BOTTLE(S) HAS/HAVE NO GROWTH SO FAR 10/12/2017 06:32 PM    Culture result: KLEBSIELLA PNEUMONIAE 10/12/2017 06:32 PM    Culture result: KLEBSIELLA OXYTOCA 10/12/2017 06:32 PM       Radiology:     Medications       Current Facility-Administered Medications   Medication Dose Route Frequency Last Dose    polyethylene glycol (MIRALAX) packet 17 g  17 g Oral TID      senna-docusate (PERICOLACE) 8.6-50 mg per tablet 1 Tab  1 Tab Oral BID      dextrose 5% - 0.45% NaCl with KCl 20 mEq/L infusion  50 mL/hr IntraVENous CONTINUOUS 50 mL/hr at 10/17/17 1510    cefTRIAXone (ROCEPHIN) 2 g in 0.9% sodium chloride (MBP/ADV) 50 mL  2 g IntraVENous Q24H 2 g at 10/17/17 2050    bisacodyl (DULCOLAX) suppository 10 mg  10 mg Rectal DAILY PRN 10 mg at 10/17/17 1807    metoprolol tartrate (LOPRESSOR) tablet 25 mg  25 mg Oral Q12H 25 mg at 10/18/17 0934    aluminum-magnesium hydroxide (MAALOX) oral suspension 30 mL  30 mL Oral Q6H PRN 30 mL at 10/18/17 0121    artificial tears (dextran 70-hypromellose) (NATURAL BALANCE) 0.1-0.3 % ophthalmic solution 2 Drop  2 Drop Both Eyes PRN      sodium chloride (NS) flush 5-10 mL  5-10 mL IntraVENous PRN 10 mL at 10/17/17 0709    predniSONE (DELTASONE) tablet 7.5 mg  7.5 mg Oral DAILY WITH BREAKFAST 7.5 mg at 10/18/17 0934    acetaminophen (TYLENOL) tablet 650 mg  650 mg Oral Q4H  mg at 10/13/17 1619    diphenhydrAMINE (BENADRYL) capsule 25 mg  25 mg Oral Q4H PRN      ondansetron (ZOFRAN) injection 4 mg  4 mg IntraVENous Q4H PRN 4 mg at 10/16/17 0528    enoxaparin (LOVENOX) injection 40 mg  40 mg SubCUTAneous Q24H 40 mg at 10/17/17 4883           Case discussed with:      Zachariah Bhatia DO

## 2017-10-18 NOTE — PROGRESS NOTES
Problem: Self Care Deficits Care Plan (Adult)  Goal: *Acute Goals and Plan of Care (Insert Text)  Occupational Therapy Goals  Initiated 10/16/2017  1. Patient will perform grooming standing at sink for at least 8 minutes with supervision/set-up within 7 day(s). 2.  Patient will perform lower body dressing including gathering his clothing with supervision/set-up within 7 day(s). 3.  Patient will perform toilet transfers with supervision/set-up using best DME for safety within 7 day(s). 4.  Patient will perform all aspects of toileting with supervision/set-up within 7 day(s). 5.  Patient will participate in upper extremity therapeutic exercise/activities with independence for 15 minutes within 7 day(s). 6.  Patient will utilize energy conservation techniques during functional activities with verbal and visual cues within 7 day(s). Occupational Therapy TREATMENT  Patient: Prakash Panda (50 y.o. male)  Date: 10/18/2017  Diagnosis: UTI (urinary tract infection)  BPH OBSTRUCTION <principal problem not specified>  Procedure(s) (LRB):  TRANSURETHRAL RESECTION OF PROSTATE WITH GREENLIGHT (N/A)    Precautions: Fall    ASSESSMENT:  Pt is making steady progress toward goals. He required min A overall for toileting and functional mobility in bathroom using his 3 wheeled walker. Educated pt on energy conservation techniques, home safety and fall prevention and discussed pt's personal goals for therapy and improved quality of life. Pt verbalized good understanding and provided thoughtful examples of each point above. Recommend brief IP rehab after TURP procedure tomorrow. Pt is very motivated for increased independence and can tolerate at least 3 hours of intense therapy a day.   Progression toward goals:  [x]       Improving appropriately and progressing toward goals  []       Improving slowly and progressing toward goals  []       Not making progress toward goals and plan of care will be adjusted PLAN:  Patient continues to benefit from skilled intervention to address the above impairments. Continue treatment per established plan of care. Discharge Recommendations:  Inpatient Rehab  Further Equipment Recommendations for Discharge:  TBD     SUBJECTIVE:   Patient stated I am feeling much better.     OBJECTIVE DATA SUMMARY:   Cognitive/Behavioral Status:  Neurologic State: Alert; Appropriate for age  Orientation Level: Oriented X4  Cognition: Appropriate for age attention/concentration; Follows commands  Perception: Appears intact  Perseveration: No perseveration noted  Safety/Judgement: Awareness of environment; Fall prevention; Insight into deficits; Decreased awareness of need for safety    Functional Mobility and Transfers for ADLs:  Transfers:  Sit to Stand: Contact guard assistance; Additional time  Functional Transfers  Bathroom Mobility: Contact guard assistance (using 3 wheeled walker)  Toilet Transfer : Minimum assistance; Additional time;Assist x1 (using grab bar)  Cues: Physical assistance; Tactile cues provided;Verbal cues provided;Visual cues provided  Adaptive Equipment: Walker (comment) (using 3 wheeled walker)    Balance:  Sitting: Intact  Standing: Impaired  Standing - Static: Fair  Standing - Dynamic : Fair    ADL Intervention:  Educated pt on energy conservation techniques, home safety and fall prevention and discussed pt's personal goals for therapy and improved quality of life. Pt verbalized good understanding and provided thoughtful examples of each point above.      Grooming  Grooming Assistance: Contact guard assistance (standing at sink)  Washing Hands: Contact guard assistance (for safety with standing balance)  Cues: Tactile cues provided;Verbal cues provided;Visual cues provided    Lower Body Dressing Assistance  Shoes with Cloth Laces: Stand-by assistance (seated EOB bending foward to reach feet)  Position Performed: Bending forward method;Seated edge of bed  Cues: Don;Verbal cues provided;Visual cues provided    Toileting  Toileting Assistance: Minimum assistance  Bladder Hygiene: Supervision/set-up  Bowel Hygiene: Supervision/set-up  Clothing Management: Minimum assistance (sit to stand from standard toilet)  Cues: Tactile cues provided;Verbal cues provided;Visual cues provided  Adaptive Equipment: Grab bars; Walker    Cognitive Retraining  Safety/Judgement: Awareness of environment; Fall prevention; Insight into deficits; Decreased awareness of need for safety    Pain:  Pain Scale 1: Numeric (0 - 10)  Pain Intensity 1: 0              Activity Tolerance:   Fair  Please refer to the flowsheet for vital signs taken during this treatment.   After treatment:   [x] Patient left in no apparent distress sitting up in chair with PT  [] Patient left in no apparent distress in bed  [x] Call bell left within reach  [x] Nursing notified  [] Caregiver present  [] Bed alarm activated    COMMUNICATION/COLLABORATION:   The patients plan of care was discussed with: Physical Therapist and Registered Nurse    Carole Mojica OT  Time Calculation: 45 mins

## 2017-10-18 NOTE — PROGRESS NOTES
Problem: Falls - Risk of  Goal: *Absence of Falls  Document Marlo Fall Risk and appropriate interventions in the flowsheet.    Outcome: Progressing Towards Goal  Fall Risk Interventions:  Mobility Interventions: Patient to call before getting OOB, PT Consult for mobility concerns         Medication Interventions: Patient to call before getting OOB, Teach patient to arise slowly    Elimination Interventions: Call light in reach, Patient to call for help with toileting needs    History of Falls Interventions: Door open when patient unattended

## 2017-10-18 NOTE — PROGRESS NOTES
Bedside and Verbal shift change report given to Jaycee Schwartz (oncoming nurse) by Ward John RN (offgoing nurse). Report included the following information SBAR, Kardex, Intake/Output, MAR and Recent Results.

## 2017-10-18 NOTE — PROGRESS NOTES
10/19/2017 9:02 PM Referral sent to [facility]. KATHY Tracey     10/19/2017 8:19 PM FELICIA spoke with pt's wife. She expressed that she still hopes that this pt will be able to go to [address] [place] [address]. Open to getting a list of SNF choices, but hoping that the pt will not have to go to a SNF. Gave her a list of SNF choice. She states that [facility] will be her next choice if the pt is unable to get into [facility].     10/19/2017 7:48 PM SW met with pt and his wife regarding placement. They express that they are concerned about getting into [facility]. Pt's wife expressed that she does not want to take her  home. SW explained that there is a process for acute rehab placement. SW asked them what they will do if the pt is unable to be accepted. Pt responded that he would want to stay longer and then go home and return to outpatient rehab. Care management to follow. KATHY Tracey     10/19/2017 2:45 PM FELICIA spoke with Seng from [facility]. She came to see this pt. Pina Hernandez and Hannah Benitez from rehab services have come to see the pt. [facility] awaits therapy notes. They have not made a determination yet. Also made a follow up call to Dr. Mariely Olivera. He notes,  discharge paper completed in hopes that this pt might be able to discharge, but is aware that placement might require more time. FELICIA informed him that [facility] is still evaluating. Peter Tracey     10/19/2017 2:07 PM Referral was sent to [address] [place] [address] as well yesterday. Pt in procedure today. Rehab services attempted to see him once today, but was unable to see him because he was in a procedure. FELICIA has contacted rehab services and asked them to come and see this pt. KATHY Tracey     10/18/2017 2:30 PM SW received a message from [facility]. They are unable to review this pt's case or make a determination until after this pt's scheduled procedure. KATHY Tracey     10/18/2017 8:46 AM Orders received for rehab. Met with the pt and his wife.  They state that they do not want to go to [facility], but would rather go to [facility] if possible. Choice letter signed for [facility] 1st and [address] [place] [address] rehab 2nd. They express concerns about their secondary coverage and if the pt's Urologist will be able to see him during rehab. Referral sent to [facility] via [address].    KATHY Escalante

## 2017-10-18 NOTE — PROGRESS NOTES
[facility]  [address] [facility], [address] [place] [address]  [phone]      Medical Progress Note      NAME: Sonam Mckinnon   :  1940  MRM:  [id number]    Date/Time: 10/18/2017  10:34 AM       Assessment and Plan:     Complicated UTI (urinary tract infection), due to indwelling mariscal catheter - POA, 2 species of klebsiella. Pan sensitive. Ceftriaxone while here and cipro at discharge. He is CLEARED for urological procedure. Klebsiella oxytoxa bacteremia - Continue IV ceftriaxone. Surveillance cultures are clear. CLEARED for Greenlight procedure tomorrow with urology.     Urine retention / Overactive bladder - Chronic, requiring mariscal for UOP. Needs TURP. Discharge delayed awaiting urology procedure. He should be fine to discharge after procedure. If he requires hospitalization due to procedure, we'd be happy to transfer is care onto the Urology service. Debilitated patient - Due to sepsis. rehab consult.      Tachycardia - Per cardiology, it was NOT atrial fibrillation with rapid ventricular response. Unremarkable TTE. Started metoprolol, but I lowered dose.       Sepsis / Leukocytosis / Fever / Sinus tachycardia - POA due to UTI. Improved. Abx as above.      Renal insufficiency / Dehydration / CKD (chronic kidney disease), stage III - POA, multifactorial. Resolved, but continue IVF until done with procedure    Constipation - Persistent. He sometimes needs enema at home, and I will order one now. He did not respond to sennakot and miralax      Sjoegren syndrome - Continue 7.5 mg prednisone usha-operatively. Appreciate rheumatology's input       Subjective:     Chief Complaint:  Feels well. Ready for procedure in AM    ROS:  (bold if positive, if negative)      Tolerating PT   Tolerating Diet        Objective:     Last 24hrs VS reviewed since prior progress note.  Most recent are:    Visit Vitals    /80 (BP 1 Location: Left arm, BP Patient Position: At rest)  Pulse 84    Temp 97.9 °F (36.6 °C)    Resp 16    Ht 6' 1\" (1.854 m)    Wt 75.8 kg (167 lb 1.7 oz)    SpO2 92%    BMI 22.05 kg/m2     SpO2 Readings from Last 6 Encounters:   10/18/17 92%   09/03/17 95%   07/13/17 95%            Intake/Output Summary (Last 24 hours) at 10/18/17 1033  Last data filed at 10/18/17 0648   Gross per 24 hour   Intake                0 ml   Output             1800 ml   Net            -1800 ml        Physical Exam:    Gen:  Well-developed, well-nourished, in no acute distress  HEENT:  Pink conjunctivae, PERRL, hearing intact to voice, moist mucous membranes  Neck:  Supple, without masses, thyroid non-tender  Resp:  No accessory muscle use, clear breath sounds without wheezes rales or rhonchi  Card:  No murmurs, normal S1, S2 without thrills, bruits or peripheral edema  Abd:  Soft, non-tender, non-distended, normoactive bowel sounds are present, no mass  Lymph:  No cervical or inguinal adenopathy  Musc:  No cyanosis or clubbing  Skin:  No rashes or ulcers, skin turgor is good  Neuro:  Cranial nerves are grossly intact, no focal motor weakness, follows commands appropriately  Psych:  Good insight, oriented to person, place and time, alert    Telemetry reviewed:   normal sinus rhythm  __________________________________________________________________  Medications Reviewed: (see below)  Medications:     Current Facility-Administered Medications   Medication Dose Route Frequency    polyethylene glycol (MIRALAX) packet 17 g  17 g Oral TID    senna-docusate (PERICOLACE) 8.6-50 mg per tablet 1 Tab  1 Tab Oral BID    dextrose 5% - 0.45% NaCl with KCl 20 mEq/L infusion  50 mL/hr IntraVENous CONTINUOUS    cefTRIAXone (ROCEPHIN) 2 g in 0.9% sodium chloride (MBP/ADV) 50 mL  2 g IntraVENous Q24H    bisacodyl (DULCOLAX) suppository 10 mg  10 mg Rectal DAILY PRN    metoprolol tartrate (LOPRESSOR) tablet 25 mg  25 mg Oral Q12H    aluminum-magnesium hydroxide (MAALOX) oral suspension 30 mL  30 mL Oral Q6H PRN    artificial tears (dextran 70-hypromellose) (NATURAL BALANCE) 0.1-0.3 % ophthalmic solution 2 Drop  2 Drop Both Eyes PRN    sodium chloride (NS) flush 5-10 mL  5-10 mL IntraVENous PRN    predniSONE (DELTASONE) tablet 7.5 mg  7.5 mg Oral DAILY WITH BREAKFAST    acetaminophen (TYLENOL) tablet 650 mg  650 mg Oral Q4H PRN    diphenhydrAMINE (BENADRYL) capsule 25 mg  25 mg Oral Q4H PRN    ondansetron (ZOFRAN) injection 4 mg  4 mg IntraVENous Q4H PRN    enoxaparin (LOVENOX) injection 40 mg  40 mg SubCUTAneous Q24H        Lab Data Reviewed: (see below)  Lab Review:     Recent Labs      10/17/17   0408   WBC  10.7   HGB  14.7   HCT  44.0   PLT  165     Recent Labs      10/18/17   0345  10/17/17   0408  10/16/17   0253   NA  139  141  137   K  4.2  4.5  3.8   CL  104  105  103   CO2  25  29  24   GLU  104*  99  118*   BUN  18  19  14   CREA  1.21  1.42*  1.17   CA  8.5  8.9  8.3*   MG  2.3  2.4  2.3   PHOS   --   4.5  3.6     No results found for: GLUCPOC  No results for input(s): PH, PCO2, PO2, HCO3, FIO2 in the last 72 hours. No results for input(s): INR in the last 72 hours. No lab exists for component: INREXT, INREXT  All Micro Results     Procedure Component Value Units Date/Time    CULTURE, BLOOD, PAIRED [[id number]]  (Abnormal)  (Susceptibility) Collected:  10/12/17 1832    Order Status:  Completed Specimen:  Blood Updated:  10/18/17 1031     Special Requests: NO SPECIAL REQUESTS        Culture result:         KLEBSIELLA OXYTOCA GROWING IN 2 OF 4 BOTTLES DRAWN . ..(SITE=  HAND NO.2 AND R FA) (A)              PRELIMINARY REPORT OF GRAM NEGATIVE RODS GROWING IN 1 OF 4 BOTTLES DRAWN CALLED TO AND READ BACK BY MS YISEL RN ON 10/13/17 AT 2030 ([facility] 529). MS (A)              PRELIMINARY REPORT OF GRAM NEGATIVE RODS GROWING IN 2 OF 4 BOTTLES DRAWN CALLED TO AND READ BACK BY MS DEYSI PARTIDA [facility] 5MS2) ON 10/15/17 AT 0107.  HH (A)              REMAINING BOTTLE(S) HAS/HAVE NO GROWTH SO FAR CULTURE, BLOOD, PAIRED [[id number]] Collected:  10/14/17 1508    Order Status:  Completed Specimen:  Blood Updated:  10/18/17 0943     Special Requests: NO SPECIAL REQUESTS        Culture result: NO GROWTH 4 DAYS       CULTURE, BLOOD, PAIRED [[id number]] Collected:  10/15/17 1441    Order Status:  Completed Specimen:  Blood Updated:  10/18/17 0943     Special Requests: NO SPECIAL REQUESTS        Culture result: NO GROWTH 3 DAYS       CULTURE, URINE [[id number]]  (Abnormal)  (Susceptibility) Collected:  10/12/17 1832    Order Status:  Completed Specimen:  Urine Updated:  10/14/17 0923     Special Requests: --        NO SPECIAL REQUESTS  Reflexed from [id number]       [place] Count --        >100,000  COLONIES/mL       Culture result: KLEBSIELLA PNEUMONIAE (A)         KLEBSIELLA OXYTOCA (A)             I have reviewed notes of prior 24hr.     Other pertinent lab: none    Total time spent with patient: [phone] [place] discussed with: Patient, Family, Care Manager, Nursing Staff, Consultant/Specialist and >50% of time spent in counseling and coordination of care    Discussed:  Care Plan and D/C Planning    Prophylaxis:  H2B/PPI    Disposition:  Home w/Family           ___________________________________________________    Attending Physician: Brii Grimaldo MD

## 2017-10-18 NOTE — ROUTINE PROCESS
Bedside shift change report given to Kendall Bowens RN (oncoming nurse) by Dominga Kay RN (offgoing nurse). Report included the following information SBAR, Kardex, Intake/Output and MAR.

## 2017-10-19 ENCOUNTER — ANESTHESIA (OUTPATIENT)
Dept: SURGERY | Age: 77
DRG: 665 | End: 2017-10-19
Payer: MEDICARE

## 2017-10-19 LAB
BACTERIA SPEC CULT: NORMAL
SERVICE CMNT-IMP: NORMAL

## 2017-10-19 PROCEDURE — 88305 TISSUE EXAM BY PATHOLOGIST: CPT | Performed by: INTERNAL MEDICINE

## 2017-10-19 PROCEDURE — 77030027138 HC INCENT SPIROMETER -A

## 2017-10-19 PROCEDURE — 74011250637 HC RX REV CODE- 250/637: Performed by: INTERNAL MEDICINE

## 2017-10-19 PROCEDURE — 97110 THERAPEUTIC EXERCISES: CPT

## 2017-10-19 PROCEDURE — 77030032490 HC SLV COMPR SCD KNE COVD -B: Performed by: UROLOGY

## 2017-10-19 PROCEDURE — 77030018832 HC SOL IRR H20 ICUM -A: Performed by: UROLOGY

## 2017-10-19 PROCEDURE — 0V508ZZ DESTRUCTION OF PROSTATE, VIA NATURAL OR ARTIFICIAL OPENING ENDOSCOPIC: ICD-10-PCS | Performed by: UROLOGY

## 2017-10-19 PROCEDURE — 77030010545: Performed by: UROLOGY

## 2017-10-19 PROCEDURE — 65660000000 HC RM CCU STEPDOWN

## 2017-10-19 PROCEDURE — 74011250637 HC RX REV CODE- 250/637: Performed by: UROLOGY

## 2017-10-19 PROCEDURE — 97116 GAIT TRAINING THERAPY: CPT

## 2017-10-19 PROCEDURE — 74011250636 HC RX REV CODE- 250/636: Performed by: INTERNAL MEDICINE

## 2017-10-19 PROCEDURE — 74011250636 HC RX REV CODE- 250/636

## 2017-10-19 PROCEDURE — 76010000162 HC OR TIME 1.5 TO 2 HR INTENSV-TIER 1: Performed by: UROLOGY

## 2017-10-19 PROCEDURE — 74011636637 HC RX REV CODE- 636/637: Performed by: INTERNAL MEDICINE

## 2017-10-19 PROCEDURE — 76060000034 HC ANESTHESIA 1.5 TO 2 HR: Performed by: UROLOGY

## 2017-10-19 PROCEDURE — 77030005546 HC CATH URETH FOL 3W BARD -A: Performed by: UROLOGY

## 2017-10-19 PROCEDURE — 77030018836 HC SOL IRR NACL ICUM -A: Performed by: UROLOGY

## 2017-10-19 PROCEDURE — 74011000258 HC RX REV CODE- 258: Performed by: INTERNAL MEDICINE

## 2017-10-19 PROCEDURE — 97530 THERAPEUTIC ACTIVITIES: CPT

## 2017-10-19 PROCEDURE — 77030026438 HC STYL ET INTUB CARD -A: Performed by: ANESTHESIOLOGY

## 2017-10-19 PROCEDURE — 74011250636 HC RX REV CODE- 250/636: Performed by: ANESTHESIOLOGY

## 2017-10-19 PROCEDURE — 77030019908 HC STETH ESOPH SIMS -A: Performed by: ANESTHESIOLOGY

## 2017-10-19 PROCEDURE — 74011000250 HC RX REV CODE- 250

## 2017-10-19 PROCEDURE — 76210000006 HC OR PH I REC 0.5 TO 1 HR: Performed by: UROLOGY

## 2017-10-19 PROCEDURE — 77030008684 HC TU ET CUF COVD -B: Performed by: ANESTHESIOLOGY

## 2017-10-19 PROCEDURE — 77030020782 HC GWN BAIR PAWS FLX 3M -B

## 2017-10-19 PROCEDURE — 77030020644 HC FBR LSR GRNLT BSC -G: Performed by: UROLOGY

## 2017-10-19 PROCEDURE — 77030019927 HC TBNG IRR CYSTO BAXT -A: Performed by: UROLOGY

## 2017-10-19 RX ORDER — SODIUM CHLORIDE, SODIUM LACTATE, POTASSIUM CHLORIDE, CALCIUM CHLORIDE 600; 310; 30; 20 MG/100ML; MG/100ML; MG/100ML; MG/100ML
125 INJECTION, SOLUTION INTRAVENOUS CONTINUOUS
Status: DISCONTINUED | OUTPATIENT
Start: 2017-10-19 | End: 2017-10-19 | Stop reason: HOSPADM

## 2017-10-19 RX ORDER — HYDROMORPHONE HYDROCHLORIDE 1 MG/ML
.5-1 INJECTION, SOLUTION INTRAMUSCULAR; INTRAVENOUS; SUBCUTANEOUS
Status: DISCONTINUED | OUTPATIENT
Start: 2017-10-19 | End: 2017-10-19 | Stop reason: HOSPADM

## 2017-10-19 RX ORDER — PROPOFOL 10 MG/ML
INJECTION, EMULSION INTRAVENOUS AS NEEDED
Status: DISCONTINUED | OUTPATIENT
Start: 2017-10-19 | End: 2017-10-19 | Stop reason: HOSPADM

## 2017-10-19 RX ORDER — ATROPA BELLADONNA AND OPIUM 16.2; 6 MG/1; MG/1
SUPPOSITORY RECTAL AS NEEDED
Status: DISCONTINUED | OUTPATIENT
Start: 2017-10-19 | End: 2017-10-19 | Stop reason: HOSPADM

## 2017-10-19 RX ORDER — CIPROFLOXACIN 500 MG/1
500 TABLET ORAL EVERY 12 HOURS
Status: DISCONTINUED | OUTPATIENT
Start: 2017-10-20 | End: 2017-10-20 | Stop reason: HOSPADM

## 2017-10-19 RX ORDER — SODIUM CHLORIDE 0.9 % (FLUSH) 0.9 %
5-10 SYRINGE (ML) INJECTION AS NEEDED
Status: DISCONTINUED | OUTPATIENT
Start: 2017-10-19 | End: 2017-10-19 | Stop reason: HOSPADM

## 2017-10-19 RX ORDER — LIDOCAINE HYDROCHLORIDE 20 MG/ML
INJECTION, SOLUTION EPIDURAL; INFILTRATION; INTRACAUDAL; PERINEURAL AS NEEDED
Status: DISCONTINUED | OUTPATIENT
Start: 2017-10-19 | End: 2017-10-19 | Stop reason: HOSPADM

## 2017-10-19 RX ORDER — ONDANSETRON 2 MG/ML
4 INJECTION INTRAMUSCULAR; INTRAVENOUS AS NEEDED
Status: DISCONTINUED | OUTPATIENT
Start: 2017-10-19 | End: 2017-10-19 | Stop reason: HOSPADM

## 2017-10-19 RX ORDER — FENTANYL CITRATE 50 UG/ML
INJECTION, SOLUTION INTRAMUSCULAR; INTRAVENOUS AS NEEDED
Status: DISCONTINUED | OUTPATIENT
Start: 2017-10-19 | End: 2017-10-19 | Stop reason: HOSPADM

## 2017-10-19 RX ORDER — HYDROCORTISONE SODIUM SUCCINATE 100 MG/2ML
INJECTION, POWDER, FOR SOLUTION INTRAMUSCULAR; INTRAVENOUS AS NEEDED
Status: DISCONTINUED | OUTPATIENT
Start: 2017-10-19 | End: 2017-10-19 | Stop reason: HOSPADM

## 2017-10-19 RX ORDER — MIDAZOLAM HYDROCHLORIDE 1 MG/ML
INJECTION, SOLUTION INTRAMUSCULAR; INTRAVENOUS AS NEEDED
Status: DISCONTINUED | OUTPATIENT
Start: 2017-10-19 | End: 2017-10-19 | Stop reason: HOSPADM

## 2017-10-19 RX ORDER — SUCCINYLCHOLINE CHLORIDE 20 MG/ML
INJECTION INTRAMUSCULAR; INTRAVENOUS AS NEEDED
Status: DISCONTINUED | OUTPATIENT
Start: 2017-10-19 | End: 2017-10-19 | Stop reason: HOSPADM

## 2017-10-19 RX ORDER — SODIUM CHLORIDE 0.9 % (FLUSH) 0.9 %
5-10 SYRINGE (ML) INJECTION EVERY 8 HOURS
Status: DISCONTINUED | OUTPATIENT
Start: 2017-10-19 | End: 2017-10-19 | Stop reason: HOSPADM

## 2017-10-19 RX ORDER — LIDOCAINE HYDROCHLORIDE 10 MG/ML
0.1 INJECTION, SOLUTION EPIDURAL; INFILTRATION; INTRACAUDAL; PERINEURAL AS NEEDED
Status: DISCONTINUED | OUTPATIENT
Start: 2017-10-19 | End: 2017-10-19 | Stop reason: HOSPADM

## 2017-10-19 RX ORDER — ROCURONIUM BROMIDE 10 MG/ML
INJECTION, SOLUTION INTRAVENOUS AS NEEDED
Status: DISCONTINUED | OUTPATIENT
Start: 2017-10-19 | End: 2017-10-19 | Stop reason: HOSPADM

## 2017-10-19 RX ADMIN — DEXTROSE MONOHYDRATE, SODIUM CHLORIDE, AND POTASSIUM CHLORIDE 50 ML/HR: 50; 4.5; 1.49 INJECTION, SOLUTION INTRAVENOUS at 05:48

## 2017-10-19 RX ADMIN — METOPROLOL TARTRATE 25 MG: 25 TABLET ORAL at 20:51

## 2017-10-19 RX ADMIN — LIDOCAINE HYDROCHLORIDE 40 MG: 20 INJECTION, SOLUTION EPIDURAL; INFILTRATION; INTRACAUDAL; PERINEURAL at 08:35

## 2017-10-19 RX ADMIN — MIDAZOLAM HYDROCHLORIDE 2 MG: 1 INJECTION, SOLUTION INTRAMUSCULAR; INTRAVENOUS at 08:29

## 2017-10-19 RX ADMIN — SUCCINYLCHOLINE CHLORIDE 100 MG: 20 INJECTION INTRAMUSCULAR; INTRAVENOUS at 08:35

## 2017-10-19 RX ADMIN — FENTANYL CITRATE 25 MCG: 50 INJECTION, SOLUTION INTRAMUSCULAR; INTRAVENOUS at 09:53

## 2017-10-19 RX ADMIN — DOCUSATE SODIUM AND SENNOSIDES 1 TABLET: 8.6; 5 TABLET, FILM COATED ORAL at 20:51

## 2017-10-19 RX ADMIN — PREDNISONE 7.5 MG: 5 TABLET ORAL at 13:25

## 2017-10-19 RX ADMIN — DOCUSATE SODIUM AND SENNOSIDES 1 TABLET: 8.6; 5 TABLET, FILM COATED ORAL at 13:23

## 2017-10-19 RX ADMIN — METOPROLOL TARTRATE 25 MG: 25 TABLET ORAL at 06:12

## 2017-10-19 RX ADMIN — CEFTRIAXONE 2 G: 2 INJECTION, POWDER, FOR SOLUTION INTRAMUSCULAR; INTRAVENOUS at 20:44

## 2017-10-19 RX ADMIN — FENTANYL CITRATE 25 MCG: 50 INJECTION, SOLUTION INTRAMUSCULAR; INTRAVENOUS at 09:13

## 2017-10-19 RX ADMIN — ENOXAPARIN SODIUM 40 MG: 40 INJECTION SUBCUTANEOUS at 21:00

## 2017-10-19 RX ADMIN — FENTANYL CITRATE 25 MCG: 50 INJECTION, SOLUTION INTRAMUSCULAR; INTRAVENOUS at 09:36

## 2017-10-19 RX ADMIN — SODIUM CHLORIDE, SODIUM LACTATE, POTASSIUM CHLORIDE, AND CALCIUM CHLORIDE: 600; 310; 30; 20 INJECTION, SOLUTION INTRAVENOUS at 09:15

## 2017-10-19 RX ADMIN — ROCURONIUM BROMIDE 5 MG: 10 INJECTION, SOLUTION INTRAVENOUS at 08:35

## 2017-10-19 RX ADMIN — HYDROCORTISONE SODIUM SUCCINATE 50 MG: 100 INJECTION, POWDER, FOR SOLUTION INTRAMUSCULAR; INTRAVENOUS at 08:44

## 2017-10-19 RX ADMIN — FENTANYL CITRATE 50 MCG: 50 INJECTION, SOLUTION INTRAMUSCULAR; INTRAVENOUS at 08:35

## 2017-10-19 RX ADMIN — SODIUM CHLORIDE, SODIUM LACTATE, POTASSIUM CHLORIDE, AND CALCIUM CHLORIDE 125 ML/HR: 600; 310; 30; 20 INJECTION, SOLUTION INTRAVENOUS at 07:27

## 2017-10-19 RX ADMIN — FENTANYL CITRATE 25 MCG: 50 INJECTION, SOLUTION INTRAMUSCULAR; INTRAVENOUS at 09:31

## 2017-10-19 RX ADMIN — FENTANYL CITRATE 25 MCG: 50 INJECTION, SOLUTION INTRAMUSCULAR; INTRAVENOUS at 09:26

## 2017-10-19 RX ADMIN — ALUMINUM HYDROXIDE AND MAGNESIUM HYDROXIDE 30 ML: 200; 200 SUSPENSION ORAL at 22:44

## 2017-10-19 RX ADMIN — POLYETHYLENE GLYCOL 3350 17 G: 17 POWDER, FOR SOLUTION ORAL at 13:24

## 2017-10-19 RX ADMIN — PROPOFOL 130 MG: 10 INJECTION, EMULSION INTRAVENOUS at 08:35

## 2017-10-19 RX ADMIN — ROCURONIUM BROMIDE 20 MG: 10 INJECTION, SOLUTION INTRAVENOUS at 08:39

## 2017-10-19 NOTE — BRIEF OP NOTE
BRIEF OPERATIVE NOTE    Date of Procedure: 10/19/2017   Preoperative Diagnosis: BPH OBSTRUCTION, Retention  Postoperative Diagnosis: BPH OBSTRUCTION, Retention  Procedure(s):  TRANSURETHRAL RESECTION OF PROSTATE WITH GREENLIGHT  Surgeon(s) and Role:     * Juan Diego Chase MD - Primary         Assistant Staff:       Surgical Staff:  Circ-1: Shilpa Garcia RN  Circ-Relief: Mely Walton RN  Scrub Tech-1: Al Drilling  Event Time In   Incision Start 9763   Incision Close 1014     Anesthesia: General   Estimated Blood Loss: 20cc  Specimens:   ID Type Source Tests Collected by Time Destination   1 : Prostate chips Preservative Prostate  Juan Diego Chase MD 10/19/2017 1004 Pathology      Findings: Trilobar hypertrophy; severe trabeculation   Complications: None  Implants: * No implants in log *   Drains: 25 Fr.  3 way [address] (inflow capped)

## 2017-10-19 NOTE — PROGRESS NOTES
Problem: Falls - Risk of  Goal: *Absence of Falls  Document Marlo Fall Risk and appropriate interventions in the flowsheet.    Outcome: Progressing Towards Goal  Fall Risk Interventions:  Mobility Interventions: Patient to call before getting OOB         Medication Interventions: Teach patient to arise slowly    Elimination Interventions: Call light in reach    History of Falls Interventions: Door open when patient unattended

## 2017-10-19 NOTE — PROGRESS NOTES
Bedside and Verbal shift change report given to Corey George (oncoming nurse) by Pantera Thompson RN (offgoing nurse). Report included the following information SBAR, Kardex, Intake/Output, MAR and Recent Results.

## 2017-10-19 NOTE — PROGRESS NOTES
Problem: Patient Education: Go to Patient Education Activity  Goal: Patient/Family Education  physical Therapy TREATMENT  Patient: Dexter More (89 y.o. male)  Date: 10/19/2017  Diagnosis: UTI (urinary tract infection)  BPH OBSTRUCTION <principal problem not specified>  Procedure(s) (LRB):  TRANSURETHRAL RESECTION OF PROSTATE WITH GREENLIGHT (N/A) Day of Surgery  Precautions: Fall  Chart, physical therapy assessment, plan of care and goals were reviewed. ASSESSMENT:  Pt cleared by nurse  to mobilize with PT. Pt has recently returned from TURP surgery and is somewhat sore but is agreeable to work with PT. Pt to sit with min to mod assist.Pt sit to stand with min to mod assist with bed elevated. Pt ambulated 5ft forward and then backwards gingerly with rollator min to mod assist.Pt took 4 side steps to head of bed with RW. Pt back to bed with min to mod assist.Pt tolerated treatment fairly well. Pt progressing slowly. Continue goals. Progression toward goals:  []      Improving appropriately and progressing toward goals  [x]      Improving slowly and progressing toward goals  []      Not making progress toward goals and plan of care will be adjusted     PLAN:  Patient continues to benefit from skilled intervention to address the above impairments. Continue treatment per established plan of care. Discharge Recommendations:  Inpatient Rehab  Further Equipment Recommendations for Discharge:  rolling walker     SUBJECTIVE:        OBJECTIVE DATA SUMMARY:   Critical Behavior:  Neurologic State: Alert  Orientation Level: Oriented X4  Cognition: Follows commands  Safety/Judgement: Awareness of environment, Fall prevention, Insight into deficits, Decreased awareness of need for safety  Functional Mobility Training:  Bed Mobility:     Supine to Sit: Minimum assistance; Moderate assistance  Sit to Supine:  Moderate assistance;Minimum assistance            Transfers:  Sit to Stand: Minimum assistance  Stand to Sit: Minimum assistance                             Balance:  Sitting: Intact  Standing: Intact; With support  Ambulation/Gait Training:  Distance (ft): 14 Feet (ft)  Assistive Device: Gait belt;Walker, rollator  Ambulation - Level of Assistance: Minimal assistance; Moderate assistance        Gait Abnormalities: Antalgic;Decreased step clearance; Path deviations        Base of Support: Narrowed     Speed/Mary: Pace decreased (<100 feet/min)  Step Length: Left shortened;Right shortened           *  Pain:  Pain Scale 1: Numeric (0 - 10)  Pain Intensity 1: 0              Activity Tolerance:   Pt tolerated treatment well. Please refer to the flowsheet for vital signs taken during this treatment.   After treatment:   [] Patient left in no apparent distress sitting up in chair  [x] Patient left in no apparent distress in bed  [] Call bell left within reach  [] Nursing notified  [] Caregiver present  [] Bed alarm activated    COMMUNICATION/COLLABORATION:   The patients plan of care was discussed with: Physical Therapist    Nata Villanueva PTA   Time Calculation: 23 mins

## 2017-10-19 NOTE — PROGRESS NOTES
Barber Jacobo [facility]  [address] [facility], [address] [place] [address]  [phone]      Medical Progress Note      NAME: Madan Perla   :  1940  MRM:  [id number]    Date/Time: 10/19/2017  11:28 AM       Assessment and Plan:     Complicated UTI (urinary tract infection), due to indwelling mariscal catheter - POA, 2 species of klebsiella. Pan sensitive. Ceftriaxone while here and cipro at discharge. Klebsiella oxytoxa bacteremia - Continue IV ceftriaxone. Surveillance cultures are clear. Cipro at discharge.     Urine retention / Overactive bladder - Chronic, requiring mariscal for UOP. Got TURP 10/19. Per urology he is cleared to discharge after procedure. Debilitated patient - Due to sepsis. rehab consult. Ready to go today if accepted      Tachycardia - Per cardiology, it was NOT atrial fibrillation with rapid ventricular response. Unremarkable TTE. Started metoprolol, but I lowered dose.       Sepsis / Leukocytosis / Fever / Sinus tachycardia - POA due to UTI. Improved. Abx as above.      Renal insufficiency / Dehydration / CKD (chronic kidney disease), stage III - POA, multifactorial. Resolved, but continue IVF until done with procedure    Constipation - Persistent. He sometimes needs enema at home. He did not respond to sennakot and miralax      Sjoegren syndrome - Continue 7.5 mg prednisone usha-operatively. Appreciate rheumatology's input. Di dnot require perioperative stress dosing       Subjective:     Chief Complaint:  Feels well. Ready for procedure in AM    ROS:  (bold if positive, if negative)      Tolerating PT   Tolerating Diet        Objective:     Last 24hrs VS reviewed since prior progress note.  Most recent are:    Visit Vitals    /71    Pulse 63    Temp 97.7 °F (36.5 °C)    Resp 12    Ht 6' 1\" (1.854 m)    Wt 79.8 kg (176 lb)    SpO2 96%    BMI 23.22 kg/m2     SpO2 Readings from Last 6 Encounters:   10/19/17 96%   17 95%   17 95%    O2 Flow Rate (L/min): 2 l/min       Intake/Output Summary (Last 24 hours) at 10/19/17 1126  Last data filed at 10/19/17 1030   Gross per 24 hour   Intake             2510 ml   Output             1625 ml   Net              885 ml        Physical Exam:    Gen:  Well-developed, well-nourished, in no acute distress  HEENT:  Pink conjunctivae, PERRL, hearing intact to voice, moist mucous membranes  Neck:  Supple, without masses, thyroid non-tender  Resp:  No accessory muscle use, clear breath sounds without wheezes rales or rhonchi  Card:  No murmurs, normal S1, S2 without thrills, bruits or peripheral edema  Abd:  Soft, non-tender, non-distended, normoactive bowel sounds are present, no mass  Lymph:  No cervical or inguinal adenopathy  Musc:  No cyanosis or clubbing  Skin:  No rashes or ulcers, skin turgor is good  Neuro:  Cranial nerves are grossly intact, no focal motor weakness, follows commands appropriately  Psych:  Good insight, oriented to person, place and time, alert    Telemetry reviewed:   normal sinus rhythm  __________________________________________________________________  Medications Reviewed: (see below)  Medications:     Current Facility-Administered Medications   Medication Dose Route Frequency    lactated Ringers infusion  125 mL/hr IntraVENous CONTINUOUS    sodium chloride (NS) flush 5-10 mL  5-10 mL IntraVENous PRN    HYDROmorphone (PF) (DILAUDID) injection 0.5-1 mg  0.5-1 mg IntraVENous Multiple    ondansetron (ZOFRAN) injection 4 mg  4 mg IntraVENous PRN    polyethylene glycol (MIRALAX) packet 17 g  17 g Oral TID    senna-docusate (PERICOLACE) 8.6-50 mg per tablet 1 Tab  1 Tab Oral BID    dextrose 5% - 0.45% NaCl with KCl 20 mEq/L infusion  50 mL/hr IntraVENous CONTINUOUS    cefTRIAXone (ROCEPHIN) 2 g in 0.9% sodium chloride (MBP/ADV) 50 mL  2 g IntraVENous Q24H    bisacodyl (DULCOLAX) suppository 10 mg  10 mg Rectal DAILY PRN    metoprolol tartrate (LOPRESSOR) tablet 25 mg  25 mg Oral Q12H    aluminum-magnesium hydroxide (MAALOX) oral suspension 30 mL  30 mL Oral Q6H PRN    artificial tears (dextran 70-hypromellose) (NATURAL BALANCE) 0.1-0.3 % ophthalmic solution 2 Drop  2 Drop Both Eyes PRN    sodium chloride (NS) flush 5-10 mL  5-10 mL IntraVENous PRN    predniSONE (DELTASONE) tablet 7.5 mg  7.5 mg Oral DAILY WITH BREAKFAST    acetaminophen (TYLENOL) tablet 650 mg  650 mg Oral Q4H PRN    diphenhydrAMINE (BENADRYL) capsule 25 mg  25 mg Oral Q4H PRN    ondansetron (ZOFRAN) injection 4 mg  4 mg IntraVENous Q4H PRN    enoxaparin (LOVENOX) injection 40 mg  40 mg SubCUTAneous Q24H        Lab Data Reviewed: (see below)  Lab Review:     Recent Labs      10/17/17   0408   WBC  10.7   HGB  14.7   HCT  44.0   PLT  165     Recent Labs      10/18/17   0345  10/17/17   0408   NA  139  141   K  4.2  4.5   CL  104  105   CO2  25  29   GLU  104*  99   BUN  18  19   CREA  1.21  1.42*   CA  8.5  8.9   MG  2.3  2.4   PHOS   --   4.5     No results found for: GLUCPOC  No results for input(s): PH, PCO2, PO2, HCO3, FIO2 in the last 72 hours. No results for input(s): INR in the last 72 hours.     No lab exists for component: Jacque Ca  All Micro Results     Procedure Component Value Units Date/Time    CULTURE, BLOOD, PAIRED [[id number]] Collected:  10/15/17 1441    Order Status:  Completed Specimen:  Blood Updated:  10/19/17 0741     Special Requests: NO SPECIAL REQUESTS        Culture result: NO GROWTH 4 DAYS       CULTURE, BLOOD, PAIRED [[id number]] Collected:  10/14/17 1508    Order Status:  Completed Specimen:  Blood Updated:  10/19/17 0741     Special Requests: NO SPECIAL REQUESTS        Culture result: NO GROWTH 5 DAYS       CULTURE, BLOOD, PAIRED [[id number]]  (Abnormal)  (Susceptibility) Collected:  10/12/17 1832    Order Status:  Completed Specimen:  Blood Updated:  10/18/17 1031     Special Requests: NO SPECIAL REQUESTS        Culture result:         KLEBSIELLA OXYTOCA GROWING IN 2 OF 4 BOTTLES DRAWN ...(SITE= L HAND NO.2 AND R FA) (A)              PRELIMINARY REPORT OF GRAM NEGATIVE RODS GROWING IN 1 OF 4 BOTTLES DRAWN CALLED TO AND READ BACK BY MS YISEL RN ON 10/13/17 AT 2030 ([facility] 529). MS (A)              PRELIMINARY REPORT OF GRAM NEGATIVE RODS GROWING IN 2 OF 4 BOTTLES DRAWN CALLED TO AND READ BACK BY MS DEYSI PARTIDA [facility] 5MS2) ON 10/15/17 AT 0107. HH (A)              REMAINING BOTTLE(S) HAS/HAVE NO GROWTH SO FAR    CULTURE, URINE [[id number]]  (Abnormal)  (Susceptibility) Collected:  10/12/17 1832    Order Status:  Completed Specimen:  Urine Updated:  10/14/17 0914     Special Requests: --        NO SPECIAL REQUESTS  Reflexed from [id number]       Bandy Count --        >100,000  COLONIES/mL       Culture result: KLEBSIELLA PNEUMONIAE (A)         KLEBSIELLA OXYTOCA (A)             I have reviewed notes of prior 24hr.     Other pertinent lab: none    Total time spent with patient: [facility] discussed with: Patient, Family, Care Manager, Nursing Staff, Consultant/Specialist and >50% of time spent in counseling and coordination of care    Discussed:  Care Plan and D/C Planning    Prophylaxis:  H2B/PPI    Disposition:  Home w/Family           ___________________________________________________    Attending Physician: Kiara Cavazos MD

## 2017-10-19 NOTE — ANESTHESIA PREPROCEDURE EVALUATION
Anesthetic History   No history of anesthetic complications            Review of Systems / Medical History  Patient summary reviewed and pertinent labs reviewed    Pulmonary          Smoker (quit smoking 10 days ago)         Neuro/Psych   Within defined limits           Cardiovascular                  Exercise tolerance: >4 METS  Comments: Developed SVT this admission due to infection-on metop now   GI/Hepatic/Renal         Renal disease: CRI       Endo/Other             Other Findings   Comments: Sjogren's, on steroids-needs stress dose    BPH-admitted 10/12 with indwelling mariscal and developed sepsis, now resolved.          Physical Exam    Airway  Mallampati: II  TM Distance: 4 - 6 cm  Neck ROM: normal range of motion   Mouth opening: Normal     Cardiovascular    Rhythm: regular  Rate: normal         Dental    Dentition: Bridges  Comments: Upper removable bridge   Pulmonary  Breath sounds clear to auscultation               Abdominal         Other Findings            Anesthetic Plan    ASA: 2  Anesthesia type: general          Induction: Intravenous  Anesthetic plan and risks discussed with: Patient

## 2017-10-19 NOTE — PERIOP NOTES
TRANSFER - OUT REPORT:    Verbal report given to Bonny Mosley on Courtney Carson  being transferred to 529(unit) for routine post - op       Report consisted of patients Situation, Background, Assessment and   Recommendations(SBAR). Information from the following report(s) SBAR, Kardex, OR Summary and MAR was reviewed with the receiving nurse. Lines:   Peripheral IV 10/18/17 Right Forearm (Active)   Site Assessment Clean, dry, & intact 10/19/2017 10:30 AM   Phlebitis Assessment 0 10/19/2017 10:30 AM   Infiltration Assessment 0 10/19/2017 10:30 AM   Dressing Status Clean, dry, & intact 10/19/2017 10:30 AM   Dressing Type Transparent 10/19/2017 10:30 AM   Hub Color/Line Status Infusing 10/19/2017 10:30 AM   Action Taken Open ports on tubing capped 10/19/2017  4:22 AM   Alcohol Cap Used Yes 10/19/2017  7:00 AM        Opportunity for questions and clarification was provided.       Patient transported with:   O2 @ 2 liters  Registered Nurse

## 2017-10-19 NOTE — PROGRESS NOTES
Problem: Self Care Deficits Care Plan (Adult)  Goal: *Acute Goals and Plan of Care (Insert Text)  Occupational Therapy Goals  Initiated 10/16/2017  1. Patient will perform grooming standing at sink for at least 8 minutes with supervision/set-up within 7 day(s). 2.  Patient will perform lower body dressing including gathering his clothing with supervision/set-up within 7 day(s). 3.  Patient will perform toilet transfers with supervision/set-up using best DME for safety within 7 day(s). 4.  Patient will perform all aspects of toileting with supervision/set-up within 7 day(s). 5.  Patient will participate in upper extremity therapeutic exercise/activities with independence for 15 minutes within 7 day(s). 6.  Patient will utilize energy conservation techniques during functional activities with verbal and visual cues within 7 day(s). Occupational Therapy TREATMENT  Patient: Charlotte House (83 y.o. male)  Date: 10/19/2017  Diagnosis: UTI (urinary tract infection)  BPH OBSTRUCTION <principal problem not specified>  Procedure(s) (LRB):  TRANSURETHRAL RESECTION OF PROSTATE WITH GREENLIGHT (N/A) Day of Surgery  Precautions: Fall  Chart, occupational therapy assessment, plan of care, and goals were reviewed. ASSESSMENT:  Pt had just returned to bed following PT fatigued. He was able to participate with 8 minutes of UE therapeutic exercise to increase strength and endurance for functional tasks. He verbalized self care tasks are Lisa Ice" now and educated pt as to energy conservation techniques to incorporate into his daily routine. Pt said 'I need to practice those more. \" He verbalized he has trouble with LB dressing and he now sits to shave. Recommend rehab.   Progression toward goals:  []          Improving appropriately and progressing toward goals  [x]          Improving slowly and progressing toward goals  []          Not making progress toward goals and plan of care will be adjusted     PLAN:  Patient continues to benefit from skilled intervention to address the above impairments. Continue treatment per established plan of care. Discharge Recommendations:  Rehab  Further Equipment Recommendations for Discharge: None     SUBJECTIVE:   Patient stated I need you guys.     OBJECTIVE DATA SUMMARY:   Cognitive/Behavioral Status:  Neurologic State: Alert  Orientation Level: Oriented X4  Cognition: Follows commands           Functional Mobility and Transfers for ADLs:   Bed Mobility:     Supine to Sit: Minimum assistance; Moderate assistance  Sit to Supine: Moderate assistance;Minimum assistance        Transfers:  Sit to Stand: Minimum assistance       Balance:  Sitting: Intact  Standing: Intact; With support  ADL Intervention:   Educated pt as to energy conservation techniques to incorporate into his daily routine. Pt would benefit from trying these techniques with his ADL routine. Therapeutic Exercises:     EXERCISE   Sets   Reps   Active Active Assist   Passive   Comments   Finger flex/ext 2 10 [x]           []           []              Wrist flex/ext 2 10 [x]           []           []              Forearm supination/pronation 2 10 [x]           []           []           1# resistance   Elbow flex/ext 2 10 [x]           []           []           1# resistance   Chest presses 2 10 [x]           []           []           1# resistance   Shoulder flex/ext 2 10 [x]           []           []           1# resistance      []           []           []                 []           []           []                 []           []           []                 []           []           []                 []           []           []                    Pain:  Pain Scale 1: Numeric (0 - 10)  Pain Intensity 1: 0                Activity Tolerance:    Fair  Please refer to the flowsheet for vital signs taken during this treatment.   After treatment:   []  Patient left in no apparent distress sitting up in chair  [x]  Patient left in no apparent distress in bed  [x]  Call bell left within reach  []  Nursing notified  []  Caregiver present  []  Bed alarm activated    COMMUNICATION/COLLABORATION:   The patients plan of care was discussed with: Occupational Therapist    INDIGO Kearney/L  Time Calculation: 16 mins

## 2017-10-19 NOTE — ANESTHESIA POSTPROCEDURE EVALUATION
Post-Anesthesia Evaluation and Assessment    Patient: Elizabeth Herzog MRN: [id number]  SSN: [id number]    YOB: 1940  Age: 68 y.o. Sex: male       Cardiovascular Function/Vital Signs  Visit Vitals    /74    Pulse 64    Temp 36.9 °C (98.4 °F)    Resp 16    Ht 6' 1\" (1.854 m)    Wt 79.8 kg (176 lb)    SpO2 96%    BMI 23.22 kg/m2       Patient is status post general anesthesia for Procedure(s):  TRANSURETHRAL RESECTION OF PROSTATE WITH GREENLIGHT. Nausea/Vomiting: None    Postoperative hydration reviewed and adequate. Pain:  Pain Scale 1: Numeric (0 - 10) (10/19/17 1030)  Pain Intensity 1: 0 (10/19/17 1030)   Managed    Neurological Status:   Neuro (WDL): Exceptions to WDL (10/19/17 1030)  Neuro  Neurologic State: Drowsy; Eyes open spontaneously (10/19/17 1030)  Orientation Level: Oriented to person;Oriented to place;Oriented to situation (10/19/17 1030)  Cognition: Follows commands (10/19/17 1030)  LUE Motor Response: Purposeful;Spontaneous  (10/19/17 1030)  LLE Motor Response: Purposeful;Spontaneous  (10/19/17 1030)  RUE Motor Response: Purposeful;Spontaneous  (10/19/17 1030)  RLE Motor Response: Purposeful;Spontaneous  (10/19/17 1030)   At baseline    Mental Status and Level of Consciousness: Arousable    Pulmonary Status:   O2 Device: Nasal cannula (10/19/17 1030)   Adequate oxygenation and airway patent    Complications related to anesthesia: None    Post-anesthesia assessment completed.  No concerns    Signed By: Adali Hayes MD     October 19, 2017

## 2017-10-19 NOTE — DISCHARGE SUMMARY
Physician Discharge Summary     Patient ID:  Wilfredo Arriaga  [id number]  66 y.o.  1940    Admit date: 10/12/2017    Discharge date and time: 10/20/2017    Admission Diagnoses: UTI (urinary tract infection)  BPH OBSTRUCTION    Discharge Diagnoses:    Principal Diagnosis       UTI (urinary tract infection)                                             Other Diagnoses    Sjoegren syndrome ([facility] [address] (4/1/2016)    Urine retention ()    Sepsis ([facility] [address] (10/12/2017)    Leukocytosis (10/12/2017)    Fever (10/12/2017)    Renal insufficiency (10/12/2017)    CKD (chronic kidney disease), stage III ()    Nausea & vomiting (10/12/2017)    Cough (10/12/2017)    Dehydration (10/12/2017)    Hypoxia (10/12/2017)    Sinus tachycardia (10/12/2017)    Hospital Course:   Complicated UTI (urinary tract infection), due to indwelling mariscal catheter - POA, 2 species of klebsiella. Pan sensitive. Ceftriaxone while here and cipro at discharge.       Klebsiella oxytoxa bacteremia - Continue IV ceftriaxone. Surveillance cultures are clear. Cipro at discharge.      Urine retention / Overactive bladder - Chronic, requiring mariscal for UOP. Got TURP 10/19. Per urology he is cleared to discharge.      Debilitated patient - Due to sepsis. Rehab consulted. Ready to go today if accepted, may choose HH PT otherwise      Tachycardia - Per cardiology, it was NOT atrial fibrillation with rapid ventricular response. Unremarkable TTE. Started metoprolol, but I lowered dose.       Sepsis / Leukocytosis / Fever / Sinus tachycardia - POA due to UTI.  Improved. Abx as above.      Renal insufficiency / Dehydration / CKD (chronic kidney disease), stage III - POA, multifactorial. Resolved, but continue IVF until done with procedure     Constipation - Persistent. He sometimes needs enema at home. He did not respond to sennakot and miralax      Sjoegren syndrome - Continue 7.5 mg prednisone usha-operatively. Appreciate rheumatology's input.  Did not require perioperative stress dosing       PCP: Paula Andrew DO    Consults: Urology    Significant Diagnostic Studies: See Hospital Course    Discharged to rehab in improved condition. Discharge Exam:   /81 (BP 1 Location: Left arm, BP Patient Position: At rest)    Pulse 69    Temp 97.9 °F (36.6 °C)    Resp 18    Ht 6' 1\" (1.854 m)    Wt 87.4 kg (192 lb 10.9 oz)    SpO2 94%    BMI 25.42 kg/m2      Gen:  Well-developed, well-nourished, in no acute distress  HEENT:  Pink conjunctivae, PERRL, hearing intact to voice, moist mucous membranes  Neck:  Supple, without masses, thyroid non-tender  Resp:  No accessory muscle use, clear breath sounds without wheezes rales or rhonchi  Card:  No murmurs, normal S1, S2 without thrills, bruits or peripheral edema  Abd:  Soft, non-tender, non-distended, normoactive bowel sounds are present, no mass  Lymph:  No cervical or inguinal adenopathy  Musc:  No cyanosis or clubbing  Skin:  No rashes or ulcers, skin turgor is good  Neuro:  Cranial nerves are grossly intact, no focal motor weakness, follows commands appropriately  Psych:  Good insight, oriented to person, place and time, alert    Patient Instructions:   Current Discharge Medication List      START taking these medications    Details   ciprofloxacin HCl (CIPRO) 500 mg tablet Take 1 Tab by mouth every twelve (12) hours for 5 days. Qty: 10 Tab, Refills: 0      metoprolol tartrate (LOPRESSOR) 25 mg tablet Take 1 Tab by mouth every twelve (12) hours for 30 days. Qty: 60 Tab, Refills: 0      senna-docusate (PERICOLACE) 8.6-50 mg per tablet Take 1 Tab by mouth two (2) times a day for 30 days. Qty: 60 Tab, Refills: 0         CONTINUE these medications which have NOT CHANGED    Details   predniSONE (DELTASONE) 2.5 mg tablet Take 7.5 mg by mouth daily (with breakfast).          STOP taking these medications       trimethoprim-sulfamethoxazole (BACTRIM DS) 160-800 mg per tablet Comments:   Reason for Stopping: Activity: Activity as tolerated and PT/OT Eval and Treat  Diet: Regular Diet  Wound Care: None needed    Follow-up with your PCP and DR Kayla Elizondo in a few weeks.   Follow-up tests/labs - none    Signed:  Olvin Delgado MD  10/20/2017  8:24 AM

## 2017-10-20 ENCOUNTER — HOSPITAL ENCOUNTER (OUTPATIENT)
Age: 77
Discharge: HOME | End: 2017-10-27
Attending: PHYSICAL MEDICINE & REHABILITATION | Admitting: PHYSICAL MEDICINE & REHABILITATION

## 2017-10-20 VITALS
TEMPERATURE: 97.8 F | HEIGHT: 73 IN | RESPIRATION RATE: 18 BRPM | DIASTOLIC BLOOD PRESSURE: 77 MMHG | BODY MASS INDEX: 25.54 KG/M2 | SYSTOLIC BLOOD PRESSURE: 137 MMHG | HEART RATE: 83 BPM | OXYGEN SATURATION: 95 % | WEIGHT: 192.68 LBS

## 2017-10-20 LAB
ANION GAP SERPL CALC-SCNC: 5 MMOL/L (ref 5–15)
BACTERIA SPEC CULT: NORMAL
BUN SERPL-MCNC: 11 MG/DL (ref 6–20)
BUN/CREAT SERPL: 9 (ref 12–20)
CALCIUM SERPL-MCNC: 8.5 MG/DL (ref 8.5–10.1)
CHLORIDE SERPL-SCNC: 105 MMOL/L (ref 97–108)
CO2 SERPL-SCNC: 29 MMOL/L (ref 21–32)
CREAT SERPL-MCNC: 1.24 MG/DL (ref 0.7–1.3)
ERYTHROCYTE [DISTWIDTH] IN BLOOD BY AUTOMATED COUNT: 13.7 % (ref 11.5–14.5)
GLUCOSE SERPL-MCNC: 99 MG/DL (ref 65–100)
HCT VFR BLD AUTO: 43.9 % (ref 36.6–50.3)
HGB BLD-MCNC: 14.5 G/DL (ref 12.1–17)
MCH RBC QN AUTO: 32.6 PG (ref 26–34)
MCHC RBC AUTO-ENTMCNC: 33 G/DL (ref 30–36.5)
MCV RBC AUTO: 98.7 FL (ref 80–99)
PLATELET # BLD AUTO: 201 K/UL (ref 150–400)
POTASSIUM SERPL-SCNC: 4.8 MMOL/L (ref 3.5–5.1)
RBC # BLD AUTO: 4.45 M/UL (ref 4.1–5.7)
SERVICE CMNT-IMP: NORMAL
SODIUM SERPL-SCNC: 139 MMOL/L (ref 136–145)
WBC # BLD AUTO: 12.4 K/UL (ref 4.1–11.1)

## 2017-10-20 PROCEDURE — 85027 COMPLETE CBC AUTOMATED: CPT | Performed by: INTERNAL MEDICINE

## 2017-10-20 PROCEDURE — 74011250637 HC RX REV CODE- 250/637: Performed by: INTERNAL MEDICINE

## 2017-10-20 PROCEDURE — 74011636637 HC RX REV CODE- 636/637: Performed by: INTERNAL MEDICINE

## 2017-10-20 PROCEDURE — 87086 URINE CULTURE/COLONY COUNT: CPT | Performed by: PHYSICAL MEDICINE & REHABILITATION

## 2017-10-20 PROCEDURE — 36415 COLL VENOUS BLD VENIPUNCTURE: CPT | Performed by: INTERNAL MEDICINE

## 2017-10-20 PROCEDURE — 80048 BASIC METABOLIC PNL TOTAL CA: CPT | Performed by: INTERNAL MEDICINE

## 2017-10-20 PROCEDURE — 74011250637 HC RX REV CODE- 250/637: Performed by: PHYSICAL MEDICINE & REHABILITATION

## 2017-10-20 RX ORDER — AMOXICILLIN 250 MG
1 CAPSULE ORAL 2 TIMES DAILY
Status: DISCONTINUED | OUTPATIENT
Start: 2017-10-20 | End: 2017-10-27 | Stop reason: HOSPADM

## 2017-10-20 RX ORDER — AMOXICILLIN 250 MG
1 CAPSULE ORAL 2 TIMES DAILY
Qty: 60 TAB | Refills: 0 | Status: SHIPPED | OUTPATIENT
Start: 2017-10-20 | End: 2017-11-19

## 2017-10-20 RX ORDER — METOPROLOL TARTRATE 25 MG/1
25 TABLET, FILM COATED ORAL EVERY 12 HOURS
Status: DISCONTINUED | OUTPATIENT
Start: 2017-10-20 | End: 2017-10-27 | Stop reason: HOSPADM

## 2017-10-20 RX ORDER — NITROGLYCERIN 0.4 MG/1
0.4 TABLET SUBLINGUAL
Status: DISCONTINUED | OUTPATIENT
Start: 2017-10-20 | End: 2017-10-27 | Stop reason: HOSPADM

## 2017-10-20 RX ORDER — LANOLIN ALCOHOL/MO/W.PET/CERES
3 CREAM (GRAM) TOPICAL
Status: DISCONTINUED | OUTPATIENT
Start: 2017-10-20 | End: 2017-10-27 | Stop reason: HOSPADM

## 2017-10-20 RX ORDER — METOPROLOL TARTRATE 25 MG/1
25 TABLET, FILM COATED ORAL EVERY 12 HOURS
Qty: 60 TAB | Refills: 0 | Status: SHIPPED | OUTPATIENT
Start: 2017-10-20 | End: 2017-11-19

## 2017-10-20 RX ORDER — FACIAL-BODY WIPES
10 EACH TOPICAL DAILY PRN
Status: DISCONTINUED | OUTPATIENT
Start: 2017-10-20 | End: 2017-10-27 | Stop reason: HOSPADM

## 2017-10-20 RX ORDER — SORBITOL SOLUTION 70 %
30 SOLUTION, ORAL MISCELLANEOUS DAILY PRN
Status: DISCONTINUED | OUTPATIENT
Start: 2017-10-20 | End: 2017-10-20

## 2017-10-20 RX ORDER — ACETAMINOPHEN 325 MG/1
650 TABLET ORAL
Status: DISCONTINUED | OUTPATIENT
Start: 2017-10-20 | End: 2017-10-27 | Stop reason: HOSPADM

## 2017-10-20 RX ORDER — SORBITOL SOLUTION 70 %
30 SOLUTION, ORAL MISCELLANEOUS DAILY PRN
Status: DISCONTINUED | OUTPATIENT
Start: 2017-10-20 | End: 2017-10-27 | Stop reason: HOSPADM

## 2017-10-20 RX ORDER — CIPROFLOXACIN 500 MG/1
500 TABLET ORAL 2 TIMES DAILY
Status: DISCONTINUED | OUTPATIENT
Start: 2017-10-20 | End: 2017-10-27 | Stop reason: HOSPADM

## 2017-10-20 RX ORDER — PREDNISONE 5 MG/1
7.5 TABLET ORAL
Status: DISCONTINUED | OUTPATIENT
Start: 2017-10-21 | End: 2017-10-27 | Stop reason: HOSPADM

## 2017-10-20 RX ORDER — ADHESIVE BANDAGE
30 BANDAGE TOPICAL DAILY PRN
Status: DISCONTINUED | OUTPATIENT
Start: 2017-10-20 | End: 2017-10-27 | Stop reason: HOSPADM

## 2017-10-20 RX ORDER — POLYETHYLENE GLYCOL 3350 17 G/17G
17 POWDER, FOR SOLUTION ORAL DAILY
Status: DISCONTINUED | OUTPATIENT
Start: 2017-10-21 | End: 2017-10-27 | Stop reason: HOSPADM

## 2017-10-20 RX ORDER — CIPROFLOXACIN 500 MG/1
500 TABLET ORAL EVERY 12 HOURS
Qty: 10 TAB | Refills: 0 | Status: SHIPPED | OUTPATIENT
Start: 2017-10-20 | End: 2017-10-25

## 2017-10-20 RX ORDER — ADHESIVE BANDAGE
30 BANDAGE TOPICAL DAILY PRN
Status: DISCONTINUED | OUTPATIENT
Start: 2017-10-20 | End: 2017-10-20

## 2017-10-20 RX ORDER — ONDANSETRON 4 MG/1
4 TABLET, ORALLY DISINTEGRATING ORAL
Status: DISCONTINUED | OUTPATIENT
Start: 2017-10-20 | End: 2017-10-27 | Stop reason: HOSPADM

## 2017-10-20 RX ADMIN — CIPROFLOXACIN HYDROCHLORIDE 500 MG: 500 TABLET, FILM COATED ORAL at 08:51

## 2017-10-20 RX ADMIN — CIPROFLOXACIN HYDROCHLORIDE 500 MG: 500 TABLET, FILM COATED ORAL at 20:30

## 2017-10-20 RX ADMIN — DOCUSATE SODIUM AND SENNOSIDES 1 TABLET: 8.6; 5 TABLET, FILM COATED ORAL at 08:51

## 2017-10-20 RX ADMIN — METOPROLOL TARTRATE 25 MG: 25 TABLET ORAL at 08:50

## 2017-10-20 RX ADMIN — PREDNISONE 7.5 MG: 5 TABLET ORAL at 08:51

## 2017-10-20 RX ADMIN — DOCUSATE SODIUM AND SENNOSIDES 1 TABLET: 8.6; 5 TABLET, FILM COATED ORAL at 20:30

## 2017-10-20 RX ADMIN — METOPROLOL TARTRATE 25 MG: 25 TABLET ORAL at 20:30

## 2017-10-20 NOTE — OP NOTES
Barber Jacobo [facility]   [address] [facility], [address]   OP NOTE       Name:  Sulema Wiseman   MR#:  [id number]   :  1940   Account #:  [de-identified]    Surgery Date:  10/19/2017   Date of Adm:  10/12/2017       PREOPERATIVE DIAGNOSIS: Benign prostatic hypertrophy with   retention. POSTOPERATIVE DIAGNOSIS: Benign prostatic hypertrophy with   retention. PROCEDURE PERFORMED: Current laser PVP. SURGEON: Rosalio Sanchez MD    ESTIMATED BLOOD LOSS: 20 mL. COMPLICATIONS: None. ANESTHESIA: General.    FINDINGS: Trilobar hypertrophy with intravesical growth gland with   severe trabeculation. DRAINS: A 22-[place], 3-way Shaw catheter. SPECIMENS REMOVED: Prostate tissue. DISPOSITION: PACU. INDICATIONS: The patient is a gentleman who has been in retention   for over 6 weeks. I recently saw him and performed office cystoscopy   in preparation for possible outlet reduction procedure. Unfortunately, he   developed a febrile UTI in spite of antibiotic usage after the procedure. He was admitted to the hospital, as he also suffered a fall. He   remained in the hospital this past weekend and urine culture, as well   as blood cultures showed Klebsiella. He has been on appropriate   antibiotics and after discussing this with the primary team and   Infectious Disease, we elected to proceed with the outlet reduction   procedure while hospitalized in an effort to get the catheter out and   hopefully avoid persistent infections from the catheter itself. INDICATION FOR PROCEDURE: Risks and benefits of laser   vaporization resection of prostate were reviewed, including bleeding,   infection, potential impact on continence and potency, worsening of   symptoms and need for further procedures. He understands this   Does not eliminate his ability to get prostate cancer in his lifetime.     DESCRIPTION OF PROCEDURE: The patient was taken to the   operating room, placed supine on the operating table. General   anesthesia was established, prepped and draped in the sterile fashion   in the lithotomy position. A Storz laser resectoscope inserted translead   into the patient's bladder. We initially had difficulty locating the right   ureteral orifice as this was located within a small diverticulum. I   inserted a standard Aden cystoscope and then used a 70-degree lens   to confirm its location. After this had been performed, I reinserted the   Storz scope. Using the GreenLight laser XPS system between 60 and   160 rader of power for a total of 367,000 joules and 44 minutes and 28   seconds of total time, the prostate was vaporized. This was done by   taking down the bladder neck and median lobe and then addressing   the lateral lobes back to the verumontanum. During the procedure,   several small pieces of prostate were laser resected and then removed   at the end of the procedure using graspers. During the procedure,   there were several areas of bleeding encountered; however, this was   able to be controlled using the laser. At the conclusion, a 22-Citizen of [place], 3-  way Shaw catheter was inserted with assistance of a catheter guide. The bladder was irrigated to clear. He was awakened, taken to PACU   in stable condition. He tolerated the procedure well.         Byrnn Trevino MD      CS / TOMMY   D:  10/19/2017   22:58   T:  10/20/2017   06:00   Job #:  [id number]

## 2017-10-20 NOTE — PROGRESS NOTES
Bedside and Verbal shift change report given to Mejia Soto RN (oncoming nurse) by Erica Gramajo RN (offgoing nurse). Report included the following information SBAR, Kardex, Intake/Output, MAR and Recent Results.

## 2017-10-20 NOTE — PROGRESS NOTES
Bedside and Verbal shift change report given to Rob Barron RN (oncoming nurse) by Crow Willard RN (offgoing nurse). Report included the following information SBAR, Kardex and MAR.

## 2017-10-20 NOTE — PROGRESS NOTES
[facility] Infectious Diseases Progress Note  Tenisha Murcia MD,             Guille Cash MD,          Tobias Larry DO     [address]    [address], [address]  [phone]  Fax    10/20/2017      Assessment & Plan:   1. Klebsiella oxytoca mariscal catheter associated urosepsis with bacteremia. . Mariscal catheter has been changed. Repeat blood cultures NGSF. Deescalate abx to cipro. Needs treatment through 10/27/17  2. Urinary retention. S/p GreenLight procedure 10/19/2017.  3. 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 4. Acute kidney injury on chronic kidney disease. Will continue to monitor closely while on antibiotics. 5. Rash. Suspect miliaria rubra, NOT drug rash          Subjective:     No complaints    Objective:     Vitals:   Visit Vitals    /77 (BP 1 Location: Left arm, BP Patient Position: At rest)    Pulse (!) 59    Temp 97.8 °F (36.6 °C)    Resp 18    Ht 6' 1\" (1.854 m)    Wt 87.4 kg (192 lb 10.9 oz)    SpO2 95%    BMI 25.42 kg/m2        Tmax:  Temp (24hrs), Av.2 °F (36.8 °C), Min:97.8 °F (36.6 °C), Max:98.8 °F (37.1 °C)      Exam:   Patient is intubated:  no    Physical Examination:   GENERAL ASSESSMENT: NAD  HEENT:Nontraumatic NCAT  CHEST: no distress  HEART:  ABDOMEN:   :Mariscal: yes  EXTREMITY: no edema  NEURO:Grossly non focal  SKIN: Pustular rash on back    Labs:        No lab exists for component: ITNL   No results for input(s): CPK, CKMB, TROIQ in the last 72 hours. Recent Labs      10/20/17   1206  10/18/17   0345   NA   --   139   K   --   4.2   CL   --   104   CO2   --   25   BUN   --   18   CREA   --   1.21   GLU   --   104*   MG   --   2.3   WBC  12.4*   --    HGB  14.5   --    HCT  43.9   --    PLT  201   --      No results for input(s): INR, PTP, APTT in the last 72 hours.     No lab exists for component: INREXT, INREXT  Needs: urine analysis, urine sodium, protein and creatinine  No results found for: NAVJOT, RAFITA      Cultures:     No results found for: BECCA  Lab Results   Component Value Date/Time    Culture result: NO GROWTH 5 DAYS 10/15/2017 02:41 PM    Culture result: NO GROWTH 5 DAYS 10/14/2017 03:08 PM    Culture result:  10/12/2017 06:32 PM     KLEBSIELLA OXYTOCA GROWING IN 2 OF 4 BOTTLES DRAWN . ..(SITE= L HAND NO.2 AND R FA)    Culture result:  10/12/2017 06:32 PM     PRELIMINARY REPORT OF GRAM NEGATIVE RODS GROWING IN 1 OF 4 BOTTLES DRAWN CALLED TO AND READ BACK BY MS YISEL RN ON 10/13/17 AT 2030 ([facility] 529). MS    Culture result:  10/12/2017 06:32 PM     PRELIMINARY REPORT OF GRAM NEGATIVE RODS GROWING IN 2 OF 4 BOTTLES DRAWN CALLED TO AND READ BACK BY MS DEYSI PARTIDA [facility] 5MS2) ON 10/15/17 AT 0107.  [facility]    Culture result: REMAINING BOTTLE(S) HAS/HAVE NO GROWTH SO FAR 10/12/2017 06:32 PM    Culture result: KLEBSIELLA PNEUMONIAE 10/12/2017 06:32 PM    Culture result: KLEBSIELLA OXYTOCA 10/12/2017 06:32 PM       Radiology:     Medications       Current Facility-Administered Medications   Medication Dose Route Frequency Last Dose    ciprofloxacin HCl (CIPRO) tablet 500 mg  500 mg Oral Q12H 500 mg at 10/20/17 0851    polyethylene glycol (MIRALAX) packet 17 g  17 g Oral TID 17 g at 10/19/17 1324    senna-docusate (PERICOLACE) 8.6-50 mg per tablet 1 Tab  1 Tab Oral BID 1 Tab at 10/20/17 0851    bisacodyl (DULCOLAX) suppository 10 mg  10 mg Rectal DAILY PRN 10 mg at 10/17/17 1807    metoprolol tartrate (LOPRESSOR) tablet 25 mg  25 mg Oral Q12H 25 mg at 10/20/17 0850    aluminum-magnesium hydroxide (MAALOX) oral suspension 30 mL  30 mL Oral Q6H PRN 30 mL at 10/19/17 2244    artificial tears (dextran 70-hypromellose) (NATURAL BALANCE) 0.1-0.3 % ophthalmic solution 2 Drop  2 Drop Both Eyes PRN      sodium chloride (NS) flush 5-10 mL  5-10 mL IntraVENous PRN 10 mL at 10/17/17 0709    predniSONE (DELTASONE) tablet 7.5 mg  7.5 mg Oral DAILY WITH BREAKFAST 7.5 mg at 10/20/17 0801    acetaminophen (TYLENOL) tablet 650 mg  650 mg Oral Q4H  mg at 10/13/17 1619    diphenhydrAMINE (BENADRYL) capsule 25 mg  25 mg Oral Q4H PRN      ondansetron (ZOFRAN) injection 4 mg  4 mg IntraVENous Q4H PRN 4 mg at 10/16/17 4385           Case discussed with:      Deo De La Vega, DO

## 2017-10-20 NOTE — PROGRESS NOTES
10- CASE MANAGEMENT NOTE:  The pt has been accepted by [facility] and can be transferred there today. I informed both the pt and his wife. Care Management Interventions  PCP Verified by CM:  Yes  Mode of Transport at Discharge: [address] (CM Consult): Discharge Planning  MyChart Signup: No  Discharge Durable Medical Equipment: No  Physical Therapy Consult: Yes  Occupational Therapy Consult: Yes  Speech Therapy Consult: No  Current Support Network: Own Home, Lives with Spouse  Confirm Follow Up Transport: Family  Plan discussed with Pt/Family/Caregiver: Yes  Freedom of Choice Offered: Yes  Discharge [facility]  [facility]    [facility]

## 2017-10-20 NOTE — PROGRESS NOTES
Barber Jacobo johanna [place] [address]  [address] [facility], [address], [address] [place] [address]  [phone]      Medical Progress Note      NAME: Joaquin Pena   :  1940  MRM:  [id number]    Date/Time: 10/20/2017  8:17 AM       Assessment and Plan:     Complicated UTI (urinary tract infection), due to indwelling mariscal catheter - POA, 2 species of klebsiella. Pan sensitive. Ceftriaxone while here and cipro at discharge. Klebsiella oxytoxa bacteremia - Continue IV ceftriaxone. Surveillance cultures are clear. Cipro at discharge.     Urine retention / Overactive bladder - Chronic, requiring mariscal for UOP. Got TURP 10/19. Per urology he is cleared to discharge. Debilitated patient - Due to sepsis. Rehab consulted. Ready to go today if accepted, may choose HH PT otherwise      Tachycardia - Per cardiology, it was NOT atrial fibrillation with rapid ventricular response. Unremarkable TTE. Started metoprolol, but I lowered dose.       Sepsis / Leukocytosis / Fever / Sinus tachycardia - POA due to UTI. Improved. Abx as above.      Renal insufficiency / Dehydration / CKD (chronic kidney disease), stage III - POA, multifactorial. Resolved, but continue IVF until done with procedure    Constipation - Persistent. He sometimes needs enema at home. He did not respond to sennakot and miralax      Sjoegren syndrome - Continue 7.5 mg prednisone usha-operatively. Appreciate rheumatology's input. Di dnot require perioperative stress dosing       Subjective:     Chief Complaint:  Feels well. Tolerated procedure 10/19, and mariscal removed today    ROS:  (bold if positive, if negative)      Tolerating some PT  Tolerating Diet        Objective:     Last 24hrs VS reviewed since prior progress note.  Most recent are:    Visit Vitals    /81 (BP 1 Location: Left arm, BP Patient Position: At rest)    Pulse 69    Temp 97.9 °F (36.6 °C)    Resp 18    Ht 6' 1\" (1.854 m)    Wt 87.4 kg (192 lb 10.9 oz)    SpO2 94%    BMI 25.42 kg/m2     SpO2 Readings from Last 6 Encounters:   10/20/17 94%   09/03/17 95%   07/13/17 95%    O2 Flow Rate (L/min): 2 l/min       Intake/Output Summary (Last 24 hours) at 10/20/17 0817  Last data filed at 10/20/17 0550   Gross per 24 hour   Intake             1600 ml   Output             2800 ml   Net            -1200 ml        Physical Exam:    Gen:  Well-developed, well-nourished, in no acute distress  HEENT:  Pink conjunctivae, PERRL, hearing intact to voice, moist mucous membranes  Neck:  Supple, without masses, thyroid non-tender  Resp:  No accessory muscle use, clear breath sounds without wheezes rales or rhonchi  Card:  No murmurs, normal S1, S2 without thrills, bruits or peripheral edema  Abd:  Soft, non-tender, non-distended, normoactive bowel sounds are present, no mass  Lymph:  No cervical or inguinal adenopathy  Musc:  No cyanosis or clubbing  Skin:  No rashes or ulcers, skin turgor is good  Neuro:  Cranial nerves are grossly intact, no focal motor weakness, follows commands appropriately  Psych:  Good insight, oriented to person, place and time, alert    Telemetry reviewed:   normal sinus rhythm  __________________________________________________________________  Medications Reviewed: (see below)  Medications:     Current Facility-Administered Medications   Medication Dose Route Frequency    ciprofloxacin HCl (CIPRO) tablet 500 mg  500 mg Oral Q12H    polyethylene glycol (MIRALAX) packet 17 g  17 g Oral TID    senna-docusate (PERICOLACE) 8.6-50 mg per tablet 1 Tab  1 Tab Oral BID    bisacodyl (DULCOLAX) suppository 10 mg  10 mg Rectal DAILY PRN    metoprolol tartrate (LOPRESSOR) tablet 25 mg  25 mg Oral Q12H    aluminum-magnesium hydroxide (MAALOX) oral suspension 30 mL  30 mL Oral Q6H PRN    artificial tears (dextran 70-hypromellose) (NATURAL BALANCE) 0.1-0.3 % ophthalmic solution 2 Drop  2 Drop Both Eyes PRN    sodium chloride (NS) flush 5-10 mL  5-10 mL IntraVENous PRN    predniSONE (DELTASONE) tablet 7.5 mg  7.5 mg Oral DAILY WITH BREAKFAST    acetaminophen (TYLENOL) tablet 650 mg  650 mg Oral Q4H PRN    diphenhydrAMINE (BENADRYL) capsule 25 mg  25 mg Oral Q4H PRN    ondansetron (ZOFRAN) injection 4 mg  4 mg IntraVENous Q4H PRN        Lab Data Reviewed: (see below)  Lab Review:     No results for input(s): WBC, HGB, HCT, PLT, HGBEXT, HCTEXT, PLTEXT, HGBEXT, HCTEXT, PLTEXT in the last 72 hours. Recent Labs      10/18/17   0345   NA  139   K  4.2   CL  104   CO2  25   GLU  104*   BUN  18   CREA  1.21   CA  8.5   MG  2.3     No results found for: GLUCPOC  No results for input(s): PH, PCO2, PO2, HCO3, FIO2 in the last 72 hours. No results for input(s): INR in the last 72 hours. No lab exists for component: Hang Olivares  All Micro Results     Procedure Component Value Units Date/Time    CULTURE, BLOOD, PAIRED [[id number]] Collected:  10/15/17 1441    Order Status:  Completed Specimen:  Blood Updated:  10/20/17 0728     Special Requests: NO SPECIAL REQUESTS        Culture result: NO GROWTH 5 DAYS       CULTURE, BLOOD, PAIRED [[id number]] Collected:  10/14/17 1508    Order Status:  Completed Specimen:  Blood Updated:  10/19/17 0741     Special Requests: NO SPECIAL REQUESTS        Culture result: NO GROWTH 5 DAYS       CULTURE, BLOOD, PAIRED [[id number]]  (Abnormal)  (Susceptibility) Collected:  10/12/17 1832    Order Status:  Completed Specimen:  Blood Updated:  10/18/17 1031     Special Requests: NO SPECIAL REQUESTS        Culture result:         KLEBSIELLA OXYTOCA GROWING IN 2 OF 4 BOTTLES DRAWN . ..(SITE= L HAND NO.2 AND R FA) (A)              PRELIMINARY REPORT OF GRAM NEGATIVE RODS GROWING IN 1 OF 4 BOTTLES DRAWN CALLED TO AND READ BACK BY MS YISEL RN ON 10/13/17 AT 2030 ([facility] 529). MS (A)              PRELIMINARY REPORT OF GRAM NEGATIVE RODS GROWING IN 2 OF 4 BOTTLES DRAWN CALLED TO AND READ BACK BY MS DEYSI PARTIDA [facility] 5MS2) ON 10/15/17 AT 0107.  HH (A) REMAINING BOTTLE(S) HAS/HAVE NO GROWTH SO FAR    CULTURE, URINE [[id number]]  (Abnormal)  (Susceptibility) Collected:  10/12/17 1832    Order Status:  Completed Specimen:  Urine Updated:  10/14/17 0923     Special Requests: --        NO SPECIAL REQUESTS  Reflexed from [id number]       [place] Count --        >100,000  COLONIES/mL       Culture result: KLEBSIELLA PNEUMONIAE (A)         KLEBSIELLA OXYTOCA (A)             I have reviewed notes of prior 24hr.     Other pertinent lab: none    Total time spent with patient: [address] [place] [address] discussed with: Patient, Family, Care Manager, Nursing Staff, Consultant/Specialist and >50% of time spent in counseling and coordination of care    Discussed:  Care Plan and D/C Planning    Prophylaxis:  H2B/PPI    Disposition:  Home w/Family           ___________________________________________________    Attending Physician: Renetta Garcia MD

## 2017-10-20 NOTE — PROGRESS NOTES
Urology Progress Note    Subjective:     Daily Progress Note: 10/20/2017 8:30 AM    Vandana Marshall is doing excellent. He reports pain is well controlled. He has no new complaints. He is tolerating a solid diet and ambulating with assistance. Indwelling catheter is draining well. Urine is slight blood tinged. Objective:     Visit Vitals    /81 (BP 1 Location: Left arm, BP Patient Position: At rest)    Pulse 69    Temp 97.9 °F (36.6 °C)    Resp 18    Ht 6' 1\" (1.854 m)    Wt 87.4 kg (192 lb 10.9 oz)    SpO2 94%    BMI 25.42 kg/m2        Temp (24hrs), Av.2 °F (36.8 °C), Min:97.7 °F (36.5 °C), Max:98.8 °F (37.1 °C)      Intake and Output:  10/18 1901 - 10/20 0700  In: 1600 [I.V.:1600]  Out: 3575 [Urine:3575]       Physical Exam:   General appearance: alert, cooperative, no distress, appears stated age  Abdomen: soft, non-tender. No masses,  no organomegaly      Assessment/Plan:     Active Problems:    UTI (urinary tract infection) (10/12/2017)      Sjoegren syndrome ([address] (2016)      Urine retention ()      Sepsis ([address] (10/12/2017)      Leukocytosis (10/12/2017)      Fever (10/12/2017)      Renal insufficiency (10/12/2017)      CKD (chronic kidney disease), stage III ()      Nausea & vomiting (10/12/2017)      Cough (10/12/2017)      Dehydration (10/12/2017)      Hypoxia (10/12/2017)      Sinus tachycardia (10/12/2017)        Plan:  Doing well and continue with treatment    Shaw removed by MD this am.  Void trial.  If voids, ok for DC by  point of view with 5 additional days of antibiotics. May use pyridium prn for dysuria.       Signed By: Massimo Gillis MD                         2017

## 2017-10-20 NOTE — PROGRESS NOTES
11:35 AM Dora from [facility] requesting bmp, cbc for admission, called Dr. Sudha Ashley, [facility] for callback. Will continue to monitor. 11:40 AM I have reviewed discharge instructions with the patient and spouse. The patient and spouse verbalized understanding. Removed peripheral IV and telemetry. Gave opportunity for questions. 11:52 AM Dr. Sudha Ashley on unit, MD states OK to draw CBC and BMP, Will continue to monitor. 12:15 PM labs collected and sent per Ana Maria-phlebotomist.   2:09 PM Dora from [facility] called back, labs are appropiate to send patient to [facility] room 405A.   2:46 PM TRANSFER - OUT REPORT:    Verbal report given to [address], RN(name) on Charlotte Hosue  being transferred to [facility]) for routine progression of care  Rehabilitation room 405A. Report consisted of patients Situation, Background, Assessment and   Recommendations(SBAR). Information from the following report(s) SBAR and Kardex was reviewed with the receiving nurse. Lines:       Opportunity for questions and clarification was provided. Patient transported with:  Volunteer in [facility].     2:52 PM attempted to call spouse, no answer. 2:53 PM Spouse called and notified of transport.

## 2017-10-20 NOTE — DISCHARGE INSTRUCTIONS
Patient Discharge Instructions    Chary Yusuf / [id number] : 1940    Admitted 10/12/2017 Discharged: 10/19/2017     Primary Diagnoses  Problem List as of 10/19/2017  Date Reviewed: 10/13/2017          Codes Class Noted - Resolved   UTI (urinary tract infection)   Urine retention   Sepsis ([facility]   Leukocytosis   Fever   Renal insufficiency   CKD (chronic kidney disease), stage III   Nausea & vomiting   Cough   Dehydration   Hypoxia   Sinus tachycardia   Sjoegren syndrome ([facility]          Take Home Medications     · It is important that you take the medication exactly as they are prescribed. · Keep your medication in the bottles provided by the pharmacist and keep a list of the medication names, dosages, and times to be taken in your wallet. · Do not take other medications without consulting your doctor. What to do at Home    Recommended diet: Regular Diet    Recommended activity: Activity as tolerated    If you experience worse symptoms, please follow up with your PCP or Urology. Follow-up with your PCP in a few weeks        Information obtained by :  I understand that if any problems occur once I am at home I am to contact my physician. I understand and acknowledge receipt of the instructions indicated above.                                                                                                                                            Physician's or R.N.'s Signature                                                                  Date/Time                                                                                                                                              Patient or Representative Signature                                                          Date/Time

## 2017-10-21 PROCEDURE — 74011636637 HC RX REV CODE- 636/637: Performed by: PHYSICAL MEDICINE & REHABILITATION

## 2017-10-21 PROCEDURE — 74011250637 HC RX REV CODE- 250/637: Performed by: PHYSICAL MEDICINE & REHABILITATION

## 2017-10-21 RX ADMIN — DOCUSATE SODIUM AND SENNOSIDES 1 TABLET: 8.6; 5 TABLET, FILM COATED ORAL at 08:50

## 2017-10-21 RX ADMIN — DOCUSATE SODIUM AND SENNOSIDES 1 TABLET: 8.6; 5 TABLET, FILM COATED ORAL at 20:37

## 2017-10-21 RX ADMIN — CIPROFLOXACIN HYDROCHLORIDE 500 MG: 500 TABLET, FILM COATED ORAL at 20:37

## 2017-10-21 RX ADMIN — POLYETHYLENE GLYCOL 3350 17 G: 17 POWDER, FOR SOLUTION ORAL at 08:48

## 2017-10-21 RX ADMIN — PREDNISONE 7.5 MG: 5 TABLET ORAL at 08:48

## 2017-10-21 RX ADMIN — METOPROLOL TARTRATE 25 MG: 25 TABLET ORAL at 08:48

## 2017-10-21 RX ADMIN — CIPROFLOXACIN HYDROCHLORIDE 500 MG: 500 TABLET, FILM COATED ORAL at 08:48

## 2017-10-21 RX ADMIN — METOPROLOL TARTRATE 25 MG: 25 TABLET ORAL at 20:37

## 2017-10-22 LAB
BACTERIA SPEC CULT: ABNORMAL
CC UR VC: ABNORMAL
SERVICE CMNT-IMP: ABNORMAL

## 2017-10-22 PROCEDURE — 74011636637 HC RX REV CODE- 636/637: Performed by: PHYSICAL MEDICINE & REHABILITATION

## 2017-10-22 PROCEDURE — 74011250637 HC RX REV CODE- 250/637: Performed by: PHYSICAL MEDICINE & REHABILITATION

## 2017-10-22 RX ADMIN — DOCUSATE SODIUM AND SENNOSIDES 1 TABLET: 8.6; 5 TABLET, FILM COATED ORAL at 21:11

## 2017-10-22 RX ADMIN — METOPROLOL TARTRATE 25 MG: 25 TABLET ORAL at 09:27

## 2017-10-22 RX ADMIN — DOCUSATE SODIUM AND SENNOSIDES 1 TABLET: 8.6; 5 TABLET, FILM COATED ORAL at 09:27

## 2017-10-22 RX ADMIN — POLYETHYLENE GLYCOL 3350 17 G: 17 POWDER, FOR SOLUTION ORAL at 09:28

## 2017-10-22 RX ADMIN — CIPROFLOXACIN HYDROCHLORIDE 500 MG: 500 TABLET, FILM COATED ORAL at 21:06

## 2017-10-22 RX ADMIN — CIPROFLOXACIN HYDROCHLORIDE 500 MG: 500 TABLET, FILM COATED ORAL at 09:27

## 2017-10-22 RX ADMIN — PREDNISONE 7.5 MG: 5 TABLET ORAL at 09:27

## 2017-10-22 RX ADMIN — METOPROLOL TARTRATE 25 MG: 25 TABLET ORAL at 21:11

## 2017-10-23 LAB
ALBUMIN SERPL-MCNC: 2.7 G/DL (ref 3.5–5)
ALBUMIN/GLOB SERPL: 0.7 {RATIO} (ref 1.1–2.2)
ALP SERPL-CCNC: 76 U/L (ref 45–117)
ALT SERPL-CCNC: 46 U/L (ref 12–78)
ANION GAP SERPL CALC-SCNC: 11 MMOL/L (ref 5–15)
APPEARANCE UR: ABNORMAL
AST SERPL-CCNC: 22 U/L (ref 15–37)
BACTERIA URNS QL MICRO: NEGATIVE /HPF
BILIRUB SERPL-MCNC: 0.5 MG/DL (ref 0.2–1)
BILIRUB UR QL: NEGATIVE
BUN SERPL-MCNC: 13 MG/DL (ref 6–20)
BUN/CREAT SERPL: 10 (ref 12–20)
CALCIUM SERPL-MCNC: 8.6 MG/DL (ref 8.5–10.1)
CHLORIDE SERPL-SCNC: 107 MMOL/L (ref 97–108)
CO2 SERPL-SCNC: 26 MMOL/L (ref 21–32)
COLOR UR: ABNORMAL
CREAT SERPL-MCNC: 1.3 MG/DL (ref 0.7–1.3)
EPITH CASTS URNS QL MICRO: ABNORMAL /LPF
ERYTHROCYTE [DISTWIDTH] IN BLOOD BY AUTOMATED COUNT: 14 % (ref 11.5–14.5)
GLOBULIN SER CALC-MCNC: 3.9 G/DL (ref 2–4)
GLUCOSE SERPL-MCNC: 97 MG/DL (ref 65–100)
GLUCOSE UR STRIP.AUTO-MCNC: NEGATIVE MG/DL
HCT VFR BLD AUTO: 42.7 % (ref 36.6–50.3)
HGB BLD-MCNC: 14.2 G/DL (ref 12.1–17)
HGB UR QL STRIP: ABNORMAL
KETONES UR QL STRIP.AUTO: NEGATIVE MG/DL
LEUKOCYTE ESTERASE UR QL STRIP.AUTO: ABNORMAL
MCH RBC QN AUTO: 32.3 PG (ref 26–34)
MCHC RBC AUTO-ENTMCNC: 33.3 G/DL (ref 30–36.5)
MCV RBC AUTO: 97.3 FL (ref 80–99)
NITRITE UR QL STRIP.AUTO: NEGATIVE
PH UR STRIP: 6 [PH] (ref 5–8)
PLATELET # BLD AUTO: 210 K/UL (ref 150–400)
POTASSIUM SERPL-SCNC: 4 MMOL/L (ref 3.5–5.1)
PROT SERPL-MCNC: 6.6 G/DL (ref 6.4–8.2)
PROT UR STRIP-MCNC: 100 MG/DL
RBC # BLD AUTO: 4.39 M/UL (ref 4.1–5.7)
RBC #/AREA URNS HPF: ABNORMAL /HPF (ref 0–5)
SODIUM SERPL-SCNC: 144 MMOL/L (ref 136–145)
SP GR UR REFRACTOMETRY: 1.01 (ref 1–1.03)
UROBILINOGEN UR QL STRIP.AUTO: 0.2 EU/DL (ref 0.2–1)
WBC # BLD AUTO: 10.9 K/UL (ref 4.1–11.1)
WBC URNS QL MICRO: ABNORMAL /HPF (ref 0–4)

## 2017-10-23 PROCEDURE — 85027 COMPLETE CBC AUTOMATED: CPT | Performed by: PHYSICAL MEDICINE & REHABILITATION

## 2017-10-23 PROCEDURE — 36415 COLL VENOUS BLD VENIPUNCTURE: CPT | Performed by: PHYSICAL MEDICINE & REHABILITATION

## 2017-10-23 PROCEDURE — 80053 COMPREHEN METABOLIC PANEL: CPT | Performed by: PHYSICAL MEDICINE & REHABILITATION

## 2017-10-23 PROCEDURE — 87086 URINE CULTURE/COLONY COUNT: CPT | Performed by: PHYSICAL MEDICINE & REHABILITATION

## 2017-10-23 PROCEDURE — 81001 URINALYSIS AUTO W/SCOPE: CPT | Performed by: PHYSICAL MEDICINE & REHABILITATION

## 2017-10-23 PROCEDURE — 74011250637 HC RX REV CODE- 250/637: Performed by: PHYSICAL MEDICINE & REHABILITATION

## 2017-10-23 PROCEDURE — 74011636637 HC RX REV CODE- 636/637: Performed by: PHYSICAL MEDICINE & REHABILITATION

## 2017-10-23 RX ADMIN — METOPROLOL TARTRATE 25 MG: 25 TABLET ORAL at 09:05

## 2017-10-23 RX ADMIN — DOCUSATE SODIUM AND SENNOSIDES 1 TABLET: 8.6; 5 TABLET, FILM COATED ORAL at 21:00

## 2017-10-23 RX ADMIN — CIPROFLOXACIN HYDROCHLORIDE 500 MG: 500 TABLET, FILM COATED ORAL at 20:59

## 2017-10-23 RX ADMIN — PREDNISONE 7.5 MG: 5 TABLET ORAL at 09:05

## 2017-10-23 RX ADMIN — CIPROFLOXACIN HYDROCHLORIDE 500 MG: 500 TABLET, FILM COATED ORAL at 09:05

## 2017-10-23 RX ADMIN — METOPROLOL TARTRATE 25 MG: 25 TABLET ORAL at 20:59

## 2017-10-23 RX ADMIN — DOCUSATE SODIUM AND SENNOSIDES 1 TABLET: 8.6; 5 TABLET, FILM COATED ORAL at 09:06

## 2017-10-24 LAB
ALBUMIN SERPL-MCNC: 2.8 G/DL (ref 3.5–5)
ALBUMIN/GLOB SERPL: 0.7 {RATIO} (ref 1.1–2.2)
ALP SERPL-CCNC: 74 U/L (ref 45–117)
ALT SERPL-CCNC: 42 U/L (ref 12–78)
ANION GAP SERPL CALC-SCNC: 11 MMOL/L (ref 5–15)
AST SERPL-CCNC: 18 U/L (ref 15–37)
BACTERIA SPEC CULT: NORMAL
BASOPHILS # BLD: 0.1 K/UL (ref 0–0.1)
BASOPHILS NFR BLD: 0 % (ref 0–1)
BILIRUB SERPL-MCNC: 0.6 MG/DL (ref 0.2–1)
BUN SERPL-MCNC: 16 MG/DL (ref 6–20)
BUN/CREAT SERPL: 12 (ref 12–20)
CALCIUM SERPL-MCNC: 8.6 MG/DL (ref 8.5–10.1)
CC UR VC: NORMAL
CHLORIDE SERPL-SCNC: 106 MMOL/L (ref 97–108)
CO2 SERPL-SCNC: 25 MMOL/L (ref 21–32)
CREAT SERPL-MCNC: 1.34 MG/DL (ref 0.7–1.3)
EOSINOPHIL # BLD: 0.1 K/UL (ref 0–0.4)
EOSINOPHIL NFR BLD: 1 % (ref 0–7)
ERYTHROCYTE [DISTWIDTH] IN BLOOD BY AUTOMATED COUNT: 13.8 % (ref 11.5–14.5)
ERYTHROCYTE [SEDIMENTATION RATE] IN BLOOD: 32 MM/HR (ref 0–20)
GLOBULIN SER CALC-MCNC: 3.9 G/DL (ref 2–4)
GLUCOSE SERPL-MCNC: 90 MG/DL (ref 65–100)
HCT VFR BLD AUTO: 41.5 % (ref 36.6–50.3)
HGB BLD-MCNC: 14.1 G/DL (ref 12.1–17)
LYMPHOCYTES # BLD: 3.3 K/UL (ref 0.8–3.5)
LYMPHOCYTES NFR BLD: 30 % (ref 12–49)
MCH RBC QN AUTO: 32.7 PG (ref 26–34)
MCHC RBC AUTO-ENTMCNC: 34 G/DL (ref 30–36.5)
MCV RBC AUTO: 96.3 FL (ref 80–99)
MONOCYTES # BLD: 0.8 K/UL (ref 0–1)
MONOCYTES NFR BLD: 8 % (ref 5–13)
NEUTS SEG # BLD: 6.9 K/UL (ref 1.8–8)
NEUTS SEG NFR BLD: 61 % (ref 32–75)
PHOSPHATE SERPL-MCNC: 4 MG/DL (ref 2.6–4.7)
PLATELET # BLD AUTO: 224 K/UL (ref 150–400)
POTASSIUM SERPL-SCNC: 4 MMOL/L (ref 3.5–5.1)
PROT SERPL-MCNC: 6.7 G/DL (ref 6.4–8.2)
RBC # BLD AUTO: 4.31 M/UL (ref 4.1–5.7)
SERVICE CMNT-IMP: NORMAL
SODIUM SERPL-SCNC: 142 MMOL/L (ref 136–145)
WBC # BLD AUTO: 11.1 K/UL (ref 4.1–11.1)

## 2017-10-24 PROCEDURE — 74011636637 HC RX REV CODE- 636/637: Performed by: PHYSICAL MEDICINE & REHABILITATION

## 2017-10-24 PROCEDURE — 74011250637 HC RX REV CODE- 250/637: Performed by: PHYSICAL MEDICINE & REHABILITATION

## 2017-10-24 PROCEDURE — 85025 COMPLETE CBC W/AUTO DIFF WBC: CPT | Performed by: PHYSICAL MEDICINE & REHABILITATION

## 2017-10-24 PROCEDURE — 84100 ASSAY OF PHOSPHORUS: CPT | Performed by: PHYSICAL MEDICINE & REHABILITATION

## 2017-10-24 PROCEDURE — 80053 COMPREHEN METABOLIC PANEL: CPT | Performed by: PHYSICAL MEDICINE & REHABILITATION

## 2017-10-24 PROCEDURE — 85652 RBC SED RATE AUTOMATED: CPT | Performed by: PHYSICAL MEDICINE & REHABILITATION

## 2017-10-24 PROCEDURE — 36415 COLL VENOUS BLD VENIPUNCTURE: CPT | Performed by: PHYSICAL MEDICINE & REHABILITATION

## 2017-10-24 RX ADMIN — DOCUSATE SODIUM AND SENNOSIDES 1 TABLET: 8.6; 5 TABLET, FILM COATED ORAL at 20:58

## 2017-10-24 RX ADMIN — DOCUSATE SODIUM AND SENNOSIDES 1 TABLET: 8.6; 5 TABLET, FILM COATED ORAL at 08:26

## 2017-10-24 RX ADMIN — PREDNISONE 7.5 MG: 5 TABLET ORAL at 08:29

## 2017-10-24 RX ADMIN — METOPROLOL TARTRATE 25 MG: 25 TABLET ORAL at 08:27

## 2017-10-24 RX ADMIN — CIPROFLOXACIN HYDROCHLORIDE 500 MG: 500 TABLET, FILM COATED ORAL at 20:57

## 2017-10-24 RX ADMIN — METOPROLOL TARTRATE 25 MG: 25 TABLET ORAL at 20:59

## 2017-10-24 RX ADMIN — CIPROFLOXACIN HYDROCHLORIDE 500 MG: 500 TABLET, FILM COATED ORAL at 08:29

## 2017-10-25 PROCEDURE — 74011636637 HC RX REV CODE- 636/637: Performed by: PHYSICAL MEDICINE & REHABILITATION

## 2017-10-25 PROCEDURE — 74011250637 HC RX REV CODE- 250/637: Performed by: PHYSICAL MEDICINE & REHABILITATION

## 2017-10-25 RX ADMIN — CIPROFLOXACIN HYDROCHLORIDE 500 MG: 500 TABLET, FILM COATED ORAL at 08:41

## 2017-10-25 RX ADMIN — METOPROLOL TARTRATE 25 MG: 25 TABLET ORAL at 20:59

## 2017-10-25 RX ADMIN — DOCUSATE SODIUM AND SENNOSIDES 1 TABLET: 8.6; 5 TABLET, FILM COATED ORAL at 20:59

## 2017-10-25 RX ADMIN — CIPROFLOXACIN HYDROCHLORIDE 500 MG: 500 TABLET, FILM COATED ORAL at 20:59

## 2017-10-25 RX ADMIN — POLYETHYLENE GLYCOL 3350 17 G: 17 POWDER, FOR SOLUTION ORAL at 08:42

## 2017-10-25 RX ADMIN — PREDNISONE 7.5 MG: 5 TABLET ORAL at 08:42

## 2017-10-25 RX ADMIN — METOPROLOL TARTRATE 25 MG: 25 TABLET ORAL at 08:41

## 2017-10-25 RX ADMIN — DOCUSATE SODIUM AND SENNOSIDES 1 TABLET: 8.6; 5 TABLET, FILM COATED ORAL at 08:41

## 2017-10-26 LAB
ALBUMIN SERPL-MCNC: 2.8 G/DL (ref 3.5–5)
ALBUMIN/GLOB SERPL: 0.8 {RATIO} (ref 1.1–2.2)
ALP SERPL-CCNC: 68 U/L (ref 45–117)
ALT SERPL-CCNC: 31 U/L (ref 12–78)
ANION GAP SERPL CALC-SCNC: 10 MMOL/L (ref 5–15)
AST SERPL-CCNC: 15 U/L (ref 15–37)
BASOPHILS # BLD: 0.1 K/UL (ref 0–0.1)
BASOPHILS NFR BLD: 1 % (ref 0–1)
BILIRUB SERPL-MCNC: 0.4 MG/DL (ref 0.2–1)
BUN SERPL-MCNC: 20 MG/DL (ref 6–20)
BUN/CREAT SERPL: 14 (ref 12–20)
CALCIUM SERPL-MCNC: 8.6 MG/DL (ref 8.5–10.1)
CHLORIDE SERPL-SCNC: 108 MMOL/L (ref 97–108)
CO2 SERPL-SCNC: 25 MMOL/L (ref 21–32)
CREAT SERPL-MCNC: 1.42 MG/DL (ref 0.7–1.3)
EOSINOPHIL # BLD: 0.1 K/UL (ref 0–0.4)
EOSINOPHIL NFR BLD: 1 % (ref 0–7)
ERYTHROCYTE [DISTWIDTH] IN BLOOD BY AUTOMATED COUNT: 14.1 % (ref 11.5–14.5)
GLOBULIN SER CALC-MCNC: 3.6 G/DL (ref 2–4)
GLUCOSE SERPL-MCNC: 86 MG/DL (ref 65–100)
HCT VFR BLD AUTO: 40.1 % (ref 36.6–50.3)
HGB BLD-MCNC: 13.3 G/DL (ref 12.1–17)
LYMPHOCYTES # BLD: 3.2 K/UL (ref 0.8–3.5)
LYMPHOCYTES NFR BLD: 33 % (ref 12–49)
MCH RBC QN AUTO: 32.2 PG (ref 26–34)
MCHC RBC AUTO-ENTMCNC: 33.2 G/DL (ref 30–36.5)
MCV RBC AUTO: 97.1 FL (ref 80–99)
MONOCYTES # BLD: 0.7 K/UL (ref 0–1)
MONOCYTES NFR BLD: 7 % (ref 5–13)
NEUTS SEG # BLD: 5.8 K/UL (ref 1.8–8)
NEUTS SEG NFR BLD: 58 % (ref 32–75)
PLATELET # BLD AUTO: 230 K/UL (ref 150–400)
POTASSIUM SERPL-SCNC: 3.8 MMOL/L (ref 3.5–5.1)
PROT SERPL-MCNC: 6.4 G/DL (ref 6.4–8.2)
RBC # BLD AUTO: 4.13 M/UL (ref 4.1–5.7)
SODIUM SERPL-SCNC: 143 MMOL/L (ref 136–145)
WBC # BLD AUTO: 9.8 K/UL (ref 4.1–11.1)

## 2017-10-26 PROCEDURE — 85025 COMPLETE CBC W/AUTO DIFF WBC: CPT | Performed by: PHYSICAL MEDICINE & REHABILITATION

## 2017-10-26 PROCEDURE — 80053 COMPREHEN METABOLIC PANEL: CPT | Performed by: PHYSICAL MEDICINE & REHABILITATION

## 2017-10-26 PROCEDURE — 74011250637 HC RX REV CODE- 250/637: Performed by: PHYSICAL MEDICINE & REHABILITATION

## 2017-10-26 PROCEDURE — 74011636637 HC RX REV CODE- 636/637: Performed by: PHYSICAL MEDICINE & REHABILITATION

## 2017-10-26 PROCEDURE — 36415 COLL VENOUS BLD VENIPUNCTURE: CPT | Performed by: PHYSICAL MEDICINE & REHABILITATION

## 2017-10-26 RX ORDER — NYSTATIN 100000 [USP'U]/G
POWDER TOPICAL 2 TIMES DAILY
Status: DISCONTINUED | OUTPATIENT
Start: 2017-10-26 | End: 2017-10-27 | Stop reason: HOSPADM

## 2017-10-26 RX ADMIN — CIPROFLOXACIN HYDROCHLORIDE 500 MG: 500 TABLET, FILM COATED ORAL at 08:05

## 2017-10-26 RX ADMIN — POLYETHYLENE GLYCOL 3350 17 G: 17 POWDER, FOR SOLUTION ORAL at 08:11

## 2017-10-26 RX ADMIN — DOCUSATE SODIUM AND SENNOSIDES 1 TABLET: 8.6; 5 TABLET, FILM COATED ORAL at 08:04

## 2017-10-26 RX ADMIN — PREDNISONE 7.5 MG: 5 TABLET ORAL at 08:05

## 2017-10-26 RX ADMIN — METOPROLOL TARTRATE 25 MG: 25 TABLET ORAL at 21:35

## 2017-10-26 RX ADMIN — METOPROLOL TARTRATE 25 MG: 25 TABLET ORAL at 08:05

## 2017-10-26 RX ADMIN — CIPROFLOXACIN HYDROCHLORIDE 500 MG: 500 TABLET, FILM COATED ORAL at 21:35

## 2017-10-26 RX ADMIN — DOCUSATE SODIUM AND SENNOSIDES 1 TABLET: 8.6; 5 TABLET, FILM COATED ORAL at 21:35

## 2017-10-26 RX ADMIN — NYSTATIN: 100000 POWDER TOPICAL at 17:30

## 2017-10-26 RX ADMIN — NYSTATIN: 100000 POWDER TOPICAL at 21:37

## 2017-10-27 VITALS
BODY MASS INDEX: 23.33 KG/M2 | DIASTOLIC BLOOD PRESSURE: 73 MMHG | HEIGHT: 73 IN | HEART RATE: 65 BPM | SYSTOLIC BLOOD PRESSURE: 126 MMHG | WEIGHT: 176 LBS

## 2017-10-27 PROCEDURE — 74011250637 HC RX REV CODE- 250/637: Performed by: PHYSICAL MEDICINE & REHABILITATION

## 2017-10-27 PROCEDURE — 74011636637 HC RX REV CODE- 636/637: Performed by: PHYSICAL MEDICINE & REHABILITATION

## 2017-10-27 RX ADMIN — PREDNISONE 7.5 MG: 5 TABLET ORAL at 08:58

## 2017-10-27 RX ADMIN — METOPROLOL TARTRATE 25 MG: 25 TABLET ORAL at 08:58

## 2017-10-27 RX ADMIN — CIPROFLOXACIN HYDROCHLORIDE 500 MG: 500 TABLET, FILM COATED ORAL at 08:59

## 2017-10-27 RX ADMIN — POLYETHYLENE GLYCOL 3350 17 G: 17 POWDER, FOR SOLUTION ORAL at 08:58

## 2017-10-27 RX ADMIN — NYSTATIN: 100000 POWDER TOPICAL at 08:59

## 2017-10-27 RX ADMIN — DOCUSATE SODIUM AND SENNOSIDES 1 TABLET: 8.6; 5 TABLET, FILM COATED ORAL at 08:59

## 2017-12-10 ENCOUNTER — HOSPITAL ENCOUNTER (EMERGENCY)
Age: 77
Discharge: HOME | End: 2017-12-10
Attending: EMERGENCY MEDICINE
Payer: MEDICARE

## 2017-12-10 VITALS
WEIGHT: 175 LBS | RESPIRATION RATE: 16 BRPM | BODY MASS INDEX: 23.19 KG/M2 | DIASTOLIC BLOOD PRESSURE: 76 MMHG | TEMPERATURE: 99.2 F | HEART RATE: 81 BPM | OXYGEN SATURATION: 95 % | HEIGHT: 73 IN | SYSTOLIC BLOOD PRESSURE: 138 MMHG

## 2017-12-10 DIAGNOSIS — N39.0 URINARY TRACT INFECTION WITHOUT HEMATURIA, SITE UNSPECIFIED: Primary | ICD-10-CM

## 2017-12-10 LAB
ANION GAP SERPL CALC-SCNC: 11 MMOL/L (ref 5–15)
APPEARANCE UR: CLEAR
BACTERIA URNS QL MICRO: NEGATIVE /HPF
BASOPHILS # BLD: 0 K/UL (ref 0–0.1)
BASOPHILS NFR BLD: 0 % (ref 0–1)
BILIRUB UR QL: NEGATIVE
BUN SERPL-MCNC: 17 MG/DL (ref 6–20)
BUN/CREAT SERPL: 14 (ref 12–20)
CALCIUM SERPL-MCNC: 9.2 MG/DL (ref 8.5–10.1)
CHLORIDE SERPL-SCNC: 107 MMOL/L (ref 97–108)
CO2 SERPL-SCNC: 24 MMOL/L (ref 21–32)
COLOR UR: ABNORMAL
CREAT SERPL-MCNC: 1.23 MG/DL (ref 0.7–1.3)
EOSINOPHIL # BLD: 0 K/UL (ref 0–0.4)
EOSINOPHIL NFR BLD: 0 % (ref 0–7)
EPITH CASTS URNS QL MICRO: ABNORMAL /LPF
ERYTHROCYTE [DISTWIDTH] IN BLOOD BY AUTOMATED COUNT: 13.8 % (ref 11.5–14.5)
GLUCOSE SERPL-MCNC: 93 MG/DL (ref 65–100)
GLUCOSE UR STRIP.AUTO-MCNC: NEGATIVE MG/DL
HCT VFR BLD AUTO: 47.2 % (ref 36.6–50.3)
HGB BLD-MCNC: 15.7 G/DL (ref 12.1–17)
HGB UR QL STRIP: ABNORMAL
HYALINE CASTS URNS QL MICRO: ABNORMAL /LPF (ref 0–5)
KETONES UR QL STRIP.AUTO: NEGATIVE MG/DL
LACTATE SERPL-SCNC: 1 MMOL/L (ref 0.4–2)
LEUKOCYTE ESTERASE UR QL STRIP.AUTO: ABNORMAL
LYMPHOCYTES # BLD: 1.4 K/UL (ref 0.8–3.5)
LYMPHOCYTES NFR BLD: 14 % (ref 12–49)
MCH RBC QN AUTO: 32.8 PG (ref 26–34)
MCHC RBC AUTO-ENTMCNC: 33.3 G/DL (ref 30–36.5)
MCV RBC AUTO: 98.7 FL (ref 80–99)
MONOCYTES # BLD: 0.7 K/UL (ref 0–1)
MONOCYTES NFR BLD: 6 % (ref 5–13)
NEUTS SEG # BLD: 8.1 K/UL (ref 1.8–8)
NEUTS SEG NFR BLD: 80 % (ref 32–75)
NITRITE UR QL STRIP.AUTO: NEGATIVE
PH UR STRIP: 6 [PH] (ref 5–8)
PLATELET # BLD AUTO: 178 K/UL (ref 150–400)
POTASSIUM SERPL-SCNC: 4.1 MMOL/L (ref 3.5–5.1)
PROT UR STRIP-MCNC: NEGATIVE MG/DL
RBC # BLD AUTO: 4.78 M/UL (ref 4.1–5.7)
RBC #/AREA URNS HPF: ABNORMAL /HPF (ref 0–5)
SODIUM SERPL-SCNC: 142 MMOL/L (ref 136–145)
SP GR UR REFRACTOMETRY: 1.01 (ref 1–1.03)
UR CULT HOLD, URHOLD: NORMAL
UROBILINOGEN UR QL STRIP.AUTO: 0.2 EU/DL (ref 0.2–1)
WBC # BLD AUTO: 10.2 K/UL (ref 4.1–11.1)
WBC URNS QL MICRO: ABNORMAL /HPF (ref 0–4)

## 2017-12-10 PROCEDURE — 83605 ASSAY OF LACTIC ACID: CPT | Performed by: EMERGENCY MEDICINE

## 2017-12-10 PROCEDURE — 74011250636 HC RX REV CODE- 250/636: Performed by: EMERGENCY MEDICINE

## 2017-12-10 PROCEDURE — 85025 COMPLETE CBC W/AUTO DIFF WBC: CPT | Performed by: EMERGENCY MEDICINE

## 2017-12-10 PROCEDURE — 99283 EMERGENCY DEPT VISIT LOW MDM: CPT

## 2017-12-10 PROCEDURE — 36415 COLL VENOUS BLD VENIPUNCTURE: CPT | Performed by: EMERGENCY MEDICINE

## 2017-12-10 PROCEDURE — 87086 URINE CULTURE/COLONY COUNT: CPT | Performed by: EMERGENCY MEDICINE

## 2017-12-10 PROCEDURE — 80048 BASIC METABOLIC PNL TOTAL CA: CPT | Performed by: EMERGENCY MEDICINE

## 2017-12-10 PROCEDURE — 96365 THER/PROPH/DIAG IV INF INIT: CPT

## 2017-12-10 PROCEDURE — 81001 URINALYSIS AUTO W/SCOPE: CPT | Performed by: EMERGENCY MEDICINE

## 2017-12-10 PROCEDURE — 74011000258 HC RX REV CODE- 258: Performed by: EMERGENCY MEDICINE

## 2017-12-10 RX ORDER — CEPHALEXIN 500 MG/1
500 CAPSULE ORAL 4 TIMES DAILY
Qty: 28 CAP | Refills: 0 | Status: SHIPPED | OUTPATIENT
Start: 2017-12-10 | End: 2017-12-17

## 2017-12-10 RX ADMIN — CEFAZOLIN SODIUM 1 G: 1 INJECTION, POWDER, FOR SOLUTION INTRAMUSCULAR; INTRAVENOUS at 17:15

## 2017-12-10 NOTE — DISCHARGE INSTRUCTIONS
Urinary Tract Infections in Men: Care Instructions  Your Care Instructions    A urinary tract infection, or UTI, is a general term for an infection anywhere between the kidneys and the tip of the penis. UTIs can also be a result of a prostate problem. Most cause pain or burning when you urinate. Most UTIs are caused by bacteria and can be cured with antibiotics. It is important to complete your treatment so that the infection does not get worse. Follow-up care is a key part of your treatment and safety. Be sure to make and go to all appointments, and call your doctor if you are having problems. It's also a good idea to know your test results and keep a list of the medicines you take. How can you care for yourself at home? · Take your antibiotics as prescribed. Do not stop taking them just because you feel better. You need to take the full course of antibiotics. · Take your medicines exactly as prescribed. Your doctor may have prescribed a medicine, such as phenazopyridine (Pyridium), to help relieve pain when you urinate. This turns your urine orange. You may stop taking it when your symptoms get better. But be sure to take all of your antibiotics, which treat the infection. · Drink extra water for the next day or two. This will help make the urine less concentrated and help wash out the bacteria causing the infection. (If you have kidney, heart, or liver disease and have to limit your fluids, talk with your doctor before you increase your fluid intake.)  · Avoid drinks that are carbonated or have caffeine. They can irritate the bladder. · Urinate often. Try to empty your bladder each time. · To relieve pain, take a hot bath or lay a heating pad (set on low) over your lower belly or genital area. Never go to sleep with a heating pad in place. To help prevent UTIs  · Drink plenty of fluids, enough so that your urine is light yellow or clear like water.  If you have kidney, heart, or liver disease and have to limit fluids, talk with your doctor before you increase the amount of fluids you drink. · Urinate when you have the urge. Do not hold your urine for a long time. Urinate before you go to sleep. · Keep your penis clean. Catheter care  If you have a drainage tube (catheter) in place, the following steps will help you care for it. · Always wash your hands before and after touching your catheter. · Check the area around the urethra for inflammation or signs of infection. Signs of infection include irritated, swollen, red, or tender skin, or pus around the catheter. · Clean the area around the catheter with soap and water two times a day. Dry with a clean towel afterward. · Do not apply powder or lotion to the skin around the catheter. To empty the urine collection bag  · Wash your hands with soap and water. · Without touching the drain spout, remove the spout from its sleeve at the bottom of the collection bag. Open the valve on the spout. · Let the urine flow out of the bag and into the toilet or a container. Do not let the tubing or drain spout touch anything. · After you empty the bag, clean the end of the drain spout with tissue and water. Close the valve and put the drain spout back into its sleeve at the bottom of the collection bag. · Wash your hands with soap and water. When should you call for help? Call your doctor now or seek immediate medical care if:  ? · Symptoms such as a fever, chills, nausea, or vomiting get worse or happen for the first time. ? · You have new pain in your back just below your rib cage. This is called flank pain. ? · There is new blood or pus in your urine. ? · You are not able to take or keep down your antibiotics. ? Watch closely for changes in your health, and be sure to contact your doctor if:  ? · You are not getting better after taking an antibiotic for 2 days. ? · Your symptoms go away but then come back. Where can you learn more?   Go to http://richelle-travis.info/. Enter I265 in the search box to learn more about \"Urinary Tract Infections in Men: Care Instructions. \"  Current as of: May 12, 2017  Content Version: 11.4  © 6088-5212 [employer]. Care instructions adapted under license by [employer] (which disclaims liability or warranty for this information). If you have questions about a medical condition or this instruction, always ask your healthcare professional. [place] [address] any warranty or liability for your use of this information.            VITALS:   Patient Vitals for the past 8 hrs:   Temp Pulse Resp BP SpO2   12/10/17 1614 99.2 °F (37.3 °C) - - - -   12/10/17 1545 97.4 °F (36.3 °C) 81 16 138/76 95 %

## 2017-12-10 NOTE — ED PROVIDER NOTES
HPI Comments: 68 y.o. male with past medical history significant for Sjogren, CKD, sepsis, cervical diskectomy, hernia repair, vasectomy, and hernia repair who presents from home with wife with chief complaint of urinary frequency. The pt reports that he was admitted a few weeks ago due to sepsis from a UTI. The pt had urinary frequency and chills at that time. During his admission the pt had a TURP. One day ago, the pt started to experience urinary frequency. Today, the frequency continues and he noticed that the prednisone that he takes for his chronic pain related to Sjogren's was not taking affect. This concerned him because he is experiencing similar sx to his last episode of sepsis. The pt denies fever, SOB, CP, current pain and cough. There are no other acute medical concerns at this time. Social hx: current smoker, no EtOH use  PCP: Dagoberto Anderson DO    Note written by Aviva Warren, as dictated by Gui Davis MD 4:00 PM      The history is provided by the patient. No  was used. Past Medical History:   Diagnosis Date    CKD (chronic kidney disease), stage III     Sjoegren syndrome ([facility] [address] 04/2016    Urine retention        Past Surgical History:   Procedure Laterality Date    HX APPENDECTOMY      HX CERVICAL DISKECTOMY      HX HEENT      Zygomatic arch repair    HX HERNIA REPAIR      HX ORTHOPAEDIC      Broken tibia and fibula    HX VASECTOMY           No family history on file. Social History     Social History    Marital status:      Spouse name: N/A    Number of children: N/A    Years of education: N/A     Occupational History    Not on file.      Social History Main Topics    Smoking status: Current Every Day Smoker     Packs/day: 0.50     Years: 40.00    Smokeless tobacco: Never Used    Alcohol use No    Drug use: No    Sexual activity: Not on file     Other Topics Concern    Not on file     Social History Narrative         ALLERGIES: Flomax [tamsulosin] and Rapaflo [silodosin]    Review of Systems   Constitutional: Negative for chills and fever. HENT: Negative for ear pain and sore throat. Eyes: Negative for photophobia and pain. Respiratory: Negative for chest tightness and shortness of breath. Cardiovascular: Negative for chest pain and leg swelling. Gastrointestinal: Negative for abdominal pain, nausea and vomiting. Genitourinary: Positive for frequency. Negative for discharge, dysuria, flank pain and penile pain. Musculoskeletal: Negative for back pain and neck pain. Skin: Negative for rash and wound. Neurological: Negative for dizziness, light-headedness and headaches. Vitals:    12/10/17 1545   BP: 138/76   Pulse: 81   Resp: 16   Temp: 97.4 °F (36.3 °C)   SpO2: 95%   Weight: 79.4 kg (175 lb)   Height: 6' 1\" (1.854 m)            Physical Exam   Constitutional: He is oriented to person, place, and time. He appears well-developed and well-nourished. HENT:   Head: Normocephalic and atraumatic. Cardiovascular: Normal rate, regular rhythm and intact distal pulses. Pulmonary/Chest: Effort normal and breath sounds normal. No respiratory distress. Abdominal: Soft. Bowel sounds are normal. There is no tenderness. There is no rebound. Genitourinary:   Genitourinary Comments: deferred   Musculoskeletal: Normal range of motion. He exhibits no edema or deformity. Neurological: He is alert and oriented to person, place, and time. Psychiatric: He has a normal mood and affect. Nursing note and vitals reviewed. MDM  Number of Diagnoses or Management Options  Urinary tract infection without hematuria, site unspecified:   Diagnosis management comments: Patient with increased urinary frequency since yesterday - patient and wife concern for UTI and early sepsis as he got septic 2 months ago due to a UTI. Check labs, urine, include lactic acid.     ddx - uti, renal colic, less likely sepsis,        Amount and/or Complexity of Data Reviewed  Clinical lab tests: ordered and reviewed      ED Course       Procedures  5:08 PM  Discussed the lab results with the pt with the wife. The plan is to administer IV Ancef  in the ED and discharge him home with 1 week of Keflex. They have agreed to F/U with urology and PCP for urine testing for clearance.

## 2017-12-12 LAB
BACTERIA SPEC CULT: NORMAL
CC UR VC: NORMAL
SERVICE CMNT-IMP: NORMAL

## 2019-06-11 ENCOUNTER — APPOINTMENT (OUTPATIENT)
Dept: GENERAL RADIOLOGY | Age: 79
DRG: 470 | End: 2019-06-11
Attending: ORTHOPAEDIC SURGERY
Payer: MEDICARE

## 2019-06-11 ENCOUNTER — ANESTHESIA (OUTPATIENT)
Dept: SURGERY | Age: 79
DRG: 470 | End: 2019-06-11
Payer: MEDICARE

## 2019-06-11 ENCOUNTER — HOSPITAL ENCOUNTER (INPATIENT)
Age: 79
LOS: 3 days | Discharge: NURSING, REHAB OR LONG-TERM CARE | DRG: 470 | End: 2019-06-14
Attending: EMERGENCY MEDICINE | Admitting: INTERNAL MEDICINE
Payer: MEDICARE

## 2019-06-11 ENCOUNTER — ANESTHESIA EVENT (OUTPATIENT)
Dept: SURGERY | Age: 79
DRG: 470 | End: 2019-06-11
Payer: MEDICARE

## 2019-06-11 ENCOUNTER — APPOINTMENT (OUTPATIENT)
Dept: GENERAL RADIOLOGY | Age: 79
DRG: 470 | End: 2019-06-11
Attending: EMERGENCY MEDICINE
Payer: MEDICARE

## 2019-06-11 DIAGNOSIS — S72.002A CLOSED LEFT HIP FRACTURE, INITIAL ENCOUNTER (HCC): ICD-10-CM
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
## 2019-06-11 LAB
ABO + RH BLD: NORMAL
ALBUMIN SERPL-MCNC: 3.6 G/DL (ref 3.5–5)
ALBUMIN/GLOB SERPL: 1 {RATIO} (ref 1.1–2.2)
ALP SERPL-CCNC: 69 U/L (ref 45–117)
ALT SERPL-CCNC: 21 U/L (ref 12–78)
ANION GAP SERPL CALC-SCNC: 8 MMOL/L (ref 5–15)
APPEARANCE UR: CLEAR
APTT PPP: 26.7 SEC (ref 22.1–32)
AST SERPL-CCNC: 17 U/L (ref 15–37)
ATRIAL RATE: 119 BPM
BACTERIA URNS QL MICRO: NEGATIVE /HPF
BASOPHILS # BLD: 0.1 K/UL (ref 0–0.1)
BASOPHILS NFR BLD: 1 % (ref 0–1)
BILIRUB SERPL-MCNC: 1.2 MG/DL (ref 0.2–1)
BILIRUB UR QL: NEGATIVE
BLOOD GROUP ANTIBODIES SERPL: NORMAL
BUN SERPL-MCNC: 17 MG/DL (ref 6–20)
BUN/CREAT SERPL: 13 (ref 12–20)
CALCIUM SERPL-MCNC: 9.1 MG/DL (ref 8.5–10.1)
CALCULATED R AXIS, ECG10: -42 DEGREES
CALCULATED T AXIS, ECG11: 9 DEGREES
CHLORIDE SERPL-SCNC: 107 MMOL/L (ref 97–108)
CO2 SERPL-SCNC: 23 MMOL/L (ref 21–32)
COLOR UR: NORMAL
COMMENT, HOLDF: NORMAL
CREAT SERPL-MCNC: 1.27 MG/DL (ref 0.7–1.3)
DIAGNOSIS, 93000: NORMAL
DIFFERENTIAL METHOD BLD: ABNORMAL
EOSINOPHIL # BLD: 0.1 K/UL (ref 0–0.4)
EOSINOPHIL NFR BLD: 1 % (ref 0–7)
EPITH CASTS URNS QL MICRO: NORMAL /LPF
ERYTHROCYTE [DISTWIDTH] IN BLOOD BY AUTOMATED COUNT: 12.8 % (ref 11.5–14.5)
GLOBULIN SER CALC-MCNC: 3.5 G/DL (ref 2–4)
GLUCOSE SERPL-MCNC: 149 MG/DL (ref 65–100)
GLUCOSE UR STRIP.AUTO-MCNC: NEGATIVE MG/DL
HCT VFR BLD AUTO: 47.3 % (ref 36.6–50.3)
HGB BLD-MCNC: 16.1 G/DL (ref 12.1–17)
HGB UR QL STRIP: NEGATIVE
HYALINE CASTS URNS QL MICRO: NORMAL /LPF (ref 0–5)
IMM GRANULOCYTES # BLD AUTO: 0.1 K/UL (ref 0–0.04)
IMM GRANULOCYTES NFR BLD AUTO: 0 % (ref 0–0.5)
INR PPP: 1.1 (ref 0.9–1.1)
KETONES UR QL STRIP.AUTO: NEGATIVE MG/DL
LEUKOCYTE ESTERASE UR QL STRIP.AUTO: NEGATIVE
LYMPHOCYTES # BLD: 1 K/UL (ref 0.8–3.5)
LYMPHOCYTES NFR BLD: 9 % (ref 12–49)
MAGNESIUM SERPL-MCNC: 2.1 MG/DL (ref 1.6–2.4)
MCH RBC QN AUTO: 33.1 PG (ref 26–34)
MCHC RBC AUTO-ENTMCNC: 34 G/DL (ref 30–36.5)
MCV RBC AUTO: 97.1 FL (ref 80–99)
MONOCYTES # BLD: 0.8 K/UL (ref 0–1)
MONOCYTES NFR BLD: 7 % (ref 5–13)
NEUTS SEG # BLD: 9.6 K/UL (ref 1.8–8)
NEUTS SEG NFR BLD: 82 % (ref 32–75)
NITRITE UR QL STRIP.AUTO: NEGATIVE
NRBC # BLD: 0 K/UL (ref 0–0.01)
NRBC BLD-RTO: 0 PER 100 WBC
PH UR STRIP: 5.5 [PH] (ref 5–8)
PHOSPHATE SERPL-MCNC: 3.1 MG/DL (ref 2.6–4.7)
PLATELET # BLD AUTO: 152 K/UL (ref 150–400)
PMV BLD AUTO: 11 FL (ref 8.9–12.9)
POTASSIUM SERPL-SCNC: 3.8 MMOL/L (ref 3.5–5.1)
PROT SERPL-MCNC: 7.1 G/DL (ref 6.4–8.2)
PROT UR STRIP-MCNC: NEGATIVE MG/DL
PROTHROMBIN TIME: 11.3 SEC (ref 9–11.1)
Q-T INTERVAL, ECG07: 426 MS
QRS DURATION, ECG06: 72 MS
QTC CALCULATION (BEZET), ECG08: 506 MS
RBC # BLD AUTO: 4.87 M/UL (ref 4.1–5.7)
RBC #/AREA URNS HPF: NORMAL /HPF (ref 0–5)
SAMPLES BEING HELD,HOLD: NORMAL
SODIUM SERPL-SCNC: 138 MMOL/L (ref 136–145)
SP GR UR REFRACTOMETRY: 1.02 (ref 1–1.03)
SPECIMEN EXP DATE BLD: NORMAL
THERAPEUTIC RANGE,PTTT: NORMAL SECS (ref 58–77)
UA: UC IF INDICATED,UAUC: NORMAL
UROBILINOGEN UR QL STRIP.AUTO: 1 EU/DL (ref 0.2–1)
VENTRICULAR RATE, ECG03: 85 BPM
WBC # BLD AUTO: 11.7 K/UL (ref 4.1–11.1)
WBC URNS QL MICRO: NORMAL /HPF (ref 0–4)

## 2019-06-11 PROCEDURE — 74011000250 HC RX REV CODE- 250

## 2019-06-11 PROCEDURE — 76060000037 HC ANESTHESIA 3 TO 3.5 HR: Performed by: ORTHOPAEDIC SURGERY

## 2019-06-11 PROCEDURE — 74011000250 HC RX REV CODE- 250: Performed by: ORTHOPAEDIC SURGERY

## 2019-06-11 PROCEDURE — 74011250636 HC RX REV CODE- 250/636

## 2019-06-11 PROCEDURE — 74011250637 HC RX REV CODE- 250/637: Performed by: ORTHOPAEDIC SURGERY

## 2019-06-11 PROCEDURE — 74011250636 HC RX REV CODE- 250/636: Performed by: INTERNAL MEDICINE

## 2019-06-11 PROCEDURE — 85730 THROMBOPLASTIN TIME PARTIAL: CPT

## 2019-06-11 PROCEDURE — 77030038587 HC LNR BOOT DISP ALLN -B: Performed by: ORTHOPAEDIC SURGERY

## 2019-06-11 PROCEDURE — 71045 X-RAY EXAM CHEST 1 VIEW: CPT

## 2019-06-11 PROCEDURE — 74011000272 HC RX REV CODE- 272: Performed by: ORTHOPAEDIC SURGERY

## 2019-06-11 PROCEDURE — 77030013708 HC HNDPC SUC IRR PULS STRY –B: Performed by: ORTHOPAEDIC SURGERY

## 2019-06-11 PROCEDURE — 77030018846 HC SOL IRR STRL H20 ICUM -A: Performed by: ORTHOPAEDIC SURGERY

## 2019-06-11 PROCEDURE — 84100 ASSAY OF PHOSPHORUS: CPT

## 2019-06-11 PROCEDURE — 74011250637 HC RX REV CODE- 250/637: Performed by: ANESTHESIOLOGY

## 2019-06-11 PROCEDURE — 76210000017 HC OR PH I REC 1.5 TO 2 HR: Performed by: ORTHOPAEDIC SURGERY

## 2019-06-11 PROCEDURE — 80053 COMPREHEN METABOLIC PANEL: CPT

## 2019-06-11 PROCEDURE — 51798 US URINE CAPACITY MEASURE: CPT

## 2019-06-11 PROCEDURE — 76010000173 HC OR TIME 3 TO 3.5 HR INTENSV-TIER 1: Performed by: ORTHOPAEDIC SURGERY

## 2019-06-11 PROCEDURE — 74011250636 HC RX REV CODE- 250/636: Performed by: EMERGENCY MEDICINE

## 2019-06-11 PROCEDURE — 77030018842 HC SOL IRR SOD CL 9% BAXT -A: Performed by: ORTHOPAEDIC SURGERY

## 2019-06-11 PROCEDURE — 85025 COMPLETE CBC W/AUTO DIFF WBC: CPT

## 2019-06-11 PROCEDURE — 77030008684 HC TU ET CUF COVD -B: Performed by: ANESTHESIOLOGY

## 2019-06-11 PROCEDURE — 74011250636 HC RX REV CODE- 250/636: Performed by: ORTHOPAEDIC SURGERY

## 2019-06-11 PROCEDURE — 77030006835 HC BLD SAW SAG STRY -B: Performed by: ORTHOPAEDIC SURGERY

## 2019-06-11 PROCEDURE — 73502 X-RAY EXAM HIP UNI 2-3 VIEWS: CPT

## 2019-06-11 PROCEDURE — C1713 ANCHOR/SCREW BN/BN,TIS/BN: HCPCS | Performed by: ORTHOPAEDIC SURGERY

## 2019-06-11 PROCEDURE — 65270000029 HC RM PRIVATE

## 2019-06-11 PROCEDURE — 74011250636 HC RX REV CODE- 250/636: Performed by: ANESTHESIOLOGY

## 2019-06-11 PROCEDURE — 86900 BLOOD TYPING SEROLOGIC ABO: CPT

## 2019-06-11 PROCEDURE — 96375 TX/PRO/DX INJ NEW DRUG ADDON: CPT

## 2019-06-11 PROCEDURE — 77030039266 HC ADH SKN EXOFIN S2SG -A: Performed by: ORTHOPAEDIC SURGERY

## 2019-06-11 PROCEDURE — 36415 COLL VENOUS BLD VENIPUNCTURE: CPT

## 2019-06-11 PROCEDURE — 83735 ASSAY OF MAGNESIUM: CPT

## 2019-06-11 PROCEDURE — 77030031139 HC SUT VCRL2 J&J -A: Performed by: ORTHOPAEDIC SURGERY

## 2019-06-11 PROCEDURE — 77030019908 HC STETH ESOPH SIMS -A: Performed by: ANESTHESIOLOGY

## 2019-06-11 PROCEDURE — 99285 EMERGENCY DEPT VISIT HI MDM: CPT

## 2019-06-11 PROCEDURE — 77030026438 HC STYL ET INTUB CARD -A: Performed by: ANESTHESIOLOGY

## 2019-06-11 PROCEDURE — 77030012935 HC DRSG AQUACEL BMS -B: Performed by: ORTHOPAEDIC SURGERY

## 2019-06-11 PROCEDURE — 77030033138 HC SUT PGA STRATFX J&J -B: Performed by: ORTHOPAEDIC SURGERY

## 2019-06-11 PROCEDURE — 72170 X-RAY EXAM OF PELVIS: CPT

## 2019-06-11 PROCEDURE — 81001 URINALYSIS AUTO W/SCOPE: CPT

## 2019-06-11 PROCEDURE — 77030013079 HC BLNKT BAIR HGGR 3M -A: Performed by: ANESTHESIOLOGY

## 2019-06-11 PROCEDURE — 85610 PROTHROMBIN TIME: CPT

## 2019-06-11 PROCEDURE — 77030035236 HC SUT PDS STRATFX BARB J&J -B: Performed by: ORTHOPAEDIC SURGERY

## 2019-06-11 PROCEDURE — 0SRB04A REPLACEMENT OF LEFT HIP JOINT WITH CERAMIC ON POLYETHYLENE SYNTHETIC SUBSTITUTE, UNCEMENTED, OPEN APPROACH: ICD-10-PCS | Performed by: ORTHOPAEDIC SURGERY

## 2019-06-11 PROCEDURE — 77030011264 HC ELECTRD BLD EXT COVD -A: Performed by: ORTHOPAEDIC SURGERY

## 2019-06-11 PROCEDURE — 77030020788: Performed by: ORTHOPAEDIC SURGERY

## 2019-06-11 PROCEDURE — 77030027138 HC INCENT SPIROMETER -A

## 2019-06-11 PROCEDURE — 77030036660

## 2019-06-11 PROCEDURE — 77030011640 HC PAD GRND REM COVD -A: Performed by: ORTHOPAEDIC SURGERY

## 2019-06-11 PROCEDURE — 96374 THER/PROPH/DIAG INJ IV PUSH: CPT

## 2019-06-11 PROCEDURE — C1776 JOINT DEVICE (IMPLANTABLE): HCPCS | Performed by: ORTHOPAEDIC SURGERY

## 2019-06-11 PROCEDURE — 93005 ELECTROCARDIOGRAM TRACING: CPT

## 2019-06-11 PROCEDURE — 94761 N-INVAS EAR/PLS OXIMETRY MLT: CPT

## 2019-06-11 DEVICE — PINNACLE CANCELLOUS BONE SCREW 6.5MM X 30MM
Type: IMPLANTABLE DEVICE | Site: HIP | Status: FUNCTIONAL
Brand: PINNACLE

## 2019-06-11 DEVICE — CABLE SURG DIA1.7MM S STL HA CERCLAGE W/ CRMP 29880101S] DEPUY SYNTHES USA]: Type: IMPLANTABLE DEVICE | Site: HIP | Status: FUNCTIONAL

## 2019-06-11 DEVICE — PINNACLE CANCELLOUS BONE SCREW 6.5MM X 25MM
Type: IMPLANTABLE DEVICE | Site: HIP | Status: FUNCTIONAL
Brand: PINNACLE

## 2019-06-11 DEVICE — SCREW BNE L35MM DIA6.5MM CANC HIP DOME PINN: Type: IMPLANTABLE DEVICE | Site: HIP | Status: FUNCTIONAL

## 2019-06-11 DEVICE — PINNACLE HIP SOLUTIONS ALTRX POLYETHYLENE ACETABULAR LINER NEUTRAL 40MM ID 58MM OD
Type: IMPLANTABLE DEVICE | Site: HIP | Status: FUNCTIONAL
Brand: PINNACLE ALTRX

## 2019-06-11 DEVICE — BIOLOX DELTA TS CERAMIC FEMORAL HEAD 12/14 TAPER REVISION DIAMETER 40MM +5
Type: IMPLANTABLE DEVICE | Site: HIP | Status: FUNCTIONAL
Brand: BIOLOX DELTA

## 2019-06-11 DEVICE — ACTIS DUOFIX HIP PROSTHESIS (FEMORAL STEM 12/14 TAPER CEMENTLESS SIZE 10 STD COLLAR)  CE
Type: IMPLANTABLE DEVICE | Site: HIP | Status: FUNCTIONAL
Brand: ACTIS

## 2019-06-11 DEVICE — PINNACLE GRIPTION ACETABULAR SHELL SECTOR 58MM OD
Type: IMPLANTABLE DEVICE | Site: HIP | Status: FUNCTIONAL
Brand: PINNACLE GRIPTION

## 2019-06-11 DEVICE — COMPONENT TOT HIP PRIMARY CERM ALTRX: Type: IMPLANTABLE DEVICE | Status: FUNCTIONAL

## 2019-06-11 RX ORDER — LIDOCAINE HYDROCHLORIDE 20 MG/ML
INJECTION, SOLUTION EPIDURAL; INFILTRATION; INTRACAUDAL; PERINEURAL AS NEEDED
Status: DISCONTINUED | OUTPATIENT
Start: 2019-06-11 | End: 2019-06-11 | Stop reason: HOSPADM

## 2019-06-11 RX ORDER — SODIUM CHLORIDE 9 MG/ML
75 INJECTION, SOLUTION INTRAVENOUS CONTINUOUS
Status: DISCONTINUED | OUTPATIENT
Start: 2019-06-11 | End: 2019-06-14 | Stop reason: HOSPADM

## 2019-06-11 RX ORDER — OXYCODONE HYDROCHLORIDE 5 MG/1
5 TABLET ORAL
Status: DISCONTINUED | OUTPATIENT
Start: 2019-06-11 | End: 2019-06-14 | Stop reason: HOSPADM

## 2019-06-11 RX ORDER — DULOXETIN HYDROCHLORIDE 30 MG/1
30 CAPSULE, DELAYED RELEASE ORAL
COMMUNITY

## 2019-06-11 RX ORDER — ACETAMINOPHEN 325 MG/1
650 TABLET ORAL EVERY 6 HOURS
Status: DISCONTINUED | OUTPATIENT
Start: 2019-06-11 | End: 2019-06-11

## 2019-06-11 RX ORDER — ROCURONIUM BROMIDE 10 MG/ML
INJECTION, SOLUTION INTRAVENOUS AS NEEDED
Status: DISCONTINUED | OUTPATIENT
Start: 2019-06-11 | End: 2019-06-11 | Stop reason: HOSPADM

## 2019-06-11 RX ORDER — NEOSTIGMINE METHYLSULFATE 1 MG/ML
INJECTION INTRAVENOUS AS NEEDED
Status: DISCONTINUED | OUTPATIENT
Start: 2019-06-11 | End: 2019-06-11 | Stop reason: HOSPADM

## 2019-06-11 RX ORDER — SODIUM CHLORIDE 0.9 % (FLUSH) 0.9 %
5-40 SYRINGE (ML) INJECTION AS NEEDED
Status: DISCONTINUED | OUTPATIENT
Start: 2019-06-11 | End: 2019-06-14 | Stop reason: HOSPADM

## 2019-06-11 RX ORDER — ONDANSETRON 2 MG/ML
8 INJECTION INTRAMUSCULAR; INTRAVENOUS
Status: COMPLETED | OUTPATIENT
Start: 2019-06-11 | End: 2019-06-11

## 2019-06-11 RX ORDER — HYDROMORPHONE HYDROCHLORIDE 2 MG/ML
INJECTION, SOLUTION INTRAMUSCULAR; INTRAVENOUS; SUBCUTANEOUS AS NEEDED
Status: DISCONTINUED | OUTPATIENT
Start: 2019-06-11 | End: 2019-06-11 | Stop reason: HOSPADM

## 2019-06-11 RX ORDER — OXYCODONE HYDROCHLORIDE 5 MG/1
5 TABLET ORAL
Status: DISCONTINUED | OUTPATIENT
Start: 2019-06-11 | End: 2019-06-11 | Stop reason: HOSPADM

## 2019-06-11 RX ORDER — VANCOMYCIN HYDROCHLORIDE 1 G/20ML
INJECTION, POWDER, LYOPHILIZED, FOR SOLUTION INTRAVENOUS AS NEEDED
Status: DISCONTINUED | OUTPATIENT
Start: 2019-06-11 | End: 2019-06-11 | Stop reason: HOSPADM

## 2019-06-11 RX ORDER — NALOXONE HYDROCHLORIDE 0.4 MG/ML
0.4 INJECTION, SOLUTION INTRAMUSCULAR; INTRAVENOUS; SUBCUTANEOUS AS NEEDED
Status: DISCONTINUED | OUTPATIENT
Start: 2019-06-11 | End: 2019-06-14 | Stop reason: HOSPADM

## 2019-06-11 RX ORDER — ACETAMINOPHEN 325 MG/1
650 TABLET ORAL
Status: DISCONTINUED | OUTPATIENT
Start: 2019-06-11 | End: 2019-06-14 | Stop reason: HOSPADM

## 2019-06-11 RX ORDER — DEXAMETHASONE SODIUM PHOSPHATE 4 MG/ML
INJECTION, SOLUTION INTRA-ARTICULAR; INTRALESIONAL; INTRAMUSCULAR; INTRAVENOUS; SOFT TISSUE AS NEEDED
Status: DISCONTINUED | OUTPATIENT
Start: 2019-06-11 | End: 2019-06-11 | Stop reason: HOSPADM

## 2019-06-11 RX ORDER — ACETAMINOPHEN 325 MG/1
650 TABLET ORAL EVERY 6 HOURS
Status: DISCONTINUED | OUTPATIENT
Start: 2019-06-11 | End: 2019-06-14 | Stop reason: HOSPADM

## 2019-06-11 RX ORDER — ONDANSETRON 2 MG/ML
INJECTION INTRAMUSCULAR; INTRAVENOUS AS NEEDED
Status: DISCONTINUED | OUTPATIENT
Start: 2019-06-11 | End: 2019-06-11 | Stop reason: HOSPADM

## 2019-06-11 RX ORDER — HYDROMORPHONE HYDROCHLORIDE 1 MG/ML
.25-1 INJECTION, SOLUTION INTRAMUSCULAR; INTRAVENOUS; SUBCUTANEOUS
Status: DISCONTINUED | OUTPATIENT
Start: 2019-06-11 | End: 2019-06-11 | Stop reason: HOSPADM

## 2019-06-11 RX ORDER — PROPOFOL 10 MG/ML
INJECTION, EMULSION INTRAVENOUS AS NEEDED
Status: DISCONTINUED | OUTPATIENT
Start: 2019-06-11 | End: 2019-06-11 | Stop reason: HOSPADM

## 2019-06-11 RX ORDER — SODIUM CHLORIDE 0.9 % (FLUSH) 0.9 %
5-40 SYRINGE (ML) INJECTION EVERY 8 HOURS
Status: DISCONTINUED | OUTPATIENT
Start: 2019-06-11 | End: 2019-06-14 | Stop reason: HOSPADM

## 2019-06-11 RX ORDER — SODIUM CHLORIDE, SODIUM LACTATE, POTASSIUM CHLORIDE, CALCIUM CHLORIDE 600; 310; 30; 20 MG/100ML; MG/100ML; MG/100ML; MG/100ML
100 INJECTION, SOLUTION INTRAVENOUS CONTINUOUS
Status: DISCONTINUED | OUTPATIENT
Start: 2019-06-11 | End: 2019-06-11 | Stop reason: HOSPADM

## 2019-06-11 RX ORDER — POLYETHYLENE GLYCOL 3350 17 G/17G
17 POWDER, FOR SOLUTION ORAL EVERY EVENING
COMMUNITY

## 2019-06-11 RX ORDER — ASPIRIN 325 MG
325 TABLET, DELAYED RELEASE (ENTERIC COATED) ORAL 2 TIMES DAILY
Status: DISCONTINUED | OUTPATIENT
Start: 2019-06-11 | End: 2019-06-14 | Stop reason: HOSPADM

## 2019-06-11 RX ORDER — LIDOCAINE HYDROCHLORIDE 10 MG/ML
0.1 INJECTION, SOLUTION EPIDURAL; INFILTRATION; INTRACAUDAL; PERINEURAL AS NEEDED
Status: DISCONTINUED | OUTPATIENT
Start: 2019-06-11 | End: 2019-06-11 | Stop reason: HOSPADM

## 2019-06-11 RX ORDER — DULOXETIN HYDROCHLORIDE 30 MG/1
30 CAPSULE, DELAYED RELEASE ORAL
Status: DISCONTINUED | OUTPATIENT
Start: 2019-06-11 | End: 2019-06-14 | Stop reason: HOSPADM

## 2019-06-11 RX ORDER — SUCCINYLCHOLINE CHLORIDE 20 MG/ML
INJECTION INTRAMUSCULAR; INTRAVENOUS AS NEEDED
Status: DISCONTINUED | OUTPATIENT
Start: 2019-06-11 | End: 2019-06-11 | Stop reason: HOSPADM

## 2019-06-11 RX ORDER — HYDROMORPHONE HYDROCHLORIDE 2 MG/ML
0.5 INJECTION, SOLUTION INTRAMUSCULAR; INTRAVENOUS; SUBCUTANEOUS
Status: DISCONTINUED | OUTPATIENT
Start: 2019-06-11 | End: 2019-06-14 | Stop reason: HOSPADM

## 2019-06-11 RX ORDER — PHENYLEPHRINE HCL IN 0.9% NACL 0.4MG/10ML
SYRINGE (ML) INTRAVENOUS AS NEEDED
Status: DISCONTINUED | OUTPATIENT
Start: 2019-06-11 | End: 2019-06-11 | Stop reason: HOSPADM

## 2019-06-11 RX ORDER — EPHEDRINE SULFATE/0.9% NACL/PF 50 MG/5 ML
SYRINGE (ML) INTRAVENOUS AS NEEDED
Status: DISCONTINUED | OUTPATIENT
Start: 2019-06-11 | End: 2019-06-11 | Stop reason: HOSPADM

## 2019-06-11 RX ORDER — HYDROMORPHONE HYDROCHLORIDE 1 MG/ML
0.5 INJECTION, SOLUTION INTRAMUSCULAR; INTRAVENOUS; SUBCUTANEOUS
Status: DISPENSED | OUTPATIENT
Start: 2019-06-11 | End: 2019-06-12

## 2019-06-11 RX ORDER — AMOXICILLIN 250 MG
1 CAPSULE ORAL EVERY EVENING
COMMUNITY

## 2019-06-11 RX ORDER — ESMOLOL HYDROCHLORIDE 10 MG/ML
INJECTION INTRAVENOUS AS NEEDED
Status: DISCONTINUED | OUTPATIENT
Start: 2019-06-11 | End: 2019-06-11 | Stop reason: HOSPADM

## 2019-06-11 RX ORDER — POLYETHYLENE GLYCOL 3350 17 G/17G
17 POWDER, FOR SOLUTION ORAL DAILY
Status: DISCONTINUED | OUTPATIENT
Start: 2019-06-12 | End: 2019-06-14 | Stop reason: HOSPADM

## 2019-06-11 RX ORDER — ACETAMINOPHEN 325 MG/1
975 TABLET ORAL ONCE
Status: COMPLETED | OUTPATIENT
Start: 2019-06-11 | End: 2019-06-11

## 2019-06-11 RX ORDER — HYDROCORTISONE SODIUM SUCCINATE 100 MG/2ML
INJECTION, POWDER, FOR SOLUTION INTRAMUSCULAR; INTRAVENOUS AS NEEDED
Status: DISCONTINUED | OUTPATIENT
Start: 2019-06-11 | End: 2019-06-11 | Stop reason: HOSPADM

## 2019-06-11 RX ORDER — TRANEXAMIC ACID 100 MG/ML
1 INJECTION, SOLUTION INTRAVENOUS ONCE
Status: COMPLETED | OUTPATIENT
Start: 2019-06-11 | End: 2019-06-11

## 2019-06-11 RX ORDER — CEFAZOLIN SODIUM/WATER 2 G/20 ML
2 SYRINGE (ML) INTRAVENOUS ONCE
Status: COMPLETED | OUTPATIENT
Start: 2019-06-11 | End: 2019-06-11

## 2019-06-11 RX ORDER — FENTANYL CITRATE 50 UG/ML
25 INJECTION, SOLUTION INTRAMUSCULAR; INTRAVENOUS
Status: DISCONTINUED | OUTPATIENT
Start: 2019-06-11 | End: 2019-06-11 | Stop reason: HOSPADM

## 2019-06-11 RX ORDER — HYDROMORPHONE HYDROCHLORIDE 2 MG/ML
1 INJECTION, SOLUTION INTRAMUSCULAR; INTRAVENOUS; SUBCUTANEOUS
Status: COMPLETED | OUTPATIENT
Start: 2019-06-11 | End: 2019-06-11

## 2019-06-11 RX ORDER — FACIAL-BODY WIPES
10 EACH TOPICAL DAILY PRN
Status: DISCONTINUED | OUTPATIENT
Start: 2019-06-13 | End: 2019-06-14 | Stop reason: HOSPADM

## 2019-06-11 RX ORDER — POVIDONE-IODINE 10 %
SOLUTION, NON-ORAL TOPICAL AS NEEDED
Status: DISCONTINUED | OUTPATIENT
Start: 2019-06-11 | End: 2019-06-11 | Stop reason: HOSPADM

## 2019-06-11 RX ORDER — ONDANSETRON 2 MG/ML
4 INJECTION INTRAMUSCULAR; INTRAVENOUS
Status: ACTIVE | OUTPATIENT
Start: 2019-06-11 | End: 2019-06-12

## 2019-06-11 RX ORDER — PREDNISONE 5 MG/1
5 TABLET ORAL
Status: DISCONTINUED | OUTPATIENT
Start: 2019-06-12 | End: 2019-06-14 | Stop reason: HOSPADM

## 2019-06-11 RX ORDER — TRAMADOL HYDROCHLORIDE 50 MG/1
50 TABLET ORAL
Status: DISCONTINUED | OUTPATIENT
Start: 2019-06-11 | End: 2019-06-14 | Stop reason: HOSPADM

## 2019-06-11 RX ORDER — PROCHLORPERAZINE EDISYLATE 5 MG/ML
10 INJECTION INTRAMUSCULAR; INTRAVENOUS
Status: DISCONTINUED | OUTPATIENT
Start: 2019-06-11 | End: 2019-06-14 | Stop reason: HOSPADM

## 2019-06-11 RX ORDER — SODIUM CHLORIDE 9 MG/ML
125 INJECTION, SOLUTION INTRAVENOUS CONTINUOUS
Status: DISPENSED | OUTPATIENT
Start: 2019-06-11 | End: 2019-06-12

## 2019-06-11 RX ORDER — GLYCOPYRROLATE 0.2 MG/ML
INJECTION INTRAMUSCULAR; INTRAVENOUS AS NEEDED
Status: DISCONTINUED | OUTPATIENT
Start: 2019-06-11 | End: 2019-06-11 | Stop reason: HOSPADM

## 2019-06-11 RX ORDER — CEFAZOLIN SODIUM/WATER 2 G/20 ML
2 SYRINGE (ML) INTRAVENOUS EVERY 8 HOURS
Status: COMPLETED | OUTPATIENT
Start: 2019-06-11 | End: 2019-06-12

## 2019-06-11 RX ORDER — AMOXICILLIN 250 MG
1 CAPSULE ORAL EVERY EVENING
Status: DISCONTINUED | OUTPATIENT
Start: 2019-06-11 | End: 2019-06-14 | Stop reason: HOSPADM

## 2019-06-11 RX ADMIN — TRANEXAMIC ACID 1 G: 100 INJECTION, SOLUTION INTRAVENOUS at 12:41

## 2019-06-11 RX ADMIN — Medication 100 MCG: at 12:51

## 2019-06-11 RX ADMIN — HYDROMORPHONE HYDROCHLORIDE 1 MG: 2 INJECTION INTRAMUSCULAR; INTRAVENOUS; SUBCUTANEOUS at 06:09

## 2019-06-11 RX ADMIN — Medication 100 MCG: at 14:41

## 2019-06-11 RX ADMIN — ROCURONIUM BROMIDE 10 MG: 10 INJECTION, SOLUTION INTRAVENOUS at 12:24

## 2019-06-11 RX ADMIN — HYDROMORPHONE HYDROCHLORIDE 0.5 MG: 2 INJECTION, SOLUTION INTRAMUSCULAR; INTRAVENOUS; SUBCUTANEOUS at 13:01

## 2019-06-11 RX ADMIN — Medication 100 MCG: at 12:47

## 2019-06-11 RX ADMIN — Medication 200 MCG: at 13:11

## 2019-06-11 RX ADMIN — LIDOCAINE HYDROCHLORIDE 60 MG: 20 INJECTION, SOLUTION EPIDURAL; INFILTRATION; INTRACAUDAL; PERINEURAL at 12:24

## 2019-06-11 RX ADMIN — OXYCODONE HYDROCHLORIDE 5 MG: 5 TABLET ORAL at 20:40

## 2019-06-11 RX ADMIN — Medication 100 MCG: at 14:22

## 2019-06-11 RX ADMIN — ONDANSETRON 8 MG: 2 INJECTION INTRAMUSCULAR; INTRAVENOUS at 06:09

## 2019-06-11 RX ADMIN — GLYCOPYRROLATE 0.5 MG: 0.2 INJECTION INTRAMUSCULAR; INTRAVENOUS at 13:31

## 2019-06-11 RX ADMIN — HYDROCORTISONE SODIUM SUCCINATE 100 MG: 100 INJECTION, POWDER, FOR SOLUTION INTRAMUSCULAR; INTRAVENOUS at 12:27

## 2019-06-11 RX ADMIN — Medication 100 MCG: at 13:37

## 2019-06-11 RX ADMIN — Medication 10 ML: at 18:41

## 2019-06-11 RX ADMIN — Medication 10 MG: at 13:37

## 2019-06-11 RX ADMIN — ASPIRIN 325 MG: 325 TABLET, COATED ORAL at 18:40

## 2019-06-11 RX ADMIN — DULOXETINE HYDROCHLORIDE 30 MG: 30 CAPSULE, DELAYED RELEASE ORAL at 22:52

## 2019-06-11 RX ADMIN — NEOSTIGMINE METHYLSULFATE 3 MG: 1 INJECTION INTRAVENOUS at 13:31

## 2019-06-11 RX ADMIN — SODIUM CHLORIDE 125 ML/HR: 900 INJECTION, SOLUTION INTRAVENOUS at 17:49

## 2019-06-11 RX ADMIN — ACETAMINOPHEN 975 MG: 325 TABLET ORAL at 11:56

## 2019-06-11 RX ADMIN — DEXAMETHASONE SODIUM PHOSPHATE 4 MG: 4 INJECTION, SOLUTION INTRA-ARTICULAR; INTRALESIONAL; INTRAMUSCULAR; INTRAVENOUS; SOFT TISSUE at 12:36

## 2019-06-11 RX ADMIN — Medication 100 MCG: at 14:47

## 2019-06-11 RX ADMIN — SUCCINYLCHOLINE CHLORIDE 100 MG: 20 INJECTION INTRAMUSCULAR; INTRAVENOUS at 12:25

## 2019-06-11 RX ADMIN — Medication 100 MCG: at 12:41

## 2019-06-11 RX ADMIN — ACETAMINOPHEN 650 MG: 325 TABLET ORAL at 18:40

## 2019-06-11 RX ADMIN — HYDROMORPHONE HYDROCHLORIDE 0.5 MG: 2 INJECTION, SOLUTION INTRAMUSCULAR; INTRAVENOUS; SUBCUTANEOUS at 12:16

## 2019-06-11 RX ADMIN — ROCURONIUM BROMIDE 40 MG: 10 INJECTION, SOLUTION INTRAVENOUS at 12:34

## 2019-06-11 RX ADMIN — Medication 2 G: at 12:36

## 2019-06-11 RX ADMIN — ONDANSETRON 4 MG: 2 INJECTION INTRAMUSCULAR; INTRAVENOUS at 12:33

## 2019-06-11 RX ADMIN — ESMOLOL HYDROCHLORIDE 20 MG: 10 INJECTION INTRAVENOUS at 13:52

## 2019-06-11 RX ADMIN — SODIUM CHLORIDE 75 ML/HR: 900 INJECTION, SOLUTION INTRAVENOUS at 16:00

## 2019-06-11 RX ADMIN — SENNOSIDES, DOCUSATE SODIUM 1 TABLET: 50; 8.6 TABLET, FILM COATED ORAL at 18:40

## 2019-06-11 RX ADMIN — PROPOFOL 150 MG: 10 INJECTION, EMULSION INTRAVENOUS at 12:24

## 2019-06-11 RX ADMIN — SODIUM CHLORIDE, SODIUM LACTATE, POTASSIUM CHLORIDE, AND CALCIUM CHLORIDE: 600; 310; 30; 20 INJECTION, SOLUTION INTRAVENOUS at 13:51

## 2019-06-11 RX ADMIN — SODIUM CHLORIDE 1000 ML: 900 INJECTION, SOLUTION INTRAVENOUS at 06:04

## 2019-06-11 RX ADMIN — SODIUM CHLORIDE, SODIUM LACTATE, POTASSIUM CHLORIDE, AND CALCIUM CHLORIDE 100 ML/HR: 600; 310; 30; 20 INJECTION, SOLUTION INTRAVENOUS at 12:14

## 2019-06-11 RX ADMIN — Medication 2 G: at 18:38

## 2019-06-11 NOTE — ANESTHESIA POSTPROCEDURE EVALUATION
Procedure(s):  LEFT HIP HAM ARTHROPLASTY VERSUS TOTAL ANTERIOR APPROACH. general    Anesthesia Post Evaluation      Multimodal analgesia: multimodal analgesia not used between 6 hours prior to anesthesia start to PACU discharge  Patient location during evaluation: PACU  Patient participation: complete - patient participated  Level of consciousness: awake  Pain management: adequate  Airway patency: patent  Anesthetic complications: no  Cardiovascular status: acceptable, blood pressure returned to baseline and hemodynamically stable  Respiratory status: acceptable  Hydration status: acceptable  Post anesthesia nausea and vomiting:  controlled      Vitals Value Taken Time   /56 6/11/2019  4:30 PM   Temp 37.3 °C (99.2 °F) 6/11/2019  3:26 PM   Pulse 69 6/11/2019  4:34 PM   Resp 14 6/11/2019  4:34 PM   SpO2 89 % 6/11/2019  4:34 PM   Vitals shown include unvalidated device data.

## 2019-06-11 NOTE — H&P
[facility]    Hospitalist Admission Note      NAME:  Oma Martinez   :   1940   MRN:  [id number]     PCP:  Tiana Castro DO     Date/Time:  2019 6:37 AM          Subjective:     CHIEF COMPLAINT: fall     HISTORY OF PRESENT ILLNESS:     Mr. Renetta Mullins is a 78 y.o.  male with a hx of CKD 3, sjogren's syndrome who presented to the Emergency Department complaining of fall. Patient fell onto left side last PM. While initially able to walk, pain became progressive and now unable to bear weight so he came to ed where he was noted to have a left femoral neck fracture. We will admit in concert with our orthopaedic colleagues. Past Medical History:   Diagnosis Date    CKD (chronic kidney disease), stage III ([address]     Sjoegren syndrome 2016    Urine retention         Past Surgical History:   Procedure Laterality Date    HX APPENDECTOMY      HX CERVICAL DISKECTOMY      HX HEENT      Zygomatic arch repair    HX HERNIA REPAIR      HX ORTHOPAEDIC      Broken tibia and fibula    HX VASECTOMY         Social History     Tobacco Use    Smoking status: Current Every Day Smoker     Packs/day: 0.50     Years: 40.00     Pack years: 20.00    Smokeless tobacco: Never Used   Substance Use Topics    Alcohol use: No        Family hx cannot be fully assessed, due to the admitting conditions    Allergies   Allergen Reactions    Flomax [Tamsulosin] Other (comments)     Dizzy    Rapaflo [Silodosin] Other (comments)     Dizzy        Prior to Admission medications    Medication Sig Start Date End Date Taking? Authorizing Provider   predniSONE (DELTASONE) 2.5 mg tablet Take 7.5 mg by mouth daily (with breakfast).     Other, Phys, MD       Review of Systems:  (bold if positive, if negative)    Gen:  Eyes:  ENT:  CVS:  Pulm:  GI:  :  MS:  PainSkin:  Psych:  Endo:  Hem:  Renal:  Neuro:        Objective:      VITALS:    Vital signs reviewed; most recent are:    Visit Vitals  /73 (BP 1 Location: Left arm, BP Patient Position: At rest)   Pulse 83   Temp 98.2 °F (36.8 °C)   Resp 18   Ht 6' 1\" (1.854 m)   Wt 83.9 kg (185 lb)   SpO2 93%   BMI 24.41 kg/m²     SpO2 Readings from Last 6 Encounters:   06/11/19 93%   12/10/17 95%   10/20/17 95%   09/03/17 95%   07/13/17 95%    O2 Flow Rate (L/min): 2 l/min   No intake or output data in the 24 hours ending 06/11/19 8990     Exam:     Physical Exam:    Gen:  Well-developed, well-nourished, in no acute distress  HEENT:  Pink conjunctivae, PERRL, hearing intact to voice, moist mucous membranes  Neck:  Supple, without masses, thyroid non-tender  Resp:  No accessory muscle use, clear breath sounds without wheezes rales or rhonchi  Card:  No murmurs, normal S1, S2 without thrills, bruits or peripheral edema  Abd:  Soft, non-tender, non-distended, normoactive bowel sounds are present, no palpable organomegaly and no detectable hernias  Lymph:  No cervical or inguinal adenopathy  Musc:  No cyanosis or clubbing  Skin:  No rashes or ulcers, skin turgor is good  Neuro:  Cranial nerves are grossly intact, no focal motor weakness, follows commands appropriately  Psych:  Good insight, oriented to person, place and time, alert     Labs:    Recent Labs     06/11/19  0604   WBC 11.7*   HGB 16.1   HCT 47.3        No results for input(s): NA, K, CL, CO2, GLU, BUN, CREA, CA, MG, PHOS, ALB, TBIL, TBILI, SGOT, ALT in the last 72 hours. No results found for: GLUCPOC  No results for input(s): PH, PCO2, PO2, HCO3, FIO2 in the last 72 hours. Recent Labs     06/11/19  0605   INR 1.1     All Micro Results     None          I have reviewed previous records       Assessment and Plan:      Principal Problem:    Fracture of femoral neck, left, closed. POA. Likely spurred on by chronic prednisone. Admit to medicine. Ortho consult for operative correction.  Ortho to determine diet, dvt ppx as usual. He is a low risk for a moderate risk procedure from a cardiovascular pre-op risk assessment. Active Problems:    Sjogren's syndrome ([facility] [address] (4/1/2016). Cont chronic prednisone if okay with ortho      CKD (chronic kidney disease), stage III (HCC) (). Check bmp       Telemetry reviewed:   normal sinus rhythm    Risk of deterioration: medium      Total time spent with patient: 50 Minutes I reviewed chart, notes, data and current medications in the medical record. I have examined and treated the patient at bedside during this period.                  Care Plan discussed with: Patient and Nursing Staff    Discussed:  Code Status, Care Plan and D/C Planning       ___________________________________________________    Attending Physician: Kalpana Marcelino DO

## 2019-06-11 NOTE — PROGRESS NOTES
Bedside and Verbal shift change report given to Marcel Brock (oncoming nurse) by Lisette David (offgoing nurse). Report included the following information SBAR, Kardex, MAR and Recent Results.

## 2019-06-11 NOTE — PERIOP NOTES
TRANSFER - OUT REPORT:    Verbal report given to Emanuel Miller RN on Sami Long  being transferred to [facility] for routine post - op       Report consisted of patients Situation, Background, Assessment and   Recommendations(SBAR). Information from the following report(s) SBAR, Kardex, OR Summary, Procedure Summary, Intake/Output and MAR was reviewed with the receiving nurse. Lines:   Peripheral IV 06/11/19 Right Forearm (Active)   Site Assessment Clean, dry, & intact 6/11/2019  4:05 PM   Phlebitis Assessment 0 6/11/2019  4:05 PM   Infiltration Assessment 0 6/11/2019  4:05 PM   Dressing Status Clean, dry, & intact 6/11/2019  4:05 PM   Dressing Type Tape;Transparent 6/11/2019  4:05 PM   Hub Color/Line Status Green; Infusing;Patent 6/11/2019  4:05 PM   Action Taken Open ports on tubing capped 6/11/2019  4:05 PM   Alcohol Cap Used Yes 6/11/2019  4:05 PM        Opportunity for questions and clarification was provided.       Patient transported with:   Registered Nurse

## 2019-06-11 NOTE — PROGRESS NOTES
[facility]: MED RECONCILIATION    Comments/Recommendations:   Patient is awake, alert, and knowledgeable about home medications   Verified allergies and preferred pharmacy as [facility] [place] [phone]  Wife available at bedside with medication bottles and stated OTC medications taken at home   Patient and wife were both good historians  Verified PTA medication list against Rx query     Medications added:   Duloxetine   Miralax   Senna/docusate    Medications removed:  None    Medications adjusted:  Prednisone 2.5mg - Take 2 tablets po QAM     Allergies: Flomax [tamsulosin] and Rapaflo [silodosin]    Prior to Admission Medications:   Prior to Admission Medications   Prescriptions Last Dose Informant Patient Reported? Taking? DULoxetine (CYMBALTA) 30 mg capsule 6/10/2019 at PM Self Yes Yes   Sig: Take 30 mg by mouth nightly. polyethylene glycol (MIRALAX) 17 gram/dose powder 6/10/2019 at PM Self Yes Yes   Sig: Take 17 g by mouth every evening. predniSONE (DELTASONE) 2.5 mg tablet 6/10/2019 at AM Self Yes Yes   Sig: Take 5 mg by mouth daily. senna-docusate (PERICOLACE) 8.6-50 mg per tablet 6/10/2019 at PM Self Yes Yes   Sig: Take 1 Tab by mouth every evening.       Facility-Administered Medications: None         Thank you,  [address] [employer]

## 2019-06-11 NOTE — OP NOTES
OPERATIVE REPORT     Admit Date: 6/11/2019  Admit Diagnosis: Fracture of femoral neck, left, closed ([facility] [S72.002A]  Fracture of femoral neck, left, closed ([facility] [S72.002A]    Date of Procedure: 6/11/2019   Preoperative Diagnosis: LEFT DISPLACED FEMORAL NECK FRACTURE  Postoperative Diagnosis: LEFT DISPLACED FEMORAL NECK FRACTURE    Procedure: Procedure(s):  LEFT HIP HAM ARTHROPLASTY VERSUS TOTAL ANTERIOR APPROACH  Surgeon: Wesley Lambert MD  Assistant(s): Emmett Mendoza  Anesthesia: General   Estimated Blood Loss: 400  Specimens: * No specimens in log *   Complications: None    INDICATIONS:    This is a 78y.o.-year-old male with a left displaced femoral neck fracture. Patient presented to [address] [place] [address] after ground level fall yesterday evening and was diagnosed with left femoral neck fracture and with his fracture he was felt appropriate for surgical intervention. We discussed ham versus total hip arthropalsty after review of the appropriate imaging studies, a detailed physical examination and history. The aspects of the surgeries and perioperative course along with pertinent risks and benefits of surgery have been discussed with the patient including but not limited to infection, wound complication(increase risk with his chronic prednisone and thin skin issues), bleeding, transfusion, fracture, neurovascular injury, leg length inequality, dissatisfaction with surgery, venothromboembolism, medical complication and death. The patient understands the risks as outlined and the plan for intraoperative evaluation for total versus ham. He and his wife agreed to proceed with surgery. PROCEDURE IN DETAIL:   Prior to initiation of the operative procedure a surgical time-out was taken and the patient's name, medical record, number, date-of-birth, and operative side was verified with the surgical consent form.   Additionally it was verified that the appropriate usha-operative antibiotic administration was completed, as well as that radiographs were available and the correct operative instrumentation and implants were available. The patient was brought to the operating room suite and after appropriate anesthesia care, was placed into well-padded boots on both lower extremities. The patient was then transferred to the [facility] table for final positioning. All bony prominences were well padded. The operative site was cleansed with alcohol and chloroprep and the extremity draped in a sterile fashion. The skin overlying the tensor fascia nasir muscle was incised sharply and the dissection was carried down to the overlying muscle belly. Hemostasis was obtained with the use of electrocautery. Sharp dissection of the overlying fascia of the TFL was performed and bluntly dissected off the underlying muscle belly. The TFL was retracted laterally and then deeper dissection to obtain control and hemostasis of the ascending branch of the lateral circumflex artery was performed. Deeper dissection to the overlying hip capsule was then performed. With the femoral neck identified, extracapsular retractors were then placed and the hip capsule was incised. We immediately encountered fracture hematoma and this was decompressed. The inferior/anterior limb of the iliofemoral ligament was tagged for retraction and the superior/anterior limb was removed. Retractors were then placed inside the joint capsule. The femoral neck fracture was noted to be displaced and to be from sub capital neck region up to the femoral head. No extension to the inferior calcar. We then performed our neck cut in standard fashion. The residual femoral head and fractured neck was then removed and sized. Periacetabular retractors were then placed. Residual acetabular labrum and pulvinar soft tissue was excised. Sequential reaming was performed to obtain an appropriate peripheral rim fit.   An even sized trial acetabulum was utilized one size larger than the last reamer to test component fit. The final acetabular component was brought up to the back table and impacted utilizing fluoroscopy and native anatomy to assess cup inclination and version. With the cup fully seated and confirmed to be in appropriate position based on anatomy and fluoroscopy, we elected to place two screws for additional fixation. Once both screws were drilled, sized and inserted in standard fashion we placed the final polyethylene liner. It was impacted and confirmed to be well seated. Attention was then turned to the femur. After release of the lateral capsular attachment on the femur and release of the piriformis attachment, the femur was elevated and delivered into the field. We did elect to place a prophylactic calcar cable. A sing;e 1.7mm cable was placed in standard fashion just proximal to lesser trochanter. Once this was tightened and crimped we turned our attention to broaching for the femoral stem. Retractors were placed around the calcar and under the lateral edge of the greater trochanter. We reconfirmed that the calcar was adequate for pressfit fixation. The box osteotome followed by the canal finder was utilized. Sequential broaching was then performed to obtain appropriate press fit. Neck trails and head balls were also trialed to obtain the appropriate leg length and offset to match the contralateral hip with the help of fluoroscopy. With the appropriate combination in place, the femur was extended 10 degrees and then externally rotated to 90 degrees to assess for anterior instability. Once we were satisfied with our femoral component combination, the hip was dislocated and fit of the final broach checked to ensure it was not loose. The calcar planer was then used and the final broach removed. No fracture of the calcar was noted. The final femoral component was then fully impacted and seated, no fractures were identified.   The trunnion was then cleaned and the final head ball was impacted and fully seated. The hip was reduced and final fluoroscopic images were taken. The wound was irrigated with saline and a dilute betadine solution and checked for excessive bleeding. The capsular tagging suture was removed and the fascia overlying the TFL was closed in a running fashion with #1 stratfix barbed suture. After placement of vancomycin powder, the subcuticular layer was closed utilizing 2-0 interrupted buried vicryl and a running 3-0 monocryl stratfix barbed suture in the dermis. Skin sealant was then utilized and waterproof dressing applied. The patient was then transferred from the [facility] to their hospital bed and to the post-anesthesia care unit without difficulty. POST-OPERATIVE:    - Weight bearing as tolerated with walker for 6 weeks on operative extremity. No extreme ROM of operative hip. - Twenty-four hours of post-operative IV antibiotics. - Pharmacologic DVT prophylaxis for one month with mechanical prophylaxis in the hospital.  The patient will receive ASA 325mg BID for 30 days     IMPLANTS :     Implant Name Type Inv.  Item Serial No.  Lot No. LRB No. Used Action   CUP ACET SECTOR GRIPTION 58MM -- TI - SNA  CUP ACET SECTOR GRIPTION 58MM -- TI NA [facility] [id number] Left 1 Implanted   ALTRX POLYETHYLENE ACETABULAR LINER    NA [employer] [id number] Left 1 Implanted   SCR ACET CANC PINN 6.5X25MM SS --  - SNA  SCR ACET CANC PINN 6.5X25MM SS --  NA [facility] [id number] Left 1 Implanted   SCR BNE CANC PINN 6.5X30MM SS --  - SNA  SCR BNE CANC PINN 6.5X30MM SS --  NA [facility] [id number] Left 1 Implanted   STEM FEM TAPR SZ10 STD OFFSET -- ACTIS - SNA  STEM FEM TAPR SZ10 STD OFFSET -- ACTIS NA [facility] [id number] Left 1 Implanted   HEAD FEM CER ARTC +5D 40MM -- [employer] DELTA - SNA  HEAD FEM CER ARTC +5D 40MM -- BIOLOX DELTA NA [facility] [id number] Left 1 Implanted   CABLE W CRIMP [id number] SS -- STRL - SNA  CABLE W CRIMP [id number] SS -- STRL NA [facility] [employer] NA Left 1 Implanted       Aidee Cordero MD

## 2019-06-11 NOTE — PROGRESS NOTES
0800- Pt resting on stretcher he states he is without pain, I have changed the bandages on his leg and arm where he has torn his skin from the fall. Jai Horvath RN     1100- Pre-op has received report patient going to the operating room, wife with patient, he has been NPO since MN. Jai Horvath RN

## 2019-06-11 NOTE — PERIOP NOTES
Pt's spouse updated on pt's care with plan for admission with understanding being verbalized. Waiting for room assignment. Pt's spouse now at bedside.

## 2019-06-11 NOTE — CONSULTS
Date of Consultation:  June 11, 2019    Referring Physician:  Dr. Reba Carpio:     Shaheen Voss is a 78 y.o. male  evaluated for a left displaced femoral neck fracture. The patient had a ground level fall earlier this AM. The patient localizes their pain to his left groin. Was brought to [facility] and xrays obtained and diagnosed with left femoral neck fracture and admitted to medicine with orthopaedic consultation. Patient has been seen and evaluated by medicine and cleared for the OR. Patient denies antecedent hip pain but does use a walker for balance issues. Otherwise is a community ambulator. Denies distal numbness. Denies previous surgery or previous trauma to the hip. Does have history of compound fracture of left leg with chronic skin and vascular changes. Is on chronic prednisone for Sjögrens. Has chronic thin skin from prednisone. Denies any other injuries to any other extremities. No other complaints at this time.      Problems :   Patient Active Problem List    Diagnosis Date Noted    Fracture of femoral neck, left, closed ([address] 06/11/2019    UTI (urinary tract infection) 10/12/2017    Sepsis ([address] 10/12/2017    Leukocytosis 10/12/2017    Fever 10/12/2017    Renal insufficiency 10/12/2017    Nausea & vomiting 10/12/2017    Cough 10/12/2017    Dehydration 10/12/2017    Hypoxia 10/12/2017    Sinus tachycardia 10/12/2017    Urine retention     CKD (chronic kidney disease), stage III (HCC)     Sjogren's syndrome ([address] 04/01/2016       Past Medical History :   Past Medical History:   Diagnosis Date    CKD (chronic kidney disease), stage III ([address]     Sjoegren syndrome 04/2016    Urine retention        Past Surgical History :   Past Surgical History:   Procedure Laterality Date    HX APPENDECTOMY      HX CERVICAL DISKECTOMY      HX HEENT      Zygomatic arch repair    HX HERNIA REPAIR      HX ORTHOPAEDIC      Broken tibia and fibula    HX VASECTOMY          Family History : Denies any family history of MI, major orthopaedic diseases or other contributing diagnoses.     Social History :   Social History     Tobacco Use    Smoking status: Current Every Day Smoker     Packs/day: 0.50     Years: 40.00     Pack years: 20.00    Smokeless tobacco: Never Used   Substance Use Topics    Alcohol use: No         Medications :  Current Facility-Administered Medications   Medication Dose Route Frequency    0.9% sodium chloride infusion  75 mL/hr IntraVENous CONTINUOUS    sodium chloride (NS) flush 5-40 mL  5-40 mL IntraVENous Q8H    sodium chloride (NS) flush 5-40 mL  5-40 mL IntraVENous PRN    naloxone (NARCAN) injection 0.4 mg  0.4 mg IntraVENous PRN    oxyCODONE IR (ROXICODONE) tablet 5 mg  5 mg Oral Q4H PRN    HYDROmorphone (PF) (DILAUDID) injection 0.5 mg  0.5 mg IntraVENous Q4H PRN    prochlorperazine (COMPAZINE) injection 10 mg  10 mg IntraVENous Q6H PRN    acetaminophen (TYLENOL) tablet 975 mg  975 mg Oral ONCE    acetaminophen (TYLENOL) tablet 650 mg  650 mg Oral Q6H       Allergies :   Flomax [tamsulosin] and Rapaflo [silodosin]       Review of Systems - General ROS: negative for - fever or weight loss  Ophthalmic ROS: negative for - blurry vision or eye pain  ENT ROS: positive for - hearing change  negative for - headaches, visual changes or vocal changes  Respiratory ROS: no cough, shortness of breath, or wheezing  Cardiovascular ROS: no chest pain or dyspnea on exertion  Gastrointestinal ROS: no abdominal pain, change in bowel habits, or black or bloody stools  Genito-Urinary ROS: no dysuria, trouble voiding, or hematuria  Musculoskeletal ROS: negative  positive for - joint pain, pain in hip - left and swelling in leg - left  negative for - pain in other extemities  Neurological ROS: no TIA or stroke symptoms  Dermatological ROS: thin skin and skin tears from chronic prednisone       Objective  Objective:     Vitals :  Patient Vitals for the past 12 hrs:   BP Temp Pulse Resp SpO2 Height Weight   06/11/19 0618 -- -- -- -- 93 % -- --   06/11/19 0617 113/73 -- 83 18 92 % -- --   06/11/19 0615 -- -- -- -- 94 % -- --   06/11/19 0539 145/89 98.2 °F (36.8 °C) -- 16 93 % 6' 1\" (1.854 m) 83.9 kg (185 lb)       GEN: Alert and Oriented x 3, Dementia is not present. HEENT: NCAT, PEERLA  RESP: No Acute Respiratory Distress, no use of accessory muscles, on NC O2  CARDS: RRR, good peripheral perfusion but chronic vascular stasis changes to left lower leg. SKIN: thin skin with multiple various stages of skin tears      Physical Exam :   Left hip min shortened, chronic skin changes to the left lower leg with palpable callus of tibial NTTP foot, ankle, lower leg and distal thigh. +EHL/FHL, DF/PF ankle and toes. Knee and hip exam deferred 2/2 pain. Pain with motion of left hip. SILT L2-S1. No Lymphadenopathy   Palpable pulses    Palpation of bilateral arms and right leg without bony abnormality. SILT all dermatomes, Motor intact in all muscle groups. Good peripheral perfusion. Good passive ROM all major joints. Labs :   Recent Labs     06/11/19 0605 06/11/19 0604   HGB  --  16.1   HCT  --  47.3   INR 1.1  --    NA  --  138   K  --  3.8   CL  --  107   CO2  --  23   BUN  --  17   CREA  --  1.27   GLU  --  149*       X-rays :   AP pelvis and lateral left hip demonstrate a minimally displaced fmeoral neck fracture with posteromedial displacement and step off. Does have mild degenerative changes of the femoral hear but preserved hip joint space. Preserved cortices in the proximal femur. ASSESSMENT :  79 yo M s/p GLF with left displaced femoral neck fracture. We will plan for left ana luisa versus total hip arthroplasty.        We discussed the risks and benefits, which include, but are not limited to, bleeding requiring blood transfusion and risks associated to transfusion, infection, neurovascular damage, wound complications which he is slightly increased with his thin skin and prednisone, dislocation, periprosthetic fracture, leg length discrepancy, painful hardware, incomplete relief of pain, DVT/PE, and need for further surgeries. Patient acknowledged all of these risks and consented for the procedure.    -defer medical management to primary appreciate help in care  -NPO  -Marked and consented  -type and screen  - Ancef and TXA ordered  -hold NSAIDS with CKD  -will update PT orders after surgery  -DVT PPX:  BID post op  -will continue prednisone post op  -dsipo: pending PT eval but home with [facility] versus SNF      Thank you for allowing to participate in this patients care. Please do not hesitate to contact my office or contact me with any questions or concerns. I will continue to follow the patient in the hospital and arrange for follow-up after the surgery. MD Joby Jackson.  Lakia Kumar [phone]  Medical Staff : Shyla Huang  Office : [phone]

## 2019-06-11 NOTE — BRIEF OP NOTE
BRIEF OPERATIVE NOTE    Date of Procedure: 6/11/2019   Preoperative Diagnosis: LEFT DISPLACED FEMORAL NECK FRACTURE  Postoperative Diagnosis: LEFT DISPLACED FEMORAL NECK FRACTURE    Procedure(s):  LEFT HIP TOTAL HIP ARTHROPLASTY ANTERIOR APPROACH  Surgeon(s) and Role:     Molly Lowe MD - Primary         Surgical Assistant: Iona Selby    Surgical Staff:  Circ-1: Gifty Orona RN  Circ-Intern: Torin BURDEN  Scrub Tech-1: Domo Thorpe  Surg Asst-1: Heidy Austin  Event Time In Time Out   Incision Start 1255    Incision Close 1515      Anesthesia: General   Estimated Blood Loss: 400  Specimens: * No specimens in log *   Findings: left hip dispalced femoral neck fracture with fracture into the head, intact calcar, good bone stock, advanced degenerative changes with loss of cartilage of femoral head and acetabulum     Complications: none immediate  Implants:   Implant Name Type Inv.  Item Serial No.  Lot No. LRB No. Used Action   CUP ACET SECTOR GRIPTION 58MM -- TI - SNA  CUP ACET SECTOR GRIPTION 58MM -- TI NA [facility] [employer] [id number] Left 1 Implanted   ALTRX POLYETHYLENE ACETABULAR LINER    NA [employer] ORTH[employer] [id number] Left 1 Implanted   SCR ACET CANC PINN 6.5X25MM SS --  - SNA  SCR ACET CANC PINN 6.5X25MM SS --  NA [facility] [employer] [id number] Left 1 Implanted   SCR BNE CANC PINN 6.5X30MM SS --  - SNA  SCR BNE CANC PINN 6.5X30MM SS --  NA [facility] [employer] [id number] Left 1 Implanted   STEM FEM TAPR SZ10 STD OFFSET -- ACTIS - SNA  STEM FEM TAPR SZ10 STD OFFSET -- ACTIS NA [facility] [employer] [id number] Left 1 Implanted   HEAD FEM CER ARTC +5D 40MM -- BIOLOX DELTA - SNA  HEAD FEM CER ARTC +5D 40MM -- BIOLOX DELTA NA [facility] [employer] [id number] Left 1 Implanted   CABLE W CRIMP [id number] SS -- STRL - SNA  CABLE W CRIMP [id number] SS -- STRL NA [employer] [place] NA Left 1 Implanted

## 2019-06-11 NOTE — ED NOTES
Bedside shift change report given to HELGA Suazo (oncoming nurse) by Jc Guaman RN (offgoing nurse). Report included the following information SBAR, ED Summary, Procedure Summary, MAR and Recent Results.

## 2019-06-11 NOTE — ANESTHESIA PREPROCEDURE EVALUATION
Relevant Problems   No relevant active problems       Anesthetic History   No history of anesthetic complications            Review of Systems / Medical History  Patient summary reviewed and pertinent labs reviewed    Pulmonary    COPD: mild      Smoker         Neuro/Psych   Within defined limits           Cardiovascular  Within defined limits                Exercise tolerance: >4 METS     GI/Hepatic/Renal         Renal disease: CRI       Endo/Other  Within defined limits           Other Findings   Comments: Sjorgren syndrome           Physical Exam    Airway  Mallampati: II  TM Distance: 4 - 6 cm  Neck ROM: normal range of motion   Mouth opening: Normal     Cardiovascular  Regular rate and rhythm,  S1 and S2 normal,  no murmur, click, rub, or gallop  Rhythm: regular  Rate: normal         Dental    Dentition: Bridges     Pulmonary  Breath sounds clear to auscultation               Abdominal  GI exam deferred       Other Findings            Anesthetic Plan    ASA: 3  Anesthesia type: general          Induction: Intravenous  Anesthetic plan and risks discussed with: Patient      Patient declines spinal.

## 2019-06-11 NOTE — ED TRIAGE NOTES
Pt presents to the ED via EMS after slipping and falling. Pt states he has left leg pain. Unable to bear weight. No obvious deformities. Pt says he fell with his phone in his left pocket and landed on it. Denies LOC. Denies hitting head.

## 2019-06-12 LAB
ANION GAP SERPL CALC-SCNC: 7 MMOL/L (ref 5–15)
BASOPHILS # BLD: 0 K/UL (ref 0–0.1)
BASOPHILS NFR BLD: 0 % (ref 0–1)
BUN SERPL-MCNC: 16 MG/DL (ref 6–20)
BUN/CREAT SERPL: 10 (ref 12–20)
CALCIUM SERPL-MCNC: 7.6 MG/DL (ref 8.5–10.1)
CHLORIDE SERPL-SCNC: 109 MMOL/L (ref 97–108)
CO2 SERPL-SCNC: 25 MMOL/L (ref 21–32)
CREAT SERPL-MCNC: 1.57 MG/DL (ref 0.7–1.3)
DIFFERENTIAL METHOD BLD: ABNORMAL
EOSINOPHIL # BLD: 0 K/UL (ref 0–0.4)
EOSINOPHIL NFR BLD: 0 % (ref 0–7)
ERYTHROCYTE [DISTWIDTH] IN BLOOD BY AUTOMATED COUNT: 13.2 % (ref 11.5–14.5)
GLUCOSE SERPL-MCNC: 123 MG/DL (ref 65–100)
HCT VFR BLD AUTO: 35.5 % (ref 36.6–50.3)
HGB BLD-MCNC: 11.9 G/DL (ref 12.1–17)
IMM GRANULOCYTES # BLD AUTO: 0 K/UL (ref 0–0.04)
IMM GRANULOCYTES NFR BLD AUTO: 0 % (ref 0–0.5)
LYMPHOCYTES # BLD: 0.8 K/UL (ref 0.8–3.5)
LYMPHOCYTES NFR BLD: 6 % (ref 12–49)
MAGNESIUM SERPL-MCNC: 1.9 MG/DL (ref 1.6–2.4)
MCH RBC QN AUTO: 33.7 PG (ref 26–34)
MCHC RBC AUTO-ENTMCNC: 33.5 G/DL (ref 30–36.5)
MCV RBC AUTO: 100.6 FL (ref 80–99)
MONOCYTES # BLD: 0.9 K/UL (ref 0–1)
MONOCYTES NFR BLD: 7 % (ref 5–13)
NEUTS SEG # BLD: 10.9 K/UL (ref 1.8–8)
NEUTS SEG NFR BLD: 87 % (ref 32–75)
NRBC # BLD: 0 K/UL (ref 0–0.01)
NRBC BLD-RTO: 0 PER 100 WBC
PHOSPHATE SERPL-MCNC: 2.8 MG/DL (ref 2.6–4.7)
PLATELET # BLD AUTO: 133 K/UL (ref 150–400)
PMV BLD AUTO: 11.1 FL (ref 8.9–12.9)
POTASSIUM SERPL-SCNC: 4.2 MMOL/L (ref 3.5–5.1)
RBC # BLD AUTO: 3.53 M/UL (ref 4.1–5.7)
RBC MORPH BLD: ABNORMAL
SODIUM SERPL-SCNC: 141 MMOL/L (ref 136–145)
WBC # BLD AUTO: 12.6 K/UL (ref 4.1–11.1)

## 2019-06-12 PROCEDURE — 97165 OT EVAL LOW COMPLEX 30 MIN: CPT | Performed by: OCCUPATIONAL THERAPIST

## 2019-06-12 PROCEDURE — 83735 ASSAY OF MAGNESIUM: CPT

## 2019-06-12 PROCEDURE — 65270000029 HC RM PRIVATE

## 2019-06-12 PROCEDURE — 80048 BASIC METABOLIC PNL TOTAL CA: CPT

## 2019-06-12 PROCEDURE — 74011250636 HC RX REV CODE- 250/636: Performed by: ORTHOPAEDIC SURGERY

## 2019-06-12 PROCEDURE — 97530 THERAPEUTIC ACTIVITIES: CPT

## 2019-06-12 PROCEDURE — 97162 PT EVAL MOD COMPLEX 30 MIN: CPT

## 2019-06-12 PROCEDURE — 85025 COMPLETE CBC W/AUTO DIFF WBC: CPT

## 2019-06-12 PROCEDURE — 74011250637 HC RX REV CODE- 250/637: Performed by: INTERNAL MEDICINE

## 2019-06-12 PROCEDURE — 97116 GAIT TRAINING THERAPY: CPT

## 2019-06-12 PROCEDURE — 97535 SELF CARE MNGMENT TRAINING: CPT | Performed by: OCCUPATIONAL THERAPIST

## 2019-06-12 PROCEDURE — 74011636637 HC RX REV CODE- 636/637: Performed by: ORTHOPAEDIC SURGERY

## 2019-06-12 PROCEDURE — 84100 ASSAY OF PHOSPHORUS: CPT

## 2019-06-12 PROCEDURE — 74011250637 HC RX REV CODE- 250/637: Performed by: ORTHOPAEDIC SURGERY

## 2019-06-12 PROCEDURE — 36415 COLL VENOUS BLD VENIPUNCTURE: CPT

## 2019-06-12 PROCEDURE — 94760 N-INVAS EAR/PLS OXIMETRY 1: CPT

## 2019-06-12 RX ORDER — CALCIUM CARBONATE 200(500)MG
200 TABLET,CHEWABLE ORAL DAILY PRN
Status: DISCONTINUED | OUTPATIENT
Start: 2019-06-12 | End: 2019-06-14 | Stop reason: HOSPADM

## 2019-06-12 RX ORDER — PANTOPRAZOLE SODIUM 40 MG/1
40 TABLET, DELAYED RELEASE ORAL
Status: DISCONTINUED | OUTPATIENT
Start: 2019-06-12 | End: 2019-06-14 | Stop reason: HOSPADM

## 2019-06-12 RX ADMIN — PREDNISONE 5 MG: 5 TABLET ORAL at 09:08

## 2019-06-12 RX ADMIN — Medication 2 G: at 01:14

## 2019-06-12 RX ADMIN — Medication 2 G: at 09:08

## 2019-06-12 RX ADMIN — ACETAMINOPHEN 650 MG: 325 TABLET ORAL at 07:28

## 2019-06-12 RX ADMIN — PANTOPRAZOLE SODIUM 40 MG: 40 TABLET, DELAYED RELEASE ORAL at 21:46

## 2019-06-12 RX ADMIN — ACETAMINOPHEN 650 MG: 325 TABLET ORAL at 17:45

## 2019-06-12 RX ADMIN — Medication 5 ML: at 14:00

## 2019-06-12 RX ADMIN — OXYCODONE HYDROCHLORIDE 5 MG: 5 TABLET ORAL at 09:08

## 2019-06-12 RX ADMIN — ANTACID TABLETS 200 MG: 500 TABLET, CHEWABLE ORAL at 18:58

## 2019-06-12 RX ADMIN — ACETAMINOPHEN 650 MG: 325 TABLET ORAL at 01:14

## 2019-06-12 RX ADMIN — DULOXETINE HYDROCHLORIDE 30 MG: 30 CAPSULE, DELAYED RELEASE ORAL at 21:46

## 2019-06-12 RX ADMIN — ACETAMINOPHEN 650 MG: 325 TABLET ORAL at 13:07

## 2019-06-12 RX ADMIN — ASPIRIN 325 MG: 325 TABLET, COATED ORAL at 17:45

## 2019-06-12 RX ADMIN — ANTACID TABLETS 200 MG: 500 TABLET, CHEWABLE ORAL at 21:46

## 2019-06-12 RX ADMIN — OXYCODONE HYDROCHLORIDE 5 MG: 5 TABLET ORAL at 17:45

## 2019-06-12 RX ADMIN — SENNOSIDES, DOCUSATE SODIUM 1 TABLET: 50; 8.6 TABLET, FILM COATED ORAL at 17:45

## 2019-06-12 RX ADMIN — ASPIRIN 325 MG: 325 TABLET, COATED ORAL at 09:08

## 2019-06-12 RX ADMIN — POLYETHYLENE GLYCOL 3350 17 G: 17 POWDER, FOR SOLUTION ORAL at 09:08

## 2019-06-12 NOTE — PROGRESS NOTES
6/12/2019 3:11 PM [facility] has accepted but does not have any beds available at this time. CM spoke with pt and called pt's wife regarding [facility] acceptance but pending bed availability. Pt signed choice letter for [address] [place] [address] acute rehab for back up if [facility] does not have a bed available once pt is ready for discharge. Referral sent to [address] [place] [address] via [employer]. Called and notified [address] [place] [address] liaison of referral.   CM will follow. 6/12/2019 12:35 PM Case management consults for discharge planning and rehab received. Met with pt and also discussed separately with pt's wife, Mayra Charles. Charted address and phone numbers confirmed. Reason for Admission:  Fracture of femoral neck                    RRAT Score:  19                Do you (patient/family) have any concerns for transition/discharge? Pt's wife is requesting rehab placement                   Plan for utilizing home health: No history       Current Advanced Directive/Advance Care Plan:    Name Lulu Monge Spouse [phone]            Transition of Care Plan:  Rehab placement    Pt lives with his wife Anel Vyas in a 1 story home in [place], there are 2 steps to enter. Pt was independent with adls and driving prior to admission. Pt was using a rollator in the home to assist with mobility and a 3 wheeled walker in the community. Pt has rx coverage and fills his scripts at [facility]. Pt has been to outpatient PT at [facility] and inpatient in 2017. Pt reported his preference for rehab is [facility]. Referral sent to [facility] via [employer]. Spoke with admission liaison at [facility], they will review pt. CM will follow up.  KATHY Chapman        Care Management Interventions  PCP Verified by CM: Yes(Herbie Adkins, ashely nurse navigator )  Transition of Care Consult (CM Consult): Discharge Planning  MyChart Signup: No  Discharge Durable Medical Equipment: No  Physical Therapy Consult: Yes  Occupational Therapy Consult: Yes  Speech Therapy Consult: No  Current Support Network: Lives with Spouse, Own Home

## 2019-06-12 NOTE — PROGRESS NOTES
Problem: Mobility Impaired (Adult and Pediatric)  Goal: *Acute Goals and Plan of Care (Insert Text)  Description  Physical Therapy Goals  Initiated 6/12/2019  1. Patient will move from supine to sit and sit to supine  in bed with minimal assistance/contact guard assist within 7 day(s). 2.  Patient will transfer from bed to chair and chair to bed with minimal assistance/contact guard assist using the least restrictive device within 7 day(s). 3.  Patient will perform sit to stand with minimal assistance/contact guard assist within 7 day(s). 4.  Patient will ambulate with minimal assistance/contact guard assist for 75 feet with the least restrictive device within 7 day(s). 5.  Patient will ascend/descend 3 stairs with handrail(s) with moderate assistance within 7 day(s). 6.  Patient will perform LE home exercise program for strengthening to improve mobility with modified independence within 7 days. Outcome: Progressing Towards Goal   PHYSICAL THERAPY EVALUATION  Patient: Ayush North (4 y.o. male)  Date: 6/12/2019  Primary Diagnosis: Fracture of femoral neck, left, closed ([facility]) [S72.002A]  Fracture of femoral neck, left, closed ([facility] [S72.002A]  Procedure(s) (LRB):  LEFT HIP HAM ARTHROPLASTY VERSUS TOTAL ANTERIOR APPROACH (Left) 1 Day Post-Op   Precautions: Fall, WBAT    ASSESSMENT :  Based on the objective data described below, the patient presents with generally decreased strength throughout, grossly decreased L hip strength and AROM, impaired balance, anxiety and fear regarding pain and mobility, decreased endurance, and limited functional mobility on POD 1 s/p L hip arthroplasty to stabilize femoral neck fracture sustained in ground level fall. Pt reports balance impairment at baseline and typically ambulates using rollator walker. He appears discouraged regarding mobility limitations today. Pt offers excellent participation with PT and presents with stable vitals.  He requires up to maximum assistance x 2 for bed mobility and transfers. He performs successive transfer trials and static stance with perturbations for hygiene and dressing tasks. Pt unable to take functional steps at this time and demonstrates fatigue limiting tolerance to further activities. Pt states his functional performance is optimal between 11:00 and 1:30 at baseline. Patient will benefit from skilled intervention to address the above impairments. Patient?s rehabilitation potential is considered to be Good  Factors which may influence rehabilitation potential include:   ? None noted  ? Mental ability/status - fear/anxiety  ? Medical condition  ? Home/family situation and support systems  ? Safety awareness  ? Pain tolerance/management  ? Other:      PLAN :  Recommendations and Planned Interventions:  ?           Bed Mobility Training             ? Neuromuscular Re-Education  ? Transfer Training                   ? Orthotic/Prosthetic Training  ? Gait Training                         ? Modalities  ? Therapeutic Exercises           ? Edema Management/Control  ? Therapeutic Activities            ? Patient and Family Training/Education  ? Other (comment):    Frequency/Duration: Patient will be followed by physical therapy  daily to address goals. Discharge Recommendations: Rehab  Further Equipment Recommendations for Discharge: defer to rehab      SUBJECTIVE:   Patient stated ? I'm at my best between 11 and 1:30.?     Pt received supine, agreeable to PT and cleared by RN.       OBJECTIVE DATA SUMMARY:   HISTORY:    Past Medical History:   Diagnosis Date    CKD (chronic kidney disease), stage III ([address]     Sjoegren syndrome 04/2016    Urine retention      Past Surgical History:   Procedure Laterality Date    HX APPENDECTOMY      HX CERVICAL DISKECTOMY      HX HEENT      Zygomatic arch repair    HX HERNIA REPAIR HX ORTHOPAEDIC      Broken tibia and fibula    HX PROSTATE SURGERY      TURP    HX VASECTOMY       Prior Level of Function/Home Situation: mod I ambulation using rollator at baseline. Personal factors and/or comorbidities impacting plan of care: as above. Home Situation  Home Environment: Private residence  # Steps to Enter: (2 garage 3 porch)  Rails to [employer]: Yes  Hand Rails : Bilateral  One/Two Story Residence: Two story, live on 1st floor  Living Alone: No  Support Systems: Spouse/Significant Other/Partner  Patient Expects to be Discharged to[de-identified] Private residence  Current DME Used/Available at Home: Baylee Hoit, rolling(4 wheel inside, 3 wheel outside)  Tub or Shower Type: Shower    EXAMINATION/PRESENTATION/DECISION MAKING:   Critical Behavior:  Neurologic State: Alert, Appropriate for age  Orientation Level: Oriented X4  Cognition: Follows commands, Decreased attention/concentration  Safety/Judgement: Awareness of environment, Fall prevention, Insight into deficits  Hearing: Auditory  Auditory Impairment: Hard of hearing, left side  Skin:  dressing intact without visible drainage    Range Of Motion:  AROM: Generally decreased, functional   Grossly decreased L hip                   Strength:    Strength: Generally decreased, functional   Grossly decreased L hip                 Tone & Sensation:   Tone: Normal              Sensation: Intact               Coordination:  Coordination: Generally decreased, functional  Vision:   Acuity: Within Defined Limits  Functional Mobility:  Bed Mobility:     Supine to Sit: Additional time; Moderate assistance;Assist x2;Bed Modified  Sit to Supine: Assist x2; Moderate assistance; Additional time  Scooting: Maximum assistance  Transfers:  Sit to Stand: Assist x2; Additional time; Moderate assistance;Maximum assistance  Stand to Sit: Assist x2; Additional time;Maximum assistance; Moderate assistance        Performs 3 standing trials lasting 20-30 seconds each trial. Balance:   Sitting: Without support  Sitting - Static: Fair (occasional)  Sitting - Dynamic: Fair (occasional)  Standing: With support  Standing - Static: Fair  Standing - Dynamic : Poor  Ambulation/Gait Training:   Pt unable. Requires manual assistance to reposition LLE in standing. Therapeutic Exercises: Ankle pumps x 10  Heel slides x 5    Functional Measure:  Barthel Index:    Bathin  Bladder: 5  Bowels: 5  Groomin  Dressin  Feeding: 10  Mobility: 0  Stairs: 0  Toilet Use: 0  Transfer (Bed to Chair and Back): 5  Total: 25/100       Percentage of impairment   0%   1-19%   20-39%   40-59%   60-79%   80-99%   100%   Barthel Score 0-100 100 99-80 79-60 59-40 20-39 1-19   0     The Barthel ADL Index: Guidelines  1. The index should be used as a record of what a patient does, not as a record of what a patient could do. 2. The main aim is to establish degree of independence from any help, physical or verbal, however minor and for whatever reason. 3. The need for supervision renders the patient not independent. 4. A patient's performance should be established using the best available evidence. Asking the patient, friends/relatives and nurses are the usual sources, but direct observation and common sense are also important. However direct testing is not needed. 5. Usually the patient's performance over the preceding 24-48 hours is important, but occasionally longer periods will be relevant. 6. Middle categories imply that the patient supplies over 50 per cent of the effort. 7. Use of aids to be independent is allowed. Yvette Hurd., Barthel, D.W. ([id number]). Functional evaluation: the Barthel Index. [facility] (14)2. DOM Marie, Tash Iverson., [facility], [address] [facility] ().  Measuring the change indisability after inpatient rehabilitation; comparison of the responsiveness of the Barthel Index and Functional [place] Measure. Journal of Neurology, Neurosurgery, and Psychiatry, 66(4), 803-393. LORNA Louis, RONY Steele, & Maciel Gracia M.A. (2004.) Assessment of post-stroke quality of life in cost-effectiveness studies: The usefulness of the Barthel Index and the EuroQoL-5D. Quality of Life Research, 15, 006-49            Physical Therapy Evaluation Charge Determination   History Examination Presentation Decision-Making   HIGH Complexity :3+ comorbidities / personal factors will impact the outcome/ POC  HIGH Complexity : 4+ Standardized tests and measures addressing body structure, function, activity limitation and / or participation in recreation  MEDIUM Complexity : Evolving with changing characteristics  Other outcome measures barthel  HIGH       Based on the above components, the patient evaluation is determined to be of the following complexity level: MEDIUM    Pain:  Pain Scale 1: Numeric (0 - 10)  Pain Intensity 1: 5            Interventions: rest, repositioned, cold pack  Activity Tolerance:   Limited by fatigue. Please refer to the flowsheet for vital signs taken during this treatment. After treatment:   ?         Patient left in no apparent distress sitting up in chair  ? Patient left in no apparent distress in bed  ? Call bell left within reach  ? Nursing notified  ? Caregiver present  ? Bed alarm activated    COMMUNICATION/EDUCATION:   The patient?s plan of care was discussed with: Occupational Therapist, Registered Nurse and Rehabilitation Attendant. ?         Fall prevention education was provided and the patient/caregiver indicated understanding. ? Patient/family have participated as able in goal setting and plan of care. ?         Patient/family agree to work toward stated goals and plan of care. ?         Patient understands intent and goals of therapy, but is neutral about his/her participation. ?          Patient is unable to participate in goal setting and plan of care.     Thank you for this referral.  Rafy Garvin, PT, DPT   Time Calculation: 41 mins

## 2019-06-12 NOTE — PROGRESS NOTES
Problem: Self Care Deficits Care Plan (Adult)  Goal: *Acute Goals and Plan of Care (Insert Text)  Description  Occupational Therapy Goals  Initiated 6/12/2019     1. Patient will perform lower body dressing at minimal assistance/contact guard assist level within 7 days. 2. Patient will perform toilet transfers at minimal assistance/contact guard assist level using Walkers, Type: Rolling Walker  within 7 days. 3. Patient will perform all aspects of toileting at minimal assistance/contact guard assist level within 7 days. Outcome: Progressing Towards Goal    OCCUPATIONAL THERAPY EVALUATION  Patient: Sonam Mckinnon (9 y.o. male)  Date: 6/12/2019  Primary Diagnosis: Fracture of femoral neck, left, closed ([facility] [S72.002A]  Fracture of femoral neck, left, closed ([facility] [facility] [S72.002A]  Procedure(s) (LRB):  LEFT HIP HAM ARTHROPLASTY VERSUS TOTAL ANTERIOR APPROACH (Left) 1 Day Post-Op   Precautions:  Fall, WBAT, no sudden or extreme movements of L hip    ASSESSMENT :  Based on the objective data described below, the patient presents with decreased strength, endurance, ROM, balance and safety following admission for L femoral neck fx after fall at home and s/p L hip ham arthroplasty. He currently requires up to Max A for LE ADLs, toileting and functional mobility at bedside. Pt complained of dizziness and not feeling well while seated EOB with VSS, but unable to further participate and was assisted back to supine. Pt and wife educated on benefits of therapy for improving strength, endurance, and safety before returning home. Patient will benefit from skilled intervention to address the above impairments. Recommend rehab at discharge. Pt's wife would like pt to go to [address] [place] [address] as he has been there in the past.    Recommend Assist x2 for transfers and functional mobility with nursing.     Patients rehabilitation potential is considered to be Good  Factors which may influence rehabilitation potential include: ?                None noted  ? Mental ability/status  X               Medical condition  ? Home/family situation and support systems  X                Safety awareness  ? Pain tolerance/management  ? Other:      PLAN :  Recommendations and Planned Interventions:  X                 Self Care Training                  X          Therapeutic Activities  X                 Functional Mobility Training    ? Cognitive Retraining  X                 Therapeutic Exercises           X          Endurance Activities  X                  Balance Training                   ? Neuromuscular Re-Education  ? Visual/Perceptual Training    X      Home Safety Training  X                 Patient Education                X           Family Training/Education  ? Other (comment):      Frequency/Duration: Patient will be followed by occupational therapy 5 times a week to address goals. Discharge Recommendations: Rehab  Further Equipment Recommendations for Discharge: TBD     SUBJECTIVE:   Patient stated I don't want to do a lot of work before I eat breakfast.        OBJECTIVE DATA SUMMARY:   HISTORY:   Past Medical History:   Diagnosis Date    CKD (chronic kidney disease), stage III ([facility] [address]     Sjoegren syndrome 04/2016    Urine retention      Past Surgical History:   Procedure Laterality Date    HX APPENDECTOMY      HX CERVICAL DISKECTOMY      HX HEENT      Zygomatic arch repair    HX HERNIA REPAIR      HX ORTHOPAEDIC      Broken tibia and fibula    HX PROSTATE SURGERY      TURP    HX VASECTOMY         Prior Level of Function/Environment/Context: Mod I with ADLs, amb in home with rollator and outside of home with 3 wheeled walker. Per pt's wife his balance and function is very limited due to his Sjoegren's syndrome.   Home Situation  Home Environment: Private residence  # Steps to Enter: (2 garage 3 porch)  Rails to Enter: Yes  Hand Rails : Bilateral  One/Two Story Residence: Two story, live on 1st floor  Living Alone: No  Support Systems: Spouse/Significant Other/Partner  Patient Expects to be Discharged to[de-identified] Private residence  Current DME Used/Available at Home: [employer], rolling(4 wheel inside, 3 wheel outside)  Tub or Shower Type: Shower  Hand dominance: Right    EXAMINATION OF PERFORMANCE DEFICITS:  Cognitive/Behavioral Status:  Neurologic State: Alert; Appropriate for age  Orientation Level: Oriented X4  Cognition: Follows commands;Decreased attention/concentration  Perception: Appears intact  Perseveration: No perseveration noted  Safety/Judgement: Awareness of environment; Fall prevention; Insight into deficits        Hearing: Auditory  Auditory Impairment: Hard of hearing, left side    Vision/Perceptual:     Acuity: Within Defined Limits      Range of Motion:  AROM: Generally decreased, functional  PROM: Generally decreased, functional    Strength:    Strength: Generally decreased, functional    Coordination:  Fine Motor Skills-Upper: Left Impaired;Right Impaired    Gross Motor Skills-Upper: Left Intact; Right Intact      Balance:  Sitting: Impaired  Sitting - Static: Good (unsupported)  Sitting - Dynamic: Fair (occasional)  Standing: Impaired; With support(RW)  Standing - Static: Fair;Constant support  Standing - Dynamic : Poor    Functional Mobility and Transfers for ADLs:  Bed Mobility:  Supine to Sit: Minimum assistance  Sit to Supine: Maximum assistance  Scooting: Maximum assistance    Transfers:  Sit to Stand: Maximum assistance;Assist x2  Stand to Sit: Maximum assistance;Assist x2  Bed to Chair: Maximum assistance;Assist x2  Bathroom Mobility: Maximum assistance  Toilet Transfer : Maximum assistance  Assistive Device : Walker, rolling    ADL Assessment:  Feeding: Independent    Oral Facial Hygiene/Grooming: Maximum assistance(standing at sink)    Bathing: Maximum assistance(for safety)    Upper Body Dressing: Maximum assistance(for safety)    Lower Body Dressing: Maximum assistance(for safety)    Toileting: Maximum assistance(for safety)    ADL Intervention and task modifications:    Cognitive Retraining  Safety/Judgement: Awareness of environment; Fall prevention; Insight into deficits      Bathing: Patient instructed when bathing to not submerge wound in water, stand to shower or sponge bathe, cover wound with plastic and tape to ensure no water reaches bandage/wound without cues. Patient indicated understanding. Dressing joint: Patient instructed to don all clothing while sitting prior to standing, doff all clothing to knees while standing, then sit to doff clothing off from knees to feet in order to facilitate fall prevention, pain management, and energy conservation. Home safety: Patient instructed on home modifications and safety (raise height of ADL objects, appropriate height of chair surfaces, recliner safety, change of floor surfaces, clear pathways) to increase independence and fall prevention. Patient indicated understanding. Standing: Patient educated to avoid sudden or extreme movement of Left LE. Patient instructed to increase amount of time standing, observe standing position during ADLs in order to increase even weight bearing through bilateral LEs in order to increase independence with ADLs. Goal to be reached 30 days post - op, per orthopedic surgeon or per PT. Patient indicated understanding. Tub transfer: Patient instructed regarding when it is safe to begin transfer into tub (complete stairs with PT, advance exercises with PT high enough to clear tub height). Patient instructed to use the same technique as used with stairs when entering and exiting tub (\"up with the non-surgical, down with the surgical leg\"). Patient indicated understanding.         Functional Measure:  Barthel Index:    Bathin  Bladder: 5  Bowels: 5  Groomin  Dressin  Feeding: 10  Mobility: 0  Stairs: 0  Toilet Use: 0  Transfer (Bed to Chair and Back): 5  Total: 25/100        Percentage of impairment   0%   1-19%   20-39%   40-59%   60-79%   80-99%   100%   Barthel Score 0-100 100 99-80 79-60 59-40 20-39 1-19   0     The Barthel ADL Index: Guidelines  1. The index should be used as a record of what a patient does, not as a record of what a patient could do. 2. The main aim is to establish degree of independence from any help, physical or verbal, however minor and for whatever reason. 3. The need for supervision renders the patient not independent. 4. A patient's performance should be established using the best available evidence. Asking the patient, friends/relatives and nurses are the usual sources, but direct observation and common sense are also important. However direct testing is not needed. 5. Usually the patient's performance over the preceding 24-48 hours is important, but occasionally longer periods will be relevant. 6. Middle categories imply that the patient supplies over 50 per cent of the effort. 7. Use of aids to be independent is allowed. Cass Purcell., Barthel, D.W. (1186). Functional evaluation: the Barthel Index. [facility] (14)2. Dina Yusuf mann [place], [place], Yana West., Karolina Tse., [place], [address] [place] [address] (1999). Measuring the change indisability after inpatient rehabilitation; comparison of the responsiveness of the Barthel Index and Functional [place] Measure. Journal of Neurology, Neurosurgery, and Psychiatry, 66(4), 446-902. SUMAYA Edwards.BLANK, PARRISH Steele.CARLYN, & Jez Cruz MREYES. (2004.) Assessment of post-stroke quality of life in cost-effectiveness studies: The usefulness of the Barthel Index and the EuroQoL-5D.  Quality of Life Research, 15, 430-50       Occupational Therapy Evaluation Charge Determination   History Examination Decision-Making   LOW Complexity : Brief history review  LOW Complexity : 1-3 performance deficits relating to physical, cognitive , or psychosocial skils that result in activity limitations and / or participation restrictions  LOW Complexity : No comorbidities that affect functional and no verbal or physical assistance needed to complete eval tasks       Based on the above components, the patient evaluation is determined to be of the following complexity level: LOW     Pain:  Pain Scale 1: Numeric (0 - 10)  Pain Intensity 1: 5  Pain Location 1: Hip  Pain Orientation 1: Left  Pain Description 1: Aching   Pain medicine administered by nurse     Activity Tolerance:   Poor    Patient Vitals for the past 4 hrs:   Temp Pulse Resp BP SpO2   06/12/19 0934 -- 93 -- 145/75 --   06/12/19 0928 -- 84 -- 151/67 --   06/12/19 0911 -- 73 -- 123/58 --     After treatment:   ? Patient left in no apparent distress sitting up in chair  X  Patient left in no apparent distress in bed  X Call bell left within reach  X Nursing notified  X Caregiver present  ? Bed alarm activated    COMMUNICATION/EDUCATION:   The patients plan of care was discussed with: Physical Therapist and Registered Nurse. X   Home safety education was provided and the patient/caregiver indicated understanding. X    Patient/family have participated as able in goal setting and plan of care. X    Patient/family agree to work toward stated goals and plan of care. ?    Patient understands intent and goals of therapy, but is neutral about his/her participation. ? Patient is unable to participate in goal setting and plan of care. This patients plan of care is appropriate for delegation to [facility]. Regarding student involvement in patient care:  A student participated in this treatment session. Per CMS Medicare statements and AOTA guidelines I certify that the following was true:  1. I was present and directly observed the entire session. 2. I made all skilled judgments and clinical decisions regarding care. 3. I am the practitioner responsible for assessment, treatment, and documentation.         Thank you for this referral.  Donna Poisson  Time Calculation: 41 mins

## 2019-06-12 NOTE — PROGRESS NOTES
Problem: Mobility Impaired (Adult and Pediatric)  Goal: *Acute Goals and Plan of Care (Insert Text)  Description  Physical Therapy Goals  Initiated 6/12/2019  1. Patient will move from supine to sit and sit to supine  in bed with minimal assistance/contact guard assist within 7 day(s). 2.  Patient will transfer from bed to chair and chair to bed with minimal assistance/contact guard assist using the least restrictive device within 7 day(s). 3.  Patient will perform sit to stand with minimal assistance/contact guard assist within 7 day(s). 4.  Patient will ambulate with minimal assistance/contact guard assist for 75 feet with the least restrictive device within 7 day(s). 5.  Patient will ascend/descend 3 stairs with handrail(s) with moderate assistance within 7 day(s). 6.  Patient will perform LE home exercise program for strengthening to improve mobility with modified independence within 7 days. Outcome: Progressing Towards Goal   PHYSICAL THERAPY TREATMENT  Patient: Shaheen Voss (44 y.o. male)  Date: 6/12/2019  Diagnosis: Fracture of femoral neck, left, closed ([address] [S72.002A]  Fracture of femoral neck, left, closed ([address] [S72.002A] Fracture of femoral neck, left, closed ([address]  Procedure(s) (LRB):  LEFT HIP HAM ARTHROPLASTY VERSUS TOTAL ANTERIOR APPROACH (Left) 1 Day Post-Op  Precautions: Fall, WBAT, avoid extreme ROM L hip  Chart, physical therapy assessment, plan of care and goals were reviewed. ASSESSMENT:  Pt reports L hip pain 8/10 with activity, and 0/10 at rest. Pt sat edge of bed with moderate assist x2 and additional time. Pt needed high guard to sit. Pt stood with rolling walker and moderate assist x2 and was able to take one step forward and one step back with verbal cues for L quad control and L foot positioning. Pt was exhausted and had a seated rest for a few minutes. Pt stood again and side stepped about 2 feet up the bed. Pt was returned to supine with moderate assist x2. Ankle pumps, quad and glut sets and gentle heel glides were done in supine, and precautions were discussed. Pt had fallen 4 times in the past year and reports that his endurance, strength and balance were not good. Pt will need extensive rehab to achieve functional walker status. Discussed with nurse and encouraged OOB to [facility] with assist x2 as able  Progression toward goals:  ?    Improving appropriately and progressing toward goals  ? Improving slowly and progressing toward goals  ? Not making progress toward goals and plan of care will be adjusted     PLAN:  Patient continues to benefit from skilled intervention to address the above impairments. Continue treatment per established plan of care. Discharge Recommendations: per MD recommendations  Further Equipment Recommendations for Discharge:  no change     SUBJECTIVE:   Patient stated ? I do best in the middle of the day. ?    OBJECTIVE DATA SUMMARY:   Critical Behavior:  Neurologic State: Alert, Appropriate for age  Orientation Level: Oriented X4  Cognition: Follows commands, Decreased attention/concentration  Safety/Judgement: Awareness of environment, Fall prevention, Insight into deficits  Functional Mobility Training:  Bed Mobility:     Supine to Sit: Additional time; Moderate assistance;Assist x2  Sit to Supine: Assist x2; Additional time; Moderate assistance  Scooting: Supervision        Transfers:  Sit to Stand: Assist x2; Additional time; Moderate assistance  Stand to Sit: Assist x2; Additional time; Moderate assistance        Bed to Chair: Maximum assistance;Assist x2                    Balance:  Sitting: High guard  Sitting - Static: Fair (occasional)  Sitting - Dynamic: Fair (occasional)  Standing: With support  Standing - Static: Constant support  Standing - Dynamic : Poor  Ambulation/Gait Training:  Distance (ft): 4 Feet (ft)  Assistive Device: Gait belt;Walker, rolling  Ambulation - Level of Assistance: Assist x2; Moderate assistance        Gait Abnormalities: Antalgic;Decreased step clearance; Step to gait     Left Side Weight Bearing: As tolerated  Base of Support: Widened  Stance: Left decreased  Speed/Mary: Slow  Step Length: Left shortened;Right shortened                    Stairs:              Neuro Re-Education:  Worked on sitting and standing balance with CGA to moderate assist x2  Therapeutic Exercises:   10 reps of  B ankle pumps, static quads and gluts and gentle heel glides on L  Pain:  Pain Scale 1: Numeric (0 - 10)  Pain Intensity 1: 5              Activity Tolerance:   poor  Please refer to the flowsheet for vital signs taken during this treatment. After treatment:   ?    Patient left in no apparent distress sitting up in chair  ? Patient left in no apparent distress in bed  ? Call bell left within reach  ? Nursing notified  ? Caregiver present  ?     Bed alarm activated    COMMUNICATION/COLLABORATION:   The patient?s plan of care was discussed with: Registered Nurse    Connie Hernández, PT   Time Calculation: 31 mins

## 2019-06-12 NOTE — PROGRESS NOTES
Physical Therapy Note:    Orders acknowledged, chart reviewed, PT evaluation attempted and deferred. On initial attempt pt receiving treatment from another service and unavailable to participate. On follow-up OT reports pt requiring return to supine after evaluation and advising PT follow at a later time   (see OT note). Will follow and proceed with PT evaluation when appropriate.

## 2019-06-12 NOTE — PROGRESS NOTES
Bedside shift change report given to [facility] (oncoming nurse) by Fernando Carl RN (offgoing nurse). Report included the following information SBAR, Kardex, Procedure Summary, Intake/Output, MAR and Recent Results.

## 2019-06-12 NOTE — PROGRESS NOTES
Hospitalist Progress Note    NAME: Ese Rodríguez   :  1940   MRN:  [id number]     Subjective:   Daily Progress Note: 2019 10:03 AM      Chief complaint: admitted with L hip fracture  Post OP day 1 doing well to get therapy today. Denies any CP/N/V.     Current Facility-Administered Medications   Medication Dose Route Frequency    0.9% sodium chloride infusion  75 mL/hr IntraVENous CONTINUOUS    sodium chloride (NS) flush 5-40 mL  5-40 mL IntraVENous Q8H    sodium chloride (NS) flush 5-40 mL  5-40 mL IntraVENous PRN    naloxone (NARCAN) injection 0.4 mg  0.4 mg IntraVENous PRN    oxyCODONE IR (ROXICODONE) tablet 5 mg  5 mg Oral Q4H PRN    HYDROmorphone (PF) (DILAUDID) injection 0.5 mg  0.5 mg IntraVENous Q4H PRN    prochlorperazine (COMPAZINE) injection 10 mg  10 mg IntraVENous Q6H PRN    acetaminophen (TYLENOL) tablet 650 mg  650 mg Oral Q6H    predniSONE (DELTASONE) tablet 5 mg  5 mg Oral DAILY WITH BREAKFAST    senna-docusate (PERICOLACE) 8.6-50 mg per tablet 1 Tab  1 Tab Oral QPM    polyethylene glycol (MIRALAX) packet 17 g  17 g Oral DAILY    DULoxetine (CYMBALTA) capsule 30 mg  30 mg Oral QHS    0.9% sodium chloride infusion  125 mL/hr IntraVENous CONTINUOUS    sodium chloride 0.9 % bolus infusion 500 mL  500 mL IntraVENous ONCE PRN    sodium chloride (NS) flush 5-40 mL  5-40 mL IntraVENous Q8H    sodium chloride (NS) flush 5-40 mL  5-40 mL IntraVENous PRN    acetaminophen (TYLENOL) tablet 650 mg  650 mg Oral Q6H PRN    traMADol (ULTRAM) tablet 50 mg  50 mg Oral Q6H PRN    HYDROmorphone (DILAUDID) syringe 0.5 mg  0.5 mg IntraVENous Q4H PRN    naloxone (NARCAN) injection 0.4 mg  0.4 mg IntraVENous PRN    ondansetron (ZOFRAN) injection 4 mg  4 mg IntraVENous Q4H PRN    prochlorperazine (COMPAZINE) with saline injection 5 mg  5 mg IntraVENous Q6H PRN    [START ON 2019] bisacodyl (DULCOLAX) suppository 10 mg  10 mg Rectal DAILY PRN    aspirin delayed-release tablet 325 mg  325 mg Oral BID          Objective:     Visit Vitals  /75 (BP 1 Location: Right arm, BP Patient Position: Post activity;Supine)   Pulse 93   Temp 98.1 °F (36.7 °C)   Resp 18   Ht 6' 1\" (1.854 m)   Wt 83.9 kg (185 lb)   SpO2 91%   BMI 24.41 kg/m²    O2 Flow Rate (L/min): 4 l/min O2 Device: Room air    Temp (24hrs), Av.3 °F (36.8 °C), Min:97.3 °F (36.3 °C), Max:99.2 °F (37.3 °C)        PHYSICAL EXAM:  Gen NAD  CVR RRR no MRG  Lungs CTA  Abd soft ND  Ext R LE moves without difficulty  Neuro AAO X 3  Psych normal affect  Skin no rash        Data Review    Recent Results (from the past 24 hour(s))   TYPE & SCREEN    Collection Time: 19 11:51 AM   Result Value Ref Range    Crossmatch Expiration 2019     ABO/Rh(D) O NEGATIVE     Antibody screen NEG    METABOLIC PANEL, BASIC    Collection Time: 19  1:23 AM   Result Value Ref Range    Sodium 141 136 - 145 mmol/L    Potassium 4.2 3.5 - 5.1 mmol/L    Chloride 109 (H) 97 - 108 mmol/L    CO2 25 21 - 32 mmol/L    Anion gap 7 5 - 15 mmol/L    Glucose 123 (H) 65 - 100 mg/dL    BUN 16 6 - 20 MG/DL    Creatinine 1.57 (H) 0.70 - 1.30 MG/DL    BUN/Creatinine ratio 10 (L) 12 - 20      GFR est AA 52 (L) >60 ml/min/1.73m2    GFR est non-AA 43 (L) >60 ml/min/1.73m2    Calcium 7.6 (L) 8.5 - 10.1 MG/DL   CBC WITH AUTOMATED DIFF    Collection Time: 19  1:23 AM   Result Value Ref Range    WBC 12.6 (H) 4.1 - 11.1 K/uL    RBC 3.53 (L) 4.10 - 5.70 M/uL    HGB 11.9 (L) 12.1 - 17.0 g/dL    HCT 35.5 (L) 36.6 - 50.3 %    .6 (H) 80.0 - 99.0 FL    MCH 33.7 26.0 - 34.0 PG    MCHC 33.5 30.0 - 36.5 g/dL    RDW 13.2 11.5 - 14.5 %    PLATELET 701 (L) 299 - 400 K/uL    MPV 11.1 8.9 - 12.9 FL    NRBC 0.0 0  WBC    ABSOLUTE NRBC 0.00 0.00 - 0.01 K/uL    NEUTROPHILS 87 (H) 32 - 75 %    LYMPHOCYTES 6 (L) 12 - 49 %    MONOCYTES 7 5 - 13 %    EOSINOPHILS 0 0 - 7 %    BASOPHILS 0 0 - 1 %    IMMATURE GRANULOCYTES 0 0.0 - 0.5 %    ABS.  NEUTROPHILS 10.9 (H) 1.8 - 8.0 K/UL    ABS. LYMPHOCYTES 0.8 0.8 - 3.5 K/UL    ABS. MONOCYTES 0.9 0.0 - 1.0 K/UL    ABS. EOSINOPHILS 0.0 0.0 - 0.4 K/UL    ABS. BASOPHILS 0.0 0.0 - 0.1 K/UL    ABS. IMM. GRANS. 0.0 0.00 - 0.04 K/UL    DF SMEAR SCANNED      RBC COMMENTS NORMOCYTIC, NORMOCHROMIC     MAGNESIUM    Collection Time: 06/12/19  1:23 AM   Result Value Ref Range    Magnesium 1.9 1.6 - 2.4 mg/dL   PHOSPHORUS    Collection Time: 06/12/19  1:23 AM   Result Value Ref Range    Phosphorus 2.8 2.6 - 4.7 MG/DL     No results found for this visit on 06/11/19. All Micro Results     None           Radiology reports and films for the last 24 hours have been reviewed. Assessment/Plan:       - Fracture of femoral neck, left, closed. POA. From fall  Likely spurred on by chronic prednisone. s/p ORIF 6/11/19 with L hemiarthroplasty POD #1  dvt ppx/pain Rx as per ortho  PT/OT on case       -Sjogren's syndrome ([facility] [address] (4/1/2016). Cont chronic prednisone      -CKD (chronic kidney disease), stage III ([facility]) ().    Stable crt 1.57 monitor                                       Care Plan discussed with: Patient and therapy     Dispo: family request RILEY torresal            Signed By: Den Cooper MD     June 12, 2019

## 2019-06-12 NOTE — PROGRESS NOTES
TOTAL HIP ARTHROPLASTY DAILY NOTE    POD  1 Day Post-Op s/p Procedure(s):  LEFT HIP HAM ARTHROPLASTY VERSUS TOTAL ANTERIOR APPROACH     ASSESSMENT / PLAN :   1. Pain Control : Excellent - Able to sleep and participate with therapy  2. Overnight Issues : low UOP, hx of Turp will check bladder scan to ensure not retaining. 3. Wound or incisional issue : Healing incision with no visible drainage  4. Therapy / Weight Bearing Recommendations : Weight bear as tolerated with use of a walker and two person assist while mobilizing  5. DVT Prophylaxis : Mechanical and Aspirin and mechanical lower extremity compression device  BID  6. Disposition : Discharge to home with home health (maximum of 4 visits) vs SNF/Rehab  7. CKD: avoid NSAIDS  8. Low UOP: check bladder scan  9. Sjogrens: continue prednisone        SUBJECTIVE :     PEDRO overnight. Pain controlled. Voiding but low UOP and hx of turp. No abdominal pain. Tolerating a regular diet. Denies CP/SOB/or other complaints. OBJECTIVE :     Vitals:    06/11/19 1927 06/11/19 2029 06/11/19 2316 06/12/19 0440   BP: 107/62 106/57 133/70 132/70   Pulse: 64 71 84 74   Resp: 14 14 20 20   Temp: 97.4 °F (36.3 °C) 97.9 °F (36.6 °C) 97.6 °F (36.4 °C) 98.8 °F (37.1 °C)   SpO2: 93% 92% 93% 92%   Weight:       Height:           Alert and oriented x3. left exam of the hip reveals that the dressing is clean, dry and intact. The patient has + quadriceps, ankle DF/PF, EHL/FHL  Sensation is intact to light touch distally  No calf pain. Labs:  Recent Labs     06/12/19  0123 06/11/19  0605   HGB 11.9*  --    HCT 35.5*  --    INR  --  1.1     --    K 4.2  --    *  --    CO2 25  --    BUN 16  --    CREA 1.57*  --    *  --         Patient mobility           Sparkle Jeff [phone]  Medical Staff : Jonh Palma  Office : [phone]

## 2019-06-13 LAB
ANION GAP SERPL CALC-SCNC: 2 MMOL/L (ref 5–15)
BASOPHILS # BLD: 0 K/UL (ref 0–0.1)
BASOPHILS NFR BLD: 0 % (ref 0–1)
BUN SERPL-MCNC: 13 MG/DL (ref 6–20)
BUN/CREAT SERPL: 10 (ref 12–20)
CALCIUM SERPL-MCNC: 8.2 MG/DL (ref 8.5–10.1)
CHLORIDE SERPL-SCNC: 108 MMOL/L (ref 97–108)
CO2 SERPL-SCNC: 29 MMOL/L (ref 21–32)
CREAT SERPL-MCNC: 1.3 MG/DL (ref 0.7–1.3)
DIFFERENTIAL METHOD BLD: ABNORMAL
EOSINOPHIL # BLD: 0.1 K/UL (ref 0–0.4)
EOSINOPHIL NFR BLD: 1 % (ref 0–7)
ERYTHROCYTE [DISTWIDTH] IN BLOOD BY AUTOMATED COUNT: 12.9 % (ref 11.5–14.5)
GLUCOSE SERPL-MCNC: 99 MG/DL (ref 65–100)
HCT VFR BLD AUTO: 35.2 % (ref 36.6–50.3)
HGB BLD-MCNC: 11.7 G/DL (ref 12.1–17)
IMM GRANULOCYTES # BLD AUTO: 0.1 K/UL (ref 0–0.04)
IMM GRANULOCYTES NFR BLD AUTO: 1 % (ref 0–0.5)
LYMPHOCYTES # BLD: 1.4 K/UL (ref 0.8–3.5)
LYMPHOCYTES NFR BLD: 13 % (ref 12–49)
MCH RBC QN AUTO: 33 PG (ref 26–34)
MCHC RBC AUTO-ENTMCNC: 33.2 G/DL (ref 30–36.5)
MCV RBC AUTO: 99.2 FL (ref 80–99)
MONOCYTES # BLD: 1 K/UL (ref 0–1)
MONOCYTES NFR BLD: 10 % (ref 5–13)
NEUTS SEG # BLD: 7.8 K/UL (ref 1.8–8)
NEUTS SEG NFR BLD: 75 % (ref 32–75)
NRBC # BLD: 0 K/UL (ref 0–0.01)
NRBC BLD-RTO: 0 PER 100 WBC
PLATELET # BLD AUTO: 115 K/UL (ref 150–400)
PMV BLD AUTO: 11.1 FL (ref 8.9–12.9)
POTASSIUM SERPL-SCNC: 4 MMOL/L (ref 3.5–5.1)
RBC # BLD AUTO: 3.55 M/UL (ref 4.1–5.7)
SODIUM SERPL-SCNC: 139 MMOL/L (ref 136–145)
WBC # BLD AUTO: 10.4 K/UL (ref 4.1–11.1)

## 2019-06-13 PROCEDURE — 74011250637 HC RX REV CODE- 250/637: Performed by: INTERNAL MEDICINE

## 2019-06-13 PROCEDURE — 74011250637 HC RX REV CODE- 250/637: Performed by: ORTHOPAEDIC SURGERY

## 2019-06-13 PROCEDURE — 97535 SELF CARE MNGMENT TRAINING: CPT

## 2019-06-13 PROCEDURE — 97530 THERAPEUTIC ACTIVITIES: CPT

## 2019-06-13 PROCEDURE — 77030011256 HC DRSG MEPILEX <16IN NO BORD MOLN -A

## 2019-06-13 PROCEDURE — 85025 COMPLETE CBC W/AUTO DIFF WBC: CPT

## 2019-06-13 PROCEDURE — 36415 COLL VENOUS BLD VENIPUNCTURE: CPT

## 2019-06-13 PROCEDURE — 74011636637 HC RX REV CODE- 636/637: Performed by: ORTHOPAEDIC SURGERY

## 2019-06-13 PROCEDURE — 94760 N-INVAS EAR/PLS OXIMETRY 1: CPT

## 2019-06-13 PROCEDURE — 65270000029 HC RM PRIVATE

## 2019-06-13 PROCEDURE — 97116 GAIT TRAINING THERAPY: CPT

## 2019-06-13 PROCEDURE — L1830 KO IMMOB CANVAS LONG PRE OTS: HCPCS

## 2019-06-13 PROCEDURE — 80048 BASIC METABOLIC PNL TOTAL CA: CPT

## 2019-06-13 RX ORDER — ADHESIVE BANDAGE
30 BANDAGE TOPICAL EVERY 6 HOURS
Status: DISCONTINUED | OUTPATIENT
Start: 2019-06-13 | End: 2019-06-14 | Stop reason: HOSPADM

## 2019-06-13 RX ADMIN — ASPIRIN 325 MG: 325 TABLET, COATED ORAL at 19:57

## 2019-06-13 RX ADMIN — OXYCODONE HYDROCHLORIDE 5 MG: 5 TABLET ORAL at 01:41

## 2019-06-13 RX ADMIN — PREDNISONE 5 MG: 5 TABLET ORAL at 10:35

## 2019-06-13 RX ADMIN — OXYCODONE HYDROCHLORIDE 5 MG: 5 TABLET ORAL at 16:48

## 2019-06-13 RX ADMIN — DULOXETINE HYDROCHLORIDE 30 MG: 30 CAPSULE, DELAYED RELEASE ORAL at 21:53

## 2019-06-13 RX ADMIN — PANTOPRAZOLE SODIUM 40 MG: 40 TABLET, DELAYED RELEASE ORAL at 06:00

## 2019-06-13 RX ADMIN — ACETAMINOPHEN 650 MG: 325 TABLET ORAL at 14:03

## 2019-06-13 RX ADMIN — ACETAMINOPHEN 650 MG: 325 TABLET ORAL at 05:59

## 2019-06-13 RX ADMIN — POLYETHYLENE GLYCOL 3350 17 G: 17 POWDER, FOR SOLUTION ORAL at 10:35

## 2019-06-13 RX ADMIN — MAGNESIUM HYDROXIDE 30 ML: 400 SUSPENSION ORAL at 10:35

## 2019-06-13 RX ADMIN — ACETAMINOPHEN 650 MG: 325 TABLET ORAL at 00:44

## 2019-06-13 RX ADMIN — OXYCODONE HYDROCHLORIDE 5 MG: 5 TABLET ORAL at 21:53

## 2019-06-13 RX ADMIN — OXYCODONE HYDROCHLORIDE 5 MG: 5 TABLET ORAL at 10:35

## 2019-06-13 RX ADMIN — ASPIRIN 325 MG: 325 TABLET, COATED ORAL at 10:35

## 2019-06-13 RX ADMIN — SENNOSIDES, DOCUSATE SODIUM 1 TABLET: 50; 8.6 TABLET, FILM COATED ORAL at 19:57

## 2019-06-13 RX ADMIN — Medication 10 ML: at 16:50

## 2019-06-13 RX ADMIN — ACETAMINOPHEN 650 MG: 325 TABLET ORAL at 19:57

## 2019-06-13 NOTE — PROGRESS NOTES
Barber Jacobo [facility] [address]  [address] [facility], [address] [place] [address]  [phone]      Medical Progress Note      NAME: Wilfredo Arriaga   :  1940  MRM:  [id number]    Date/Time: 2019  11:28 AM       Assessment and Plan:   1. Fracture of femoral neck, left, closed. POA. From a fall. Likely spurred on by chronic prednisone. s/p ORIF 19 with L hemiarthroplasty. Continue PT/OT. Accepted to [address]      2. Sjogren's syndrome ([facility] [address] (2016). Cont chronic prednisone     3. CKD (chronic kidney disease), stage III (HCC) (). monitor           Subjective:     Chief Complaint:  Follow up of pt who was admitted with hip fracture. ROS:  (bold if positive, if negative)      Tolerating PT  Tolerating Diet        Objective:     Last 24hrs VS reviewed since prior progress note.  Most recent are:    Visit Vitals  /80 (BP 1 Location: Right arm, BP Patient Position: At rest)   Pulse 82   Temp 98.2 °F (36.8 °C)   Resp 14   Ht 6' 1\" (1.854 m)   Wt 83.9 kg (185 lb)   SpO2 92%   BMI 24.41 kg/m²     SpO2 Readings from Last 6 Encounters:   19 92%   12/10/17 95%   10/20/17 95%   17 95%   17 95%    O2 Flow Rate (L/min): 4 l/min       Intake/Output Summary (Last 24 hours) at 2019 1128  Last data filed at 2019 0400  Gross per 24 hour   Intake 0 ml   Output 800 ml   Net -800 ml        Physical Exam:    Gen:  Well-developed, well-nourished, in no acute distress  HEENT:  Pink conjunctivae, PERRL, hearing intact to voice, moist mucous membranes  Neck:  Supple, without masses, thyroid non-tender  Resp:  No accessory muscle use, clear breath sounds without wheezes rales or rhonchi  Card:  No murmurs, normal S1, S2 without thrills, bruits or peripheral edema  Abd:  Soft, non-tender, non-distended, normoactive bowel sounds are present, no palpable organomegaly and no detectable hernias  Lymph:  No cervical or inguinal adenopathy  Musc:  No cyanosis or clubbing  Skin:  No rashes or ulcers, skin turgor is good  Neuro:  Cranial nerves are grossly intact, no focal motor weakness, follows commands appropriately  Psych:  Good insight, oriented to person, place and time, alert  __________________________________________________________________  Medications Reviewed: (see below)  Medications:     Current Facility-Administered Medications   Medication Dose Route Frequency    magnesium hydroxide (MILK OF MAGNESIA) 400 mg/5 mL oral suspension 30 mL  30 mL Oral Q6H    calcium carbonate (TUMS) chewable tablet 200 mg [elemental]  200 mg Oral DAILY PRN    pantoprazole (PROTONIX) tablet 40 mg  40 mg Oral ACB    0.9% sodium chloride infusion  75 mL/hr IntraVENous CONTINUOUS    sodium chloride (NS) flush 5-40 mL  5-40 mL IntraVENous Q8H    sodium chloride (NS) flush 5-40 mL  5-40 mL IntraVENous PRN    naloxone (NARCAN) injection 0.4 mg  0.4 mg IntraVENous PRN    oxyCODONE IR (ROXICODONE) tablet 5 mg  5 mg Oral Q4H PRN    HYDROmorphone (PF) (DILAUDID) injection 0.5 mg  0.5 mg IntraVENous Q4H PRN    prochlorperazine (COMPAZINE) injection 10 mg  10 mg IntraVENous Q6H PRN    acetaminophen (TYLENOL) tablet 650 mg  650 mg Oral Q6H    predniSONE (DELTASONE) tablet 5 mg  5 mg Oral DAILY WITH BREAKFAST    senna-docusate (PERICOLACE) 8.6-50 mg per tablet 1 Tab  1 Tab Oral QPM    polyethylene glycol (MIRALAX) packet 17 g  17 g Oral DAILY    DULoxetine (CYMBALTA) capsule 30 mg  30 mg Oral QHS    sodium chloride (NS) flush 5-40 mL  5-40 mL IntraVENous Q8H    sodium chloride (NS) flush 5-40 mL  5-40 mL IntraVENous PRN    acetaminophen (TYLENOL) tablet 650 mg  650 mg Oral Q6H PRN    traMADol (ULTRAM) tablet 50 mg  50 mg Oral Q6H PRN    naloxone (NARCAN) injection 0.4 mg  0.4 mg IntraVENous PRN    bisacodyl (DULCOLAX) suppository 10 mg  10 mg Rectal DAILY PRN    aspirin delayed-release tablet 325 mg  325 mg Oral BID        Lab Data Reviewed: (see below)  Lab Review:     Recent Labs     06/13/19  4626 06/12/19  0123 06/11/19  0604   WBC 10.4 12.6* 11.7*   HGB 11.7* 11.9* 16.1   HCT 35.2* 35.5* 47.3   * 133* 152     Recent Labs     06/13/19  0055 06/12/19  0123 06/11/19  0605 06/11/19  0604    141  --  138   K 4.0 4.2  --  3.8    109*  --  107   CO2 29 25  --  23   GLU 99 123*  --  149*   BUN 13 16  --  17   CREA 1.30 1.57*  --  1.27   CA 8.2* 7.6*  --  9.1   MG  --  1.9  --  2.1   PHOS  --  2.8  --  3.1   ALB  --   --   --  3.6   TBILI  --   --   --  1.2*   SGOT  --   --   --  17   ALT  --   --   --  21   INR  --   --  1.1  --      No results found for: GLUCPOC  No results for input(s): PH, PCO2, PO2, HCO3, FIO2 in the last 72 hours. Recent Labs     06/11/19  0605   INR 1.1     All Micro Results     None          I have reviewed notes of prior 24hr. Other pertinent lab:       Total time spent with patient: [address] discussed with: Patient, Nursing Staff and >50% of time spent in counseling and coordination of care    Discussed:  Care Plan    Prophylaxis:  SCD's    Disposition:  [facility]/Rehab           ___________________________________________________    Attending Physician: Yelena Winchester MD

## 2019-06-13 NOTE — PROGRESS NOTES
Problem: Mobility Impaired (Adult and Pediatric)  Goal: *Acute Goals and Plan of Care (Insert Text)  Description  Physical Therapy Goals  Initiated 6/12/2019  1. Patient will move from supine to sit and sit to supine  in bed with minimal assistance/contact guard assist within 7 day(s). 2.  Patient will transfer from bed to chair and chair to bed with minimal assistance/contact guard assist using the least restrictive device within 7 day(s). 3.  Patient will perform sit to stand with minimal assistance/contact guard assist within 7 day(s). 4.  Patient will ambulate with minimal assistance/contact guard assist for 75 feet with the least restrictive device within 7 day(s). 5.  Patient will ascend/descend 3 stairs with handrail(s) with moderate assistance within 7 day(s). 6.  Patient will perform LE home exercise program for strengthening to improve mobility with modified independence within 7 days. Outcome: Progressing Towards Goal  Note:   PHYSICAL THERAPY TREATMENT  Patient: Ayush North (79 y.o. male)  Date: 6/13/2019  Diagnosis: Fracture of femoral neck, left, closed ([address] [S72.002A]  Fracture of femoral neck, left, closed ([address] [S72.002A] Fracture of femoral neck, left, closed ([address]  Procedure(s) (LRB):  LEFT HIP HAM ARTHROPLASTY VERSUS TOTAL ANTERIOR APPROACH (Left) 2 Days Post-Op  Precautions: Fall, WBAT  Chart, physical therapy assessment, plan of care and goals were reviewed. ASSESSMENT:  Pt required verbal cues for sequencing bed mobility to manage BLE. Demonstrates impaired sitting balance, requiring nearly constant support to maintain upright posture. Mod A x2 for sit<>stand x 4 poor quad control on LLE with constant knee buckling. Max A x2 for SPT to chair. Pt tolerated sitting in chair, 45 min, trial use of knee immobilizer for WB. Slightly improved tolerance for WB for SPT  and side steps toward HOB. Pt fatigued quickly with activity. Mod A x2 for return to supine. Progression toward goals:  ?      Improving appropriately and progressing toward goals  ? Improving slowly and progressing toward goals  ? Not making progress toward goals and plan of care will be adjusted     PLAN:  Patient continues to benefit from skilled intervention to address the above impairments. Continue treatment per established plan of care. Discharge Recommendations:  Rehab  Further Equipment Recommendations for Discharge:  none      SUBJECTIVE:   Patient stated ? this is diffucult. ?    OBJECTIVE DATA SUMMARY:   Critical Behavior:  Neurologic State: Alert  Orientation Level: Oriented X4  Cognition: Appropriate safety awareness, Follows commands  Safety/Judgement: Awareness of environment, Fall prevention, Insight into deficits  Functional Mobility Training:  Bed Mobility:     Supine to Sit: Moderate assistance;Assist x1  Sit to Supine: Moderate assistance;Assist x2  Scooting: Minimum assistance; Additional time        Transfers:  Sit to Stand: Moderate assistance;Assist x2; Additional time  Stand to Sit: Moderate assistance;Assist x2; Additional time        Bed to Chair: Maximum assistance;Assist x2                    Balance:  Sitting: Impaired; Without support  Sitting - Static: Fair (occasional)  Sitting - Dynamic: Fair (occasional)  Standing: Impaired; With support  Standing - Static: Constant support  Standing - Dynamic : Poor  Ambulation/Gait Training:                                                        Stairs: Therapeutic Exercises:   SUPINE  EXERCISES   Sets   Reps   Active Active Assist   Passive Self ROM   Comments   Ankle Pumps   ? ?                                        ?                                        ?                                           Quad Sets   ?                                         ?                                        ?                                        ? Heel Slides   ? ?                                        ?                                        ?                                           Hip Abduction   ? ?                                        ?                                        ?                                           Glut Sets   ? ?                                        ?                                        ?                                              ?                                        ?                                        ?                                        ?                                              ?                                        ?                                        ?                                        ?                                             STANDING  EXERCISES   Sets   Reps   Active Active Assist   Passive Self ROM   Comments   Heel Raises   ? ?                                        ?                                        ?                                        In sitting   Hip Abduction   ?                                         ?                                        ?                                        ?                                              ?                                        ?                                        ?                                        ?                                              ?                                        ?                                        ?                                        ?                                             Pain:  Pain Scale 1: Numeric (0 - 10)  Pain Intensity 1: 0  Pain Location 1: Hip  Pain Orientation 1: Left     Pain Intervention(s) 1: Medication (see MAR)  Activity Tolerance:   fair  Please refer to the flowsheet for vital signs taken during this treatment. After treatment:   ? Patient left in no apparent distress sitting up in chair  ? Patient left in no apparent distress in bed  ? Call bell left within reach  ? Nursing notified  ? Caregiver present  ?  Bed alarm activated    COMMUNICATION/COLLABORATION:   The patient?s plan of care was discussed with: Registered Nurse    Fern Patricio   Time Calculation: 25 mins

## 2019-06-13 NOTE — PROGRESS NOTES
TOTAL HIP ARTHROPLASTY DAILY NOTE    POD  2 Day Post-Op s/p Procedure(s):  LEFT HIP HAM ARTHROPLASTY VERSUS TOTAL ANTERIOR APPROACH     ASSESSMENT / PLAN :   1. Pain Control : Acceptable - Some pain at times, but the patient does not wish to increase their medications  2. Overnight Issues : none  3. Wound or incisional issue : Healing incision with no visible drainage  4. Therapy / Weight Bearing Recommendations : Weight bear as tolerated with use of a walker and two person assist while mobilizing  5. DVT Prophylaxis : Mechanical and Aspirin and mechanical lower extremity compression device  BID  6. Disposition : Rehab pending, limited progress with PT possibly related to underlying sjogrens  7. CKD: avoid NSAIDS, slight improvement since admission  8. Constipation: acute on chronic, will inc bowel regimen and PRN suppository ordered  9. Sjogrens: continue prednisone        SUBJECTIVE :     PEDRO overnight. Pain controlled. Voiding now w/o difficulty. No abdominal pain. 1 BM but has sensation of constipation which is a chronic issue for him. Desires another BM. Tolerating a regular diet. Denies CP/SOB/abd pain or other complaints. OBJECTIVE :     Vitals:    06/12/19 1513 06/12/19 1909 06/12/19 2253 06/13/19 0311   BP: 132/65 137/69 118/71 134/64   Pulse: 78 75 64 78   Resp: 16 16 16 16   Temp: 99 °F (37.2 °C) 98.6 °F (37 °C) 98.4 °F (36.9 °C) 98.2 °F (36.8 °C)   SpO2: 93% 93% 92% 92%   Weight:       Height:           Alert and oriented x3. left exam of the hip reveals that the dressing is clean, dry and intact. The patient has + quadriceps, ankle DF/PF, EHL/FHL  Sensation is intact to light touch distally  No calf pain.      Labs:  Recent Labs     06/13/19  0055  06/11/19  0605   HGB 11.7*   < >  --    HCT 35.2*   < >  --    INR  --   --  1.1      < >  --    K 4.0   < >  --       < >  --    CO2 29   < >  --    BUN 13   < >  --    CREA 1.30   < >  --    GLU 99   < >  --     < > = values in this interval not displayed. Patient mobility  Gait  Base of Support: Widened  Speed/Mary: Slow  Step Length: Left shortened, Right shortened  Stance: Left decreased  Gait Abnormalities: Antalgic, Decreased step clearance, Step to gait  Ambulation - Level of Assistance: Assist x2, Moderate assistance  Distance (ft): 4 Feet (ft)  Assistive Device: Gait belt, Walker, rolling        [employer]  Alexys Cruz Sparkle [facility] [phone]  Medical Staff : [facility]  Office : [phone]

## 2019-06-13 NOTE — PROGRESS NOTES
Problem: Mobility Impaired (Adult and Pediatric)  Goal: *Acute Goals and Plan of Care (Insert Text)  Description  Physical Therapy Goals  Initiated 6/12/2019  1. Patient will move from supine to sit and sit to supine  in bed with minimal assistance/contact guard assist within 7 day(s). 2.  Patient will transfer from bed to chair and chair to bed with minimal assistance/contact guard assist using the least restrictive device within 7 day(s). 3.  Patient will perform sit to stand with minimal assistance/contact guard assist within 7 day(s). 4.  Patient will ambulate with minimal assistance/contact guard assist for 75 feet with the least restrictive device within 7 day(s). 5.  Patient will ascend/descend 3 stairs with handrail(s) with moderate assistance within 7 day(s). 6.  Patient will perform LE home exercise program for strengthening to improve mobility with modified independence within 7 days. 6/13/2019 1626 by Mariya Jameson  Outcome: Progressing Towards Goal  Note:   PHYSICAL THERAPY TREATMENT  Patient: Mariya Verdin (79 y.o. male)  Date: 6/13/2019  Diagnosis: Fracture of femoral neck, left, closed ([address] [S72.002A]  Fracture of femoral neck, left, closed ([address] [S72.002A] Fracture of femoral neck, left, closed ([address]  Procedure(s) (LRB):  LEFT HIP HAM ARTHROPLASTY VERSUS TOTAL ANTERIOR APPROACH (Left) 2 Days Post-Op  Precautions: Fall, WBAT  Chart, physical therapy assessment, plan of care and goals were reviewed. ASSESSMENT:  Pt received in bed, agreeable to participate with physical therapy. Reviewed HEP in supine. Min-Mod A x1 to come to sitting EOB. Demonstrates poor dynamic sitting balance with posterior LOB. Mod A x2 for sit<>stand using RW. L knee buckling with any attempt for WB, immobilizer donned to maintain knee extension, pt performed gait training x 5' with Mod A x 2 with Max verbal cues for sequencing. Pt fatigued quickly and requested to return to bed.  Requires verbal cues for safe technique for sit>stand tranfers. Pt currently requires increase level of assistance for all transfers and gait training and is not safe to return home. Pt is motivated to progress and would tolerate 3 hours of therapy daily. Progression toward goals:  ?      Improving appropriately and progressing toward goals  ? Improving slowly and progressing toward goals  ? Not making progress toward goals and plan of care will be adjusted     PLAN:  Patient continues to benefit from skilled intervention to address the above impairments. Continue treatment per established plan of care. Discharge Recommendations:  Inpatient Rehab  Further Equipment Recommendations for Discharge:  none      SUBJECTIVE:   I am doing ok, it hurts, but ok    OBJECTIVE DATA SUMMARY:   Functional Mobility Training:  Bed Mobility:     Supine to Sit: Additional time;Assist x1;Minimum assistance  Sit to Supine: Moderate assistance; Additional time  Scooting: Minimum assistance;Assist x1;Additional time        Transfers:  Sit to Stand: Moderate assistance;Assist x2  Stand to Sit: Moderate assistance;Assist x2; Additional time        Bed to Chair: Maximum assistance;Assist x2                    Ambulation/Gait Training:  Distance (ft): 5 Feet (ft)  Assistive Device: Walker, rolling;Gait belt(immobilizer LLE)  Ambulation - Level of Assistance: Moderate assistance;Assist x2; Additional time        Gait Abnormalities: Decreased step clearance; Step to gait; Path deviations(L knee buckling)        Base of Support: Widened     Speed/Mary: Shuffled                       Stairs: Therapeutic Exercises:   Exercises performed per protocol. See morning treatment note for description. Pain:                    Activity Tolerance:   fair  Please refer to the flowsheet for vital signs taken during this treatment. After treatment:   ? Patient left in no apparent distress sitting up in chair  ?  Patient left in no apparent distress in bed  ? Call bell left within reach  ? Nursing notified  ? Caregiver present  ? Bed alarm activated    COMMUNICATION/COLLABORATION:   The patient?s plan of care was discussed with: Registered Nurse    Renetta Briggs   Time Calculation: 28 mins                          6/13/2019 1230 by Alhaji Colunga  Outcome: Progressing Towards Goal  Note:   PHYSICAL THERAPY TREATMENT  Patient: Leticia Abbott (55 y.o. male)  Date: 6/13/2019  Diagnosis: Fracture of femoral neck, left, closed ([address] [S72.002A]  Fracture of femoral neck, left, closed ([address] [S72.002A] Fracture of femoral neck, left, closed ([address]  Procedure(s) (LRB):  LEFT HIP HAM ARTHROPLASTY VERSUS TOTAL ANTERIOR APPROACH (Left) 2 Days Post-Op  Precautions: Fall, WBAT  Chart, physical therapy assessment, plan of care and goals were reviewed. ASSESSMENT:  Pt required verbal cues for sequencing bed mobility to manage BLE. Demonstrates impaired sitting balance, requiring nearly constant support to maintain upright posture. Mod A x2 for sit<>stand x 4 poor quad control on LLE with constant knee buckling. Max A x2 for SPT to chair. Pt tolerated sitting in chair, 45 min, trial use of knee immobilizer for WB. Slightly improved tolerance for WB for SPT  and side steps toward HOB. Pt fatigued quickly with activity. Mod A x2 for return to supine. Progression toward goals:  ?      Improving appropriately and progressing toward goals  ? Improving slowly and progressing toward goals  ? Not making progress toward goals and plan of care will be adjusted     PLAN:  Patient continues to benefit from skilled intervention to address the above impairments. Continue treatment per established plan of care. Discharge Recommendations:  Rehab  Further Equipment Recommendations for Discharge:  none      SUBJECTIVE:   Patient stated ? this is diffucult. ?    OBJECTIVE DATA SUMMARY:   Critical Behavior:  Neurologic State: Alert  Orientation Level: Oriented X4  Cognition: Appropriate safety awareness, Follows commands  Safety/Judgement: Awareness of environment, Fall prevention, Insight into deficits  Functional Mobility Training:  Bed Mobility:     Supine to Sit: Moderate assistance;Assist x1  Sit to Supine: Moderate assistance;Assist x2  Scooting: Minimum assistance; Additional time        Transfers:  Sit to Stand: Moderate assistance;Assist x2; Additional time  Stand to Sit: Moderate assistance;Assist x2; Additional time        Bed to Chair: Maximum assistance;Assist x2                    Balance:  Sitting: Impaired; Without support  Sitting - Static: Fair (occasional)  Sitting - Dynamic: Fair (occasional)  Standing: Impaired; With support  Standing - Static: Constant support  Standing - Dynamic : Poor  Ambulation/Gait Training:                                                        Stairs: Therapeutic Exercises:   SUPINE  EXERCISES   Sets   Reps   Active Active Assist   Passive Self ROM   Comments   Ankle Pumps   ? ?                                        ?                                        ?                                           Quad Sets   ? ?                                        ?                                        ?                                           Heel Slides   ? ?                                        ?                                        ?                                           Hip Abduction   ? ?                                        ?                                        ?                                           Glut Sets   ?                                         ?                                        ?                                        ?                                              ?                                        ? ?                                        ?                                              ?                                        ?                                        ?                                        ?                                             STANDING  EXERCISES   Sets   Reps   Active Active Assist   Passive Self ROM   Comments   Heel Raises   ? ?                                        ?                                        ?                                        In sitting   Hip Abduction   ? ?                                        ?                                        ?                                              ?                                        ?                                        ?                                        ?                                              ?                                        ?                                        ?                                        ?                                             Pain:  Pain Scale 1: Numeric (0 - 10)  Pain Intensity 1: 0  Pain Location 1: Hip  Pain Orientation 1: Left     Pain Intervention(s) 1: Medication (see MAR)  Activity Tolerance:   fair  Please refer to the flowsheet for vital signs taken during this treatment. After treatment:   ? Patient left in no apparent distress sitting up in chair  ? Patient left in no apparent distress in bed  ? Call bell left within reach  ? Nursing notified  ? Caregiver present  ?  Bed alarm activated    COMMUNICATION/COLLABORATION:   The patient?s plan of care was discussed with: Registered Nurse    Fern Patricio   Time Calculation: 25 mins

## 2019-06-13 NOTE — PROGRESS NOTES
Problem: Self Care Deficits Care Plan (Adult)  Goal: *Acute Goals and Plan of Care (Insert Text)  Description  Occupational Therapy Goals  Initiated 6/12/2019     1. Patient will perform lower body dressing at minimal assistance/contact guard assist level within 7 days. 2. Patient will perform toilet transfers at minimal assistance/contact guard assist level using Walkers, Type: Rolling Walker  within 7 days. 3. Patient will perform all aspects of toileting at minimal assistance/contact guard assist level within 7 days. Note:   OCCUPATIONAL THERAPY TREATMENT  Patient: Simeon Sow (24 y.o. male)  Date: 6/13/2019  Diagnosis: Fracture of femoral neck, left, closed ([address] [S72.002A]  Fracture of femoral neck, left, closed ([address] [S72.002A] Fracture of femoral neck, left, closed ([address]  Procedure(s) (LRB):  LEFT HIP HAM ARTHROPLASTY VERSUS TOTAL ANTERIOR APPROACH (Left) 2 Days Post-Op  Precautions: Fall, WBAT  Chart, occupational therapy assessment, plan of care, and goals were reviewed. ASSESSMENT:  Pt agreeable to OT and wanting to don underwear. Supine to sit and pt with impaired sitting balance to LB dressing task. Showed him how to don underwear over L foot with reacher. He needed assist to don over R foot as well. Assist x 2 to stand and unable to release walker with one hand to bring pants over hips. Pt with impaired standing balance as well. Pt transferred to chair. Recommend rehab. Progression toward goals:  ?       Improving appropriately and progressing toward goals  ? Improving slowly and progressing toward goals  ? Not making progress toward goals and plan of care will be adjusted     PLAN:  Patient continues to benefit from skilled intervention to address the above impairments. Continue treatment per established plan of care. Discharge Recommendations:  Rehab  Further Equipment Recommendations for Discharge:  None     SUBJECTIVE:   Patient stated ? I would like to put on underwear.?    OBJECTIVE DATA SUMMARY:   Cognitive/Behavioral Status:  Neurologic State: Alert  Orientation Level: Oriented X4  Cognition: Follows commands             Functional Mobility and Transfers for ADLs:  Bed Mobility:  Supine to Sit: Moderate assistance;Assist x1  Sit to Supine: Moderate assistance;Assist x2  Scooting: Minimum assistance; Additional time    Transfers:  Sit to Stand: Moderate assistance;Assist x2; Additional time     Bed to Chair: Maximum assistance;Assist x2    Balance:  Sitting: Impaired; Without support  Sitting - Static: Fair (occasional)  Sitting - Dynamic: Fair (occasional)  Standing: Impaired; With support  Standing - Static: Constant support  Standing - Dynamic : Poor    ADL Intervention:       Lower Body Dressing Assistance  Underpants: Maximum assistance  Leg Crossed Method Used: No  Position Performed: Seated edge of bed  Cues: Don;Physical assistance;Verbal cues provided  Adaptive Equipment Used: Reacher          Pain:  Pain Scale 1: Numeric (0 - 10)  Pain Intensity 1: 0              Activity Tolerance:   Fair  Please refer to the flowsheet for vital signs taken during this treatment. After treatment:   ? Patient left in no apparent distress sitting up in chair  ? Patient left in no apparent distress in bed  ? Call bell left within reach  ? Nursing notified  ? Caregiver present  ?  Bed alarm activated    COMMUNICATION/COLLABORATION:   The patient?s plan of care was discussed with: Physical Therapy Assistant, Occupational Therapist and Registered Nurse    GIOVANNY Villatoro  Time Calculation: 21 mins

## 2019-06-13 NOTE — PROGRESS NOTES
6/13/2019 4:11 PM [facility] acute rehab is still pending, spoke with [address] [place] [address] liaison, they will review with their MD.   [facility] with CM manager and MD. Also spoke with pt's wife requested additional IPR placement preference. CM reviewed options, pt's wife was resistant but was agreeable to CM sending referral to [facility] but relayed this is her \"last choice\". Referral sent to [facility]. CM will follow up.     6/13/2019  11:39 AM [facility] does not have beds available today, not anticipating bed available until 6/15. Referral pending for [address] [place] [address] acute rehab, spoke with liaison who will follow up with CM. Spoke with pt's wife and pt's MD, pt and pt's wife are refusing for pt to discharge to [address] [place] [address]. Pt and pt's wife only want pt to discharge to [facility]. CM will follow up.     6/13/2019  9:10 AM Pt has been accepted by [facility], pending bed availability. Referral pending with [address] [place] [address] acute rehab. Bundle payment sheet sent to [facility] and [address] [place] [address] via [employer]. CM will follow up.    KATHY Mohamud

## 2019-06-14 VITALS
TEMPERATURE: 98.3 F | HEIGHT: 73 IN | DIASTOLIC BLOOD PRESSURE: 72 MMHG | RESPIRATION RATE: 16 BRPM | HEART RATE: 81 BPM | BODY MASS INDEX: 24.52 KG/M2 | SYSTOLIC BLOOD PRESSURE: 147 MMHG | OXYGEN SATURATION: 94 % | WEIGHT: 185 LBS

## 2019-06-14 PROCEDURE — 74011636637 HC RX REV CODE- 636/637: Performed by: ORTHOPAEDIC SURGERY

## 2019-06-14 PROCEDURE — 74011250637 HC RX REV CODE- 250/637: Performed by: ORTHOPAEDIC SURGERY

## 2019-06-14 PROCEDURE — 97116 GAIT TRAINING THERAPY: CPT

## 2019-06-14 PROCEDURE — 94760 N-INVAS EAR/PLS OXIMETRY 1: CPT

## 2019-06-14 PROCEDURE — 74011250637 HC RX REV CODE- 250/637: Performed by: INTERNAL MEDICINE

## 2019-06-14 PROCEDURE — 97530 THERAPEUTIC ACTIVITIES: CPT

## 2019-06-14 RX ORDER — ACETAMINOPHEN 325 MG/1
650 TABLET ORAL
Qty: 10 TAB | Refills: 0 | Status: SHIPPED
Start: 2019-06-14

## 2019-06-14 RX ORDER — OXYCODONE HYDROCHLORIDE 5 MG/1
5 TABLET ORAL
Qty: 10 TAB | Refills: 0 | Status: SHIPPED | OUTPATIENT
Start: 2019-06-14 | End: 2019-06-17

## 2019-06-14 RX ADMIN — ASPIRIN 325 MG: 325 TABLET, COATED ORAL at 09:14

## 2019-06-14 RX ADMIN — OXYCODONE HYDROCHLORIDE 5 MG: 5 TABLET ORAL at 13:56

## 2019-06-14 RX ADMIN — ACETAMINOPHEN 650 MG: 325 TABLET ORAL at 02:01

## 2019-06-14 RX ADMIN — ACETAMINOPHEN 650 MG: 325 TABLET ORAL at 07:01

## 2019-06-14 RX ADMIN — Medication 5 ML: at 14:00

## 2019-06-14 RX ADMIN — OXYCODONE HYDROCHLORIDE 5 MG: 5 TABLET ORAL at 07:04

## 2019-06-14 RX ADMIN — POLYETHYLENE GLYCOL 3350 17 G: 17 POWDER, FOR SOLUTION ORAL at 09:14

## 2019-06-14 RX ADMIN — PANTOPRAZOLE SODIUM 40 MG: 40 TABLET, DELAYED RELEASE ORAL at 07:01

## 2019-06-14 RX ADMIN — ACETAMINOPHEN 650 MG: 325 TABLET ORAL at 13:55

## 2019-06-14 RX ADMIN — PREDNISONE 5 MG: 5 TABLET ORAL at 09:14

## 2019-06-14 NOTE — PROGRESS NOTES
[facility]  [address] [facility], [address] [place] [address]  [phone]      Medical Progress Note      NAME: Vandana Marshall   :  1940  MRM:  [id number]    Date/Time: 2019  11:28 AM       Assessment and Plan:   1. Fracture of femoral neck, left, closed. POA. From a fall. Likely spurred on by chronic prednisone. s/p ORIF 19 with L hemiarthroplasty. Continue PT/OT. Awaiting rehab      2. Sjogren's syndrome ([facility] (2016). Cont chronic prednisone     3. CKD (chronic kidney disease), stage III ([facility]) (). monitor           Subjective:     Chief Complaint:  Follow up of pt who was admitted with hip fracture. ROS:  (bold if positive, if negative)      Tolerating PT  Tolerating Diet        Objective:     Last 24hrs VS reviewed since prior progress note.  Most recent are:    Visit Vitals  /81 (BP 1 Location: Right arm, BP Patient Position: At rest;Head of bed elevated (Comment degrees))   Pulse 85   Temp 98.3 °F (36.8 °C)   Resp 16   Ht 6' 1\" (1.854 m)   Wt 83.9 kg (185 lb)   SpO2 92%   BMI 24.41 kg/m²     SpO2 Readings from Last 6 Encounters:   19 92%   12/10/17 95%   10/20/17 95%   17 95%   17 95%    O2 Flow Rate (L/min): 4 l/min       Intake/Output Summary (Last 24 hours) at 2019 1055  Last data filed at 2019 0400  Gross per 24 hour   Intake 0 ml   Output 700 ml   Net -700 ml        Physical Exam:    Gen:  Well-developed, well-nourished, in no acute distress  HEENT:  Pink conjunctivae, PERRL, hearing intact to voice, moist mucous membranes  Neck:  Supple, without masses, thyroid non-tender  Resp:  No accessory muscle use, clear breath sounds without wheezes rales or rhonchi  Card:  No murmurs, normal S1, S2 without thrills, bruits or peripheral edema  Abd:  Soft, non-tender, non-distended, normoactive bowel sounds are present, no palpable organomegaly and no detectable hernias  Lymph:  No cervical or inguinal adenopathy  Musc:  No cyanosis or clubbing  Skin:  No rashes or ulcers, skin turgor is good  Neuro:  Cranial nerves are grossly intact, no focal motor weakness, follows commands appropriately  Psych:  Good insight, oriented to person, place and time, alert  __________________________________________________________________  Medications Reviewed: (see below)  Medications:     Current Facility-Administered Medications   Medication Dose Route Frequency    magnesium hydroxide (MILK OF MAGNESIA) 400 mg/5 mL oral suspension 30 mL  30 mL Oral Q6H    calcium carbonate (TUMS) chewable tablet 200 mg [elemental]  200 mg Oral DAILY PRN    pantoprazole (PROTONIX) tablet 40 mg  40 mg Oral ACB    0.9% sodium chloride infusion  75 mL/hr IntraVENous CONTINUOUS    sodium chloride (NS) flush 5-40 mL  5-40 mL IntraVENous Q8H    sodium chloride (NS) flush 5-40 mL  5-40 mL IntraVENous PRN    naloxone (NARCAN) injection 0.4 mg  0.4 mg IntraVENous PRN    oxyCODONE IR (ROXICODONE) tablet 5 mg  5 mg Oral Q4H PRN    HYDROmorphone (PF) (DILAUDID) injection 0.5 mg  0.5 mg IntraVENous Q4H PRN    prochlorperazine (COMPAZINE) injection 10 mg  10 mg IntraVENous Q6H PRN    acetaminophen (TYLENOL) tablet 650 mg  650 mg Oral Q6H    predniSONE (DELTASONE) tablet 5 mg  5 mg Oral DAILY WITH BREAKFAST    senna-docusate (PERICOLACE) 8.6-50 mg per tablet 1 Tab  1 Tab Oral QPM    polyethylene glycol (MIRALAX) packet 17 g  17 g Oral DAILY    DULoxetine (CYMBALTA) capsule 30 mg  30 mg Oral QHS    sodium chloride (NS) flush 5-40 mL  5-40 mL IntraVENous Q8H    sodium chloride (NS) flush 5-40 mL  5-40 mL IntraVENous PRN    acetaminophen (TYLENOL) tablet 650 mg  650 mg Oral Q6H PRN    traMADol (ULTRAM) tablet 50 mg  50 mg Oral Q6H PRN    naloxone (NARCAN) injection 0.4 mg  0.4 mg IntraVENous PRN    bisacodyl (DULCOLAX) suppository 10 mg  10 mg Rectal DAILY PRN    aspirin delayed-release tablet 325 mg  325 mg Oral BID        Lab Data Reviewed: (see below)  Lab Review:     Recent Labs     06/13/19  0055 06/12/19  0123   WBC 10.4 12.6*   HGB 11.7* 11.9*   HCT 35.2* 35.5*   * 133*     Recent Labs     06/13/19  0055 06/12/19  0123    141   K 4.0 4.2    109*   CO2 29 25   GLU 99 123*   BUN 13 16   CREA 1.30 1.57*   CA 8.2* 7.6*   MG  --  1.9   PHOS  --  2.8     No results found for: GLUCPOC  No results for input(s): PH, PCO2, PO2, HCO3, FIO2 in the last 72 hours. No results for input(s): INR in the last 72 hours. No lab exists for component: INREXT, INREXT  All Micro Results     None          I have reviewed notes of prior 24hr. Other pertinent lab:       Total time spent with patient: 30 [address] discussed with: Patient, Nursing Staff and >50% of time spent in counseling and coordination of care    Discussed:  Care Plan    Prophylaxis:  SCD's    Disposition:  [facility]/Rehab           ___________________________________________________    Attending Physician: Joo Najera MD

## 2019-06-14 NOTE — PROGRESS NOTES
Alert and oriented x 4. Denies any complaints at present. Left hip dressing dry and intact. Small amount of redness noted to area. Ice pack applied to area. No signs of distress noted.

## 2019-06-14 NOTE — PROGRESS NOTES
TOTAL HIP ARTHROPLASTY DAILY NOTE    POD  3 Day Post-Op s/p Procedure(s):  LEFT HIP HAM ARTHROPLASTY VERSUS TOTAL ANTERIOR APPROACH     ASSESSMENT / PLAN :   1. Pain Control : Acceptable - Some pain at times, but the patient does not wish to increase their medications  2. Wound or incisional issue : Healing incision with no visible drainage  3. Therapy / Weight Bearing Recommendations : Weight bear as tolerated with use of a walker and two person assist while mobilizing, left quad weakness benefited by knee brace to prevent buckling. 4. DVT Prophylaxis : Mechanical and Aspirin and mechanical lower extremity compression device  BID  5. Disposition : Rehab pending  6. CKD: avoid NSAIDS, slight improvement since admission  7. Constipation: acute on chronic, will inc bowel regimen and PRN suppository ordered  8. Sjogrens: continue prednisone        SUBJECTIVE :     PEDRO overnight. Pain controlled. Voiding w/o difficulty. No abdominal pain. Tolerating a regular diet. Denies CP/SOB/abd pain or other complaints. Frustrated with rehab approval process and concerned about cost.        OBJECTIVE :     Vitals:    06/13/19 1521 06/13/19 1924 06/13/19 2317 06/14/19 0407   BP: 135/70 133/66 125/77 145/88   Pulse: 92 95 79 77   Resp: 16 16 16 16   Temp: 97.9 °F (36.6 °C) 99 °F (37.2 °C) 98.3 °F (36.8 °C) 98.6 °F (37 °C)   SpO2: 94% 94% 96% 94%   Weight:       Height:           Alert and oriented x3. left exam of the hip reveals that the dressing is clean, dry and intact. The patient has + quadriceps, ankle DF/PF, EHL/FHL. Does have 3/5 quad strength but intact and fires quad. 4/5 ankle DF/PF and EHL/FHL  Sensation is intact to light touch distally sural/saph/DP/SP/PT  PT 2+  No calf pain.      Labs:  Recent Labs     06/13/19  0055  06/11/19  0605   HGB 11.7*   < >  --    HCT 35.2*   < >  --    INR  --   --  1.1      < >  --    K 4.0   < >  --       < >  --    CO2 29   < >  --    BUN 13   < >  --    CREA 1.30   < >  --    GLU 99   < >  --     < > = values in this interval not displayed. Patient mobility  Gait  Base of Support: Widened  Speed/Mary: Shuffled  Step Length: Left shortened, Right shortened  Stance: Left decreased  Gait Abnormalities: Decreased step clearance, Step to gait, Path deviations(L knee buckling)  Ambulation - Level of Assistance: Moderate assistance, Assist x2, Additional time  Distance (ft): 5 Feet (ft)  Assistive Device: Walker, rolling, Gait belt(immobilizer LLE)        [facility] [place] [facility] [phone]  Medical Staff : [facility]  Office : [phone]

## 2019-06-14 NOTE — DISCHARGE INSTRUCTIONS
ACUTE DIAGNOSES:  Fracture of femoral neck, left, closed ([place] [address].) [S72.002A]  Fracture of femoral neck, left, closed ([place] [address].) Julien Foot    CHRONIC MEDICAL DIAGNOSES:  Problem List as of 6/14/2019 Date Reviewed: 6/11/2019          Codes Class Noted - Resolved    * (Principal) Fracture of femoral neck, left, closed ([place] [address] ICD-10-CM: H82.716M  ICD-9-CM: 820.8  6/11/2019 - Present        UTI (urinary tract infection) ICD-10-CM: N39.0  ICD-9-CM: 599.0  10/12/2017 - Present        Urine retention ICD-10-CM: R33.9  ICD-9-CM: 788.20  Unknown - Present        Sepsis ([place] [address].) ICD-10-CM: A41.9  ICD-9-CM: 038.9, 995.91  10/12/2017 - Present        Leukocytosis ICD-10-CM: C98.383  ICD-9-CM: 288.60  10/12/2017 - Present        Fever ICD-10-CM: R50.9  ICD-9-CM: 780.60  10/12/2017 - Present        Renal insufficiency ICD-10-CM: N28.9  ICD-9-CM: 593.9  10/12/2017 - Present        CKD (chronic kidney disease), stage III ([place] [address].) ICD-10-CM: N18.3  ICD-9-CM: 075. 3  Unknown - Present        Nausea & vomiting ICD-10-CM: R11.2  ICD-9-CM: 787.01  10/12/2017 - Present        Cough ICD-10-CM: R05  ICD-9-CM: 786.2  10/12/2017 - Present        Dehydration ICD-10-CM: E86.0  ICD-9-CM: 276.51  10/12/2017 - Present        Hypoxia ICD-10-CM: R09.02  ICD-9-CM: 799.02  10/12/2017 - Present        Sinus tachycardia ICD-10-CM: R00.0  ICD-9-CM: 427.89  10/12/2017 - Present        Sjogren's syndrome ([address] ICD-10-CM: M35.00  ICD-9-CM: 710.2  4/1/2016 - Present              DISCHARGE MEDICATIONS:          · It is important that you take the medication exactly as they are prescribed. · Keep your medication in the bottles provided by the pharmacist and keep a list of the medication names, dosages, and times to be taken in your wallet. · Do not take other medications without consulting your doctor.        DIET:  Cardiac Diet    ACTIVITY: Activity as tolerated    ADDITIONAL INFORMATION: If you experience any of the following symptoms then please call your primary care physician or return to the emergency room if you cannot get hold of your doctor: Fever, chills, nausea, vomiting, diarrhea, change in mentation, falling, bleeding, shortness of breath. FOLLOW UP CARE:  Dr. Frandy Herbert, DO  you are to call and set up an appointment to see them in 2 weeks. Follow-up with Dr Austen Bardales, orthopedics in 2 weeks      Information obtained by :  I understand that if any problems occur once I am at home I am to contact my physician. I understand and acknowledge receipt of the instructions indicated above.                                                                                                                                            Physician's or R.N.'s Signature                                                                  Date/Time                                                                                                                                              Patient or Representative Signature                                                          Date/Time

## 2019-06-14 NOTE — PROGRESS NOTES
Problem: Mobility Impaired (Adult and Pediatric)  Goal: *Acute Goals and Plan of Care (Insert Text)  Description  Physical Therapy Goals  Initiated 6/12/2019  1. Patient will move from supine to sit and sit to supine  in bed with minimal assistance/contact guard assist within 7 day(s). 2.  Patient will transfer from bed to chair and chair to bed with minimal assistance/contact guard assist using the least restrictive device within 7 day(s). 3.  Patient will perform sit to stand with minimal assistance/contact guard assist within 7 day(s). 4.  Patient will ambulate with minimal assistance/contact guard assist for 75 feet with the least restrictive device within 7 day(s). 5.  Patient will ascend/descend 3 stairs with handrail(s) with moderate assistance within 7 day(s). 6.  Patient will perform LE home exercise program for strengthening to improve mobility with modified independence within 7 days. 6/14/2019 1239 by Bria Gutierrez  Outcome: Progressing Towards Goal  Note:   PHYSICAL THERAPY TREATMENT  Patient: Elio Arambula (89 y.o. male)  Date: 6/14/2019  Diagnosis: Fracture of femoral neck, left, closed ([address] [S72.002A]  Fracture of femoral neck, left, closed ([address] [S72.002A] Fracture of femoral neck, left, closed ([address]  Procedure(s) (LRB):  LEFT HIP HAM ARTHROPLASTY VERSUS TOTAL ANTERIOR APPROACH (Left) 3 Days Post-Op  Precautions: Fall, WBAT  Chart, physical therapy assessment, plan of care and goals were reviewed. ASSESSMENT:  Tolerated sitting in chiar nearly one hour , pt anxious to return to bed. Improved tolerance for transfer BTB, demonstrating increased WB on LLE, with immobilizer in place, to advance RLE. Decreased safety awareness with stand>sit transfers and requires Mod A to manage LLE and trunk stability for sit>supine. Progression toward goals:  ?    Improving appropriately and progressing toward goals  ? Improving slowly and progressing toward goals  ?     Not making progress toward goals and plan of care will be adjusted     PLAN:  Patient continues to benefit from skilled intervention to address the above impairments. Continue treatment per established plan of care. Discharge Recommendations:  Inpatient Rehab  Further Equipment Recommendations for Discharge:  none      SUBJECTIVE:   Patient stated ?get me back over there please. ?    OBJECTIVE DATA SUMMARY:   Critical Behavior:  Neurologic State: Alert  Orientation Level: Oriented X4  Cognition: Follows commands  Safety/Judgement: Awareness of environment, Fall prevention, Insight into deficits  Functional Mobility Training:  Bed Mobility:     Supine to Sit: Moderate assistance;Assist x1  Sit to Supine: Moderate assistance;Assist x1  Scooting: Minimum assistance;Assist x1;Additional time        Transfers:  Sit to Stand: Moderate assistance;Assist x2  Stand to Sit: Moderate assistance;Assist x2; Additional time        Bed to Chair: Moderate assistance;Assist x2                    Balance:  Sitting: High guard; Intact  Sitting - Static: Good (unsupported)  Sitting - Dynamic: Fair (occasional)  Standing: Impaired; With support  Standing - Static: Constant support  Standing - Dynamic : Poor  Ambulation/Gait Training:  Distance (ft): 5 Feet (ft)  Assistive Device: Gait belt;Brace/Splint; Walker, rolling(immobilizer LLE with WB)  Ambulation - Level of Assistance: Moderate assistance;Assist x2        Gait Abnormalities: Decreased step clearance; Path deviations        Base of Support: Widened;Shift to right     Speed/Mary: Slow;Shuffled                       Stairs:              Neuro Re-Education:    Therapeutic Exercises:     Pain:  Pain Scale 1: Numeric (0 - 10)  Pain Intensity 1: 0              Activity Tolerance:   fair  Please refer to the flowsheet for vital signs taken during this treatment. After treatment:   ?    Patient left in no apparent distress sitting up in chair  ? Patient left in no apparent distress in bed  ? Call bell left within reach  ? Nursing notified  ? Caregiver present  ? Bed alarm activated    COMMUNICATION/COLLABORATION:   The patient?s plan of care was discussed with: Registered Nurse    Manisha Lopez   Time Calculation: 15 mins              6/14/2019 1104 by Mike Elizabeth  Outcome: Progressing Towards Goal  Note:   PHYSICAL THERAPY TREATMENT  Patient: Nathan Wong (5 y.o. male)  Date: 6/14/2019  Diagnosis: Fracture of femoral neck, left, closed ([address] [S72.002A]  Fracture of femoral neck, left, closed ([address] [S72.002A] Fracture of femoral neck, left, closed ([address]  Procedure(s) (LRB):  LEFT HIP HAM ARTHROPLASTY VERSUS TOTAL ANTERIOR APPROACH (Left) 3 Days Post-Op  Precautions: Fall, WBAT  Chart, physical therapy assessment, plan of care and goals were reviewed. ASSESSMENT:  Pt requires mod A x1 for bed mobility, continued poor motor control LLE, donned immobilizer for improved knee exension with WB. Mod A x2 with assist to advance LLE x 5' using RW verbal uces for sequencing. Pt fatigues quickly and returned to chair. Pt is well below baseline and would benefit from intense rehab to improve functional mobility and acitivyt tolerance to maximize independence. Progression toward goals:  ?      Improving appropriately and progressing toward goals  ? Improving slowly and progressing toward goals  ? Not making progress toward goals and plan of care will be adjusted     PLAN:  Patient continues to benefit from skilled intervention to address the above impairments. Continue treatment per established plan of care. Discharge Recommendations:  Inpatient Rehab  Further Equipment Recommendations for Discharge:  none      SUBJECTIVE:   Patient stated ? L leg still nothing. ?    OBJECTIVE DATA SUMMARY:   Critical Behavior:  Neurologic State: Alert  Orientation Level: Oriented X4  Cognition: Appropriate safety awareness, Follows commands  Safety/Judgement: Awareness of environment, Fall prevention, Insight into deficits  Functional Mobility Training:  Bed Mobility:     Supine to Sit: Moderate assistance;Assist x1     Scooting: Minimum assistance;Assist x1;Additional time        Transfers:  Sit to Stand: Moderate assistance;Assist x2; Additional time  Stand to Sit: Moderate assistance;Assist x2; Additional time                             Balance:  Sitting: High guard; Intact  Sitting - Static: Good (unsupported)  Sitting - Dynamic: Fair (occasional)  Standing: Impaired; With support  Standing - Static: Constant support  Standing - Dynamic : Poor  Ambulation/Gait Training:  Distance (ft): 5 Feet (ft)  Assistive Device: Gait belt;Brace/Splint; Walker, rolling(immobilizer LLE with WB)  Ambulation - Level of Assistance: Moderate assistance;Assist x2        Gait Abnormalities: Decreased step clearance; Path deviations        Base of Support: Widened;Shift to right     Speed/Mary: Slow;Shuffled                       Stairs: Therapeutic Exercises:   SUPINE  EXERCISES   Sets   Reps   Active Active Assist   Passive Self ROM   Comments   Ankle Pumps   ? ?                                        ?                                        ?                                           Quad Sets   ? ?                                        ?                                        ?                                           Heel Slides   ? ?                                        ?                                        ?                                           Hip Abduction   ? ?                                        ?                                        ?                                           Glut Sets   ?                                         ?                                        ?                                        ? ?                                        ?                                        ?                                        ?                                              ?                                        ?                                        ?                                        ?                                             STANDING  EXERCISES   Sets   Reps   Active Active Assist   Passive Self ROM   Comments   Heel Raises   ? ?                                        ?                                        ?                                           Hip Abduction   ? ?                                        ?                                        ?                                              ?                                        ?                                        ?                                        ?                                              ?                                        ?                                        ?                                        ?                                             Pain:  Pain Scale 1: Numeric (0 - 10)  Pain Intensity 1: 0              Activity Tolerance:   fair  Please refer to the flowsheet for vital signs taken during this treatment. After treatment:   ? Patient left in no apparent distress sitting up in chair  ? Patient left in no apparent distress in bed  ? Call bell left within reach  ? Nursing notified  ? Caregiver present  ?  Bed alarm activated    COMMUNICATION/COLLABORATION:   The patient?s plan of care was discussed with: Registered Nurse    Serjio Allison   Time Calculation: 30 mins

## 2019-06-14 NOTE — PROGRESS NOTES
Problem: Falls - Risk of  Goal: *Absence of Falls  Description  Document Jamar Fleming Fall Risk and appropriate interventions in the flowsheet. Outcome: Resolved/Met     Problem: Patient Education: Go to Patient Education Activity  Goal: Patient/Family Education  Outcome: Resolved/Met     Problem: Pressure Injury - Risk of  Goal: *Prevention of pressure injury  Description  Document Tuan Scale and appropriate interventions in the flowsheet.   Outcome: Resolved/Met     Problem: Patient Education: Go to Patient Education Activity  Goal: Patient/Family Education  Outcome: Resolved/Met     Problem: Patient Education: Go to Patient Education Activity  Goal: Patient/Family Education  Outcome: Resolved/Met     Problem: Patient Education: Go to Patient Education Activity  Goal: Patient/Family Education  Outcome: Resolved/Met

## 2019-06-14 NOTE — PROGRESS NOTES
Discussed discharge instructions with patient and his wife. They verbalized understanding. Copy given.

## 2019-06-14 NOTE — PROGRESS NOTES
Problem: Mobility Impaired (Adult and Pediatric)  Goal: *Acute Goals and Plan of Care (Insert Text)  Description  Physical Therapy Goals  Initiated 6/12/2019  1. Patient will move from supine to sit and sit to supine  in bed with minimal assistance/contact guard assist within 7 day(s). 2.  Patient will transfer from bed to chair and chair to bed with minimal assistance/contact guard assist using the least restrictive device within 7 day(s). 3.  Patient will perform sit to stand with minimal assistance/contact guard assist within 7 day(s). 4.  Patient will ambulate with minimal assistance/contact guard assist for 75 feet with the least restrictive device within 7 day(s). 5.  Patient will ascend/descend 3 stairs with handrail(s) with moderate assistance within 7 day(s). 6.  Patient will perform LE home exercise program for strengthening to improve mobility with modified independence within 7 days. Outcome: Progressing Towards Goal  Note:   PHYSICAL THERAPY TREATMENT  Patient: Shaheen Voss (22 y.o. male)  Date: 6/14/2019  Diagnosis: Fracture of femoral neck, left, closed ([address] [S72.002A]  Fracture of femoral neck, left, closed ([address] [S72.002A] Fracture of femoral neck, left, closed ([address]  Procedure(s) (LRB):  LEFT HIP HAM ARTHROPLASTY VERSUS TOTAL ANTERIOR APPROACH (Left) 3 Days Post-Op  Precautions: Fall, WBAT  Chart, physical therapy assessment, plan of care and goals were reviewed. ASSESSMENT:  Pt requires mod A x1 for bed mobility, continued poor motor control LLE, donned immobilizer for improved knee exension with WB. Mod A x2 with assist to advance LLE x 5' using RW verbal uces for sequencing. Pt fatigues quickly and returned to chair. Pt is well below baseline and would benefit from intense rehab to improve functional mobility and acitivyt tolerance to maximize independence. Progression toward goals:  ?      Improving appropriately and progressing toward goals  ?       Improving slowly and progressing toward goals  ? Not making progress toward goals and plan of care will be adjusted     PLAN:  Patient continues to benefit from skilled intervention to address the above impairments. Continue treatment per established plan of care. Discharge Recommendations:  Inpatient Rehab  Further Equipment Recommendations for Discharge:  none      SUBJECTIVE:   Patient stated ? L leg still nothing. ?    OBJECTIVE DATA SUMMARY:   Critical Behavior:  Neurologic State: Alert  Orientation Level: Oriented X4  Cognition: Appropriate safety awareness, Follows commands  Safety/Judgement: Awareness of environment, Fall prevention, Insight into deficits  Functional Mobility Training:  Bed Mobility:     Supine to Sit: Moderate assistance;Assist x1     Scooting: Minimum assistance;Assist x1;Additional time        Transfers:  Sit to Stand: Moderate assistance;Assist x2; Additional time  Stand to Sit: Moderate assistance;Assist x2; Additional time                             Balance:  Sitting: High guard; Intact  Sitting - Static: Good (unsupported)  Sitting - Dynamic: Fair (occasional)  Standing: Impaired; With support  Standing - Static: Constant support  Standing - Dynamic : Poor  Ambulation/Gait Training:  Distance (ft): 5 Feet (ft)  Assistive Device: Gait belt;Brace/Splint; Walker, rolling(immobilizer LLE with WB)  Ambulation - Level of Assistance: Moderate assistance;Assist x2        Gait Abnormalities: Decreased step clearance; Path deviations        Base of Support: Widened;Shift to right     Speed/Mary: Slow;Shuffled                       Stairs: Therapeutic Exercises:   SUPINE  EXERCISES   Sets   Reps   Active Active Assist   Passive Self ROM   Comments   Ankle Pumps   ? ?                                        ?                                        ?                                           Quad Sets   ? ? ?                                        ?                                           Heel Slides   ? ?                                        ?                                        ?                                           Hip Abduction   ? ?                                        ?                                        ?                                           Glut Sets   ? ?                                        ?                                        ?                                              ?                                        ?                                        ?                                        ?                                              ?                                        ?                                        ?                                        ?                                             STANDING  EXERCISES   Sets   Reps   Active Active Assist   Passive Self ROM   Comments   Heel Raises   ? ?                                        ?                                        ?                                           Hip Abduction   ?                                         ?                                        ?                                        ?                                              ?                                        ?                                        ?                                        ?                                              ?                                        ?                                        ?                                        ?                                             Pain:  Pain Scale 1: Numeric (0 - 10)  Pain Intensity 1: 0              Activity Tolerance:   fair  Please refer to the flowsheet for vital signs taken during this treatment. After treatment:   ? Patient left in no apparent distress sitting up in chair  ? Patient left in no apparent distress in bed  ? Call bell left within reach  ? Nursing notified  ? Caregiver present  ? Bed alarm activated    COMMUNICATION/COLLABORATION:   The patient?s plan of care was discussed with: Registered Nurse    Charlene Gross   Time Calculation: 30 mins                             Problem: Mobility Impaired (Adult and Pediatric)  Goal: *Acute Goals and Plan of Care (Insert Text)  Description  Physical Therapy Goals  Initiated 6/12/2019  1. Patient will move from supine to sit and sit to supine  in bed with minimal assistance/contact guard assist within 7 day(s). 2.  Patient will transfer from bed to chair and chair to bed with minimal assistance/contact guard assist using the least restrictive device within 7 day(s). 3.  Patient will perform sit to stand with minimal assistance/contact guard assist within 7 day(s). 4.  Patient will ambulate with minimal assistance/contact guard assist for 75 feet with the least restrictive device within 7 day(s). 5.  Patient will ascend/descend 3 stairs with handrail(s) with moderate assistance within 7 day(s). 6.  Patient will perform LE home exercise program for strengthening to improve mobility with modified independence within 7 days. Outcome: Progressing Towards Goal  Note:   PHYSICAL THERAPY TREATMENT  Patient: Ramón Ling (36 y.o. male)  Date: 6/14/2019  Diagnosis: Fracture of femoral neck, left, closed ([address] [S72.002A]  Fracture of femoral neck, left, closed ([address] [S72.002A] Fracture of femoral neck, left, closed ([address]  Procedure(s) (LRB):  LEFT HIP HAM ARTHROPLASTY VERSUS TOTAL ANTERIOR APPROACH (Left) 3 Days Post-Op  Precautions: Fall, WBAT  Chart, physical therapy assessment, plan of care and goals were reviewed.     ASSESSMENT:  Pt requires mod A x1 for bed mobility, continued poor motor control LLE, donned immobilizer for improved knee exension with WB. Mod A x2 with assist to advance LLE x 5' using RW verbal uces for sequencing. Pt fatigues quickly and returned to chair. Pt is well below baseline and would benefit from intense rehab to improve functional mobility and acitivyt tolerance to maximize independence. Progression toward goals:  ?      Improving appropriately and progressing toward goals  ? Improving slowly and progressing toward goals  ? Not making progress toward goals and plan of care will be adjusted     PLAN:  Patient continues to benefit from skilled intervention to address the above impairments. Continue treatment per established plan of care. Discharge Recommendations:  Inpatient Rehab  Further Equipment Recommendations for Discharge:  none      SUBJECTIVE:   Patient stated ? L leg still nothing. ?    OBJECTIVE DATA SUMMARY:   Critical Behavior:  Neurologic State: Alert  Orientation Level: Oriented X4  Cognition: Appropriate safety awareness, Follows commands  Safety/Judgement: Awareness of environment, Fall prevention, Insight into deficits  Functional Mobility Training:  Bed Mobility:     Supine to Sit: Moderate assistance;Assist x1     Scooting: Minimum assistance;Assist x1;Additional time        Transfers:  Sit to Stand: Moderate assistance;Assist x2; Additional time  Stand to Sit: Moderate assistance;Assist x2; Additional time                             Balance:  Sitting: High guard; Intact  Sitting - Static: Good (unsupported)  Sitting - Dynamic: Fair (occasional)  Standing: Impaired; With support  Standing - Static: Constant support  Standing - Dynamic : Poor  Ambulation/Gait Training:  Distance (ft): 5 Feet (ft)  Assistive Device: Gait belt;Brace/Splint; Walker, rolling(immobilizer LLE with WB)  Ambulation - Level of Assistance: Moderate assistance;Assist x2        Gait Abnormalities: Decreased step clearance; Path deviations        Base of Support: Widened;Shift to right     Speed/Mary: Slow;Shuffled                       Stairs: Therapeutic Exercises:   SUPINE  EXERCISES   Sets   Reps   Active Active Assist   Passive Self ROM   Comments   Ankle Pumps   ? ?                                        ?                                        ?                                           Quad Sets   ? ?                                        ?                                        ?                                           Heel Slides   ? ?                                        ?                                        ?                                           Hip Abduction   ? ?                                        ?                                        ?                                           Glut Sets   ? ?                                        ?                                        ?                                              ?                                        ?                                        ?                                        ?                                              ?                                        ?                                        ?                                        ?                                             STANDING  EXERCISES   Sets   Reps   Active Active Assist   Passive Self ROM   Comments   Heel Raises   ? ?                                        ?                                        ?                                           Hip Abduction   ?                                         ?                                        ?                                        ?                                              ?                                        ? ?                                        ?                                              ?                                        ?                                        ?                                        ?                                             Pain:  Pain Scale 1: Numeric (0 - 10)  Pain Intensity 1: 0              Activity Tolerance:   fair  Please refer to the flowsheet for vital signs taken during this treatment. After treatment:   ? Patient left in no apparent distress sitting up in chair  ? Patient left in no apparent distress in bed  ? Call bell left within reach  ? Nursing notified  ? Caregiver present  ? Bed alarm activated    COMMUNICATION/COLLABORATION:   The patient?s plan of care was discussed with: Registered Nurse    Patria Martinez   Time Calculation: 30 mins                             Problem: Mobility Impaired (Adult and Pediatric)  Goal: *Acute Goals and Plan of Care (Insert Text)  Description  Physical Therapy Goals  Initiated 6/12/2019  1. Patient will move from supine to sit and sit to supine  in bed with minimal assistance/contact guard assist within 7 day(s). 2.  Patient will transfer from bed to chair and chair to bed with minimal assistance/contact guard assist using the least restrictive device within 7 day(s). 3.  Patient will perform sit to stand with minimal assistance/contact guard assist within 7 day(s). 4.  Patient will ambulate with minimal assistance/contact guard assist for 75 feet with the least restrictive device within 7 day(s). 5.  Patient will ascend/descend 3 stairs with handrail(s) with moderate assistance within 7 day(s). 6.  Patient will perform LE home exercise program for strengthening to improve mobility with modified independence within 7 days.        Outcome: Progressing Towards Goal  Note:   PHYSICAL THERAPY TREATMENT  Patient: Jason Sheikh (15 y.o. male)  Date: 6/14/2019  Diagnosis: Fracture of femoral neck, left, closed ([address] [S72.002A]  Fracture of femoral neck, left, closed ([facility] [S72.002A] Fracture of femoral neck, left, closed ([facility])  Procedure(s) (LRB):  LEFT HIP HAM ARTHROPLASTY VERSUS TOTAL ANTERIOR APPROACH (Left) 3 Days Post-Op  Precautions: Fall, WBAT  Chart, physical therapy assessment, plan of care and goals were reviewed. ASSESSMENT:  Pt requires mod A x1 for bed mobility, continued poor motor control LLE, donned immobilizer for improved knee exension with WB. Mod A x2 with assist to advance LLE x 5' using RW verbal uces for sequencing. Pt fatigues quickly and returned to chair. Pt is well below baseline and would benefit from intense rehab to improve functional mobility and acitivyt tolerance to maximize independence. Progression toward goals:  ?      Improving appropriately and progressing toward goals  ? Improving slowly and progressing toward goals  ? Not making progress toward goals and plan of care will be adjusted     PLAN:  Patient continues to benefit from skilled intervention to address the above impairments. Continue treatment per established plan of care. Discharge Recommendations:  Inpatient Rehab  Further Equipment Recommendations for Discharge:  none      SUBJECTIVE:   Patient stated ? L leg still nothing. ?    OBJECTIVE DATA SUMMARY:   Critical Behavior:  Neurologic State: Alert  Orientation Level: Oriented X4  Cognition: Appropriate safety awareness, Follows commands  Safety/Judgement: Awareness of environment, Fall prevention, Insight into deficits  Functional Mobility Training:  Bed Mobility:     Supine to Sit: Moderate assistance;Assist x1     Scooting: Minimum assistance;Assist x1;Additional time        Transfers:  Sit to Stand: Moderate assistance;Assist x2; Additional time  Stand to Sit: Moderate assistance;Assist x2; Additional time                             Balance:  Sitting: High guard; Intact  Sitting - Static: Good (unsupported)  Sitting - Dynamic: Fair (occasional)  Standing: Impaired; With support  Standing - Static: Constant support  Standing - Dynamic : Poor  Ambulation/Gait Training:  Distance (ft): 5 Feet (ft)  Assistive Device: Gait belt;Brace/Splint; Walker, rolling(immobilizer LLE with WB)  Ambulation - Level of Assistance: Moderate assistance;Assist x2        Gait Abnormalities: Decreased step clearance; Path deviations        Base of Support: Widened;Shift to right     Speed/Mary: Slow;Shuffled                       Stairs: Therapeutic Exercises:   SUPINE  EXERCISES   Sets   Reps   Active Active Assist   Passive Self ROM   Comments   Ankle Pumps   ? ?                                        ?                                        ?                                           Quad Sets   ? ?                                        ?                                        ?                                           Heel Slides   ? ?                                        ?                                        ?                                           Hip Abduction   ? ?                                        ?                                        ?                                           Glut Sets   ? ?                                        ?                                        ?                                              ?                                        ?                                        ?                                        ?                                              ?                                        ?                                        ?                                        ?                                             STANDING  EXERCISES   Sets   Reps   Active Active Assist   Passive Self ROM   Comments   Heel Raises   ? ?                                        ?                                        ?                                           Hip Abduction   ? ?                                        ?                                        ?                                              ?                                        ?                                        ?                                        ?                                              ?                                        ?                                        ?                                        ?                                             Pain:  Pain Scale 1: Numeric (0 - 10)  Pain Intensity 1: 0              Activity Tolerance:   fair  Please refer to the flowsheet for vital signs taken during this treatment. After treatment:   ? Patient left in no apparent distress sitting up in chair  ? Patient left in no apparent distress in bed  ? Call bell left within reach  ? Nursing notified  ? Caregiver present  ? Bed alarm activated    COMMUNICATION/COLLABORATION:   The patient?s plan of care was discussed with: Registered Nurse    Kat Madsen   Time Calculation: 30 mins                             Problem: Mobility Impaired (Adult and Pediatric)  Goal: *Acute Goals and Plan of Care (Insert Text)  Description  Physical Therapy Goals  Initiated 6/12/2019  1. Patient will move from supine to sit and sit to supine  in bed with minimal assistance/contact guard assist within 7 day(s). 2.  Patient will transfer from bed to chair and chair to bed with minimal assistance/contact guard assist using the least restrictive device within 7 day(s). 3.  Patient will perform sit to stand with minimal assistance/contact guard assist within 7 day(s). 4.  Patient will ambulate with minimal assistance/contact guard assist for 75 feet with the least restrictive device within 7 day(s).    5. Patient will ascend/descend 3 stairs with handrail(s) with moderate assistance within 7 day(s). 6.  Patient will perform LE home exercise program for strengthening to improve mobility with modified independence within 7 days. Outcome: Progressing Towards Goal  Note:   PHYSICAL THERAPY TREATMENT  Patient: Leticia Abbott (77 y.o. male)  Date: 6/14/2019  Diagnosis: Fracture of femoral neck, left, closed ([address] [S72.002A]  Fracture of femoral neck, left, closed ([address] [S72.002A] Fracture of femoral neck, left, closed ([address]  Procedure(s) (LRB):  LEFT HIP HAM ARTHROPLASTY VERSUS TOTAL ANTERIOR APPROACH (Left) 3 Days Post-Op  Precautions: Fall, WBAT  Chart, physical therapy assessment, plan of care and goals were reviewed. ASSESSMENT:  Pt requires mod A x1 for bed mobility, continued poor motor control LLE, donned immobilizer for improved knee exension with WB. Mod A x2 with assist to advance LLE x 5' using RW verbal uces for sequencing. Pt fatigues quickly and returned to chair. Pt is well below baseline and would benefit from intense rehab to improve functional mobility and acitivyt tolerance to maximize independence. Progression toward goals:  ?      Improving appropriately and progressing toward goals  ? Improving slowly and progressing toward goals  ? Not making progress toward goals and plan of care will be adjusted     PLAN:  Patient continues to benefit from skilled intervention to address the above impairments. Continue treatment per established plan of care. Discharge Recommendations:  Inpatient Rehab  Further Equipment Recommendations for Discharge:  none      SUBJECTIVE:   Patient stated ? L leg still nothing. ?    OBJECTIVE DATA SUMMARY:   Critical Behavior:  Neurologic State: Alert  Orientation Level: Oriented X4  Cognition: Appropriate safety awareness, Follows commands  Safety/Judgement: Awareness of environment, Fall prevention, Insight into deficits  Functional Mobility Training:  Bed Mobility:     Supine to Sit: Moderate assistance;Assist x1     Scooting: Minimum assistance;Assist x1;Additional time        Transfers:  Sit to Stand: Moderate assistance;Assist x2; Additional time  Stand to Sit: Moderate assistance;Assist x2; Additional time                             Balance:  Sitting: High guard; Intact  Sitting - Static: Good (unsupported)  Sitting - Dynamic: Fair (occasional)  Standing: Impaired; With support  Standing - Static: Constant support  Standing - Dynamic : Poor  Ambulation/Gait Training:  Distance (ft): 5 Feet (ft)  Assistive Device: Gait belt;Brace/Splint; Walker, rolling(immobilizer LLE with WB)  Ambulation - Level of Assistance: Moderate assistance;Assist x2        Gait Abnormalities: Decreased step clearance; Path deviations        Base of Support: Widened;Shift to right     Speed/Mary: Slow;Shuffled                       Stairs: Therapeutic Exercises:   SUPINE  EXERCISES   Sets   Reps   Active Active Assist   Passive Self ROM   Comments   Ankle Pumps   ? ?                                        ?                                        ?                                           Quad Sets   ? ?                                        ?                                        ?                                           Heel Slides   ? ?                                        ?                                        ?                                           Hip Abduction   ? ?                                        ?                                        ?                                           Glut Sets   ?                                         ?                                        ?                                        ?                                              ? ?                                        ?                                        ?                                              ?                                        ?                                        ?                                        ?                                             STANDING  EXERCISES   Sets   Reps   Active Active Assist   Passive Self ROM   Comments   Heel Raises   ? ?                                        ?                                        ?                                           Hip Abduction   ? ?                                        ?                                        ?                                              ?                                        ?                                        ?                                        ?                                              ?                                        ?                                        ?                                        ?                                             Pain:  Pain Scale 1: Numeric (0 - 10)  Pain Intensity 1: 0              Activity Tolerance:   fair  Please refer to the flowsheet for vital signs taken during this treatment. After treatment:   ? Patient left in no apparent distress sitting up in chair  ? Patient left in no apparent distress in bed  ? Call bell left within reach  ? Nursing notified  ? Caregiver present  ?  Bed alarm activated    COMMUNICATION/COLLABORATION:   The patient?s plan of care was discussed with: Registered Nurse    Jason Mccray   Time Calculation: 30 mins

## 2019-06-14 NOTE — PROGRESS NOTES
6/14/2019 12:05 PM Spoke with pt's wife and pt, [facility] was selected as preference. Confirmed with Mark Moncada at [facility] they can accept pt today. Nursing please call report to Ezequiel Leonard at [phone]. Ambulance transport for 4PM.   CM will follow up.     6/14/2019 11:38 AM [facility] does not have a bed available until 6/15. Pt has been accepted by [address] and [facility]equ[facility]le. CM called and spoke with pt's wife who reported she will discuss with pt and follow up with CM after.    Ravi Mix, BSW

## 2019-06-14 NOTE — PROGRESS NOTES
NUTRITION   RD Screen      Pt seen for:      []  MST for     [x]   MD Consult    [x]        Supplements  []   PO intake check   []        Food Allergies  []   Food Preferences/tolerances    []        Rescreen  []   Education    []        Diet order clarification []   Other   []  LOS                          Nutrition Prescription:     Increase protein intake for 30 days, via supplements or dietary sources, in addition to balanced meals. Encourage adequate intake of meals and supplements. Pt was eating normally PTA. Pt with hx of Sjogren's syndrome. Add Ensure HP for wound healing post hip repair. Weight hx is sparse but pt 10 lbs up from 1.5 years ago. Monitor weight and PO intakes. BG elevated on admission, WNL now. Labs reviewed. No food allergies. Does not like fish. Assessment:   Information obtained from:  Patient and chart         Cultural, Amish and ethnic food preferences:   [x]  None   []  Identified and addressed    Diet:   Regular    PO Intake: [x]           Good     []           Fair      []           Poor     No data found. Wt Readings from Last 5 Encounters:   06/11/19 83.9 kg (185 lb)   12/10/17 79.4 kg (175 lb)   10/21/17 79.8 kg (176 lb)   10/20/17 87.4 kg (192 lb 10.9 oz)   09/03/17 79.4 kg (175 lb)       Body mass index is 24.41 kg/m². Skin: left hip incision  BM: 6/11  ABD: WDL    Estimated Daily Nutrition Requirements:  Kcals/day: 3942 Kcals/day(BMR(1442x1.3))  Protein: 84 g(-101g/day(1.0-1.2g/kg))  Fluid:  1850 mL     Based On:  [place] [address]  Weight Used: Actual wt    Weight Changes:   []   Loss  []   Gain  [x]   Stable    Nutrition Problems Identified  [x]     None  []     Specified food preferences   []     Dislikes supplements              []     Allergies  []     Difficulty chewing     []     Dentition   []     Nausea/Vomiting  []    Constipation  []    Diarrhea    Nutrition Diagnosis:      Increased nutrient needs related to increased protein needs with wound healing evidenced by impaired skin integrity with left hip incision.     Intervention:   [x]    Obtained/adjusted food preferences/tolerances and/or snacks options   []    Dislikes supplements will try a substitution   []    Modify diet for food allergies  []    Adjust texture due to difficulty chewing   [x]    Encourage Fresh Fruit, Activia yogurt, fluid   [x]    Educated patient  []    Rescreen per screening protocol  [x]    Add Supplements    Goal: patient to consume 1 protein item with meals and 50% of meals and snacks in the next 5-7 days    Monitoring/Evaluation:   Education & Discharge Needs  [x] Nutrition related discharge needs addressed:   [x] Supplements (on d/c instruction &/or coupons provided)    [x] Education   [] No nutrition related discharge needs at this time    Rescreen: [x]  At Nutrition Risk          []  Not at [facility], rescreen per screening protocol      Carole Tse, [address] [place] [address]  Pager [phone]   Office [phone]

## 2019-06-14 NOTE — PROGRESS NOTES
Bedside and Verbal shift change report given to Ryan (oncoming nurse) by [address] Coronel (offgoing nurse). Report included the following information SBAR, Kardex, OR Summary, Intake/Output, MAR and Recent Results.

## 2019-06-14 NOTE — PROGRESS NOTES
Problem: Self Care Deficits Care Plan (Adult)  Goal: *Acute Goals and Plan of Care (Insert Text)  Description  Occupational Therapy Goals  Initiated 6/12/2019     1. Patient will perform lower body dressing at minimal assistance/contact guard assist level within 7 days. 2. Patient will perform toilet transfers at minimal assistance/contact guard assist level using Walkers, Type: Rolling Walker  within 7 days. 3. Patient will perform all aspects of toileting at minimal assistance/contact guard assist level within 7 days. Outcome: Progressing Towards Goal  Note:   OCCUPATIONAL THERAPY TREATMENT  Patient: Oma Martinez (20 y.o. male)  Date: 6/14/2019  Diagnosis: Fracture of femoral neck, left, closed ([address] [S72.002A]  Fracture of femoral neck, left, closed ([address] [S72.002A] Fracture of femoral neck, left, closed ([address]  Procedure(s) (LRB):  LEFT HIP HAM ARTHROPLASTY VERSUS TOTAL ANTERIOR APPROACH (Left) 3 Days Post-Op  Precautions: Fall, WBAT  Chart, occupational therapy assessment, plan of care, and goals were reviewed. ASSESSMENT:  Pt seated in chair and wanting to return to bed. He needs assist x 2 with L knee immobilizer in place. Once back to bed he wanted to doff/don underwear. Needed multiple cues to doff/don surgical LE first but able to use reacher to assist with task. Pt used bridging technique to bring underwear and shorts over hips. Plans are for pt to be discharged to rehab. Progression toward goals:  ?       Improving appropriately and progressing toward goals  ? Improving slowly and progressing toward goals  ? Not making progress toward goals and plan of care will be adjusted     PLAN:  Patient continues to benefit from skilled intervention to address the above impairments. Continue treatment per established plan of care. Discharge Recommendations:  Rehab  Further Equipment Recommendations for Discharge:  None     SUBJECTIVE:   Patient stated ? Oh really??    OBJECTIVE DATA SUMMARY:   Cognitive/Behavioral Status:  Neurologic State: Alert  Orientation Level: Oriented X4  Cognition: Follows commands             Functional Mobility and Transfers for ADLs:  Bed Mobility:  Supine to Sit: Moderate assistance;Assist x1  Scooting: Minimum assistance;Assist x1;Additional time    Transfers:  Sit to Stand: Moderate assistance;Assist x2; Additional time          Balance:  Sitting: High guard; Intact  Sitting - Static: Good (unsupported)  Sitting - Dynamic: Fair (occasional)  Standing: Impaired; With support  Standing - Static: Constant support  Standing - Dynamic : Poor    ADL Intervention:     Pt instructed to use reacher to doff/don underwear over surgical LE first. He was able to bridge to bring pants over hips in semi supine. Pain:  Pain Scale 1: Numeric (0 - 10)  Pain Intensity 1: 0              Activity Tolerance:   Fair  Please refer to the flowsheet for vital signs taken during this treatment. After treatment:   ? Patient left in no apparent distress sitting up in chair  ? Patient left in no apparent distress in bed  ? Call bell left within reach  ? Nursing notified  ? Caregiver present  ?  Bed alarm activated    COMMUNICATION/COLLABORATION:   The patient?s plan of care was discussed with: Physical Therapy Assistant and Occupational Therapist    GIOVANNY Newby  Time Calculation: 18 mins

## 2019-06-17 NOTE — DISCHARGE SUMMARY
Hospitalist Discharge Summary     Patient ID:    Sanaz Osullivan  [id number]  78 y.o.  1940    Admit date: 6/11/2019    Discharge date and time: 6/17/2019    Admission Diagnoses: Fracture of femoral neck, left, closed ([facility] [S72.002A]  Fracture of femoral neck, left, closed ([facility] [S72.002A]    Chronic Diagnoses:    Problem List as of 6/14/2019 Date Reviewed: 6/11/2019          Codes Class Noted - Resolved    * (Principal) Fracture of femoral neck, left, closed ([facility] ICD-10-CM: R68.471L  ICD-9-CM: 820.8  6/11/2019 - Present        UTI (urinary tract infection) ICD-10-CM: N39.0  ICD-9-CM: 599.0  10/12/2017 - Present        Urine retention ICD-10-CM: R33.9  ICD-9-CM: 788.20  Unknown - Present        Sepsis ([facility] ICD-10-CM: A41.9  ICD-9-CM: 038.9, 995.91  10/12/2017 - Present        Leukocytosis ICD-10-CM: I33.977  ICD-9-CM: 288.60  10/12/2017 - Present        Fever ICD-10-CM: R50.9  ICD-9-CM: 780.60  10/12/2017 - Present        Renal insufficiency ICD-10-CM: N28.9  ICD-9-CM: 593.9  10/12/2017 - Present        CKD (chronic kidney disease), stage III ([facility] ICD-10-CM: N18.3  ICD-9-CM: 167. 3  Unknown - Present        Nausea & vomiting ICD-10-CM: R11.2  ICD-9-CM: 787.01  10/12/2017 - Present        Cough ICD-10-CM: R05  ICD-9-CM: 786.2  10/12/2017 - Present        Dehydration ICD-10-CM: E86.0  ICD-9-CM: 276.51  10/12/2017 - Present        Hypoxia ICD-10-CM: R09.02  ICD-9-CM: 799.02  10/12/2017 - Present        Sinus tachycardia ICD-10-CM: R00.0  ICD-9-CM: 427.89  10/12/2017 - Present        Sjogren's syndrome ([facility] ICD-10-CM: M35.00  ICD-9-CM: 710.2  4/1/2016 - Present              Discharge Medications: Cannot display discharge medications since this patient is not currently admitted. Follow up Care:    1. Herbie Adkins DO in 1-2 weeks  2. ortho    Diet:  Regular Diet    Disposition:  SNF. Advanced Directive:    Discharge Exam:  See today's note.     CONSULTATIONS: Orthopedic Surgery    Significant Diagnostic Studies:   No results for input(s): WBC, HGB, HCT, PLT, HGBEXT, HCTEXT, PLTEXT in the last 72 hours. No results for input(s): NA, K, CL, CO2, BUN, CREA, GLU, CA, MG, PHOS, URICA in the last 72 hours. No results for input(s): SGOT, GPT, ALT, AP, TBIL, TBILI, TP, ALB, GLOB, GGT, AML, LPSE in the last 72 hours. No lab exists for component: AMYP, HLPSE  No results for input(s): INR, PTP, APTT in the last 72 hours. No lab exists for component: INREXT   No results for input(s): FE, TIBC, PSAT, FERR in the last 72 hours. No results for input(s): PH, PCO2, PO2 in the last 72 hours. No results for input(s): CPK, CKMB in the last 72 hours. No lab exists for component: TROPONINI  No results found for: [address]:   1. Fracture of femoral neck, left, closed. POA. From a fall. Likely spurred on by chronic prednisone. s/p ORIF 6/11/19 with L hemiarthroplasty. Continue PT/OT. Awaiting rehab      2. Sjogren's syndrome ([facility] (4/1/2016). Cont chronic prednisone     3.   CKD (chronic kidney disease), stage III ([facility] (). monitor      Discharged in improved condition         Signed:  Bob Tracey MD  6/17/2019  2:57 PM

## 2019-06-25 ENCOUNTER — HOSPITAL ENCOUNTER (INPATIENT)
Age: 79
LOS: 7 days | Discharge: NURSING, REHAB OR LONG-TERM CARE | DRG: 871 | End: 2019-07-02
Attending: EMERGENCY MEDICINE | Admitting: FAMILY MEDICINE
Payer: MEDICARE

## 2019-06-25 ENCOUNTER — APPOINTMENT (OUTPATIENT)
Dept: CT IMAGING | Age: 79
DRG: 871 | End: 2019-06-25
Attending: EMERGENCY MEDICINE
Payer: MEDICARE

## 2019-06-25 ENCOUNTER — APPOINTMENT (OUTPATIENT)
Dept: GENERAL RADIOLOGY | Age: 79
DRG: 871 | End: 2019-06-25
Attending: EMERGENCY MEDICINE
Payer: MEDICARE

## 2019-06-25 ENCOUNTER — APPOINTMENT (OUTPATIENT)
Dept: CT IMAGING | Age: 79
DRG: 871 | End: 2019-06-25
Attending: FAMILY MEDICINE
Payer: MEDICARE

## 2019-06-25 DIAGNOSIS — J18.9 HAP (HOSPITAL-ACQUIRED PNEUMONIA): Primary | ICD-10-CM
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
## 2019-06-25 LAB
ALBUMIN SERPL-MCNC: 2.9 G/DL (ref 3.5–5)
ALBUMIN/GLOB SERPL: 0.6 {RATIO} (ref 1.1–2.2)
ALP SERPL-CCNC: 114 U/L (ref 45–117)
ALT SERPL-CCNC: 25 U/L (ref 12–78)
ANION GAP SERPL CALC-SCNC: 10 MMOL/L (ref 5–15)
APPEARANCE UR: CLEAR
AST SERPL-CCNC: 12 U/L (ref 15–37)
BACTERIA URNS QL MICRO: NEGATIVE /HPF
BASOPHILS # BLD: 0.1 K/UL (ref 0–0.1)
BASOPHILS NFR BLD: 0 % (ref 0–1)
BILIRUB SERPL-MCNC: 0.8 MG/DL (ref 0.2–1)
BILIRUB UR QL: NEGATIVE
BNP SERPL-MCNC: 146 PG/ML
BUN SERPL-MCNC: 16 MG/DL (ref 6–20)
BUN/CREAT SERPL: 11 (ref 12–20)
CALCIUM SERPL-MCNC: 8.9 MG/DL (ref 8.5–10.1)
CHLORIDE SERPL-SCNC: 105 MMOL/L (ref 97–108)
CO2 SERPL-SCNC: 22 MMOL/L (ref 21–32)
COLOR UR: ABNORMAL
COMMENT, HOLDF: NORMAL
CREAT SERPL-MCNC: 1.49 MG/DL (ref 0.7–1.3)
DIFFERENTIAL METHOD BLD: ABNORMAL
EOSINOPHIL # BLD: 0 K/UL (ref 0–0.4)
EOSINOPHIL NFR BLD: 0 % (ref 0–7)
EPITH CASTS URNS QL MICRO: ABNORMAL /LPF
ERYTHROCYTE [DISTWIDTH] IN BLOOD BY AUTOMATED COUNT: 13.6 % (ref 11.5–14.5)
GLOBULIN SER CALC-MCNC: 4.5 G/DL (ref 2–4)
GLUCOSE SERPL-MCNC: 121 MG/DL (ref 65–100)
GLUCOSE UR STRIP.AUTO-MCNC: NEGATIVE MG/DL
HCT VFR BLD AUTO: 40.9 % (ref 36.6–50.3)
HGB BLD-MCNC: 13.3 G/DL (ref 12.1–17)
HGB UR QL STRIP: ABNORMAL
IMM GRANULOCYTES # BLD AUTO: 0.3 K/UL (ref 0–0.04)
IMM GRANULOCYTES NFR BLD AUTO: 1 % (ref 0–0.5)
KETONES UR QL STRIP.AUTO: NEGATIVE MG/DL
LACTATE SERPL-SCNC: 4.5 MMOL/L (ref 0.4–2)
LEUKOCYTE ESTERASE UR QL STRIP.AUTO: ABNORMAL
LIPASE SERPL-CCNC: 72 U/L (ref 73–393)
LYMPHOCYTES # BLD: 2.3 K/UL (ref 0.8–3.5)
LYMPHOCYTES NFR BLD: 10 % (ref 12–49)
MCH RBC QN AUTO: 32.6 PG (ref 26–34)
MCHC RBC AUTO-ENTMCNC: 32.5 G/DL (ref 30–36.5)
MCV RBC AUTO: 100.2 FL (ref 80–99)
MONOCYTES # BLD: 2 K/UL (ref 0–1)
MONOCYTES NFR BLD: 8 % (ref 5–13)
NEUTS SEG # BLD: 19.3 K/UL (ref 1.8–8)
NEUTS SEG NFR BLD: 81 % (ref 32–75)
NITRITE UR QL STRIP.AUTO: POSITIVE
NRBC # BLD: 0 K/UL (ref 0–0.01)
NRBC BLD-RTO: 0 PER 100 WBC
PH UR STRIP: 6 [PH] (ref 5–8)
PLATELET # BLD AUTO: 295 K/UL (ref 150–400)
PMV BLD AUTO: 10.2 FL (ref 8.9–12.9)
POTASSIUM SERPL-SCNC: 4.2 MMOL/L (ref 3.5–5.1)
PROT SERPL-MCNC: 7.4 G/DL (ref 6.4–8.2)
PROT UR STRIP-MCNC: 30 MG/DL
RBC # BLD AUTO: 4.08 M/UL (ref 4.1–5.7)
RBC #/AREA URNS HPF: ABNORMAL /HPF (ref 0–5)
SAMPLES BEING HELD,HOLD: NORMAL
SODIUM SERPL-SCNC: 137 MMOL/L (ref 136–145)
SP GR UR REFRACTOMETRY: 1.01 (ref 1–1.03)
TROPONIN I SERPL-MCNC: <0.05 NG/ML
UR CULT HOLD, URHOLD: NORMAL
UROBILINOGEN UR QL STRIP.AUTO: 0.2 EU/DL (ref 0.2–1)
WBC # BLD AUTO: 24 K/UL (ref 4.1–11.1)
WBC URNS QL MICRO: >100 /HPF (ref 0–4)

## 2019-06-25 PROCEDURE — 74011250636 HC RX REV CODE- 250/636: Performed by: EMERGENCY MEDICINE

## 2019-06-25 PROCEDURE — 74011000258 HC RX REV CODE- 258: Performed by: EMERGENCY MEDICINE

## 2019-06-25 PROCEDURE — 93005 ELECTROCARDIOGRAM TRACING: CPT

## 2019-06-25 PROCEDURE — 36415 COLL VENOUS BLD VENIPUNCTURE: CPT

## 2019-06-25 PROCEDURE — 85025 COMPLETE CBC W/AUTO DIFF WBC: CPT

## 2019-06-25 PROCEDURE — 83605 ASSAY OF LACTIC ACID: CPT

## 2019-06-25 PROCEDURE — 84484 ASSAY OF TROPONIN QUANT: CPT

## 2019-06-25 PROCEDURE — 80053 COMPREHEN METABOLIC PANEL: CPT

## 2019-06-25 PROCEDURE — 83880 ASSAY OF NATRIURETIC PEPTIDE: CPT

## 2019-06-25 PROCEDURE — 99285 EMERGENCY DEPT VISIT HI MDM: CPT

## 2019-06-25 PROCEDURE — 71250 CT THORAX DX C-: CPT

## 2019-06-25 PROCEDURE — 87086 URINE CULTURE/COLONY COUNT: CPT

## 2019-06-25 PROCEDURE — 87040 BLOOD CULTURE FOR BACTERIA: CPT

## 2019-06-25 PROCEDURE — 65660000000 HC RM CCU STEPDOWN

## 2019-06-25 PROCEDURE — 74176 CT ABD & PELVIS W/O CONTRAST: CPT

## 2019-06-25 PROCEDURE — 96360 HYDRATION IV INFUSION INIT: CPT

## 2019-06-25 PROCEDURE — 81001 URINALYSIS AUTO W/SCOPE: CPT

## 2019-06-25 PROCEDURE — 83690 ASSAY OF LIPASE: CPT

## 2019-06-25 RX ORDER — VANCOMYCIN 2 GRAM/500 ML IN 0.9 % SODIUM CHLORIDE INTRAVENOUS
2000
Status: COMPLETED | OUTPATIENT
Start: 2019-06-25 | End: 2019-06-26

## 2019-06-25 RX ORDER — SODIUM CHLORIDE 0.9 % (FLUSH) 0.9 %
5-10 SYRINGE (ML) INJECTION AS NEEDED
Status: DISCONTINUED | OUTPATIENT
Start: 2019-06-25 | End: 2019-07-02 | Stop reason: HOSPADM

## 2019-06-25 RX ORDER — SODIUM CHLORIDE 9 MG/ML
125 INJECTION, SOLUTION INTRAVENOUS CONTINUOUS
Status: DISCONTINUED | OUTPATIENT
Start: 2019-06-26 | End: 2019-06-29

## 2019-06-25 RX ORDER — LEVOFLOXACIN 5 MG/ML
750 INJECTION, SOLUTION INTRAVENOUS
Status: DISCONTINUED | OUTPATIENT
Start: 2019-06-25 | End: 2019-06-26 | Stop reason: CLARIF

## 2019-06-25 RX ADMIN — SODIUM CHLORIDE 1000 ML: 900 INJECTION, SOLUTION INTRAVENOUS at 20:48

## 2019-06-25 RX ADMIN — SODIUM CHLORIDE 1000 ML: 900 INJECTION, SOLUTION INTRAVENOUS at 22:37

## 2019-06-25 RX ADMIN — CEFEPIME HYDROCHLORIDE 2 G: 2 INJECTION, POWDER, FOR SOLUTION INTRAVENOUS at 22:40

## 2019-06-25 NOTE — PROGRESS NOTES
6/25/2019  3:02 PM  CM received TC from Pt's wife Ofilia Moritz, she is unhappy w/ the care at [facility] and states \"now my  is sick and is not getting the care he needs\", she would like pt to come to ED and be placed in another [facility] facility. Pt d/c from [facility] for hip fracture to [facility] 6/14/19. CM discussed w/ pt's wife and advised her to s/w the CM at [facility], she  stated she has and has been in contact w/ liaison for [facility] but pt would have to be re-evaluated and there may or may not be an bed available if accepted. I advised her that would be the process she is asking if pt came to the ED would we \"keep him overnight to be re-evaluated\"  I told her I could not make that decision or predict and if pt came to Ed and there was no reason to admit he would be discharged back to [facility]. Taz Montaño expressed frustration but understood that is the only way her  could transfer, she did tell me he is expected to d/c in about 1 week.   Carlos Postal

## 2019-06-26 ENCOUNTER — APPOINTMENT (OUTPATIENT)
Dept: ULTRASOUND IMAGING | Age: 79
DRG: 871 | End: 2019-06-26
Attending: INTERNAL MEDICINE
Payer: MEDICARE

## 2019-06-26 ENCOUNTER — APPOINTMENT (OUTPATIENT)
Dept: CT IMAGING | Age: 79
DRG: 871 | End: 2019-06-26
Attending: UROLOGY
Payer: MEDICARE

## 2019-06-26 LAB
ANION GAP SERPL CALC-SCNC: 8 MMOL/L (ref 5–15)
APTT PPP: 31.3 SEC (ref 22.1–32)
ARTERIAL PATENCY WRIST A: YES
ATRIAL RATE: 78 BPM
BASE DEFICIT BLD-SCNC: 8 MMOL/L
BASOPHILS # BLD: 0 K/UL (ref 0–0.1)
BASOPHILS NFR BLD: 0 % (ref 0–1)
BDY SITE: ABNORMAL
BUN SERPL-MCNC: 14 MG/DL (ref 6–20)
BUN/CREAT SERPL: 12 (ref 12–20)
CALCIUM SERPL-MCNC: 7.8 MG/DL (ref 8.5–10.1)
CALCULATED P AXIS, ECG09: 64 DEGREES
CALCULATED R AXIS, ECG10: -44 DEGREES
CALCULATED T AXIS, ECG11: -18 DEGREES
CHLORIDE SERPL-SCNC: 111 MMOL/L (ref 97–108)
CHOLEST SERPL-MCNC: 98 MG/DL
CO2 SERPL-SCNC: 22 MMOL/L (ref 21–32)
CREAT SERPL-MCNC: 1.17 MG/DL (ref 0.7–1.3)
DIAGNOSIS, 93000: NORMAL
DIFFERENTIAL METHOD BLD: ABNORMAL
EOSINOPHIL # BLD: 0 K/UL (ref 0–0.4)
EOSINOPHIL NFR BLD: 0 % (ref 0–7)
ERYTHROCYTE [DISTWIDTH] IN BLOOD BY AUTOMATED COUNT: 13.8 % (ref 11.5–14.5)
GAS FLOW.O2 O2 DELIVERY SYS: ABNORMAL L/MIN
GLUCOSE SERPL-MCNC: 105 MG/DL (ref 65–100)
HCO3 BLD-SCNC: 17.6 MMOL/L (ref 22–26)
HCT VFR BLD AUTO: 36 % (ref 36.6–50.3)
HDLC SERPL-MCNC: 43 MG/DL
HDLC SERPL: 2.3 {RATIO} (ref 0–5)
HGB BLD-MCNC: 11.3 G/DL (ref 12.1–17)
IMM GRANULOCYTES # BLD AUTO: 0 K/UL
IMM GRANULOCYTES NFR BLD AUTO: 0 %
LACTATE SERPL-SCNC: 1.1 MMOL/L (ref 0.4–2)
LDLC SERPL CALC-MCNC: 40.8 MG/DL (ref 0–100)
LIPID PROFILE,FLP: NORMAL
LYMPHOCYTES # BLD: 1.1 K/UL (ref 0.8–3.5)
LYMPHOCYTES NFR BLD: 6 % (ref 12–49)
MCH RBC QN AUTO: 32.3 PG (ref 26–34)
MCHC RBC AUTO-ENTMCNC: 31.4 G/DL (ref 30–36.5)
MCV RBC AUTO: 102.9 FL (ref 80–99)
MONOCYTES # BLD: 1.2 K/UL (ref 0–1)
MONOCYTES NFR BLD: 7 % (ref 5–13)
NEUTS SEG # BLD: 15.3 K/UL (ref 1.8–8)
NEUTS SEG NFR BLD: 87 % (ref 32–75)
NRBC # BLD: 0 K/UL (ref 0–0.01)
NRBC BLD-RTO: 0 PER 100 WBC
O2/TOTAL GAS SETTING VFR VENT: 21 %
P-R INTERVAL, ECG05: 192 MS
PCO2 BLD: 29.8 MMHG (ref 35–45)
PH BLD: 7.38 [PH] (ref 7.35–7.45)
PLATELET # BLD AUTO: 224 K/UL (ref 150–400)
PMV BLD AUTO: 10.4 FL (ref 8.9–12.9)
PO2 BLD: 66 MMHG (ref 80–100)
POTASSIUM SERPL-SCNC: 3.9 MMOL/L (ref 3.5–5.1)
Q-T INTERVAL, ECG07: 438 MS
QRS DURATION, ECG06: 80 MS
QTC CALCULATION (BEZET), ECG08: 499 MS
RBC # BLD AUTO: 3.5 M/UL (ref 4.1–5.7)
RBC MORPH BLD: ABNORMAL
SAO2 % BLD: 93 % (ref 92–97)
SODIUM SERPL-SCNC: 141 MMOL/L (ref 136–145)
SPECIMEN TYPE: ABNORMAL
THERAPEUTIC RANGE,PTTT: NORMAL SECS (ref 58–77)
TOTAL RESP. RATE, ITRR: 14
TRIGL SERPL-MCNC: 71 MG/DL (ref ?–150)
VENTRICULAR RATE, ECG03: 78 BPM
VLDLC SERPL CALC-MCNC: 14.2 MG/DL
WBC # BLD AUTO: 17.6 K/UL (ref 4.1–11.1)

## 2019-06-26 PROCEDURE — 80061 LIPID PANEL: CPT

## 2019-06-26 PROCEDURE — 74011250636 HC RX REV CODE- 250/636: Performed by: FAMILY MEDICINE

## 2019-06-26 PROCEDURE — 85025 COMPLETE CBC W/AUTO DIFF WBC: CPT

## 2019-06-26 PROCEDURE — 83605 ASSAY OF LACTIC ACID: CPT

## 2019-06-26 PROCEDURE — 74011636320 HC RX REV CODE- 636/320: Performed by: RADIOLOGY

## 2019-06-26 PROCEDURE — 80048 BASIC METABOLIC PNL TOTAL CA: CPT

## 2019-06-26 PROCEDURE — 74011250636 HC RX REV CODE- 250/636: Performed by: EMERGENCY MEDICINE

## 2019-06-26 PROCEDURE — 85730 THROMBOPLASTIN TIME PARTIAL: CPT

## 2019-06-26 PROCEDURE — 74011000258 HC RX REV CODE- 258: Performed by: RADIOLOGY

## 2019-06-26 PROCEDURE — 74011636637 HC RX REV CODE- 636/637: Performed by: FAMILY MEDICINE

## 2019-06-26 PROCEDURE — 82803 BLOOD GASES ANY COMBINATION: CPT

## 2019-06-26 PROCEDURE — 74011000258 HC RX REV CODE- 258: Performed by: FAMILY MEDICINE

## 2019-06-26 PROCEDURE — 36600 WITHDRAWAL OF ARTERIAL BLOOD: CPT

## 2019-06-26 PROCEDURE — 74178 CT ABD&PLV WO CNTR FLWD CNTR: CPT

## 2019-06-26 PROCEDURE — 36415 COLL VENOUS BLD VENIPUNCTURE: CPT

## 2019-06-26 PROCEDURE — 76770 US EXAM ABDO BACK WALL COMP: CPT

## 2019-06-26 PROCEDURE — 65660000000 HC RM CCU STEPDOWN

## 2019-06-26 PROCEDURE — 74011250637 HC RX REV CODE- 250/637: Performed by: FAMILY MEDICINE

## 2019-06-26 RX ORDER — VANCOMYCIN HYDROCHLORIDE
1250
Status: DISCONTINUED | OUTPATIENT
Start: 2019-06-26 | End: 2019-06-28

## 2019-06-26 RX ORDER — AMOXICILLIN 250 MG
1 CAPSULE ORAL EVERY EVENING
Status: DISCONTINUED | OUTPATIENT
Start: 2019-06-26 | End: 2019-07-02 | Stop reason: HOSPADM

## 2019-06-26 RX ORDER — SODIUM CHLORIDE 0.9 % (FLUSH) 0.9 %
10 SYRINGE (ML) INJECTION
Status: COMPLETED | OUTPATIENT
Start: 2019-06-26 | End: 2019-06-26

## 2019-06-26 RX ORDER — SODIUM CHLORIDE 0.9 % (FLUSH) 0.9 %
5-40 SYRINGE (ML) INJECTION AS NEEDED
Status: DISCONTINUED | OUTPATIENT
Start: 2019-06-26 | End: 2019-07-02 | Stop reason: HOSPADM

## 2019-06-26 RX ORDER — NYSTATIN 100000 [USP'U]/G
POWDER TOPICAL 3 TIMES DAILY
Status: DISCONTINUED | OUTPATIENT
Start: 2019-06-26 | End: 2019-07-02 | Stop reason: HOSPADM

## 2019-06-26 RX ORDER — DULOXETIN HYDROCHLORIDE 30 MG/1
30 CAPSULE, DELAYED RELEASE ORAL
Status: DISCONTINUED | OUTPATIENT
Start: 2019-06-26 | End: 2019-07-02 | Stop reason: HOSPADM

## 2019-06-26 RX ORDER — POLYETHYLENE GLYCOL 3350 17 G/17G
17 POWDER, FOR SOLUTION ORAL EVERY EVENING
Status: DISCONTINUED | OUTPATIENT
Start: 2019-06-26 | End: 2019-07-02 | Stop reason: HOSPADM

## 2019-06-26 RX ORDER — LEVOFLOXACIN 5 MG/ML
750 INJECTION, SOLUTION INTRAVENOUS EVERY 24 HOURS
Status: DISCONTINUED | OUTPATIENT
Start: 2019-06-27 | End: 2019-07-02 | Stop reason: HOSPADM

## 2019-06-26 RX ORDER — HEPARIN SODIUM 5000 [USP'U]/ML
5000 INJECTION, SOLUTION INTRAVENOUS; SUBCUTANEOUS EVERY 8 HOURS
Status: DISCONTINUED | OUTPATIENT
Start: 2019-06-26 | End: 2019-07-02 | Stop reason: HOSPADM

## 2019-06-26 RX ORDER — PREDNISONE 5 MG/1
5 TABLET ORAL DAILY
Status: DISCONTINUED | OUTPATIENT
Start: 2019-06-26 | End: 2019-07-02 | Stop reason: HOSPADM

## 2019-06-26 RX ORDER — VANCOMYCIN/0.9 % SOD CHLORIDE 1.5G/250ML
1500 PLASTIC BAG, INJECTION (ML) INTRAVENOUS EVERY 24 HOURS
Status: DISCONTINUED | OUTPATIENT
Start: 2019-06-27 | End: 2019-06-26 | Stop reason: CLARIF

## 2019-06-26 RX ORDER — VANCOMYCIN HYDROCHLORIDE
1250
Status: DISCONTINUED | OUTPATIENT
Start: 2019-06-26 | End: 2019-06-26 | Stop reason: DRUGHIGH

## 2019-06-26 RX ORDER — SODIUM CHLORIDE 0.9 % (FLUSH) 0.9 %
5-40 SYRINGE (ML) INJECTION EVERY 8 HOURS
Status: DISCONTINUED | OUTPATIENT
Start: 2019-06-26 | End: 2019-07-02 | Stop reason: HOSPADM

## 2019-06-26 RX ORDER — ACETAMINOPHEN 325 MG/1
650 TABLET ORAL
Status: DISCONTINUED | OUTPATIENT
Start: 2019-06-26 | End: 2019-07-02 | Stop reason: HOSPADM

## 2019-06-26 RX ADMIN — VANCOMYCIN HYDROCHLORIDE 1250 MG: 10 INJECTION, POWDER, LYOPHILIZED, FOR SOLUTION INTRAVENOUS at 16:57

## 2019-06-26 RX ADMIN — SODIUM CHLORIDE 100 ML: 900 INJECTION, SOLUTION INTRAVENOUS at 12:42

## 2019-06-26 RX ADMIN — NYSTATIN: 100000 POWDER TOPICAL at 19:02

## 2019-06-26 RX ADMIN — Medication 10 ML: at 12:42

## 2019-06-26 RX ADMIN — VANCOMYCIN HYDROCHLORIDE 2000 MG: 10 INJECTION, POWDER, LYOPHILIZED, FOR SOLUTION INTRAVENOUS at 01:25

## 2019-06-26 RX ADMIN — NYSTATIN: 100000 POWDER TOPICAL at 21:55

## 2019-06-26 RX ADMIN — SODIUM CHLORIDE 397 ML: 900 INJECTION, SOLUTION INTRAVENOUS at 02:12

## 2019-06-26 RX ADMIN — Medication 10 ML: at 16:49

## 2019-06-26 RX ADMIN — PREDNISONE 5 MG: 5 TABLET ORAL at 11:23

## 2019-06-26 RX ADMIN — HEPARIN SODIUM 5000 UNITS: 5000 INJECTION INTRAVENOUS; SUBCUTANEOUS at 16:47

## 2019-06-26 RX ADMIN — SODIUM CHLORIDE 125 ML/HR: 900 INJECTION, SOLUTION INTRAVENOUS at 03:17

## 2019-06-26 RX ADMIN — DULOXETINE HYDROCHLORIDE 30 MG: 30 CAPSULE, DELAYED RELEASE ORAL at 21:53

## 2019-06-26 RX ADMIN — SENNOSIDES, DOCUSATE SODIUM 1 TABLET: 50; 8.6 TABLET, FILM COATED ORAL at 19:02

## 2019-06-26 RX ADMIN — Medication 10 ML: at 01:26

## 2019-06-26 RX ADMIN — POLYETHYLENE GLYCOL 3350 17 G: 17 POWDER, FOR SOLUTION ORAL at 19:02

## 2019-06-26 RX ADMIN — LEVOFLOXACIN 750 MG: 5 INJECTION, SOLUTION INTRAVENOUS at 02:24

## 2019-06-26 RX ADMIN — IOPAMIDOL 100 ML: 612 INJECTION, SOLUTION INTRAVENOUS at 12:42

## 2019-06-26 RX ADMIN — NYSTATIN: 100000 POWDER TOPICAL at 03:17

## 2019-06-26 RX ADMIN — CEFEPIME HYDROCHLORIDE 2 G: 2 INJECTION, POWDER, FOR SOLUTION INTRAVENOUS at 10:54

## 2019-06-26 RX ADMIN — HEPARIN SODIUM 5000 UNITS: 5000 INJECTION INTRAVENOUS; SUBCUTANEOUS at 10:54

## 2019-06-26 RX ADMIN — CEFEPIME HYDROCHLORIDE 2 G: 2 INJECTION, POWDER, FOR SOLUTION INTRAVENOUS at 21:54

## 2019-06-26 RX ADMIN — SODIUM CHLORIDE 125 ML/HR: 900 INJECTION, SOLUTION INTRAVENOUS at 21:59

## 2019-06-26 RX ADMIN — NYSTATIN: 100000 POWDER TOPICAL at 10:55

## 2019-06-26 RX ADMIN — HEPARIN SODIUM 5000 UNITS: 5000 INJECTION INTRAVENOUS; SUBCUTANEOUS at 01:23

## 2019-06-26 RX ADMIN — SODIUM CHLORIDE 1000 ML: 900 INJECTION, SOLUTION INTRAVENOUS at 01:21

## 2019-06-26 NOTE — ROUTINE PROCESS
Bedside and Verbal shift change report given to Ivette (oncoming nurse) by Dixon Ma (offgoing nurse).  Report included the following information SBAR, Procedure Summary, Intake/Output, Recent Results and Cardiac Rhythm SR .

## 2019-06-26 NOTE — PROGRESS NOTES
Hospitalist Progress Note  Antonio Olvera MD  Answering service: [phone] from in house phone      Date of Service:  2019  NAME:  Nathen Maciel  :  1940  MRN:  [id number]    Admission Summary:   79M hx of CKD, Sjogrn brought from Rehab facility 'following NOF#' with n/v- ?PNA  Interval history / Subjective:   Patient seen and examined at bedside, feels ok, little dry lips, on room air. Assessment & Plan:     #. HCAP- RLL- POA  #. Severe sepsis: POA- resolved. #. Lactic acidosis: POA- resolved. - Abx, IVF, blood cultures pending, sputum cultures pending, O2 to keep sats >90%, DuoNebs  - IS, Guaifenesin, UA: no bacteria, lots of WCC, urine cultures pending, Monitor  - CT cap: small PNA, small lesion in R kidney, get PT/OT     #. R Hyroureteronephrosis: mild, seen on CT, also small lesion, will get US renal.  #. Recent L hip #: s/p ORIF, PT/OT, analgesia prn  #. Sjogren's: stable, continue home regimen    Code status: Full  DVT prophylaxis: heparin  Care Plan discussed with: Patient/Family and Nurse  Disposition: TBD     Hospital Problems  Date Reviewed: 2019          Codes Class Noted POA    Pneumonia ICD-10-CM: J18.9  ICD-9-CM: 319  2019 Unknown        Hypoxia ICD-10-CM: R09.02  ICD-9-CM: 799.02  10/12/2017 Unknown            Review of Systems:   Pertinent items are mentioned in interval history. Vital Signs:    Last 24hrs VS reviewed since prior progress note.  Most recent are:  Visit Vitals  /68 (BP 1 Location: Left arm, BP Patient Position: At rest)   Pulse 89   Temp 98.1 °F (36.7 °C)   Resp 18   Ht 6' 1\" (1.854 m)   Wt 84.1 kg (185 lb 6.5 oz)   SpO2 95%   BMI 24.46 kg/m²         Intake/Output Summary (Last 24 hours) at 2019 0839  Last data filed at 2019 0354  Gross per 24 hour   Intake 4147 ml   Output --   Net 4147 ml        Physical Examination:   General:  Alert, oriented, No acute distress  Card:  S1, S2 without murmurs, good peripheral perfusion  Resp:  No accessory muscle use, Good AE, no wheezes, fine rhonchi at bases  Abd:  Soft, non-tender, non-distended, BS+  Extremities:  No cyanosis or clubbing, no significant edema  Neuro:  Grossly normal, no focal neuro deficits, follows commands   Psych:  fair insight, AAOx3, not agitated. Data Review:    Review and/or order of clinical lab test  Review and/or order of tests in the radiology section of CPT  Review and/or order of tests in the medicine section of [facility]  Labs:     Recent Labs     06/26/19 0219 06/25/19 2041   WBC 17.6* 24.0*   HGB 11.3* 13.3   HCT 36.0* 40.9    295     Recent Labs     06/26/19 0219 06/25/19 2041    137   K 3.9 4.2   * 105   CO2 22 22   BUN 14 16   CREA 1.17 1.49*   * 121*   CA 7.8* 8.9     Recent Labs     06/25/19 2041   SGOT 12*   ALT 25      TBILI 0.8   TP 7.4   ALB 2.9*   GLOB 4.5*   LPSE 72*     No results for input(s): INR, PTP, APTT in the last 72 hours. No lab exists for component: INREXT   No results for input(s): FE, TIBC, PSAT, FERR in the last 72 hours. No results found for: FOL, RBCF   No results for input(s): PH, PCO2, PO2 in the last 72 hours.   Recent Labs     06/25/19 2041   TROIQ <0.05     Lab Results   Component Value Date/Time    Cholesterol, total 98 06/26/2019 02:19 AM    HDL Cholesterol 43 06/26/2019 02:19 AM    LDL, calculated 40.8 06/26/2019 02:19 AM    Triglyceride 71 06/26/2019 02:19 AM    CHOL/HDL Ratio 2.3 06/26/2019 02:19 AM     No results found for: [facility]  Lab Results   Component Value Date/Time    Color YELLOW/STRAW 06/25/2019 09:20 PM    Appearance CLEAR 06/25/2019 09:20 PM    Specific gravity 1.015 06/25/2019 09:20 PM    Specific gravity 1.017 06/11/2019 06:46 AM    pH (UA) 6.0 06/25/2019 09:20 PM    Protein 30 (A) 06/25/2019 09:20 PM    Glucose NEGATIVE  06/25/2019 09:20 PM    Ketone NEGATIVE  06/25/2019 09:20 PM    Bilirubin NEGATIVE 06/25/2019 09:20 PM    Urobilinogen 0.2 06/25/2019 09:20 PM    Nitrites POSITIVE (A) 06/25/2019 09:20 PM    Leukocyte Esterase MODERATE (A) 06/25/2019 09:20 PM    Epithelial cells FEW 06/25/2019 09:20 PM    Bacteria NEGATIVE  06/25/2019 09:20 PM    WBC >100 (H) 06/25/2019 09:20 PM    RBC 5-10 06/25/2019 09:20 PM     Medications Reviewed:     Current Facility-Administered Medications   Medication Dose Route Frequency    DULoxetine (CYMBALTA) capsule 30 mg  30 mg Oral QHS    acetaminophen (TYLENOL) tablet 650 mg  650 mg Oral Q6H PRN    polyethylene glycol (MIRALAX) packet 17 g  17 g Oral QPM    senna-docusate (PERICOLACE) 8.6-50 mg per tablet 1 Tab  1 Tab Oral QPM    predniSONE (DELTASONE) tablet 5 mg  5 mg Oral DAILY    sodium chloride (NS) flush 5-40 mL  5-40 mL IntraVENous Q8H    sodium chloride (NS) flush 5-40 mL  5-40 mL IntraVENous PRN    heparin (porcine) injection 5,000 Units  5,000 Units SubCUTAneous Q8H    nystatin (MYCOSTATIN) 100,000 unit/gram powder   Topical TID    [START ON 6/27/2019] vancomycin (VANCOCIN) 1500 mg in  ml infusion  1,500 mg IntraVENous Q24H    sodium chloride (NS) flush 5-10 mL  5-10 mL IntraVENous PRN    cefepime (MAXIPIME) 2 g in 0.9% sodium chloride (MBP/ADV) 100 mL  2 g IntraVENous Q12H    levoFLOXacin (LEVAQUIN) 750 mg in D5W IVPB  750 mg IntraVENous Q48H    0.9% sodium chloride infusion  125 mL/hr IntraVENous CONTINUOUS    Vancomycin Pharmacy Dosing   Other PRN   ______________________________________________________________________  EXPECTED LENGTH OF STAY: - - -  ACTUAL LENGTH OF STAY:          1               Rupinder Willoughby MD

## 2019-06-26 NOTE — PROGRESS NOTES
Day #1 of Levaquin  Indication: pneumonia  Current regimen:  750 mg Q 24 HR  Abx regimen: Levaquin+Cefepime+Vancomycin  Recent Labs     19   WBC 24.0*   CREA 1.49*   BUN 16     Est CrCl: ~ 45 ml/min;    Temp (24hrs), Av.3 °F (37.9 °C), Min:100.3 °F (37.9 °C), Max:100.3 °F (37.9 °C)    Cultures:  blood and urine in process    Plan: Change to 750 MG iv q 48 hr per renal dosing protocol

## 2019-06-26 NOTE — CONSULTS
Urology Consult    Subjective:     Date of Consultation:  June 26, 2019    Referring Physician: Flower Orourke    Reason for Consultation:  [facility], renal mass    Patient Name: Alexis San  MRN: [id number]    History of Present Illness:   Patient is a 78 y.o.  male who is being seen for above. He was admitted to the hospital for Hypoxia [R09.02]  Pneumonia [J18.9]. Past Medical History:   Diagnosis Date    CKD (chronic kidney disease), stage III ([address]     Sjoegren syndrome 04/2016    Urine retention       Past Surgical History:   Procedure Laterality Date    HX APPENDECTOMY      HX CERVICAL DISKECTOMY      HX HEENT      Zygomatic arch repair    HX HERNIA REPAIR      HX HIP REPLACEMENT Left 06/11/2019    HX ORTHOPAEDIC      Broken tibia and fibula    HX PROSTATE SURGERY      TURP    HX VASECTOMY        History reviewed. No pertinent family history. Social History     Tobacco Use    Smoking status: Current Every Day Smoker     Packs/day: 0.50     Years: 40.00     Pack years: 20.00    Smokeless tobacco: Never Used   Substance Use Topics    Alcohol use: No     Allergies   Allergen Reactions    Flomax [Tamsulosin] Other (comments)     Dizzy    Rapaflo [Silodosin] Other (comments)     Dizzy      Prior to Admission medications    Medication Sig Start Date End Date Taking? Authorizing Provider   predniSONE (DELTASONE) 2.5 mg tablet Take 5 mg by mouth daily. Yes Other, MD Cecily   acetaminophen (TYLENOL) 325 mg tablet Take 2 Tabs by mouth every six (6) hours as needed for Pain. 6/14/19   Tomasa Grimaldo MD   DULoxetine (CYMBALTA) 30 mg capsule Take 30 mg by mouth nightly. Provider, Historical   polyethylene glycol (MIRALAX) 17 gram/dose powder Take 17 g by mouth every evening. Provider, Historical   senna-docusate (PERICOLACE) 8.6-50 mg per tablet Take 1 Tab by mouth every evening.     Provider, Historical         Review of Systems:  A comprehensive review of systems was negative except for that written in the HPI. Objective:     Data Review (Labs):    Recent Labs     19  0219 19  2041   WBC 17.6* 24.0*   HGB 11.3* 13.3   .9* 100.2*    295    137   K 3.9 4.2   CREA 1.17 1.49*   BUN 14 16   ALB  --  2.9*   TBILI  --  0.8   SGOT  --  12*   ALT  --  25   AP  --  114   LPSE  --  72*       Patient Vitals for the past 8 hrs:   BP Temp Pulse Resp SpO2   19 0829 132/68 98.1 °F (36.7 °C) 89 18 95 %   19 0600 136/57 -- 81 15 94 %   19 0500 148/76 -- 75 13 95 %   19 0400 115/65 -- 82 21 94 %   19 0300 139/71 -- 70 18 95 %   19 0200 129/60 -- 76 21 95 %     Temp (24hrs), Av.9 °F (37.2 °C), Min:98.1 °F (36.7 °C), Max:100.3 °F (37.9 °C)      Intake and Output:    1901 -  0700  In: 6283 [I.V.:4147]  Out: -     Physical Exam:            General:    alert, cooperative, no distress, appears stated age                     Skin:  Normal.   Lymph nodes:  Cervical, supraclavicular, and axillary nodes normal.             Abdomen[de-identified]  soft, non-tender. Bowel sounds normal. No masses,  no organomegaly. No cvat. Genitalia:  defer exam          Extremities:  negative       Assessment:     Active Problems:    Hypoxia (10/12/2017)      Pneumonia (2019)          Pt admitted w UTI and pneumonia. CT shows significant hydro down to bladder that looks chronic. Has masses not characterized without contrast.    US done [place] [facility]  did not show hydro. Has hx poor bladder emptying and is sp greenlight laser rx for bph. . Has a normal serum creat. No cvatenderness to suggest pyelo at this time. Needs CT/IVP and post void residual to begin workup       Plan:     Bladder scan, CT ivp  (non con, early and delayed images of urin tract) mariscal if pvr is high.       Signed By: Eva Nair MD                         2019

## 2019-06-26 NOTE — H&P
[address]  HISTORY AND PHYSICAL    Name:  Neetu Farris  MR#:  [id number]  :  1940  ACCOUNT #:  [de-identified]  ADMIT DATE:  2019      CHIEF COMPLAINT:  The patient does not provide. HISTORY OF PRESENT ILLNESS:  A 79-year-old white male with past medical history of chronic kidney disease, Sjorgren's syndrome, urinary retention, recent ORIF, left hemiarthroplasty secondary to closed left femoral neck fracture presented to the emergency department from [facility] [address] [place] [address] with reported initial chief complaint of nausea and vomiting. The patient is a limited historian. He is accompanied by his wife who provides some additional history. Remainder of history was obtained from review of ED and electronic medical records. As noted, the patient had recently been hospitalized from 2019 to 2019 after having closed left femoral neck fracture and underwent ORIF with left hemiarthroplasty on 2019. The patient was subsequently discharged to rehab facility where he has currently been using a walker and a wheelchair. However, the patient notes that his legs are generally weak. He recently had been diagnosed with urinary tract infection and was started on Rocephin. However, it was felt that his symptoms were worsening including recent nausea and vomiting. He is not reporting abdominal pain. Per the ED, he also had a cough productive of white sputum. There are no reports of dizziness, lightheadedness, new-onset numbness, paresthesias, syncope, loss of consciousness, headache, neck pain, back pain, chest pain, palpitations, shortness of breath, melena, hematuria, calf pain, swelling, or edema. His wife notes that the patient has development of a rash on his buttock and groin region. The patient notably has some urinary incontinence. He also notes he has difficulty with \"the volume of his urine. \"  On arrival to the emergency department, initial recorded vital signs; temperature of 100.3 degrees Fahrenheit, blood pressure 144/69, heart rate of 100, respiratory rate of 27, and O2 saturation is 90% on room air. A workup included CT of the chest without contrast showed small areas of the ground-glass opacity in the inferior right middle lobe suspicious for infection or inflammation with indeterminate 2.7-cm lesion on the lateral left kidney and 1.5-cm left adrenal adenoma. The patient's UA was abnormal, but bacteria was not present. Lactic acid equals 4.5. He had elevated WBC of 24,000 and neutrophils 81%. The patient is now seen for admission to hospitalist service for continued evaluation and treatment. PAST MEDICAL HISTORY:  1.  Stage III chronic kidney disease. 2.  Sjorgen's syndrome. 3.  Urinary retention. 4.  Urinary incontinence. 5.  E. coli UTI per the patient's wife's report. 6.  Gait abnormality. 7.  History of septic shock. PAST SURGICAL HISTORY:  1. Appendectomy. 2.  Cervical diskectomy. 3.  Zygomatic arch repair. 4.  Hernia repair, unspecified. 5.  Surgical repair of tibia and fibular fractures. 6.  Transurethral resection of prostate. 7.  Vasectomy  8. ORIF and left hemiarthroplasty, left femoral neck fracture, 06/11/2019. CURRENT MEDICATIONS:  Medication list reviewed and noted on chart records. acetaminophen (TYLENOL) 325 mg tablet    06/14/19  -- Iveth Andre MD     Take 2 Tabs by mouth every six (6) hours as needed for Pain. DULoxetine (CYMBALTA) 30 mg capsule    --  --  Provider, Historical    Take 30 mg by mouth nightly. polyethylene glycol (MIRALAX) 17 gram/dose powder    --  --  Provider, Historical    Take 17 g by mouth every evening. predniSONE (DELTASONE) 2.5 mg tablet    --  --  Other, MD Cecily    Take 5 mg by mouth daily. senna-docusate (PERICOLACE) 8.6-50 mg per tablet    --  --  Provider, Historical    Take 1 Tab by mouth every evening. ALLERGIES:  Manriquez Velpen.     SOCIAL HISTORY:  Positive smoking cigarettes, undisclosed amount. Negative for alcohol. No reports of illicit drugs. He is . FAMILY HISTORY:  Unknown regard to heart disease or stroke. REVIEW OF SYSTEMS:  Pertinent positives as noted in HPI, otherwise negative. PHYSICAL EXAMINATION:  VITAL SIGNS:  Temperature maximum of 100.3 degrees Fahrenheit, blood pressure 148/64, heart rate of 85, respiratory rate of 19, O2 saturation 95% on room air. Recorded weight of 185 pounds (84.1 kg). Recorded height of 6 feet 1 inch tall. GENERAL:  Elderly male in no acute respiratory distress. Constant hiccups (new onset), appears fatigued. PSYCHIATRIC:  The patient is awake, alert, and oriented x3. NEUROLOGIC:  GCS of 14 (E4, V4, M6) spontaneous eye opening, occasional inappropriate verbal response and follows commands. Moves extremities x4 with generalized weakness. Sensation is grossly intact without slurred speech or facial droop. HEENT:  Normocephalic, atraumatic. PERRLA. EOMs intact. Sclerae anicteric. Conjunctivae clear. Nares are patent. Oropharynx is clear. Tongue is midline, nonedematous. NECK:  Supple without lymphadenopathy, JVD, carotid bruits or thyromegaly. LYMPH:  Negative for cervical or supraclavicular adenopathy. RESPIRATORY:  Lungs few scattered rhonchi with fine bibasilar rales. CVS:  Heart regular rate and rhythm. Normal S1, S2 without murmurs, rubs, or gallops. GI:  Abdomen is soft, nontender, nondistended. Normoactive bowel sounds. No rebound, guarding, or rigidity. No auscultated abdominal bruits or pulsatile abdominal mass. BACK:  No CVA tenderness or step-off deformity. MUSCULOSKELETAL:  No acute palpable bony deformity. Negative for calf tenderness. VASCULAR:  2+ radial to 1+ dorsalis pedis pulse without cyanosis or clubbing. There is trace lower extremity nonpitting edema. SKIN:  Warm and dry.   There are superficial skin tears, anterior surface of the distal right leg.  multiple ecchymotic areas of bilateral upper and lower extremities. Skin tears noted on the left forearm. Dressings in place. There is extensive rash with erythema involving bilateral buttocks, sacral, groin, scrotal, and perineal regions with induration. LABORATORY DATA:  Labs are reviewed. Sodium 137, potassium 4.2, chloride 105, CO2 of 22, BUN 16, creatinine of 1.49, glucose of 121, anion gap of 10, calcium is 8.9, GFR 45, total bilirubin is 0.8, total protein 7.4, albumin is 2.9, ALT is 25, AST is 12, alkaline phosphatase of 114, lipase 72, lactic acid 4.5, troponin I less than 0.05. ProBNP of 146. WBC of 24.0, hemoglobin of 13.3, hematocrit of 40.9, platelets 347, and neutrophils 81%. Urinalysis; leukocyte esterase moderate, nitrites positive, urobilinogen is 0.2, bilirubin negative, blood large, ketones negative, glucose negative, protein 30, pH of 6.0, specific gravity 1.015, wbc's greater than 100, rbc's 5-10, bacteria negative. The CT of abdomen and pelvis without contrast, results reviewed. The CT of the chest without contrast, results reviewed. A 12-lead EKG, sinus rhythm, premature atrial complex with left axis deviation, ST changes in inferior leads at 78 beats per minute. IMPRESSION AND PLAN:  1. Sepsis - with noted fever, initial tachypnea, hypoxia, leukocytosis, and now suspected pneumonia involving the right lower lobe. Admit the patient to telemetry. The patient has been started on IV antibiotics, cefepime, and levofloxacin. Concern for hospital-acquired pneumonia given recent hospitalization. At this time, continue with noted antibiotics as ED already has been ordered. Check blood cultures and adjust antibiotics accordingly. Also concern for underlying urinary tract infection, although the UA had not shown bacteria. 2.  Pneumonia. Plan as noted above. 3.  Hypoxia. We will order oxygen therapy and pulse oximetry monitoring. 4.  Fever. May have Tylenol p.r.n.  5.  Leukocytosis. Repeat CBC. 6.  Abnormal urinalysis - suspect likely urinary tract infection. The patient reportedly had Escherichia coli urinary tract infection according to the wife's report prior to admission at the rehab facility. Continue with IV antibiotics as mentioned. 7.  Lactic acidosis. Order IV fluid hydration resuscitation until lactic acid levels are corrected. 8.  Sjorgen's syndrome. Continue supportive care and current home medication. 9.  Dehydration, elevated creatinine. Order IV fluid hydration resuscitation. Repeat the renal panel in the a.m. 10.  Mild right hydroureteronephrosis - indeterminate etiology. Consult with urologist.  11.  Right adrenal adenoma. Plan as noted above. 12.  Left renal hypodensity, possible mass. Again consult with urologist.  13.  Generalized weakness and debility. Place on fall precautions. 14.  Hiccups. Elevate the head of bed and continue supportive care. 15.  Nausea and vomiting. Have Zofran 4 mg IV q.6 hours p.r.n.  16.  Skin cares. Continue with wound cares and dressing changes. 17.  Rash - involving buttocks, groin, sacral regions. Order nystatin powder t.i.d.  18.  Venous thromboembolism prophylaxis. Order heparin 5000 units subcu q.8 hours. CODE STATUS:  Full code. FUNCTIONAL STATUS:  The patient uses a walker and wheelchair.       David Vargas MD [id number]  D:  06/26/2019 0:53  T:  06/26/2019 3:41  JOB #:  [id number]

## 2019-06-26 NOTE — PROGRESS NOTES
Day #1 of levofloxacin  Indication:  Pneumonia  Current regimen:  750 mg Q48H  Abx regimen: vancomycin + cefepime + levofloxacin  Recent Labs     19  0219 19  2041   WBC 17.6* 24.0*   CREA 1.17 1.49*   BUN 14 16     Est CrCl: 55-60 ml/min; UO: unmeasured  Temp (24hrs), Av.9 °F (37.2 °C), Min:98.1 °F (36.7 °C), Max:100.3 °F (37.9 °C)    Cultures:    blood and urine, pending    Plan: Change to levofloxacin 750 mg Q24H for crcl > 50 ml.min

## 2019-06-26 NOTE — ED PROVIDER NOTES
Wife did all the talking - 'had hi(p replaced 06/11 w/ Dr Elizabeth Velazquez at [facility], have been at [facility] rehab x 4 days/ getting worse/ vomiting a lot / was told he has a uti / I made them send a culture this am/ then I made them give hm rocephin but I think I finished he bag'    Pt speaks intermittently/ noted to be coughing/ prod of white phlegm/ copious saliva    pt denies HA, vison changes, diff swallowing, CP, Abd pain, F/Ch, N/V, D/Cons or other current systemic complaints           Past Medical History:   Diagnosis Date    CKD (chronic kidney disease), stage III ([facility] [address]     Sjoegren syndrome 04/2016    Urine retention        Past Surgical History:   Procedure Laterality Date    HX APPENDECTOMY      HX CERVICAL DISKECTOMY      HX HEENT      Zygomatic arch repair    HX HERNIA REPAIR      HX ORTHOPAEDIC      Broken tibia and fibula    HX PROSTATE SURGERY      TURP    HX VASECTOMY           History reviewed. No pertinent family history.     Social History     Socioeconomic History    Marital status:      Spouse name: Not on file    Number of children: Not on file    Years of education: Not on file    Highest education level: Not on file   Occupational History    Not on file   Social Needs    Financial resource strain: Not on file    Food insecurity:     Worry: Not on file     Inability: Not on file    Transportation needs:     Medical: Not on file     Non-medical: Not on file   Tobacco Use    Smoking status: Current Every Day Smoker     Packs/day: 0.50     Years: 40.00     Pack years: 20.00    Smokeless tobacco: Never Used   Substance and Sexual Activity    Alcohol use: No    Drug use: No    Sexual activity: Not on file   Lifestyle    Physical activity:     Days per week: Not on file     Minutes per session: Not on file    Stress: Not on file   Relationships    Social connections:     Talks on phone: Not on file     Gets together: Not on file     Attends [employer] service: Not on file Active member of club or organization: Not on file     Attends meetings of clubs or organizations: Not on file     Relationship status: Not on file    Intimate partner violence:     Fear of current or ex partner: Not on file     Emotionally abused: Not on file     Physically abused: Not on file     Forced sexual activity: Not on file   Other Topics Concern    Not on file   Social History Narrative    Not on file         ALLERGIES: Flomax [tamsulosin] and Rapaflo [silodosin]    Review of Systems   Constitutional: Positive for chills. Negative for fever. HENT: Negative for trouble swallowing and voice change. Eyes: Negative for photophobia. Respiratory: Positive for cough. Negative for choking, chest tightness and shortness of breath. Cardiovascular: Negative for chest pain, palpitations and leg swelling. Gastrointestinal: Positive for vomiting. Negative for abdominal pain, constipation, diarrhea and nausea. Genitourinary: Positive for dysuria. Negative for flank pain, penile swelling, scrotal swelling and testicular pain. Skin: Negative for rash. Neurological: Negative for dizziness and tremors. All other systems reviewed and are negative. Vitals:    06/25/19 2028   BP: 144/69   Pulse: 100   Resp: 27   Temp: 100.3 °F (37.9 °C)   SpO2: 90%   Weight: 84.1 kg (185 lb 6.5 oz)   Height: 6' 1\" (1.854 m)            Physical Exam   Constitutional: He is oriented to person, place, and time. He appears well-developed and well-nourished. No distress. NAD, AxOx4, speaking in complete sentences     HENT:   Head: Normocephalic and atraumatic. Right Ear: External ear normal.   Left Ear: External ear normal.   Mouth/Throat: Oropharynx is clear and moist. No oropharyngeal exudate. Cn intact grossly       Eyes: Pupils are equal, round, and reactive to light. Conjunctivae and EOM are normal. Right eye exhibits no discharge. Left eye exhibits no discharge. Neck: Normal range of motion. Neck supple. Cardiovascular: Normal rate, regular rhythm, normal heart sounds and intact distal pulses. Exam reveals no gallop and no friction rub. No murmur heard. Pulmonary/Chest: Effort normal and breath sounds normal. No stridor. No respiratory distress. He has no wheezes. He has no rales. He exhibits no tenderness. Abdominal: Soft. Bowel sounds are normal. He exhibits no distension and no mass. There is no tenderness. There is no rebound and no guarding. Genitourinary:   Genitourinary Comments: Pt denies urinary/ Testicular/ scrotal or penile  complaints   Musculoskeletal: Normal range of motion. He exhibits no edema, tenderness or deformity. L hip - noted wound/ no redness/ dehiscence/ induration/ no soi   Lymphadenopathy:     He has no cervical adenopathy. Neurological: He is alert and oriented to person, place, and time. He displays normal reflexes. No cranial nerve deficit or sensory deficit. He exhibits normal muscle tone. Coordination normal.   pt has motor/ CV/ Sensation grossly intact to all extremities, R = L in strength;   Skin: Skin is warm and dry. Capillary refill takes less than 2 seconds. No rash noted. No erythema. Psychiatric: He has a normal mood and affect. Nursing note and vitals reviewed. MDM       Procedures    Chief Complaint   Patient presents with    Abnormal Lab Results    Vomiting       8:43 PM  The patients presenting problems have been discussed, and they are in agreement with the care plan formulated and outlined with them. I have encouraged them to ask questions as they arise throughout their visit.     MEDICATIONS GIVEN:  Medications   sodium chloride (NS) flush 5-10 mL (has no administration in time range)   sodium chloride 0.9 % bolus infusion 1,000 mL (has no administration in time range)       LABS REVIEWED:  Labs Reviewed   CULTURE, BLOOD   CULTURE, BLOOD   URINE CULTURE HOLD SAMPLE   LACTIC ACID   METABOLIC PANEL, COMPREHENSIVE   CBC WITH AUTOMATED DIFF TROPONIN I   NT-PRO BNP   URINALYSIS W/MICROSCOPIC       RADIOLOGY RESULTS:  The following have been ordered and reviewed:  _____________________________________________________________________  _____________________________________________________________________    EKG interpretation:   Rhythm: normal sinus rhythm; and regular . Rate (approx.): 78; Axis: normal; P wave: normal; QRS interval: normal ; ST/T wave: normal; Negative acute significant segmental elevations/ unchanged compared to study dated 06/11/2019    PROCEDURES:        CONSULTATIONS:       PROGRESS NOTES:      DIAGNOSIS:    No diagnosis found. PLAN:  1-      ED COURSE: The patients hospital course has been uncomplicated. Hospitalist Trell for Admission  9:51 PM    ED Room Number: ER25/25  Patient Name and age:  Aleena Clemens 78 y.o.  male  Working Diagnosis:   1.  HAP (hospital-acquired pneumonia)      Readmission: yes  Isolation Requirements:  no  Recommended Level of Care:  telemetry  Code Status:  Full  Other:  Started V+Cefepime/

## 2019-06-26 NOTE — ROUTINE PROCESS
TRANSFER - OUT REPORT:    Verbal report given to HELGA Estrada(name) on Nathen Maciel  being transferred to [address] [place] [address] [id number](unit) for routine progression of care       Report consisted of patients Situation, Background, Assessment and   Recommendations(SBAR). Information from the following report(s) SBAR, ED Summary, Intake/Output, MAR, Recent Results and Cardiac Rhythm sinus was reviewed with the receiving nurse. Lines:   Peripheral IV 06/25/19 Left Wrist (Active)   Site Assessment Clean, dry, & intact 6/25/2019  8:37 PM   Phlebitis Assessment 0 6/25/2019  8:37 PM   Infiltration Assessment 0 6/25/2019  8:37 PM   Dressing Status Clean, dry, & intact 6/25/2019  8:37 PM   Dressing Type Transparent;Tape 6/25/2019  8:37 PM   Hub Color/Line Status Green;Patent; Flushed 6/25/2019  8:37 PM   Action Taken Blood drawn 6/25/2019  8:37 PM       Peripheral IV 06/25/19 Right Antecubital (Active)   Site Assessment Clean, dry, & intact 6/25/2019  8:42 PM   Phlebitis Assessment 0 6/25/2019  8:42 PM   Infiltration Assessment 0 6/25/2019  8:42 PM   Dressing Status Clean, dry, & intact 6/25/2019  8:42 PM   Dressing Type Transparent 6/25/2019  8:42 PM   Hub Color/Line Status Pink;Patent; Flushed 6/25/2019  8:42 PM   Action Taken Blood drawn 6/25/2019  8:42 PM        Opportunity for questions and clarification was provided.       Patient transported with:   Monitor  Registered Nurse

## 2019-06-26 NOTE — PROGRESS NOTES
Day #1 of Cefepime  Indication: pneumonia  Current regimen:  2 grams Q 8 hr  Abx regimen: Levaquin+Cefepime+Vancomycin  Recent Labs     19   WBC 24.0*   CREA 1.49*   BUN 16     Est CrCl: ~ 45 ml/min;    Temp (24hrs), Av.3 °F (37.9 °C), Min:100.3 °F (37.9 °C), Max:100.3 °F (37.9 °C)    Cultures:  blood and urine in process    Plan: Change to 2 grams Q 12 hr per renal dosing protocol

## 2019-06-26 NOTE — PROGRESS NOTES
Pharmacist Note - Vancomycin Dosing    Consult provided for this 78 y.o. male for indication of HAP. Antibiotic regimen(s): Cefepime+Levaquin+Vancomycin    Recent Labs     19  2041   WBC 24.0*   CREA 1.49*   BUN 16     Frequency of BMP: daily x3   Height: 185.4 cm  Weight: 84.1 kg  Est CrCl: ~ 45 ml/min; Temp (24hrs), Av.3 °F (37.9 °C), Min:100.3 °F (37.9 °C), Max:100.3 °F (37.9 °C)    Cultures:   blood in process   urine in process    Goal trough = 15 - 20 mcg/mL    Therapy will be initiated with a loading dose of 2000 mg IV x 1 to be followed by a maintenance dose of 1500 mg IV every 24 hours. Pharmacy to follow patient daily and order levels / make dose adjustments as appropriate.

## 2019-06-26 NOTE — ED TRIAGE NOTES
Pt arrives via EMS from [facility] for abnormal lab results, showing elevated WBC. Pt had a L hip replacement 6/11/19 at [facility]. Wife at bedside, reports history of sepsis and a current UTI, and that his mentation has changed over the weekend, stating \"he's been out of it all weekend. \"    Pt arrives coughing, vomiting, and difficulty breathing.  Pt able to speak a few words between coughs

## 2019-06-26 NOTE — PROGRESS NOTES
Reason for Readmission:     HCAP/UTI         RRAT Score and Risk Level:     23      Level of Readmission:    1      Care Conference scheduled:   no       Resources/supports as identified by patient/family:   Wife and daughter-in-law       Top Challenges facing patient (as identified by patient/family and CM): Finances/Medication cost?     Medicare and [employer]      Family available to transport  Support system or lack thereof?     wife  Living arrangements? Lives with wife  In 2 story home but lives on 1st floor  Self-care/ADLs/Cognition? Came from [facility] - prior to ORIF was walking with a rolling walker- some minimal ST memory loss - able to make own needs known and participate in medical decision making - also defers to wife for decision making as well       Current Advanced Directive/Advance Care Plan:  none           Plan for utilizing home health:   TBD - patient admitted from [facility] for ORIF             Transition of Care Plan:    Based on readmission, the patient's previous Plan of Care   has been evaluated and/or modified. The current Transition of Care Plan is:             CM met with patient and Carlos Oropeza daughter-in-law ph# [phone] - who is visiting today but patient wants all staff to go through him and the wife) Patient and wife do not want patient to return to [facility] and they are not even willing to speak with [facility] liason at this time - if patient still needs IPR upon discharge their first choice will be [address] [place] [address] and CM provided them a list to provide a back up facility as well - CM will await input of PT/OT when appropriate and then send referrals based on recommendations - CM will continue to follow.  BRITTANY Aguilar

## 2019-06-26 NOTE — PROGRESS NOTES
Day #1 of Vancomycin  Indication:  HAP  Current regimen:  1500 mg Q24H  Abx regimen:  vancomycin + cefepime + levofloxacin  ID Following ?: NO  Concomitant nephrotoxic drugs (requires more frequent monitoring): None  Frequency of BMP?: daily through     Recent Labs     19  0219 19  2041   WBC 17.6* 24.0*   CREA 1.17 1.49*   BUN 14 16     Est CrCl: 55-60 ml/min; UO: unmeasured ml/kg/hr  Temp (24hrs), Av.9 °F (37.2 °C), Min:98.1 °F (36.7 °C), Max:100.3 °F (37.9 °C)    Cultures:    blood x2, pending   urine, pending    Goal trough = 15 - 20 mcg/mL    Recent trough history (date/time/level/dose/action taken):      Plan: Change to vancomycin 1250 mg Q16H for significant improvement in renal function

## 2019-06-27 LAB
ANION GAP SERPL CALC-SCNC: 8 MMOL/L (ref 5–15)
BACTERIA SPEC CULT: NORMAL
BUN SERPL-MCNC: 13 MG/DL (ref 6–20)
BUN/CREAT SERPL: 11 (ref 12–20)
CALCIUM SERPL-MCNC: 8.1 MG/DL (ref 8.5–10.1)
CC UR VC: NORMAL
CHLORIDE SERPL-SCNC: 109 MMOL/L (ref 97–108)
CO2 SERPL-SCNC: 22 MMOL/L (ref 21–32)
CREAT SERPL-MCNC: 1.16 MG/DL (ref 0.7–1.3)
GLUCOSE SERPL-MCNC: 84 MG/DL (ref 65–100)
POTASSIUM SERPL-SCNC: 3.6 MMOL/L (ref 3.5–5.1)
SERVICE CMNT-IMP: NORMAL
SODIUM SERPL-SCNC: 139 MMOL/L (ref 136–145)

## 2019-06-27 PROCEDURE — 36415 COLL VENOUS BLD VENIPUNCTURE: CPT

## 2019-06-27 PROCEDURE — 97530 THERAPEUTIC ACTIVITIES: CPT

## 2019-06-27 PROCEDURE — 80048 BASIC METABOLIC PNL TOTAL CA: CPT

## 2019-06-27 PROCEDURE — 65270000032 HC RM SEMIPRIVATE

## 2019-06-27 PROCEDURE — 74011000258 HC RX REV CODE- 258: Performed by: FAMILY MEDICINE

## 2019-06-27 PROCEDURE — 74011250636 HC RX REV CODE- 250/636: Performed by: FAMILY MEDICINE

## 2019-06-27 PROCEDURE — 97116 GAIT TRAINING THERAPY: CPT

## 2019-06-27 PROCEDURE — 74011250637 HC RX REV CODE- 250/637: Performed by: FAMILY MEDICINE

## 2019-06-27 PROCEDURE — 74011636637 HC RX REV CODE- 636/637: Performed by: FAMILY MEDICINE

## 2019-06-27 PROCEDURE — 51798 US URINE CAPACITY MEASURE: CPT

## 2019-06-27 PROCEDURE — 97161 PT EVAL LOW COMPLEX 20 MIN: CPT

## 2019-06-27 RX ADMIN — Medication 10 ML: at 23:18

## 2019-06-27 RX ADMIN — NYSTATIN: 100000 POWDER TOPICAL at 15:06

## 2019-06-27 RX ADMIN — NYSTATIN: 100000 POWDER TOPICAL at 09:47

## 2019-06-27 RX ADMIN — LEVOFLOXACIN 750 MG: 5 INJECTION, SOLUTION INTRAVENOUS at 02:08

## 2019-06-27 RX ADMIN — HEPARIN SODIUM 5000 UNITS: 5000 INJECTION INTRAVENOUS; SUBCUTANEOUS at 09:36

## 2019-06-27 RX ADMIN — CEFEPIME HYDROCHLORIDE 2 G: 2 INJECTION, POWDER, FOR SOLUTION INTRAVENOUS at 13:27

## 2019-06-27 RX ADMIN — VANCOMYCIN HYDROCHLORIDE 1250 MG: 10 INJECTION, POWDER, LYOPHILIZED, FOR SOLUTION INTRAVENOUS at 09:39

## 2019-06-27 RX ADMIN — HEPARIN SODIUM 5000 UNITS: 5000 INJECTION INTRAVENOUS; SUBCUTANEOUS at 02:08

## 2019-06-27 RX ADMIN — Medication 10 ML: at 22:00

## 2019-06-27 RX ADMIN — PREDNISONE 5 MG: 5 TABLET ORAL at 09:36

## 2019-06-27 RX ADMIN — CEFEPIME HYDROCHLORIDE 2 G: 2 INJECTION, POWDER, FOR SOLUTION INTRAVENOUS at 23:17

## 2019-06-27 RX ADMIN — HEPARIN SODIUM 5000 UNITS: 5000 INJECTION INTRAVENOUS; SUBCUTANEOUS at 16:39

## 2019-06-27 RX ADMIN — DULOXETINE HYDROCHLORIDE 30 MG: 30 CAPSULE, DELAYED RELEASE ORAL at 23:18

## 2019-06-27 NOTE — PROGRESS NOTES
CM notes patient is a [employer]. Will await PT/OT notes and MD recommendation for disposition needs at this time. Patient admitted from [facility] and patient/familiy does not wish to return there- if patient still needs IPR LOC first choice of IPR is [address] and family has a list for second choice if needed. Will continue to follow.    BRITTANY Casas

## 2019-06-27 NOTE — PROGRESS NOTES
Hospitalist Progress Note  Susana Mosquead MD  Answering service: [phone] from in house phone      Date of Service:  2019  NAME:  Shellie Bragg  :  1940  MRN:  [id number]    Admission Summary:   79M hx of CKD, Sjogrn brought from Rehab facility 'following NOF#' with n/v- ?PNA  Interval history / Subjective:   Patient seen and examined at bedside, feels ok, still very lethargic, no specific symptoms, no fever, wife at bedside, answered her many questions. Assessment & Plan:     #. HCAP- RLL- POA  #. Severe sepsis: POA- resolved. #. Lactic acidosis: POA- resolved. - Abx, IVF, blood cultures pending, sputum cultures pending, O2 to keep sats >90%, DuoNebs  - IS, Guaifenesin, UA: no bacteria, lots of WCC, urine cultures pending, Monitor  - CT cap: small PNA, small lesion in R kidney, get PT/OT     #. Hiccups: improving, likely related to acute illness, monitor. #. R Hyroureteronephrosis: mild, seen on CT chest, also small lesion,   - US/CT a/p: no hydronephrosis, Urology following.  - UA no bacteria, Urine cultures pending, Bladder check to r/o retention. #. Recent L hip #: s/p ORIF, PT/OT, due f/u will consult ortho. analgesia prn  #. Sjogren's: stable, continue home regimen    Code status: Full  DVT prophylaxis: heparin  Care Plan discussed with: Patient/Family and Nurse  Disposition: TBD     Hospital Problems  Date Reviewed: 2019          Codes Class Noted POA    Pneumonia ICD-10-CM: J18.9  ICD-9-CM: 656  2019 Unknown        Hypoxia ICD-10-CM: R09.02  ICD-9-CM: 799.02  10/12/2017 Unknown            Review of Systems:   Pertinent items are mentioned in interval history. Vital Signs:    Last 24hrs VS reviewed since prior progress note.  Most recent are:  Visit Vitals  /52 (BP 1 Location: Left arm, BP Patient Position: At rest)   Pulse 78   Temp 96.8 °F (36 °C)   Resp 16   Ht 6' 1\" (1.854 m)   Wt 86.3 kg (190 lb 4.1 oz)   SpO2 91%   BMI 25.10 kg/m²         Intake/Output Summary (Last 24 hours) at 6/27/2019 0906  Last data filed at 6/27/2019 7277  Gross per 24 hour   Intake 3600 ml   Output 1560 ml   Net 2040 ml        Physical Examination:   General:  Alert, oriented, No acute distress  Resp:  No accessory muscle use, Good AE, no wheezes, no rhonchi  Abd:  Soft, non-tender, non-distended  Extremities:  No cyanosis or clubbing, no significant edema  Neuro:  Grossly normal, no focal neuro deficits, follows commands   Psych:  fair insight, AAOx3, not agitated. Data Review:    Review and/or order of clinical lab test  Review and/or order of tests in the radiology section of CPT  Review and/or order of tests in the medicine section of CPT  Labs:     Recent Labs     06/26/19 0219 06/25/19 2041   WBC 17.6* 24.0*   HGB 11.3* 13.3   HCT 36.0* 40.9    295     Recent Labs     06/27/19  0150 06/26/19 0219 06/25/19 2041    141 137   K 3.6 3.9 4.2   * 111* 105   CO2 22 22 22   BUN 13 14 16   CREA 1.16 1.17 1.49*   GLU 84 105* 121*   CA 8.1* 7.8* 8.9     Recent Labs     06/25/19 2041   SGOT 12*   ALT 25      TBILI 0.8   TP 7.4   ALB 2.9*   GLOB 4.5*   LPSE 72*     Recent Labs     06/26/19  1713   APTT 31.3      No results for input(s): FE, TIBC, PSAT, FERR in the last 72 hours. No results found for: FOL, RBCF   No results for input(s): PH, PCO2, PO2 in the last 72 hours.   Recent Labs     06/25/19 2041   TROIQ <0.05     Lab Results   Component Value Date/Time    Cholesterol, total 98 06/26/2019 02:19 AM    HDL Cholesterol 43 06/26/2019 02:19 AM    LDL, calculated 40.8 06/26/2019 02:19 AM    Triglyceride 71 06/26/2019 02:19 AM    CHOL/HDL Ratio 2.3 06/26/2019 02:19 AM     No results found for: Alisha Soto  Lab Results   Component Value Date/Time    Color YELLOW/STRAW 06/25/2019 09:20 PM    Appearance CLEAR 06/25/2019 09:20 PM    Specific gravity 1.015 06/25/2019 09:20 PM    Specific gravity 1.017 06/11/2019 06:46 AM    pH (UA) 6.0 06/25/2019 09:20 PM    Protein 30 (A) 06/25/2019 09:20 PM    Glucose NEGATIVE  06/25/2019 09:20 PM    Ketone NEGATIVE  06/25/2019 09:20 PM    Bilirubin NEGATIVE  06/25/2019 09:20 PM    Urobilinogen 0.2 06/25/2019 09:20 PM    Nitrites POSITIVE (A) 06/25/2019 09:20 PM    Leukocyte Esterase MODERATE (A) 06/25/2019 09:20 PM    Epithelial cells FEW 06/25/2019 09:20 PM    Bacteria NEGATIVE  06/25/2019 09:20 PM    WBC >100 (H) 06/25/2019 09:20 PM    RBC 5-10 06/25/2019 09:20 PM     Medications Reviewed:     Current Facility-Administered Medications   Medication Dose Route Frequency    DULoxetine (CYMBALTA) capsule 30 mg  30 mg Oral QHS    acetaminophen (TYLENOL) tablet 650 mg  650 mg Oral Q6H PRN    polyethylene glycol (MIRALAX) packet 17 g  17 g Oral QPM    senna-docusate (PERICOLACE) 8.6-50 mg per tablet 1 Tab  1 Tab Oral QPM    predniSONE (DELTASONE) tablet 5 mg  5 mg Oral DAILY    sodium chloride (NS) flush 5-40 mL  5-40 mL IntraVENous Q8H    sodium chloride (NS) flush 5-40 mL  5-40 mL IntraVENous PRN    heparin (porcine) injection 5,000 Units  5,000 Units SubCUTAneous Q8H    nystatin (MYCOSTATIN) 100,000 unit/gram powder   Topical TID    levoFLOXacin (LEVAQUIN) 750 mg in D5W IVPB  750 mg IntraVENous Q24H    vancomycin (VANCOCIN) 1250 mg in  ml infusion  1,250 mg IntraVENous Q16H    sodium chloride (NS) flush 5-10 mL  5-10 mL IntraVENous PRN    cefepime (MAXIPIME) 2 g in 0.9% sodium chloride (MBP/ADV) 100 mL  2 g IntraVENous Q12H    0.9% sodium chloride infusion  125 mL/hr IntraVENous CONTINUOUS    Vancomycin Pharmacy Dosing   Other PRN   ______________________________________________________________________  EXPECTED LENGTH OF STAY: - - -  ACTUAL LENGTH OF STAY:          2               Yosef Duncan MD

## 2019-06-27 NOTE — PROGRESS NOTES
TRANSFER - OUT REPORT:    Verbal report given to Estrella Herrera (name) on Jason Payan  being transferred to [address] [place] [address] (unit) for routine progression of care       Report consisted of patients Situation, Background, Assessment and   Recommendations(SBAR). Information from the following report(s) SBAR was reviewed with the receiving nurse. Lines:   Peripheral IV 06/25/19 Left Wrist (Active)   Site Assessment Clean, dry, & intact 6/27/2019  4:30 PM   Phlebitis Assessment 0 6/27/2019  4:30 PM   Infiltration Assessment 0 6/27/2019  4:30 PM   Dressing Status Clean, dry, & intact 6/27/2019  4:30 PM   Dressing Type Transparent;Tape 6/27/2019  4:30 PM   Hub Color/Line Status Green; Infusing 6/27/2019  4:30 PM   Action Taken Open ports on tubing capped 6/27/2019 12:30 PM   Alcohol Cap Used Yes 6/27/2019  4:30 PM        Opportunity for questions and clarification was provided.       Patient transported with:   Patient-specific medications from Pharmacy

## 2019-06-27 NOTE — PROGRESS NOTES
Problem: Mobility Impaired (Adult and Pediatric)  Goal: *Acute Goals and Plan of Care (Insert Text)  Description  Physical Therapy Goals  Initiated 6/27/2019    1. Patient will move from supine to sit and sit to supine , scoot up and down and roll side to side in bed with modified independence within 4 days. 2. Patient will perform sit to stand with minimal assistance/contact guard assist within 4 days. 3. Patient will ambulate with minimal assistance/contact guard assist for 60 feet with the least restrictive device within 4 days. 4. Patient will ascend/descend 2 stairs with right handrail(s) with minimal assistance/contact guard assist within 4 days. 5. Patient will perform anterior hip home exercise program per protocol with modified independence within 4 days. 6. Patient will improve Tinetti score by 4-5 points within 7 days. Outcome: Progressing Towards Goal   PHYSICAL THERAPY EVALUATION  Patient: Isaac Carvalho (49 y.o. male)  Date: 6/27/2019  Primary Diagnosis: Hypoxia [R09.02]  Pneumonia [J18.9]  Precautions: Fall, WBAT(recent L ana luisa follow fx)    ASSESSMENT :  Based on the objective data described below, the patient presents with generalized weakness, impaired balance, antalgic gait, decreased endurance and activity tolerance, and recent L hip hemiarthroplasty for fx repair following fall. Patient required overall supervision-moderate assist for mobility, due to weakness and impaired balance, but c/o no pain. He was able to mobilize to EOB without assist but needs help to stand. Gait characterized by extremely shortened step length, shuffled steps in nature, weight shifted to R (cues to center self in RW), and antalgic with decreased weightbearing L. Dizzy after 30 ft of gait requiring seated rest and wheeled back to room. BP stable, although HR on monitor read 141, although rhythm appearing to be more artifact? Highly recommend d/c to inpatient rehab.  Patient not yet able to mobilize at supervision level which he will need to do so in order to go home with wife. Not safe to climb stairs and has 2 stairs to enter home. Generally weak and a high fall risk following fx repair 2 weeks ago from a previous fall. Will tolerate 3 hrs of therapy/day. Spoke with attending MD who will order OT consult for assistance in rehab referral.     Patient will benefit from skilled intervention to address the above impairments. Patient?s rehabilitation potential is considered to be Good  Factors which may influence rehabilitation potential include:   ? None noted  ? Mental ability/status  ? Medical condition  ? Home/family situation and support systems  ? Safety awareness  ? Pain tolerance/management  ? Other:      PLAN :  Recommendations and Planned Interventions:  ?           Bed Mobility Training             ? Neuromuscular Re-Education  ? Transfer Training                   ? Orthotic/Prosthetic Training  ? Gait Training                         ? Modalities  ? Therapeutic Exercises           ? Edema Management/Control  ? Therapeutic Activities            ? Patient and Family Training/Education  ? Other (comment):    Frequency/Duration: Patient will be followed by physical therapy  5 times a week to address goals. Discharge Recommendations: Inpatient Rehab  Further Equipment Recommendations for Discharge: TBD at rehab      SUBJECTIVE:   Patient stated ? I haven't been able to get in the shower and stand at the sink to shave.?    OBJECTIVE DATA SUMMARY:   HISTORY:    Past Medical History:   Diagnosis Date    CKD (chronic kidney disease), stage III ([address]     Sjoegren syndrome 04/2016    Urine retention      Past Surgical History:   Procedure Laterality Date    HX APPENDECTOMY      HX CERVICAL DISKECTOMY      HX HEENT      Zygomatic arch repair    HX HERNIA REPAIR      HX HIP REPLACEMENT Left 06/11/2019    HX ORTHOPAEDIC      Broken tibia and fibula    HX PROSTATE SURGERY      TURP    HX VASECTOMY       Prior Level of Function/Home Situation: Used rolling walker prior to fall that resulted in hip fx with hemiarthroplasty repair 2 weeks ago, recently in rehab at [facility] and states he was ambulating 30 ft at a time  Personal factors and/or comorbidities impacting plan of care: [facility]    Home Situation  Home Environment: Rehabilitation facility  One/Two Story Residence: One story  Living Alone: No  Support Systems: Family member(s), Spouse/Significant Other/Partner  Patient Expects to be Discharged to[de-identified] Rehabilitation facility  Current DME Used/Available at Home: Walker, rolling    EXAMINATION/PRESENTATION/DECISION MAKING:   Hearing: Auditory  Auditory Impairment: Hard of hearing, left side  Range Of Motion:  AROM: Generally decreased, functional  Strength:    Strength: Generally decreased, functional  Tone & Sensation:   Tone: Normal  Sensation: Intact  Coordination:  Coordination: Generally decreased, functional  Functional Mobility:  Bed Mobility:  Supine to Sit: Supervision  Scooting: Contact guard assistance  Transfers:  Sit to Stand: Minimum assistance; Moderate assistance  Stand to Sit: Minimum assistance  Balance:   Sitting: Intact; Without support  Standing: Impaired; With support  Standing - Static: Fair  Standing - Dynamic : Fair  Ambulation/Gait Training:  Distance (ft): 30 Feet (ft)  Assistive Device: Walker, rolling;Gait belt  Ambulation - Level of Assistance: Minimal assistance  Gait Abnormalities: Antalgic; Step to gait  Right Side Weight Bearing: Full  Left Side Weight Bearing: As tolerated  Base of Support: Widened;Shift to right  Stance: Left decreased  Speed/Mary: Slow  Step Length: Right shortened  Swing Pattern: Left asymmetrical  Therapeutic Exercises:   Seated marches  Pillow squeezes  Glut squeezes   LAQs    Functional Measure:  Tinetti test:    Sitting Balance: 1  Arises: 0  Attempts to Rise: 0  Immediate Standing Balance: 1  Standing Balance: 1  Nudged: 0  Eyes Closed: 0  Turn 360 Degrees - Continuous/Discontinuous: 0  Turn 360 Degrees - Steady/Unsteady: 0  Sitting Down: 0  Balance Score: 3  Indication of Gait: 1  R Step Length/Height: 0  L Step Length/Height: 0  R Foot Clearance: 1  L Foot Clearance: 1  Step Symmetry: 0  Step Continuity: 1  Path: 1  Trunk: 0  Walking Time: 0  Gait Score: 5  Total Score: 8         Tinetti Tool Score Risk of Falls  <19 = High Fall Risk  19-24 = Moderate Fall Risk  25-28 = Low Fall Risk  Tinetti ME. Performance-Oriented Assessment of Mobility Problems in Elderly Patients. [facility]; [id number]. (Scoring Description: PT Bulletin Feb. 10, 1993)    Older adults: Jessica Christie et al, 2009; n = [address] elderly evaluated with ABC, ANA, ADL, and IADL)  · Mean ANA score for males aged 69-68 years = 26.21(3.40)  · Mean ANA score for females age 69-68 years = 25.16(4.30)  · Mean ANA score for males over 80 years = 23.29(6.02)  · Mean ANA score for females over 80 years = 17.20(8.32)            Physical Therapy Evaluation Charge Determination   History Examination Presentation Decision-Making   HIGH Complexity :3+ comorbidities / personal factors will impact the outcome/ POC  HIGH Complexity : 4+ Standardized tests and measures addressing body structure, function, activity limitation and / or participation in recreation  LOW Complexity : Stable, uncomplicated  Other outcome measures Tinetti 8/28  HIGH       Based on the above components, the patient evaluation is determined to be of the following complexity level: LOW     Pain:  Pain Scale 1: Numeric (0 - 10)  Pain Intensity 1: 0              Activity Tolerance:   Dizzy and fatigued with 30 ft of gait  Please refer to the flowsheet for vital signs taken during this treatment. After treatment:   ?         Patient left in no apparent distress sitting up in chair  ?          Patient left in no apparent distress in bed  ?         Call bell left within reach  ? Nursing notified  ? Caregiver present  ? Bed alarm activated    COMMUNICATION/EDUCATION:   The patient?s plan of care was discussed with: Registered Nurse and Physician. ?         Fall prevention education was provided and the patient/caregiver indicated understanding. ? Patient/family have participated as able in goal setting and plan of care. ?         Patient/family agree to work toward stated goals and plan of care. ?         Patient understands intent and goals of therapy, but is neutral about his/her participation. ? Patient is unable to participate in goal setting and plan of care.     Thank you for this referral.  Nando Alas, PT, DPT   Time Calculation: 40 mins

## 2019-06-27 NOTE — PROGRESS NOTES
Add.  Voided 100 and had 190 cc post void residual.  Poor emptying likely reason for uti. No clin evid of pyelo. Not bad enough to warrant mariscal at this time. Wife to get cx info from rehab hosp.

## 2019-06-27 NOTE — CDMP QUERY
#1 
 
Pt admitted with sepsis /Pt noted to have pneumonia If possible, please document in the progress notes and d/c summary if you are evaluating and / or treating any of the following: 
 
? Gram negative pneumonia ? Bacterial pneumonia ? Aspiration pneumonia ? Hypostatic pneumonia 
? Other (please specify) ? Unknown The medical record reflects the following: 
 Risk Factors: pneumonia documented, from long term facilty Clinical Indicators: ED-Pt arrives via EMS from [facility] for abnormal lab results, showing elevated WBC. Pt had a L hip replacement 6/11/19 at [address] [place] [address]. Wife at bedside, reports history of sepsis and a current UTI, and that his mentation has changed over the weekend, stating \"he's been out of it all weekend. \" Pt speaks intermittently/ noted to be coughing/ prod of white phlegm/ copious saliva HAP (hospital-acquired pneumonia) H/P-Sepsis Concern for hospital-acquired pneumonia given recent hospitalization. Failed out pt rocephin CTA-1. Small areas of groundglass opacity in the inferior right middle lobe which 
may be infectious or inflammatory. Treatment: maxipime IV, levaquin IV, vancomycin IV Thank you, 
       Eusebia Llanos [facility], [address] [facility]

## 2019-06-27 NOTE — PROGRESS NOTES
CT reviewed. Distal ureter not filled and ureteral wall looks thickened. These findings may be c/w chronic reflux but urothelial carcinoma needs to be considered. Will need cysto and retrogrades when recovered and UTI resolved. Recent cx from rehab shows E coli and need sensitivities to make sure covered. Suspect he has poor bladder emptying. Bladders scan not done yet. May need mariscal. Wife will try to get cx results from rehab. Discussed w Catrina Infante who sees him at [facility].

## 2019-06-27 NOTE — PROGRESS NOTES
Bedside shift change report given to Hetal (oncoming nurse) by Elsi Tracey (offgoing nurse). Report included the following information SBAR, Kardex, MAR, Accordion and Cardiac Rhythm NSR.

## 2019-06-27 NOTE — CONSULTS
ORTHOPAEDIC CONSULT NOTE    Subjective:     Date of Consultation:  June 27, 2019  Referring Physician:  Sharon Parada is a 78 y.o. male with PMH significant for Sjogren's and CKD who is 2wks s/p left hip hemiarthroplasty following a fall has been readmitted from [facility] with PNA and UTI with possible sepsis. In terms of his hip he denies any pain or drainage. He states that he was starting to be able to move more but still had feelings of leg weakness. He does admit to using a walker for about 4yrs due to instability and mobility issues related to his Sjogren's. At this time his only complaint is regarding care at [facility] and he requests to go to [facility] at discharge. Denies pain in his back, hips, knees or ankles. Denies tingling or numbness. Not taking pain medications at this time. We have been asked to see him regarding his recent surgery. Patient Active Problem List    Diagnosis Date Noted    Pneumonia 06/25/2019    Fracture of femoral neck, left, closed ([address] 06/11/2019    UTI (urinary tract infection) 10/12/2017    Sepsis ([address] 10/12/2017    Leukocytosis 10/12/2017    Fever 10/12/2017    Renal insufficiency 10/12/2017    Nausea & vomiting 10/12/2017    Cough 10/12/2017    Dehydration 10/12/2017    Hypoxia 10/12/2017    Sinus tachycardia 10/12/2017    Urine retention     CKD (chronic kidney disease), stage III (HCC)     Sjogren's syndrome ([address] 04/01/2016     History reviewed. No pertinent family history.    Social History     Tobacco Use    Smoking status: Current Every Day Smoker     Packs/day: 0.50     Years: 40.00     Pack years: 20.00    Smokeless tobacco: Never Used   Substance Use Topics    Alcohol use: No     Past Medical History:   Diagnosis Date    CKD (chronic kidney disease), stage III ([address]     Sjoegren syndrome 04/2016    Urine retention       Past Surgical History:   Procedure Laterality Date    HX APPENDECTOMY      HX CERVICAL DISKECTOMY      HX HEENT      Zygomatic arch repair    HX HERNIA REPAIR      HX HIP REPLACEMENT Left 06/11/2019    HX ORTHOPAEDIC      Broken tibia and fibula    HX PROSTATE SURGERY      TURP    HX VASECTOMY        Prior to Admission medications    Medication Sig Start Date End Date Taking? Authorizing Provider   predniSONE (DELTASONE) 2.5 mg tablet Take 5 mg by mouth daily. Yes Other, MD Cecily   acetaminophen (TYLENOL) 325 mg tablet Take 2 Tabs by mouth every six (6) hours as needed for Pain. 6/14/19   Shruthi Sheriff MD   DULoxetine (CYMBALTA) 30 mg capsule Take 30 mg by mouth nightly. Provider, Historical   polyethylene glycol (MIRALAX) 17 gram/dose powder Take 17 g by mouth every evening. Provider, Historical   senna-docusate (PERICOLACE) 8.6-50 mg per tablet Take 1 Tab by mouth every evening.     Provider, Historical     Current Facility-Administered Medications   Medication Dose Route Frequency    DULoxetine (CYMBALTA) capsule 30 mg  30 mg Oral QHS    acetaminophen (TYLENOL) tablet 650 mg  650 mg Oral Q6H PRN    polyethylene glycol (MIRALAX) packet 17 g  17 g Oral QPM    senna-docusate (PERICOLACE) 8.6-50 mg per tablet 1 Tab  1 Tab Oral QPM    predniSONE (DELTASONE) tablet 5 mg  5 mg Oral DAILY    sodium chloride (NS) flush 5-40 mL  5-40 mL IntraVENous Q8H    sodium chloride (NS) flush 5-40 mL  5-40 mL IntraVENous PRN    heparin (porcine) injection 5,000 Units  5,000 Units SubCUTAneous Q8H    nystatin (MYCOSTATIN) 100,000 unit/gram powder   Topical TID    levoFLOXacin (LEVAQUIN) 750 mg in D5W IVPB  750 mg IntraVENous Q24H    vancomycin (VANCOCIN) 1250 mg in  ml infusion  1,250 mg IntraVENous Q16H    sodium chloride (NS) flush 5-10 mL  5-10 mL IntraVENous PRN    cefepime (MAXIPIME) 2 g in 0.9% sodium chloride (MBP/ADV) 100 mL  2 g IntraVENous Q12H    0.9% sodium chloride infusion  125 mL/hr IntraVENous CONTINUOUS    Vancomycin Pharmacy Dosing   Other PRN      Allergies Allergen Reactions    Flomax [Tamsulosin] Other (comments)     Dizzy    Rapaflo [Silodosin] Other (comments)     Dizzy        Review of Systems:  Pertinent items are noted in HPI.     Mental Status: no dementia    Objective:     Patient Vitals for the past 8 hrs:   BP Temp Pulse Resp SpO2   19 1123 130/52 97.3 °F (36.3 °C) 74 16 96 %   19 0839 147/52 96.8 °F (36 °C) 78 16 91 %     Temp (24hrs), Av.8 °F (36.6 °C), Min:96.8 °F (36 °C), Max:98.6 °F (37 °C)      EXAM: Awake and alert lying in bed; NAD; agreeable to exam  Left hip bandage removed revealing a well healing surgical incision without erythema or effusion; mild crusting noted along its course but no drainage; surgical site NTTP  Log roll of leg is painfree  Passive and active range to 90deg is pain free as well  Distal sensory function grossly intact  5/5 strength for ankle plantar and dorsiflexion  Distal pulses palpable    Imaging Review: none obtained at this time    Labs:   Recent Results (from the past 24 hour(s))   PTT    Collection Time: 19  5:13 PM   Result Value Ref Range    aPTT 31.3 22.1 - 32.0 sec    aPTT, therapeutic range     58.0 - 88.1 SECS   METABOLIC PANEL, BASIC    Collection Time: 19  1:50 AM   Result Value Ref Range    Sodium 139 136 - 145 mmol/L    Potassium 3.6 3.5 - 5.1 mmol/L    Chloride 109 (H) 97 - 108 mmol/L    CO2 22 21 - 32 mmol/L    Anion gap 8 5 - 15 mmol/L    Glucose 84 65 - 100 mg/dL    BUN 13 6 - 20 MG/DL    Creatinine 1.16 0.70 - 1.30 MG/DL    BUN/Creatinine ratio 11 (L) 12 - 20      GFR est AA >60 >60 ml/min/1.73m2    GFR est non-AA >60 >60 ml/min/1.73m2    Calcium 8.1 (L) 8.5 - 10.1 MG/DL         Impression:     Active Problems:    Hypoxia (10/12/2017)      Pneumonia (2019)        Plan:     Patient s/p left hip hemiarthroplasty - healing well without concerns  Will need to continue PT/OT for continued mobilization work  WBAT  Pain control as needed  Appears to have been on ASA 325mg BID for DVT prophylaxis while admitted at [facility] of PNA/UTI by primary team  No acute Ortho surgical needs - will follow from a distance  Outpatient follow-up with surgeon as previously scheduled  Case Management for disposition planning - would prefer to go to [facility] at discharge if able    Dr Swetha Bedolla aware of patient and in agreement with current plan of care  Adarsh Ibarra PA-C  Orthopedic Trauma Service  [address] [facility]

## 2019-06-28 LAB
ANION GAP SERPL CALC-SCNC: 6 MMOL/L (ref 5–15)
BUN SERPL-MCNC: 11 MG/DL (ref 6–20)
BUN/CREAT SERPL: 11 (ref 12–20)
CALCIUM SERPL-MCNC: 8.2 MG/DL (ref 8.5–10.1)
CHLORIDE SERPL-SCNC: 112 MMOL/L (ref 97–108)
CO2 SERPL-SCNC: 22 MMOL/L (ref 21–32)
CREAT SERPL-MCNC: 0.96 MG/DL (ref 0.7–1.3)
DATE LAST DOSE: ABNORMAL
ERYTHROCYTE [DISTWIDTH] IN BLOOD BY AUTOMATED COUNT: 13.4 % (ref 11.5–14.5)
GLUCOSE SERPL-MCNC: 93 MG/DL (ref 65–100)
HCT VFR BLD AUTO: 32.2 % (ref 36.6–50.3)
HGB BLD-MCNC: 10.6 G/DL (ref 12.1–17)
MCH RBC QN AUTO: 32.9 PG (ref 26–34)
MCHC RBC AUTO-ENTMCNC: 32.9 G/DL (ref 30–36.5)
MCV RBC AUTO: 100 FL (ref 80–99)
NRBC # BLD: 0 K/UL (ref 0–0.01)
NRBC BLD-RTO: 0 PER 100 WBC
PLATELET # BLD AUTO: 242 K/UL (ref 150–400)
PMV BLD AUTO: 10.3 FL (ref 8.9–12.9)
POTASSIUM SERPL-SCNC: 3.7 MMOL/L (ref 3.5–5.1)
RBC # BLD AUTO: 3.22 M/UL (ref 4.1–5.7)
REPORTED DOSE,DOSE: ABNORMAL UNITS
REPORTED DOSE/TIME,TMG: ABNORMAL
SODIUM SERPL-SCNC: 140 MMOL/L (ref 136–145)
VANCOMYCIN TROUGH SERPL-MCNC: 12.4 UG/ML (ref 5–10)
WBC # BLD AUTO: 8.9 K/UL (ref 4.1–11.1)

## 2019-06-28 PROCEDURE — 97116 GAIT TRAINING THERAPY: CPT | Performed by: PHYSICAL THERAPIST

## 2019-06-28 PROCEDURE — 36415 COLL VENOUS BLD VENIPUNCTURE: CPT

## 2019-06-28 PROCEDURE — 97165 OT EVAL LOW COMPLEX 30 MIN: CPT

## 2019-06-28 PROCEDURE — 74011250636 HC RX REV CODE- 250/636: Performed by: FAMILY MEDICINE

## 2019-06-28 PROCEDURE — 80048 BASIC METABOLIC PNL TOTAL CA: CPT

## 2019-06-28 PROCEDURE — 80202 ASSAY OF VANCOMYCIN: CPT

## 2019-06-28 PROCEDURE — 85027 COMPLETE CBC AUTOMATED: CPT

## 2019-06-28 PROCEDURE — 65270000032 HC RM SEMIPRIVATE

## 2019-06-28 PROCEDURE — 74011636637 HC RX REV CODE- 636/637: Performed by: FAMILY MEDICINE

## 2019-06-28 PROCEDURE — 97535 SELF CARE MNGMENT TRAINING: CPT

## 2019-06-28 PROCEDURE — 97530 THERAPEUTIC ACTIVITIES: CPT | Performed by: PHYSICAL THERAPIST

## 2019-06-28 PROCEDURE — 74011000258 HC RX REV CODE- 258: Performed by: FAMILY MEDICINE

## 2019-06-28 PROCEDURE — 74011250637 HC RX REV CODE- 250/637: Performed by: FAMILY MEDICINE

## 2019-06-28 RX ADMIN — Medication 10 ML: at 13:31

## 2019-06-28 RX ADMIN — SODIUM CHLORIDE 125 ML/HR: 900 INJECTION, SOLUTION INTRAVENOUS at 18:33

## 2019-06-28 RX ADMIN — HEPARIN SODIUM 5000 UNITS: 5000 INJECTION INTRAVENOUS; SUBCUTANEOUS at 16:25

## 2019-06-28 RX ADMIN — NYSTATIN: 100000 POWDER TOPICAL at 10:01

## 2019-06-28 RX ADMIN — DULOXETINE HYDROCHLORIDE 30 MG: 30 CAPSULE, DELAYED RELEASE ORAL at 22:59

## 2019-06-28 RX ADMIN — SODIUM CHLORIDE 125 ML/HR: 900 INJECTION, SOLUTION INTRAVENOUS at 09:49

## 2019-06-28 RX ADMIN — LEVOFLOXACIN 750 MG: 5 INJECTION, SOLUTION INTRAVENOUS at 06:22

## 2019-06-28 RX ADMIN — VANCOMYCIN HYDROCHLORIDE 1250 MG: 10 INJECTION, POWDER, LYOPHILIZED, FOR SOLUTION INTRAVENOUS at 02:30

## 2019-06-28 RX ADMIN — POLYETHYLENE GLYCOL 3350 17 G: 17 POWDER, FOR SOLUTION ORAL at 18:33

## 2019-06-28 RX ADMIN — VANCOMYCIN HYDROCHLORIDE 1250 MG: 10 INJECTION, POWDER, LYOPHILIZED, FOR SOLUTION INTRAVENOUS at 18:32

## 2019-06-28 RX ADMIN — CEFEPIME HYDROCHLORIDE 2 G: 2 INJECTION, POWDER, FOR SOLUTION INTRAVENOUS at 22:59

## 2019-06-28 RX ADMIN — PREDNISONE 5 MG: 5 TABLET ORAL at 09:51

## 2019-06-28 RX ADMIN — HEPARIN SODIUM 5000 UNITS: 5000 INJECTION INTRAVENOUS; SUBCUTANEOUS at 09:51

## 2019-06-28 RX ADMIN — NYSTATIN: 100000 POWDER TOPICAL at 20:33

## 2019-06-28 RX ADMIN — HEPARIN SODIUM 5000 UNITS: 5000 INJECTION INTRAVENOUS; SUBCUTANEOUS at 02:30

## 2019-06-28 RX ADMIN — CEFEPIME HYDROCHLORIDE 2 G: 2 INJECTION, POWDER, FOR SOLUTION INTRAVENOUS at 09:50

## 2019-06-28 NOTE — PROGRESS NOTES
Problem: Mobility Impaired (Adult and Pediatric)  Goal: *Acute Goals and Plan of Care (Insert Text)  Description  Physical Therapy Goals  Initiated 6/27/2019    1. Patient will move from supine to sit and sit to supine , scoot up and down and roll side to side in bed with modified independence within 4 days. 2. Patient will perform sit to stand with minimal assistance/contact guard assist within 4 days. 3. Patient will ambulate with minimal assistance/contact guard assist for 60 feet with the least restrictive device within 4 days. 4. Patient will ascend/descend 2 stairs with right handrail(s) with minimal assistance/contact guard assist within 4 days. 5. Patient will perform anterior hip home exercise program per protocol with modified independence within 4 days. 6. Patient will improve Tinetti score by 4-5 points within 7 days. Outcome: Progressing Towards Goal  PHYSICAL THERAPY TREATMENT  Patient: North Arshad (71 y.o. male)  Date: 6/28/2019  Diagnosis: Hypoxia [R09.02]  Pneumonia [J18.9] <principal problem not specified>       Precautions: Fall, WBAT(recent L ana luisa follow fx)  Chart, physical therapy assessment, plan of care and goals were reviewed. ASSESSMENT:  Based on the objective data described below, the patient presents with Contact guard assistance level overall for transfers. Gait training completed at Minimum assistance, Additional time and Assist x1, 60 feet and using a gait belt and rolling walker. Patient has short steps with a step to gait and decreased WB LLE. Uses a RW. Has hip precautions. The following are barriers to independence while in acute care:   -Cognitive and/or behavioral: none. Cooperative and motivated. -Medical condition: standing balance, cardiopulmonary tolerance and precautions    -Other:           PLAN:  Patient continues to benefit from skilled intervention to address the above impairments. Continue treatment per established plan of care.   Recommend with staff: Up in the chair as tolerated. Recommend next PT session: Continue with ambulation. Discharge recommendations: Rehab at inpatient facility: patient can tolerate 3 hours of therapy (to regain functional baseline patient requires rehab)  If above is not an option then recommend: None    Patient's barriers to discharging home, in addition to above impairments: level of physical assist required to maintain patient safety. Equipment recommendations for successful discharge (if) home: none        SUBJECTIVE:   Patient stated ? This is the furthest I have walked. I am getting tired. ?    OBJECTIVE DATA SUMMARY:   Critical Behavior:      Alert and oriented. Functional Mobility Training:  Bed Mobility:     Supine to Sit: Supervision     Scooting: Supervision        Transfers:  Sit to Stand: Minimum assistance;Assist x1;Additional time  Stand to Sit: Minimum assistance;Assist x1;Additional time                             Balance:  Sitting: Intact  Standing: Impaired  Standing - Static: Constant support;Good  Standing - Dynamic : Fair  Ambulation/Gait Training:  Distance (ft): 60 Feet (ft)  Assistive Device: Walker, rolling;Gait belt  Ambulation - Level of Assistance: Minimal assistance;Assist x1        Gait Abnormalities: Antalgic; Step to gait  Right Side Weight Bearing: Full  Left Side Weight Bearing: As tolerated  Base of Support: Widened;Shift to right     Speed/Mary: Pace decreased (<100 feet/min); Shuffled  Step Length: Right shortened;Left shortened                  Short steps. Step to gait. Therapeutic Exercises:   Encouraged to do the ankle pumps and LAQ while up sitting in the chair. Activity Tolerance:   Good  Please refer to the flowsheet for vital signs taken during this treatment.     After treatment patient left:   Up in chair  Caregiver at bedside  RN notified     COMMUNICATION/COLLABORATION:   The patient?s plan of care was discussed with: Occupational Therapist and Registered Nurse    Bruce Sheridan, PT   Time Calculation: 25 mins

## 2019-06-28 NOTE — PROGRESS NOTES
CM notes patient is a Sound Bundle. LISA: [facility] ([address] Referral sent today) OT evaluation pending. Noted the patient was transferred to [address]. PT is recommending IPR. The family and patient prefer [address] location. Referral sent via all [employer]. The patient came in from [facility], but does not want to return. CRM confirmed the plan with the patient and wife at the bedside. Wife Fidelina Males [phone].)    [facility].  [phone]

## 2019-06-28 NOTE — PROGRESS NOTES
Pt arrived to room from [id number]. Wife accompanied pt. Pt resting comfortably in bed. Call bell within reach.

## 2019-06-28 NOTE — PROGRESS NOTES
Improved clinically. No sp or cva pain. Voiding well  Wife has cx results from prior to adm and is bringing them in. Suspect current abiotics cover uti but will check. After dischg and rehab he needs to fu w Toan Dean for eval of dilated right ureter  Reflux vs obstruction. Will be back on Monday. Partner avail over wend if needed thanks.

## 2019-06-28 NOTE — PROGRESS NOTES
Problem: Self Care Deficits Care Plan (Adult)  Goal: *Acute Goals and Plan of Care (Insert Text)  Description  Occupational Therapy Goals  Initiated 6/28/2019  1. Patient will perform grooming standing at sink for 10 minutes with no LOB with supervision/set-up within 7 day(s). 2.  Patient will perform upper body ADLs with supervision/set-up within 7 day(s). 3.  Patient will perform lower body ADLs using AE PRN with supervision/set-up within 7 day(s). 4.  Patient will perform toilet transfers with supervision/set-up within 7 day(s). 5.  Patient will perform all aspects of toileting with supervision/set-up within 7 day(s). 6.  Patient will participate in upper extremity therapeutic exercise/activities with independence for 5 minutes within 7 day(s). 7.  Patient will utilize energy conservation techniques during functional activities with verbal cues within 7 day(s). Outcome: Progressing Towards Goal    OCCUPATIONAL THERAPY EVALUATION  Patient: Rut Walter (8 y.o. male)  Date: 6/28/2019  Primary Diagnosis: Hypoxia [R09.02]  Pneumonia [J18.9]        Precautions:  Fall, WBAT(recent L ALICIA)    ASSESSMENT :  Based on the objective data described below, the patient presents with overall min-mod A for functional mobility, SBA/supervision for bed mobility, setup-IND with upper body ADLs and min A for lower body ADLs s/p admission for PNA and hypoxia. Patient reporting having hx of Sjoegren syndrome which he says affects his balance at baseline. Patient with recent L ALICIA and was at [facility] for IP rehab post sx. Today, patient ADLs limited by impaired balance, decreased functional activity tolerance, generalized weakness, and limited reach to feet. Patient demonstrating deficits in ~45% of ADLs at this time. Patient would benefit from return to IP rehab in order to maximize safety and IND prior to return home.      Recommend with nursing patient to complete as able in order to maintain strength, endurance and independence: ADLs with supervision/setup, OOB to chair 3x/day and mobilizing to the bathroom for toileting with 2 assist. Thank you for your assistance. Patient will benefit from skilled intervention to address the above impairments. Patient?s rehabilitation potential is considered to be Good  Factors which may influence rehabilitation potential include:   ? None noted  ? Mental ability/status  ? Medical condition  ? Home/family situation and support systems  ? Safety awareness  ? Pain tolerance/management  ? Other:      PLAN :  Recommendations and Planned Interventions:  ?               Self Care Training                  ? Therapeutic Activities  ? Functional Mobility Training    ? Cognitive Retraining  ? Therapeutic Exercises           ? Endurance Activities  ? Balance Training                   ? Neuromuscular Re-Education  ? Visual/Perceptual Training     ? Home Safety Training  ? Patient Education                 ? Family Training/Education  ? Other (comment):    Frequency/Duration: Patient will be followed by occupational therapy 5 times a week to address goals. Discharge Recommendations: Inpatient Rehab  Further Equipment Recommendations for Discharge: TBD      SUBJECTIVE:   Patient stated ? I just want to get back to being myself. ?    OBJECTIVE DATA SUMMARY:   HISTORY:   Past Medical History:   Diagnosis Date    CKD (chronic kidney disease), stage III ([facility] [address]     Sjoegren syndrome 04/2016    Urine retention      Past Surgical History:   Procedure Laterality Date    HX APPENDECTOMY      HX CERVICAL DISKECTOMY      HX HEENT      Zygomatic arch repair    HX HERNIA REPAIR      HX HIP REPLACEMENT Left 06/11/2019    HX ORTHOPAEDIC      Broken tibia and fibula    HX PROSTATE SURGERY      TURP HX VASECTOMY         Prior Level of Function/Environment/Context: Patient lives in 1 level home with wife and was IND prior to L ALICIA and subsequent rehab stay. Has a RW and shower chair. Plan to return to IP rehab. Home Situation  Home Environment: Rehabilitation facility  One/Two Story Residence: One story  Living Alone: No  Support Systems: Spouse/Significant Other/Partner, Family member(s)  Patient Expects to be Discharged to[de-identified] Rehabilitation facility  Current DME Used/Available at Home: Schuyler Klinefelter, rolling, Shower chair  Tub or Shower Type: Shower    Hand dominance: Right    EXAMINATION OF PERFORMANCE DEFICITS:  Cognitive/Behavioral Status:  Neurologic State: Alert  Orientation Level: Oriented X4  Cognition: Appropriate for age attention/concentration; Follows commands  Perception: Appears intact  Perseveration: No perseveration noted  Safety/Judgement: Awareness of environment; Fall prevention    Skin: Appears grossly intact    Edema: none noted in BUEs    Hearing: Auditory  Auditory Impairment: Hard of hearing, left side    Vision/Perceptual:    Tracking: Able to track stimulus in all quadrants w/o difficulty    Diplopia: No    Acuity: Within Defined Limits    Corrective Lenses: Reading glasses    Range of Motion:  In BUEs  AROM: Generally decreased, functional    Strength: In BUEs  Strength: Generally decreased, functional    Coordination:  Coordination: Generally decreased, functional  Fine Motor Skills-Upper: Left Intact; Right Intact    Gross Motor Skills-Upper: Left Intact; Right Intact    Tone & Sensation:  In BUEs  Tone: Normal  Sensation: Intact    Balance:  Sitting: Intact; Without support  Standing: Impaired; With support  Standing - Static: Fair  Standing - Dynamic : Fair    Functional Mobility and Transfers for ADLs:  Bed Mobility:  Supine to Sit: Supervision  Sit to Supine: Supervision  Scooting: Supervision    Transfers:  Sit to Stand: Minimum assistance;Assist x1;Additional time  Stand to Sit: Minimum assistance;Assist x1;Additional time  Toilet Transfer : Minimum assistance; Moderate assistance; Additional time;Assist x1(Infer per obs of func reach, balance and strength)  Assistive Device : Walker, rolling    ADL Assessment:  Feeding: Independent    Oral Facial Hygiene/Grooming: Setup(Infer sitting EOB)    Bathing: Minimum assistance(Infer per obs of func reach, balance)    Upper Body Dressing: Setup(Infer sitting EOB)    Lower Body Dressing: Minimum assistance    Toileting: Minimum assistance(Infer per obs of func reach, BUE ROM, balance)     ADL Intervention and task modifications:    Lower Body Dressing Assistance  Shoes with Cloth Laces: Supervision(Infer min A to hike pants 2* balance)  Leg Crossed Method Used: No  Position Performed: Seated edge of bed  Cues: Doff;Verbal cues provided    Cognitive Retraining  Safety/Judgement: Awareness of environment; Fall prevention    Functional Measure:  Barthel Index:    Bathin  Bladder: 10  Bowels: 10  Groomin  Dressin  Feeding: 10  Mobility: 0  Stairs: 0  Toilet Use: 5  Transfer (Bed to Chair and Back): 10  Total: 55/100        Percentage of impairment   0%   1-19%   20-39%   40-59%   60-79%   80-99%   100%   Barthel Score 0-100 100 99-80 79-60 59-40 20-39 1-19   0     The Barthel ADL Index: Guidelines  1. The index should be used as a record of what a patient does, not as a record of what a patient could do. 2. The main aim is to establish degree of independence from any help, physical or verbal, however minor and for whatever reason. 3. The need for supervision renders the patient not independent. 4. A patient's performance should be established using the best available evidence. Asking the patient, friends/relatives and nurses are the usual sources, but direct observation and common sense are also important. However direct testing is not needed.   5. Usually the patient's performance over the preceding 24-48 hours is important, but occasionally longer periods will be relevant. 6. Middle categories imply that the patient supplies over 50 per cent of the effort. 7. Use of aids to be independent is allowed. Satna Portillo., Barthel, D.W. (4824). Functional evaluation: the Barthel Index. [address] [facility] (14)2. DOM Crook, [facility]., [facility], [address] [place] [address] (1999). Measuring the change indisability after inpatient rehabilitation; comparison of the responsiveness of the Barthel Index and Functional [place] Measure. Journal of Neurology, Neurosurgery, and Psychiatry, 66(4), 138-722. Vern Lynch, N.J.A, RONY Steele, & Jojo Sanchez MREYES. (2004.) Assessment of post-stroke quality of life in cost-effectiveness studies: The usefulness of the Barthel Index and the EuroQoL-5D. Quality of Life Research, 15, 651-17       Occupational Therapy Evaluation Charge Determination   History Examination Decision-Making   LOW Complexity : Brief history review  LOW Complexity : 1-3 performance deficits relating to physical, cognitive , or psychosocial skils that result in activity limitations and / or participation restrictions  MEDIUM Complexity : Patient may present with comorbidities that affect occupational performnce. Miniml to moderate modification of tasks or assistance (eg, physical or verbal ) with assesment(s) is necessary to enable patient to complete evaluation       Based on the above components, the patient evaluation is determined to be of the following complexity level: LOW   Pain:                    Activity Tolerance:   Fair, VSS  Please refer to the flowsheet for vital signs taken during this treatment. After treatment:   ? Patient left in no apparent distress sitting up in chair  ? Patient left in no apparent distress in bed  ? Call bell left within reach  ? Nursing notified  ? Caregiver present  ?  Bed alarm activated    COMMUNICATION/EDUCATION:   The patient?s plan of care was discussed with: Physical Therapist, Registered Nurse and . ? Home safety education was provided and the patient/caregiver indicated understanding. ? Patient/family have participated as able in goal setting and plan of care. ? Patient/family agree to work toward stated goals and plan of care. ? Patient understands intent and goals of therapy, but is neutral about his/her participation. ? Patient is unable to participate in goal setting and plan of care. This patient?s plan of care is appropriate for delegation to [facility].     Thank you for this referral.  Johan Gann OT  Time Calculation: 26 mins

## 2019-06-28 NOTE — PROGRESS NOTES
Orders received, chart reviewed and patient evaluated by occupational therapy. Recommend patient to discharge to [facility] pending progression with skilled acute occupational therapy. Recommend with nursing patient to complete as able in order to maintain strength, endurance and independence: OOB to chair 3x/day, ADLs with supervision/setup and mobilizing to the bathroom for toileting with 1 assist. Thank you for your assistance. Full evaluation to follow.

## 2019-06-28 NOTE — PROGRESS NOTES
Hospitalist Progress Note  Coretta Kennedy MD  Answering service: [phone] from in house phone      Date of Service:  2019  NAME:  Gurvinder Go  :  1940  MRN:  [id number]    Admission Summary:   79M hx of CKD, Sjogrn brought from Rehab facility 'following NOF#' with n/v- ?PNA  Interval history / Subjective:   Patient seen and examined at bedside, feels better, a/w IPR- doesn't Pradeep Yousif [facility]     Assessment & Plan:     #. HCAP- RLL- hypostatic. POA  #. Severe sepsis: POA- resolved. #. Lactic acidosis: POA- resolved. - Abx, IVF, blood cultures -ve O2 to keep sats >90%, DuoNebs  - IS, Guaifenesin, UA: no bacteria, lots of WCC, urine cultures <1000, Monitor  - CT cap: small PNA, small lesion in R kidney, get PT/OT     #. Hiccups: resolved. #. R Hyroureteronephrosis: mild, seen on CT chest, also small lesion,   - US/CT a/p: no hydronephrosis, Urology following. Op f/u   - UA no bacteria, Bladder check to r/o retention. #. Recent L hip #: s/p ORIF, PT/OT, due f/u will consult ortho. analgesia prn  #. Sjogren's: stable, continue home regimen    Code status: Full  DVT prophylaxis: heparin  Care Plan discussed with: Patient/Family and Nurse  Disposition: TBD a/w IPR     Hospital Problems  Date Reviewed: 2019          Codes Class Noted POA    Pneumonia ICD-10-CM: J18.9  ICD-9-CM: 240  2019 Unknown        Hypoxia ICD-10-CM: R09.02  ICD-9-CM: 799.02  10/12/2017 Unknown            Review of Systems:   Pertinent items are mentioned in interval history. Vital Signs:    Last 24hrs VS reviewed since prior progress note.  Most recent are:  Visit Vitals  /73 (BP 1 Location: Right arm, BP Patient Position: At rest)   Pulse 66   Temp 97.8 °F (36.6 °C)   Resp 18   Ht 6' 1\" (1.854 m)   Wt 83.2 kg (183 lb 6.8 oz)   SpO2 96%   BMI 24.20 kg/m²         Intake/Output Summary (Last 24 hours) at 2019 1032  Last data filed at 6/28/2019 0959  Gross per 24 hour   Intake --   Output 1975 ml   Net -1975 ml        Physical Examination:   General:  Alert, oriented, No acute distress  Resp:  No accessory muscle use, Good AE, no wheezes, no rhonchi  Abd:  Soft, non-tender, non-distended  Extremities:  No cyanosis or clubbing, no significant edema  Neuro:  Grossly normal, no focal neuro deficits, follows commands   Psych:  fair insight, AAOx3, not agitated. Data Review:    Review and/or order of clinical lab test  Review and/or order of tests in the radiology section of CPT  Review and/or order of tests in the medicine section of [facility]  Labs:     Recent Labs     06/28/19  0416 06/26/19 0219   WBC 8.9 17.6*   HGB 10.6* 11.3*   HCT 32.2* 36.0*    224     Recent Labs     06/28/19  0416 06/27/19  0150 06/26/19 0219    139 141   K 3.7 3.6 3.9   * 109* 111*   CO2 22 22 22   BUN 11 13 14   CREA 0.96 1.16 1.17   GLU 93 84 105*   CA 8.2* 8.1* 7.8*     Recent Labs     06/25/19 2041   SGOT 12*   ALT 25      TBILI 0.8   TP 7.4   ALB 2.9*   GLOB 4.5*   LPSE 72*     Recent Labs     06/26/19  1713   APTT 31.3      No results for input(s): FE, TIBC, PSAT, FERR in the last 72 hours. No results found for: FOL, RBCF   No results for input(s): PH, PCO2, PO2 in the last 72 hours.   Recent Labs     06/25/19 2041   TROIQ <0.05     Lab Results   Component Value Date/Time    Cholesterol, total 98 06/26/2019 02:19 AM    HDL Cholesterol 43 06/26/2019 02:19 AM    LDL, calculated 40.8 06/26/2019 02:19 AM    Triglyceride 71 06/26/2019 02:19 AM    CHOL/HDL Ratio 2.3 06/26/2019 02:19 AM     No results found for: Elliott Az  Lab Results   Component Value Date/Time    Color YELLOW/STRAW 06/25/2019 09:20 PM    Appearance CLEAR 06/25/2019 09:20 PM    Specific gravity 1.015 06/25/2019 09:20 PM    Specific gravity 1.017 06/11/2019 06:46 AM    pH (UA) 6.0 06/25/2019 09:20 PM    Protein 30 (A) 06/25/2019 09:20 PM    Glucose NEGATIVE  06/25/2019 09:20 PM    Ketone NEGATIVE  06/25/2019 09:20 PM    Bilirubin NEGATIVE  06/25/2019 09:20 PM    Urobilinogen 0.2 06/25/2019 09:20 PM    Nitrites POSITIVE (A) 06/25/2019 09:20 PM    Leukocyte Esterase MODERATE (A) 06/25/2019 09:20 PM    Epithelial cells FEW 06/25/2019 09:20 PM    Bacteria NEGATIVE  06/25/2019 09:20 PM    WBC >100 (H) 06/25/2019 09:20 PM    RBC 5-10 06/25/2019 09:20 PM     Medications Reviewed:     Current Facility-Administered Medications   Medication Dose Route Frequency    DULoxetine (CYMBALTA) capsule 30 mg  30 mg Oral QHS    acetaminophen (TYLENOL) tablet 650 mg  650 mg Oral Q6H PRN    polyethylene glycol (MIRALAX) packet 17 g  17 g Oral QPM    senna-docusate (PERICOLACE) 8.6-50 mg per tablet 1 Tab  1 Tab Oral QPM    predniSONE (DELTASONE) tablet 5 mg  5 mg Oral DAILY    sodium chloride (NS) flush 5-40 mL  5-40 mL IntraVENous Q8H    sodium chloride (NS) flush 5-40 mL  5-40 mL IntraVENous PRN    heparin (porcine) injection 5,000 Units  5,000 Units SubCUTAneous Q8H    nystatin (MYCOSTATIN) 100,000 unit/gram powder   Topical TID    levoFLOXacin (LEVAQUIN) 750 mg in D5W IVPB  750 mg IntraVENous Q24H    vancomycin (VANCOCIN) 1250 mg in  ml infusion  1,250 mg IntraVENous Q16H    sodium chloride (NS) flush 5-10 mL  5-10 mL IntraVENous PRN    cefepime (MAXIPIME) 2 g in 0.9% sodium chloride (MBP/ADV) 100 mL  2 g IntraVENous Q12H    0.9% sodium chloride infusion  125 mL/hr IntraVENous CONTINUOUS    Vancomycin Pharmacy Dosing   Other PRN   ______________________________________________________________________  EXPECTED LENGTH OF STAY: 4d 19h  ACTUAL LENGTH OF STAY:          Aiyana Wisdom MD

## 2019-06-28 NOTE — PROGRESS NOTES
The patient is receiving antibiotics, physical therapy is to work with the patient today, results given to Urology group, & will continue to monitor for falls.

## 2019-06-29 LAB
ANION GAP SERPL CALC-SCNC: 9 MMOL/L (ref 5–15)
BUN SERPL-MCNC: 9 MG/DL (ref 6–20)
BUN/CREAT SERPL: 9 (ref 12–20)
CALCIUM SERPL-MCNC: 8.3 MG/DL (ref 8.5–10.1)
CHLORIDE SERPL-SCNC: 108 MMOL/L (ref 97–108)
CO2 SERPL-SCNC: 24 MMOL/L (ref 21–32)
CREAT SERPL-MCNC: 1.04 MG/DL (ref 0.7–1.3)
GLUCOSE SERPL-MCNC: 89 MG/DL (ref 65–100)
POTASSIUM SERPL-SCNC: 3.7 MMOL/L (ref 3.5–5.1)
SODIUM SERPL-SCNC: 141 MMOL/L (ref 136–145)

## 2019-06-29 PROCEDURE — 80048 BASIC METABOLIC PNL TOTAL CA: CPT

## 2019-06-29 PROCEDURE — 74011636637 HC RX REV CODE- 636/637: Performed by: FAMILY MEDICINE

## 2019-06-29 PROCEDURE — 65270000032 HC RM SEMIPRIVATE

## 2019-06-29 PROCEDURE — 74011250636 HC RX REV CODE- 250/636: Performed by: FAMILY MEDICINE

## 2019-06-29 PROCEDURE — 74011000258 HC RX REV CODE- 258: Performed by: FAMILY MEDICINE

## 2019-06-29 PROCEDURE — 36415 COLL VENOUS BLD VENIPUNCTURE: CPT

## 2019-06-29 PROCEDURE — 74011250637 HC RX REV CODE- 250/637: Performed by: FAMILY MEDICINE

## 2019-06-29 RX ORDER — CALAMINE/ZINC OXIDE
LOTION (ML) TOPICAL
Qty: 1 BOTTLE | Refills: 0 | Status: SHIPPED | OUTPATIENT
Start: 2019-06-29 | End: 2019-07-04

## 2019-06-29 RX ORDER — LEVOFLOXACIN 750 MG/1
750 TABLET ORAL DAILY
Qty: 3 TAB | Refills: 0 | Status: SHIPPED | OUTPATIENT
Start: 2019-06-29 | End: 2019-07-01 | Stop reason: SDUPTHER

## 2019-06-29 RX ADMIN — Medication 10 ML: at 13:49

## 2019-06-29 RX ADMIN — NYSTATIN: 100000 POWDER TOPICAL at 22:06

## 2019-06-29 RX ADMIN — HEPARIN SODIUM 5000 UNITS: 5000 INJECTION INTRAVENOUS; SUBCUTANEOUS at 18:01

## 2019-06-29 RX ADMIN — SODIUM CHLORIDE 125 ML/HR: 900 INJECTION, SOLUTION INTRAVENOUS at 07:08

## 2019-06-29 RX ADMIN — DULOXETINE HYDROCHLORIDE 30 MG: 30 CAPSULE, DELAYED RELEASE ORAL at 22:06

## 2019-06-29 RX ADMIN — HEPARIN SODIUM 5000 UNITS: 5000 INJECTION INTRAVENOUS; SUBCUTANEOUS at 02:48

## 2019-06-29 RX ADMIN — HEPARIN SODIUM 5000 UNITS: 5000 INJECTION INTRAVENOUS; SUBCUTANEOUS at 12:03

## 2019-06-29 RX ADMIN — NYSTATIN: 100000 POWDER TOPICAL at 09:09

## 2019-06-29 RX ADMIN — Medication 10 ML: at 22:06

## 2019-06-29 RX ADMIN — PREDNISONE 5 MG: 5 TABLET ORAL at 09:06

## 2019-06-29 RX ADMIN — POLYETHYLENE GLYCOL 3350 17 G: 17 POWDER, FOR SOLUTION ORAL at 18:01

## 2019-06-29 RX ADMIN — CEFEPIME HYDROCHLORIDE 2 G: 2 INJECTION, POWDER, FOR SOLUTION INTRAVENOUS at 09:05

## 2019-06-29 RX ADMIN — VANCOMYCIN HYDROCHLORIDE 1000 MG: 1 INJECTION, POWDER, LYOPHILIZED, FOR SOLUTION INTRAVENOUS at 07:09

## 2019-06-29 RX ADMIN — LEVOFLOXACIN 750 MG: 5 INJECTION, SOLUTION INTRAVENOUS at 02:47

## 2019-06-29 RX ADMIN — NYSTATIN: 100000 POWDER TOPICAL at 18:02

## 2019-06-29 NOTE — PROGRESS NOTES
CM notified that [facility] will not have a bed until Sunday for patient. Attending MD is notified.       [facility]

## 2019-06-29 NOTE — PROGRESS NOTES
Bedside shift change report given to Alexandra (oncoming nurse) by HELGA Banks (offgoing nurse). Report included the following information SBAR.

## 2019-06-29 NOTE — PROGRESS NOTES
Bedside shift change report given to Alexandra (oncoming nurse) by HELGA Banks (offgoing nurse). Report included the following information SBAR and Kardex.

## 2019-06-29 NOTE — ROUTINE PROCESS
Bedside and Verbal shift change report given to Karlene Swenson  (oncoming nurse) by [facility] (offgoing nurse). Report included the following information SBAR.

## 2019-06-29 NOTE — PROGRESS NOTES
Pharmacist Note - Vancomycin Dosing  Therapy day 3  Indication: HAP  Current regimen: 1250 mg IV q16h    A Trough Level resulted at 12.4 mcg/mL which was obtained 16 hrs post-dose. Goal trough: 15 - 20 mcg/mL     Plan: Change to 1000 mg IV q12h . Pharmacy will continue to monitor this patient daily for changes in clinical status and renal function.

## 2019-06-29 NOTE — PROGRESS NOTES
Harriet Aquino NP aware of C & S results from [facility] stated the patient is on the right antibiotics.

## 2019-06-29 NOTE — DISCHARGE SUMMARY
Discharge Summary       PATIENT ID: Gurvinder Go  MRN: [id number]   YOB: 1940    DATE OF ADMISSION: 6/25/2019  8:20 PM    DATE OF DISCHARGE: 6/29/2019   PRIMARY CARE PROVIDER: Merlinda Brain, DO     ATTENDING PHYSICIAN: Coretta Kennedy MD  DISCHARGING PROVIDER: Coretta Kennedy MD    To contact this individual call [phone] and ask the  to page. If unavailable ask to be transferred the Adult Hospitalist Department. CONSULTATIONS: IP CONSULT TO UROLOGY  IP CONSULT TO ORTHOPEDIC SURGERY    PROCEDURES/SURGERIES: * No surgery found *    ADMITTING DIAGNOSES & HOSPITAL COURSE:   Admitted with feeling unwell from rehab, had lactic acidosis resolved, evidence of possible PNA on CT, treated with abx, with good response, recent hip surgery, needs rehab on dc. Pt's wife is very demanding, and tries to dictate treatment. Risk of Re-Admission: high- anxious wife  DISCHARGE DIAGNOSES / PLAN:      #. HCAP- RLL- likely bacterial, Gram +ve- hypostatic. POA  #. Severe sepsis: POA- resolved. #. Lactic acidosis: POA- resolved. - Abx, IVF, blood cultures -ve O2 to keep sats >90%, DuoNebs  - IS, Guaifenesin, UA: no bacteria, lots of WCC, urine cultures <1000, prev urine cultures sensitivity reviewed, Levaquin will cover. Close f/u with Urology  - CT cap: small PNA, small lesion in R kidney, get PT/OT - f/u CT chest in 8 weeks through PCP to ensure resolution.     #. Hiccups: resolved. #. R Hyroureteronephrosis: mild, seen on CT chest, also small lesion,   - US/CT a/p: no hydronephrosis, Urology following. Op f/u. - UA no bacteria, Bladder check to r/o retention. #. Mild rash: on back, possible heat rash, calamine lotion prn. #. Recent L hip #: s/p ORIF, PT/OT, consult ortho- rehab on dc. analgesia prn  #.  Sjogren's: stable, continue home regimen     FOLLOW UP APPOINTMENTS:    Follow-up Information     Follow up With Specialties Details Why Contact Info    Silvestre Beckman MD Urology In 1 month  [facility]  [phone]      Raj Caldera, DO Family Practice In 1 week  [address]  [phone]           ADDITIONAL CARE RECOMMENDATIONS:  Follow up with PCP and urology    DIET: Resume previous diet    ACTIVITY: Activity as tolerated    DISCHARGE MEDICATIONS: levaquin to fnish course    NOTIFY YOUR PHYSICIAN FOR ANY OF THE FOLLOWING:   Fever over 101 degrees for 24 hours. Chest pain, shortness of breath, fever, chills, nausea, vomiting, diarrhea, change in mentation, falling, weakness, bleeding. Severe pain or pain not relieved by medications. Or, any other signs or symptoms that you may have questions about. DISPOSITION:    Home With:   OT  PT  HH  RN       Long term SNF/Inpatient Rehab   x Independent/assisted living    Hospice    Other:     PATIENT CONDITION AT DISCHARGE:     Functional status    Poor     Deconditioned     Independent      Cognition     Lucid     Forgetful     Dementia      Catheters/lines (plus indication)    Shaw     PICC     PEG     None      Code status     Full code     DNR      PHYSICAL EXAMINATION AT DISCHARGE:  Patient seen and examined at bedside, Condition stable, explained discharge and follow up plans. General:  Alert, oriented, No acute distress  Resp:  No accessory muscle use, Good AE.   Neuro:  Grossly normal, no focal neuro deficits, follows commands   CHRONIC MEDICAL DIAGNOSES:  Problem List as of 6/29/2019 Date Reviewed: 6/11/2019          Codes Class Noted - Resolved    Pneumonia ICD-10-CM: J18.9  ICD-9-CM: 780  6/25/2019 - Present        Fracture of femoral neck, left, closed ([facility] ICD-10-CM: N06.270H  ICD-9-CM: 820.8  6/11/2019 - Present        UTI (urinary tract infection) ICD-10-CM: N39.0  ICD-9-CM: 599.0  10/12/2017 - Present        Urine retention ICD-10-CM: R33.9  ICD-9-CM: 788.20  Unknown - Present        Sepsis ([facility] ICD-10-CM: A41.9  ICD-9-CM: 038.9, 995.91  10/12/2017 - Present Leukocytosis ICD-10-CM: L21.141  ICD-9-CM: 288.60  10/12/2017 - Present        Fever ICD-10-CM: R50.9  ICD-9-CM: 780.60  10/12/2017 - Present        Renal insufficiency ICD-10-CM: N28.9  ICD-9-CM: 593.9  10/12/2017 - Present        CKD (chronic kidney disease), stage III (HCC) ICD-10-CM: N18.3  ICD-9-CM: 865. 3  Unknown - Present        Nausea & vomiting ICD-10-CM: R11.2  ICD-9-CM: 787.01  10/12/2017 - Present        Cough ICD-10-CM: R05  ICD-9-CM: 786.2  10/12/2017 - Present        Dehydration ICD-10-CM: E86.0  ICD-9-CM: 276.51  10/12/2017 - Present        Hypoxia ICD-10-CM: R09.02  ICD-9-CM: 799.02  10/12/2017 - Present        Sinus tachycardia ICD-10-CM: R00.0  ICD-9-CM: 427.89  10/12/2017 - Present        Sjogren's syndrome ([facility] ICD-10-CM: M35.00  ICD-9-CM: 710.2  4/1/2016 - Present              37 minutes were spent with the patient on counseling and coordination of care.     Signed:   Sanya Mccarty MD  6/29/2019  10:58 AM

## 2019-06-29 NOTE — PROGRESS NOTES
Problem: Falls - Risk of  Goal: *Absence of Falls  Description  Document Louisa Guillaumeon Fall Risk and appropriate interventions in the flowsheet.   Outcome: Progressing Towards Goal  Note:   Fall Risk Interventions:  Mobility Interventions: Patient to call before getting OOB, PT Consult for mobility concerns, PT Consult for assist device competence, OT consult for ADLs    Medication Interventions: Evaluate medications/consider consulting pharmacy, Patient to call before getting OOB, Teach patient to arise slowly    Elimination Interventions: Elevated toilet seat, Call light in reach    History of Falls Interventions: Door open when patient unattended, Investigate reason for fall    Problem: Pneumonia: Day 4  Goal: Activity/Safety  Outcome: Progressing Towards Goal  Goal: Nutrition/Diet  Outcome: Progressing Towards Goal  Goal: Discharge Planning  Outcome: Progressing Towards Goal  Goal: Medications  Outcome: Progressing Towards Goal  Goal: Respiratory  Outcome: Progressing Towards Goal  Goal: Treatments/Interventions/Procedures  Outcome: Progressing Towards Goal  Goal: Psychosocial  Outcome: Progressing Towards Goal

## 2019-06-29 NOTE — DISCHARGE INSTRUCTIONS
Patient Education        Pneumonia: Care Instructions  Your Care Instructions    Pneumonia is an infection of the lungs. Most cases are caused by infections from bacteria or viruses. Pneumonia may be mild or very severe. If it is caused by bacteria, you will be treated with antibiotics. It may take a few weeks to a few months to recover fully from pneumonia, depending on how sick you were and whether your overall health is good. Follow-up care is a key part of your treatment and safety. Be sure to make and go to all appointments, and call your doctor if you are having problems. It's also a good idea to know your test results and keep a list of the medicines you take. How can you care for yourself at home? · Take your antibiotics exactly as directed. Do not stop taking the medicine just because you are feeling better. You need to take the full course of antibiotics. · Take your medicines exactly as prescribed. Call your doctor if you think you are having a problem with your medicine. · Get plenty of rest and sleep. You may feel weak and tired for a while, but your energy level will improve with time. · To prevent dehydration, drink plenty of fluids, enough so that your urine is light yellow or clear like water. Choose water and other caffeine-free clear liquids until you feel better. If you have kidney, heart, or liver disease and have to limit fluids, talk with your doctor before you increase the amount of fluids you drink. · Take care of your cough so you can rest. A cough that brings up mucus from your lungs is common with pneumonia. It is one way your body gets rid of the infection. But if coughing keeps you from resting or causes severe fatigue and chest-wall pain, talk to your doctor. He or she may suggest that you take a medicine to reduce the cough. · Use a vaporizer or humidifier to add moisture to your bedroom. Follow the directions for cleaning the machine.   · Do not smoke or allow others to smoke around you. Smoke will make your cough last longer. If you need help quitting, talk to your doctor about stop-smoking programs and medicines. These can increase your chances of quitting for good. · Take an over-the-counter pain medicine, such as acetaminophen (Tylenol), ibuprofen (Advil, Motrin), or naproxen (Aleve). Read and follow all instructions on the label. · Do not take two or more pain medicines at the same time unless the doctor told you to. Many pain medicines have acetaminophen, which is Tylenol. Too much acetaminophen (Tylenol) can be harmful. · If you were given a spirometer to measure how well your lungs are working, use it as instructed. This can help your doctor tell how your recovery is going. · To prevent pneumonia in the future, talk to your doctor about getting a flu vaccine (once a year) and a pneumococcal vaccine (one time only for most people). When should you call for help? Call 911 anytime you think you may need emergency care. For example, call if:    · You have severe trouble breathing.    Call your doctor now or seek immediate medical care if:    · You cough up dark brown or bloody mucus (sputum).     · You have new or worse trouble breathing.     · You are dizzy or lightheaded, or you feel like you may faint.    Watch closely for changes in your health, and be sure to contact your doctor if:    · You have a new or higher fever.     · You are coughing more deeply or more often.     · You are not getting better after 2 days (48 hours).     · You do not get better as expected. Where can you learn more? Go to http://richelle-travis.info/. Enter [id number] in the search box to learn more about \"Pneumonia: Care Instructions. \"  Current as of: September 5, 2018  Content Version: 11.9  © 7182-5205 [employer]. Care instructions adapted under license by [employer] (which disclaims liability or warranty for this information).  If you have questions about a medical condition or this instruction, always ask your healthcare professional. [place] [address] any warranty or liability for your use of this information. Discharge Instructions       PATIENT ID: Homar Gauthier  MRN: [id number]   YOB: 1940    DATE OF ADMISSION: 6/25/2019  8:20 PM    DATE OF DISCHARGE: 6/29/2019    PRIMARY CARE PROVIDER: Ceh Ghotra DO     ATTENDING PHYSICIAN: Paul Duncan MD  DISCHARGING PROVIDER: Joaquin Spencer MD    To contact this individual call [phone] and ask the  to page. If unavailable ask to be transferred the Adult Hospitalist Department. DISCHARGE DIAGNOSES Possible HCAP    CONSULTATIONS: IP CONSULT TO UROLOGY  IP CONSULT TO ORTHOPEDIC SURGERY    PROCEDURES/SURGERIES: * No surgery found *    FOLLOW UP APPOINTMENTS:   Follow-up Information     Follow up With Specialties Details Why Contact Info    Oliver Smith MD Urology In 1 month  [facility]  [phone]      Che Ghotra DO Family [facility] In 1 week  [address] [place] [address]  [phone]             ADDITIONAL CARE RECOMMENDATIONS: follow up with PCP and Urology    DIET: Resume previous diet    DISCHARGE MEDICATIONS:  Levaquin to finish course    · It is important that you take the medication exactly as they are prescribed. · Keep your medication in the bottles provided by the pharmacist and keep a list of the medication names, dosages, and times to be taken in your wallet. · Do not take other medications without consulting your doctor. NOTIFY YOUR PHYSICIAN FOR ANY OF THE FOLLOWING:   Fever over 101 degrees for 24 hours. Chest pain, shortness of breath, fever, chills, nausea, vomiting, diarrhea, change in mentation, falling, weakness, bleeding. Severe pain or pain not relieved by medications. Or, any other signs or symptoms that you may have questions about.   It was our pleasure to help take care of you and we hope you get well very soon.     DISPOSITION:    Home With:   OT  PT  HH  RN       SNF/Inpatient Rehab/LTAC   x Independent/assisted living    Hospice    Other:     CDMP Checked:   Yes x     PROBLEM LIST Updated:  Yes x     Signed:   Veronica Arriaga MD  6/29/2019  11:03 AM

## 2019-06-30 LAB
ANION GAP SERPL CALC-SCNC: 8 MMOL/L (ref 5–15)
BUN SERPL-MCNC: 11 MG/DL (ref 6–20)
BUN/CREAT SERPL: 10 (ref 12–20)
CALCIUM SERPL-MCNC: 8.8 MG/DL (ref 8.5–10.1)
CHLORIDE SERPL-SCNC: 105 MMOL/L (ref 97–108)
CO2 SERPL-SCNC: 25 MMOL/L (ref 21–32)
CREAT SERPL-MCNC: 1.05 MG/DL (ref 0.7–1.3)
ERYTHROCYTE [DISTWIDTH] IN BLOOD BY AUTOMATED COUNT: 13.2 % (ref 11.5–14.5)
GLUCOSE SERPL-MCNC: 95 MG/DL (ref 65–100)
HCT VFR BLD AUTO: 37.3 % (ref 36.6–50.3)
HGB BLD-MCNC: 12.3 G/DL (ref 12.1–17)
MCH RBC QN AUTO: 32.4 PG (ref 26–34)
MCHC RBC AUTO-ENTMCNC: 33 G/DL (ref 30–36.5)
MCV RBC AUTO: 98.2 FL (ref 80–99)
NRBC # BLD: 0 K/UL (ref 0–0.01)
NRBC BLD-RTO: 0 PER 100 WBC
PLATELET # BLD AUTO: 271 K/UL (ref 150–400)
PMV BLD AUTO: 9.8 FL (ref 8.9–12.9)
POTASSIUM SERPL-SCNC: 3.6 MMOL/L (ref 3.5–5.1)
RBC # BLD AUTO: 3.8 M/UL (ref 4.1–5.7)
SODIUM SERPL-SCNC: 138 MMOL/L (ref 136–145)
WBC # BLD AUTO: 10.7 K/UL (ref 4.1–11.1)

## 2019-06-30 PROCEDURE — 80048 BASIC METABOLIC PNL TOTAL CA: CPT

## 2019-06-30 PROCEDURE — 85027 COMPLETE CBC AUTOMATED: CPT

## 2019-06-30 PROCEDURE — 74011250636 HC RX REV CODE- 250/636: Performed by: FAMILY MEDICINE

## 2019-06-30 PROCEDURE — 77010033678 HC OXYGEN DAILY

## 2019-06-30 PROCEDURE — 36415 COLL VENOUS BLD VENIPUNCTURE: CPT

## 2019-06-30 PROCEDURE — 74011636637 HC RX REV CODE- 636/637: Performed by: FAMILY MEDICINE

## 2019-06-30 PROCEDURE — 65270000032 HC RM SEMIPRIVATE

## 2019-06-30 PROCEDURE — 74011250637 HC RX REV CODE- 250/637: Performed by: FAMILY MEDICINE

## 2019-06-30 RX ADMIN — NYSTATIN: 100000 POWDER TOPICAL at 17:30

## 2019-06-30 RX ADMIN — DULOXETINE HYDROCHLORIDE 30 MG: 30 CAPSULE, DELAYED RELEASE ORAL at 22:15

## 2019-06-30 RX ADMIN — NYSTATIN: 100000 POWDER TOPICAL at 22:16

## 2019-06-30 RX ADMIN — LEVOFLOXACIN 750 MG: 5 INJECTION, SOLUTION INTRAVENOUS at 03:04

## 2019-06-30 RX ADMIN — PREDNISONE 5 MG: 5 TABLET ORAL at 08:46

## 2019-06-30 RX ADMIN — HEPARIN SODIUM 5000 UNITS: 5000 INJECTION INTRAVENOUS; SUBCUTANEOUS at 19:08

## 2019-06-30 RX ADMIN — HEPARIN SODIUM 5000 UNITS: 5000 INJECTION INTRAVENOUS; SUBCUTANEOUS at 03:04

## 2019-06-30 RX ADMIN — Medication 10 ML: at 17:30

## 2019-06-30 RX ADMIN — HEPARIN SODIUM 5000 UNITS: 5000 INJECTION INTRAVENOUS; SUBCUTANEOUS at 12:00

## 2019-06-30 RX ADMIN — Medication 10 ML: at 22:15

## 2019-06-30 RX ADMIN — POLYETHYLENE GLYCOL 3350 17 G: 17 POWDER, FOR SOLUTION ORAL at 17:30

## 2019-06-30 RX ADMIN — NYSTATIN: 100000 POWDER TOPICAL at 08:48

## 2019-06-30 RX ADMIN — Medication 10 ML: at 06:14

## 2019-06-30 NOTE — PROGRESS NOTES
A Spiritual Care Partner Volunteer visited patient in Rm 500 on 6/30/2019.   Documented by:  Chaplain Almanza MDiv, MS, [address] ([id number])

## 2019-06-30 NOTE — PROGRESS NOTES
Hospitalist Progress Note  Betty Orona MD  Answering service: [phone] from in house phone      Date of Service:  2019  NAME:  Marga Salgado  :  1940  MRN:  [id number]    Admission Summary:   79M hx of CKD, Sjogrn brought from Rehab facility 'following NOF#' with n/v- ?PNA  Interval history / Subjective:   Patient seen and examined at bedside. Didn't get discharge yesterday, [facility] said they will have a bed today, now saying don't have a bed today, will have one tomorrow. Feels well, no acute events. Assessment & Plan:     #. HCAP- RLL- hypostatic. POA  #. Severe sepsis: POA- resolved. #. Lactic acidosis: POA- resolved. - Abx, IVF, blood cultures -ve O2 to keep sats >90%, DuoNebs  - IS, Guaifenesin, UA: no bacteria, lots of WCC, urine cultures <1000, Monitor  - CT cap: small PNA, small lesion in R kidney, get PT/OT     #. Hiccups: resolved. #. R Hyroureteronephrosis: mild, seen on CT chest, also small lesion,   - US/CT a/p: no hydronephrosis, Urology following. Op f/u   - UA no bacteria, Urine cultures <1000 bacteria. Wife brought Urine culture done outside hospital over a week ago, grew bacteria that were sensitive to LQ. Finishing course of LQ . Bladder check to r/o retention. #. Recent L hip #: s/p ORIF, PT/OT, due f/u will consult ortho. analgesia prn  #. Sjogren's: stable, continue home regimen    Code status: Full  DVT prophylaxis: heparin  Care Plan discussed with: Patient/Family and Nurse  Disposition: TBD a/w IPR     Hospital Problems  Date Reviewed: 2019          Codes Class Noted POA    Pneumonia ICD-10-CM: J18.9  ICD-9-CM: 599  2019 Unknown        Hypoxia ICD-10-CM: R09.02  ICD-9-CM: 799.02  10/12/2017 Unknown            Review of Systems:   Pertinent items are mentioned in interval history. Vital Signs:    Last 24hrs VS reviewed since prior progress note.  Most recent are:  Visit Vitals  /78   Pulse 66   Temp 98.4 °F (36.9 °C)   Resp 18   Ht 6' 1\" (1.854 m)   Wt 80.8 kg (178 lb 2.1 oz)   SpO2 99%   BMI 23.50 kg/m²         Intake/Output Summary (Last 24 hours) at 6/30/2019 1033  Last data filed at 6/30/2019 0327  Gross per 24 hour   Intake 1290 ml   Output 2325 ml   Net -1035 ml        Physical Examination:   General:  Alert, oriented, No acute distress  Resp:  No accessory muscle use, Good AE, no wheezes, no rhonchi  Abd:  Soft, non-tender, non-distended  Extremities:  No cyanosis or clubbing, no significant edema  Neuro:  Grossly normal, no focal neuro deficits, follows commands   Psych:  fair insight, AAOx3, not agitated. Data Review:    Review and/or order of clinical lab test  Review and/or order of tests in the radiology section of CPT  Review and/or order of tests in the medicine section of CPT  Labs:     Recent Labs     06/30/19  0309 06/28/19  0416   WBC 10.7 8.9   HGB 12.3 10.6*   HCT 37.3 32.2*    242     Recent Labs     06/30/19  0309 06/29/19  0301 06/28/19  0416    141 140   K 3.6 3.7 3.7    108 112*   CO2 25 24 22   BUN 11 9 11   CREA 1.05 1.04 0.96   GLU 95 89 93   CA 8.8 8.3* 8.2*     No results for input(s): SGOT, GPT, ALT, AP, TBIL, TBILI, TP, ALB, GLOB, GGT, AML, LPSE in the last 72 hours. No lab exists for component: AMYP, HLPSE  No results for input(s): INR, PTP, APTT in the last 72 hours. No lab exists for component: INREXT, INREXT   No results for input(s): FE, TIBC, PSAT, FERR in the last 72 hours. No results found for: FOL, RBCF   No results for input(s): PH, PCO2, PO2 in the last 72 hours. No results for input(s): CPK, CKNDX, TROIQ in the last 72 hours.     No lab exists for component: CPKMB  Lab Results   Component Value Date/Time    Cholesterol, total 98 06/26/2019 02:19 AM    HDL Cholesterol 43 06/26/2019 02:19 AM    LDL, calculated 40.8 06/26/2019 02:19 AM    Triglyceride 71 06/26/2019 02:19 AM    CHOL/HDL Ratio 2.3 06/26/2019 02:19 AM     No results found for: 4295  F?rsat Bu F?rsat  Lab Results   Component Value Date/Time    Color YELLOW/STRAW 06/25/2019 09:20 PM    Appearance CLEAR 06/25/2019 09:20 PM    Specific gravity 1.015 06/25/2019 09:20 PM    Specific gravity 1.017 06/11/2019 06:46 AM    pH (UA) 6.0 06/25/2019 09:20 PM    Protein 30 (A) 06/25/2019 09:20 PM    Glucose NEGATIVE  06/25/2019 09:20 PM    Ketone NEGATIVE  06/25/2019 09:20 PM    Bilirubin NEGATIVE  06/25/2019 09:20 PM    Urobilinogen 0.2 06/25/2019 09:20 PM    Nitrites POSITIVE (A) 06/25/2019 09:20 PM    Leukocyte Esterase MODERATE (A) 06/25/2019 09:20 PM    Epithelial cells FEW 06/25/2019 09:20 PM    Bacteria NEGATIVE  06/25/2019 09:20 PM    WBC >100 (H) 06/25/2019 09:20 PM    RBC 5-10 06/25/2019 09:20 PM     Medications Reviewed:     Current Facility-Administered Medications   Medication Dose Route Frequency    DULoxetine (CYMBALTA) capsule 30 mg  30 mg Oral QHS    acetaminophen (TYLENOL) tablet 650 mg  650 mg Oral Q6H PRN    polyethylene glycol (MIRALAX) packet 17 g  17 g Oral QPM    senna-docusate (PERICOLACE) 8.6-50 mg per tablet 1 Tab  1 Tab Oral QPM    predniSONE (DELTASONE) tablet 5 mg  5 mg Oral DAILY    sodium chloride (NS) flush 5-40 mL  5-40 mL IntraVENous Q8H    sodium chloride (NS) flush 5-40 mL  5-40 mL IntraVENous PRN    heparin (porcine) injection 5,000 Units  5,000 Units SubCUTAneous Q8H    nystatin (MYCOSTATIN) 100,000 unit/gram powder   Topical TID    levoFLOXacin (LEVAQUIN) 750 mg in D5W IVPB  750 mg IntraVENous Q24H    sodium chloride (NS) flush 5-10 mL  5-10 mL IntraVENous PRN   ______________________________________________________________________  EXPECTED LENGTH OF STAY: 4d 19h  ACTUAL LENGTH OF STAY:          Linn Leigh MD

## 2019-06-30 NOTE — PROGRESS NOTES
CM follow up. CM spoke with Enrique Biswas [facility], who said that bed is NOT available today. Per [facility] Lee, a bed will be available Monday 7/1/19 and patient is 1st in line for that bed.

## 2019-06-30 NOTE — ROUTINE PROCESS
Bedside and Verbal shift change report given to Gilbert Brown (oncoming nurse) by Jack Addison (offgoing nurse). Report included the following information SBAR.

## 2019-07-01 LAB
BACTERIA SPEC CULT: NORMAL
BACTERIA SPEC CULT: NORMAL
GLUCOSE BLD STRIP.AUTO-MCNC: 92 MG/DL (ref 65–100)
SERVICE CMNT-IMP: NORMAL

## 2019-07-01 PROCEDURE — 82962 GLUCOSE BLOOD TEST: CPT

## 2019-07-01 PROCEDURE — 97530 THERAPEUTIC ACTIVITIES: CPT

## 2019-07-01 PROCEDURE — 74011636637 HC RX REV CODE- 636/637: Performed by: FAMILY MEDICINE

## 2019-07-01 PROCEDURE — 65270000032 HC RM SEMIPRIVATE

## 2019-07-01 PROCEDURE — 74011250636 HC RX REV CODE- 250/636: Performed by: FAMILY MEDICINE

## 2019-07-01 PROCEDURE — 74011250637 HC RX REV CODE- 250/637: Performed by: FAMILY MEDICINE

## 2019-07-01 PROCEDURE — 97116 GAIT TRAINING THERAPY: CPT

## 2019-07-01 RX ORDER — LEVOFLOXACIN 750 MG/1
750 TABLET ORAL DAILY
Qty: 2 TAB | Refills: 0 | Status: SHIPPED | OUTPATIENT
Start: 2019-07-01 | End: 2019-07-03

## 2019-07-01 RX ADMIN — Medication 10 ML: at 14:00

## 2019-07-01 RX ADMIN — NYSTATIN: 100000 POWDER TOPICAL at 22:36

## 2019-07-01 RX ADMIN — Medication 10 ML: at 04:47

## 2019-07-01 RX ADMIN — HEPARIN SODIUM 5000 UNITS: 5000 INJECTION INTRAVENOUS; SUBCUTANEOUS at 04:46

## 2019-07-01 RX ADMIN — Medication 10 ML: at 21:45

## 2019-07-01 RX ADMIN — DULOXETINE HYDROCHLORIDE 30 MG: 30 CAPSULE, DELAYED RELEASE ORAL at 21:45

## 2019-07-01 RX ADMIN — HEPARIN SODIUM 5000 UNITS: 5000 INJECTION INTRAVENOUS; SUBCUTANEOUS at 18:58

## 2019-07-01 RX ADMIN — LEVOFLOXACIN 750 MG: 5 INJECTION, SOLUTION INTRAVENOUS at 04:47

## 2019-07-01 RX ADMIN — PREDNISONE 5 MG: 5 TABLET ORAL at 09:31

## 2019-07-01 RX ADMIN — NYSTATIN: 100000 POWDER TOPICAL at 09:00

## 2019-07-01 RX ADMIN — HEPARIN SODIUM 5000 UNITS: 5000 INJECTION INTRAVENOUS; SUBCUTANEOUS at 10:31

## 2019-07-01 RX ADMIN — POLYETHYLENE GLYCOL 3350 17 G: 17 POWDER, FOR SOLUTION ORAL at 18:00

## 2019-07-01 RX ADMIN — NYSTATIN: 100000 POWDER TOPICAL at 16:00

## 2019-07-01 NOTE — PROGRESS NOTES
Bedside shift change report given to HELGA Barajas (oncoming nurse) by HELGA Banks (offgoing nurse). Report included the following information SBAR.

## 2019-07-01 NOTE — PROGRESS NOTES
Ortho Progress Note    69yo male, 3 weeks s/p left hip ana luisa  Re-admitted with PNA/UTI  Feeling better  Walking with PT  No complaints  Anxious to go to rehab  To rehab when bed avail    Elizabeth Dennison [address]

## 2019-07-01 NOTE — DISCHARGE SUMMARY
Discharge Summary       PATIENT ID: Carlee Benavides  MRN: [id number]   YOB: 1940    DATE OF ADMISSION: 6/25/2019  8:20 PM    DATE OF DISCHARGE: 6/29/2019   PRIMARY CARE PROVIDER: Becky Erickson DO     ATTENDING PHYSICIAN: Neema Gomes MD  DISCHARGING PROVIDER: Neema Gomes MD    To contact this individual call [phone] and ask the  to page. If unavailable ask to be transferred the Adult Hospitalist Department. CONSULTATIONS: IP CONSULT TO UROLOGY  IP CONSULT TO ORTHOPEDIC SURGERY    PROCEDURES/SURGERIES: * No surgery found *    ADMITTING DIAGNOSES & HOSPITAL COURSE:   Admitted with feeling unwell from rehab, had lactic acidosis resolved, evidence of possible PNA on CT, treated with abx, with good response, recent hip surgery, needs rehab on dc. Pt's wife is very demanding, and tries to dictate treatment. Risk of Re-Admission: high- anxious wife  DISCHARGE DIAGNOSES / PLAN:      #. HCAP- RLL- likely bacterial, Gram +ve- hypostatic. POA  #. Severe sepsis: POA- resolved. #. Lactic acidosis: POA- resolved. - Abx, IVF, blood cultures -ve O2 to keep sats >90%, DuoNebs  - IS, Guaifenesin, UA: no bacteria, lots of WCC, urine cultures <1000, prev urine cultures sensitivity reviewed, Levaquin will cover. Close f/u with Urology  - CT cap: small PNA, small lesion in R kidney, get PT/OT - f/u CT chest in 8 weeks through PCP to ensure resolution.     #. Hiccups: resolved. #. R Hyroureteronephrosis: mild, seen on CT chest, also small lesion,   - US/CT a/p: no hydronephrosis, Urology following. Op f/u. - UA no bacteria, Bladder check to r/o retention. #. Mild rash: on back, possible heat rash, calamine lotion prn. #. Recent L hip #: s/p ORIF, PT/OT, consult ortho- rehab on dc. analgesia prn  #.  Sjogren's: stable, continue home regimen     FOLLOW UP APPOINTMENTS:    Follow-up Information     Follow up With Specialties Details Why Contact Info    Fatmata Sultana MD Urology In 1 month  [address] [facility] [address]  [phone]      Meredith Painter DO Family Practice In 1 week  [address]  [phone]           ADDITIONAL CARE RECOMMENDATIONS:  Follow up with PCP and urology    DIET: Resume previous diet    ACTIVITY: Activity as tolerated    DISCHARGE MEDICATIONS: levaquin to fnish course    NOTIFY YOUR PHYSICIAN FOR ANY OF THE FOLLOWING:   Fever over 101 degrees for 24 hours. Chest pain, shortness of breath, fever, chills, nausea, vomiting, diarrhea, change in mentation, falling, weakness, bleeding. Severe pain or pain not relieved by medications. Or, any other signs or symptoms that you may have questions about. DISPOSITION:    Home With:   OT  PT  HH  RN       Long term SNF/Inpatient Rehab   x Independent/assisted living    Hospice    Other:     PATIENT CONDITION AT DISCHARGE:     Functional status    Poor     Deconditioned     Independent      Cognition     Lucid     Forgetful     Dementia      Catheters/lines (plus indication)    Shaw     PICC     PEG     None      Code status     Full code     DNR      PHYSICAL EXAMINATION AT DISCHARGE:  Patient seen and examined at bedside, Condition stable, explained discharge and follow up plans. General:  Alert, oriented, No acute distress  Resp:  No accessory muscle use, Good AE.   Neuro:  Grossly normal, no focal neuro deficits, follows commands   CHRONIC MEDICAL DIAGNOSES:  Problem List as of 6/29/2019 Date Reviewed: 6/11/2019          Codes Class Noted - Resolved    Pneumonia ICD-10-CM: J18.9  ICD-9-CM: 246  6/25/2019 - Present        Fracture of femoral neck, left, closed ([facility] ICD-10-CM: A95.394E  ICD-9-CM: 820.8  6/11/2019 - Present        UTI (urinary tract infection) ICD-10-CM: N39.0  ICD-9-CM: 599.0  10/12/2017 - Present        Urine retention ICD-10-CM: R33.9  ICD-9-CM: 788.20  Unknown - Present        Sepsis ([facility] ICD-10-CM: A41.9  ICD-9-CM: 038.9, 995.91  10/12/2017 - Present Leukocytosis ICD-10-CM: X72.968  ICD-9-CM: 288.60  10/12/2017 - Present        Fever ICD-10-CM: R50.9  ICD-9-CM: 780.60  10/12/2017 - Present        Renal insufficiency ICD-10-CM: N28.9  ICD-9-CM: 593.9  10/12/2017 - Present        CKD (chronic kidney disease), stage III (HCC) ICD-10-CM: N18.3  ICD-9-CM: 506. 3  Unknown - Present        Nausea & vomiting ICD-10-CM: R11.2  ICD-9-CM: 787.01  10/12/2017 - Present        Cough ICD-10-CM: R05  ICD-9-CM: 786.2  10/12/2017 - Present        Dehydration ICD-10-CM: E86.0  ICD-9-CM: 276.51  10/12/2017 - Present        Hypoxia ICD-10-CM: R09.02  ICD-9-CM: 799.02  10/12/2017 - Present        Sinus tachycardia ICD-10-CM: R00.0  ICD-9-CM: 427.89  10/12/2017 - Present        Sjogren's syndrome ([facility] ICD-10-CM: M35.00  ICD-9-CM: 710.2  4/1/2016 - Present              37 minutes were spent with the patient on counseling and coordination of care.     Signed:   Curtis Pantoja MD  6/29/2019  10:58 AM

## 2019-07-01 NOTE — PROGRESS NOTES
Bedside and Verbal shift change report given to Jas Brown RN (oncoming nurse) by Ro Weathers RN (offgoing nurse). Report included the following information SBAR, Kardex, ED Summary, Intake/Output, MAR and Recent Results.

## 2019-07-01 NOTE — PROGRESS NOTES
Problem: Mobility Impaired (Adult and Pediatric)  Goal: *Acute Goals and Plan of Care (Insert Text)  Description  Physical Therapy Goals  Initiated 6/27/2019    1. Patient will move from supine to sit and sit to supine , scoot up and down and roll side to side in bed with modified independence within 4 days. 2. Patient will perform sit to stand with minimal assistance/contact guard assist within 4 days. 3. Patient will ambulate with minimal assistance/contact guard assist for 60 feet with the least restrictive device within 4 days. 4. Patient will ascend/descend 2 stairs with right handrail(s) with minimal assistance/contact guard assist within 4 days. 5. Patient will perform anterior hip home exercise program per protocol with modified independence within 4 days. 6. Patient will improve Tinetti score by 4-5 points within 7 days. Outcome: Progressing Towards Goal    PHYSICAL THERAPY TREATMENT  Patient: Melina Gaitan (21 y.o. male)  Date: 7/1/2019  Diagnosis: Hypoxia [R09.02]  Pneumonia [J18.9] <principal problem not specified>       Precautions: Fall, WBAT(recent L ALICIA)  Chart, physical therapy assessment, plan of care and goals were reviewed. ASSESSMENT:  Pt demos increased mobility and movement with noted great increase in distance and weight bearing  Progression toward goals:  ?    Improving appropriately and progressing toward goals  ? Improving slowly and progressing toward goals  ? Not making progress toward goals and plan of care will be adjusted     PLAN:  Patient continues to benefit from skilled intervention to address the above impairments. Continue treatment per established plan of care. Discharge Recommendations:  Inpatient Rehab  Further Equipment Recommendations for Discharge:  rolling walker     SUBJECTIVE:   Patient stated ? I am so disappointed.  You are not putting the patient first? pt having difficulty with [facility] bed availability preventing DC today, attempting [facility] inpatient. Attempted to explain thoroughly, pt continues to be upset about \"patient first\" mentality being abandoned. Educated pt that awaiting bed is not compromising \"patient first\" mentality    OBJECTIVE DATA SUMMARY:   Critical Behavior:  Neurologic State: Alert  Orientation Level: Oriented X4  Cognition: Follows commands  Safety/Judgement: Awareness of environment, Fall prevention  Functional Mobility Training:  Bed Mobility:     Supine to Sit: Supervision  Sit to Supine: Supervision           Transfers:  Sit to Stand: Contact guard assistance  Stand to Sit: Contact guard assistance                             Balance:  Sitting: Intact; Without support  Standing: Impaired; With support  Standing - Static: Fair  Standing - Dynamic : Fair  Ambulation/Gait Training:  Distance (ft): 140 Feet (ft)  Assistive Device: Walker, rolling;Gait belt  Ambulation - Level of Assistance: Minimal assistance;Assist x1        Gait Abnormalities: Antalgic; Step to gait; Decreased step clearance;Trunk sway increased     Left Side Weight Bearing: As tolerated  Base of Support: Widened;Shift to right  Stance: Left decreased  Speed/Mary: Shuffled; Slow  Step Length: Left shortened;Right shortened  Swing Pattern: Left asymmetrical         Pain:  Pain Scale 1: Numeric (0 - 10)  Pain Intensity 1: 0              Activity Tolerance:   Good with no desaturation or pain  Please refer to the flowsheet for vital signs taken during this treatment. After treatment:   ?    Patient left in no apparent distress sitting up in chair  ? Patient left in no apparent distress in bed  ? Call bell left within reach  ? Nursing notified  ? Caregiver present  ?     Bed alarm activated    COMMUNICATION/COLLABORATION:   The patient?s plan of care was discussed with: Registered Nurse and     Graham Snyder, PT   Time Calculation: 38 mins

## 2019-07-01 NOTE — PROGRESS NOTES
LISA PLAN:  - [facility] has no bed  -Referral sent to [facility] Cm following for discharge planning, noted documentation indicating that [facility] would have a bed for patient today. Unfortunately, this is incorrect. Cm called patients' wife ([phone]) to update her on this. The [facility] locations are both full today, [facility] has four discharges tomorrow. Cm informed wife she would have to either choose the [facility] location or another facility all together. Wife was understandably upset, as she was told all weekend that patient would have a bed today. We discussed alternative rehab facilities and she was in agreement with me sending a referral to [facility]. Referral sent and cm called to confirm they do have beds. 3:15pm: [facility] has accepted patient and can take him tomorrow morning. Cm discussed this with patient and wife. Wife still wants patient to go to [facility], and cm reiterated to her that patient will be leaving tomorrow to go to [facility]. If by some small miracle, [facility] has a bed tomorrow, patient can go there, otherwise, he will go to [facility].      Rut Fink BSW, ACM

## 2019-07-01 NOTE — PROGRESS NOTES
Chart reviewed and attempted to see patient for OT intervention. Patient reporting he worked with physical therapist, sat in the chair for an hour, and had just returned to bed to rest. Patient requesting if OT could return later this afternoon. Will follow up for OT intervention as able and appropriate. Thank you.

## 2019-07-02 ENCOUNTER — HOSPITAL ENCOUNTER (OUTPATIENT)
Dept: REHABILITATION | Age: 79
End: 2019-07-12
Attending: PHYSICAL MEDICINE & REHABILITATION | Admitting: PHYSICAL MEDICINE & REHABILITATION

## 2019-07-02 VITALS
BODY MASS INDEX: 22.73 KG/M2 | TEMPERATURE: 98.1 F | RESPIRATION RATE: 16 BRPM | SYSTOLIC BLOOD PRESSURE: 132 MMHG | HEART RATE: 72 BPM | HEIGHT: 73 IN | DIASTOLIC BLOOD PRESSURE: 81 MMHG | WEIGHT: 171.52 LBS | OXYGEN SATURATION: 97 %

## 2019-07-02 LAB
ANION GAP SERPL CALC-SCNC: 8 MMOL/L (ref 5–15)
BUN SERPL-MCNC: 15 MG/DL (ref 6–20)
BUN/CREAT SERPL: 14 (ref 12–20)
CALCIUM SERPL-MCNC: 8.7 MG/DL (ref 8.5–10.1)
CHLORIDE SERPL-SCNC: 107 MMOL/L (ref 97–108)
CO2 SERPL-SCNC: 24 MMOL/L (ref 21–32)
CREAT SERPL-MCNC: 1.04 MG/DL (ref 0.7–1.3)
ERYTHROCYTE [DISTWIDTH] IN BLOOD BY AUTOMATED COUNT: 13.7 % (ref 11.5–14.5)
GLUCOSE SERPL-MCNC: 95 MG/DL (ref 65–100)
HCT VFR BLD AUTO: 37.9 % (ref 36.6–50.3)
HGB BLD-MCNC: 12.5 G/DL (ref 12.1–17)
MCH RBC QN AUTO: 32.7 PG (ref 26–34)
MCHC RBC AUTO-ENTMCNC: 33 G/DL (ref 30–36.5)
MCV RBC AUTO: 99.2 FL (ref 80–99)
NRBC # BLD: 0 K/UL (ref 0–0.01)
NRBC BLD-RTO: 0 PER 100 WBC
PLATELET # BLD AUTO: 269 K/UL (ref 150–400)
PMV BLD AUTO: 10 FL (ref 8.9–12.9)
POTASSIUM SERPL-SCNC: 3.8 MMOL/L (ref 3.5–5.1)
RBC # BLD AUTO: 3.82 M/UL (ref 4.1–5.7)
SODIUM SERPL-SCNC: 139 MMOL/L (ref 136–145)
WBC # BLD AUTO: 11.7 K/UL (ref 4.1–11.1)

## 2019-07-02 PROCEDURE — 85027 COMPLETE CBC AUTOMATED: CPT

## 2019-07-02 PROCEDURE — 74011250636 HC RX REV CODE- 250/636: Performed by: FAMILY MEDICINE

## 2019-07-02 PROCEDURE — 36415 COLL VENOUS BLD VENIPUNCTURE: CPT

## 2019-07-02 PROCEDURE — 80048 BASIC METABOLIC PNL TOTAL CA: CPT

## 2019-07-02 PROCEDURE — 74011636637 HC RX REV CODE- 636/637: Performed by: FAMILY MEDICINE

## 2019-07-02 RX ADMIN — LEVOFLOXACIN 750 MG: 5 INJECTION, SOLUTION INTRAVENOUS at 03:37

## 2019-07-02 RX ADMIN — PREDNISONE 5 MG: 5 TABLET ORAL at 09:04

## 2019-07-02 RX ADMIN — NYSTATIN: 100000 POWDER TOPICAL at 09:05

## 2019-07-02 RX ADMIN — HEPARIN SODIUM 5000 UNITS: 5000 INJECTION INTRAVENOUS; SUBCUTANEOUS at 11:19

## 2019-07-02 RX ADMIN — HEPARIN SODIUM 5000 UNITS: 5000 INJECTION INTRAVENOUS; SUBCUTANEOUS at 03:37

## 2019-07-02 NOTE — DISCHARGE SUMMARY
Discharge Summary       PATIENT ID: Kim Wilson  MRN: [id number]   YOB: 1940    DATE OF ADMISSION: 6/25/2019  8:20 PM    DATE OF DISCHARGE: 6/29/2019   PRIMARY CARE PROVIDER: Radha Ariza DO     ATTENDING PHYSICIAN: Rachel Anglin MD  DISCHARGING PROVIDER: Rachel Anglin MD    To contact this individual call [phone] and ask the  to page. If unavailable ask to be transferred the Adult Hospitalist Department. CONSULTATIONS: IP CONSULT TO UROLOGY  IP CONSULT TO ORTHOPEDIC SURGERY    PROCEDURES/SURGERIES: * No surgery found *    ADMITTING DIAGNOSES & HOSPITAL COURSE:   Admitted with feeling unwell from rehab, had lactic acidosis resolved, evidence of possible PNA on CT, treated with abx, with good response, recent hip surgery, needs rehab on dc. Pt's wife is very demanding, and tries to dictate treatment. Patient was discharged on 7/1 but no beds available and so [facility]. Risk of Re-Admission: high- anxious wife    DISCHARGE DIAGNOSES / PLAN:      #. HCAP- RLL- likely bacterial, Gram +ve- hypostatic. POA  #. Severe sepsis: POA- resolved. #. Lactic acidosis: POA- resolved. - Abx, IVF, blood cultures -ve O2 to keep sats >90%, DuoNebs  - IS, Guaifenesin, UA: no bacteria, lots of WCC, urine cultures <1000, prev urine cultures sensitivity reviewed, Levaquin will cover. Close f/u with Urology  - CT cap: small PNA, small lesion in R kidney, get PT/OT - f/u CT chest in 8 weeks through PCP to ensure resolution.     #. Hiccups: resolved. #. R Hyroureteronephrosis: mild, seen on CT chest, also small lesion,   - US/CT a/p: no hydronephrosis, Urology following. Op f/u. - UA no bacteria, Bladder check to r/o retention. #. Mild rash: on back, possible heat rash, calamine lotion prn. #. Recent L hip #: s/p ORIF, PT/OT, consult ortho- rehab on dc. analgesia prn  #.  Sjogren's: stable, continue home regimen     FOLLOW UP APPOINTMENTS:    Follow-up Information     Follow up With Specialties Details Why Contact Info    Jarvis Aguilar MD Urology In 1 month  [facility]  [phone]      Vidhya Elias In 1 week  [address] [facility]  [phone]          Current Discharge Medication List      START taking these medications    Details   levoFLOXacin (LEVAQUIN) 750 mg tablet Take 1 Tab by mouth daily for 2 days. Qty: 2 Tab, Refills: 0      calamine-zinc oxide (CALAMINE) topical lotion Apply  to affected area two (2) times daily as needed for Itching for up to 5 days. Qty: 1 Bottle, Refills: 0         CONTINUE these medications which have NOT CHANGED    Details   predniSONE (DELTASONE) 2.5 mg tablet Take 5 mg by mouth daily. acetaminophen (TYLENOL) 325 mg tablet Take 2 Tabs by mouth every six (6) hours as needed for Pain. Qty: 10 Tab, Refills: 0      DULoxetine (CYMBALTA) 30 mg capsule Take 30 mg by mouth nightly. polyethylene glycol (MIRALAX) 17 gram/dose powder Take 17 g by mouth every evening. senna-docusate (PERICOLACE) 8.6-50 mg per tablet Take 1 Tab by mouth every evening. ADDITIONAL CARE RECOMMENDATIONS:  Follow up with PCP and urology    DIET: Resume previous diet    ACTIVITY: Activity as tolerated    DISCHARGE MEDICATIONS: levaquin to fnish course    NOTIFY YOUR PHYSICIAN FOR ANY OF THE FOLLOWING:   Fever over 101 degrees for 24 hours. Chest pain, shortness of breath, fever, chills, nausea, vomiting, diarrhea, change in mentation, falling, weakness, bleeding. Severe pain or pain not relieved by medications. Or, any other signs or symptoms that you may have questions about.     DISPOSITION:    Home With:   OT  PT  HH  RN       Long term SNF/Inpatient Rehab   x Independent/assisted living    Hospice    Other:     PATIENT CONDITION AT DISCHARGE:     Functional status    Poor     Deconditioned     Independent      Cognition     Lucid     Forgetful Dementia      Catheters/lines (plus indication)    Shaw     PICC     PEG     None      Code status   X  Full code     DNR      PHYSICAL EXAMINATION AT DISCHARGE:  Visit Vitals  /79 (BP 1 Location: Left arm, BP Patient Position: At rest)   Pulse 75   Temp 98.1 °F (36.7 °C)   Resp 16   Ht 6' 1\" (1.854 m)   Wt 77.8 kg (171 lb 8.3 oz)   SpO2 96%   BMI 22.63 kg/m²     Patient seen and examined at bedside, Condition stable, explained discharge and follow up plans. General:  Alert, oriented, No acute distress  Resp:  No accessory muscle use, Good AE. Neuro:  Grossly normal, no focal neuro deficits, follows commands     CHRONIC MEDICAL DIAGNOSES:  Problem List as of 6/29/2019 Date Reviewed: 6/11/2019          Codes Class Noted - Resolved    Pneumonia ICD-10-CM: J18.9  ICD-9-CM: 914  6/25/2019 - Present        Fracture of femoral neck, left, closed ([facility] ICD-10-CM: S72.002A  ICD-9-CM: 820.8  6/11/2019 - Present        UTI (urinary tract infection) ICD-10-CM: N39.0  ICD-9-CM: 599.0  10/12/2017 - Present        Urine retention ICD-10-CM: R33.9  ICD-9-CM: 788.20  Unknown - Present        Sepsis ([facility] ICD-10-CM: A41.9  ICD-9-CM: 038.9, 995.91  10/12/2017 - Present        Leukocytosis ICD-10-CM: R64.977  ICD-9-CM: 288.60  10/12/2017 - Present        Fever ICD-10-CM: R50.9  ICD-9-CM: 780.60  10/12/2017 - Present        Renal insufficiency ICD-10-CM: N28.9  ICD-9-CM: 593.9  10/12/2017 - Present        CKD (chronic kidney disease), stage III ([facility] ICD-10-CM: N18.3  ICD-9-CM: 253. 3  Unknown - Present        Nausea & vomiting ICD-10-CM: R11.2  ICD-9-CM: 787.01  10/12/2017 - Present        Cough ICD-10-CM: R05  ICD-9-CM: 786.2  10/12/2017 - Present        Dehydration ICD-10-CM: E86.0  ICD-9-CM: 276.51  10/12/2017 - Present        Hypoxia ICD-10-CM: R09.02  ICD-9-CM: 799.02  10/12/2017 - Present        Sinus tachycardia ICD-10-CM: R00.0  ICD-9-CM: 427.89  10/12/2017 - Present        Sjogren's syndrome ([facility] ICD-10-CM: M35.00  ICD-9-CM: 710.2  4/1/2016 - Present            Significant Diagnostic Studies:   6/25/2019: BUN 16 MG/DL (Ref range: 6 - 20 MG/DL); Calcium 8.9 MG/DL (Ref range: 8.5 - 10.1 MG/DL); CO2 22 mmol/L (Ref range: 21 - 32 mmol/L); Creatinine 1.49 MG/DL* (Ref range: 0.70 - 1.30 MG/DL); Glucose 121 mg/dL* (Ref range: 65 - 100 mg/dL); HCT 40.9 % (Ref range: 36.6 - 50.3 %); HGB 13.3 g/dL (Ref range: 12.1 - 17.0 g/dL); Potassium 4.2 mmol/L (Ref range: 3.5 - 5.1 mmol/L); Sodium 137 mmol/L (Ref range: 136 - 145 mmol/L)  6/26/2019: BUN 14 MG/DL (Ref range: 6 - 20 MG/DL); Calcium 7.8 MG/DL* (Ref range: 8.5 - 10.1 MG/DL); CO2 22 mmol/L (Ref range: 21 - 32 mmol/L); Creatinine 1.17 MG/DL (Ref range: 0.70 - 1.30 MG/DL); Glucose 105 mg/dL* (Ref range: 65 - 100 mg/dL); HCT 36.0 %* (Ref range: 36.6 - 50.3 %); HGB 11.3 g/dL* (Ref range: 12.1 - 17.0 g/dL); Potassium 3.9 mmol/L (Ref range: 3.5 - 5.1 mmol/L); Sodium 141 mmol/L (Ref range: 136 - 145 mmol/L)  Recent Labs     07/02/19  0336 06/30/19  0309   WBC 11.7* 10.7   HGB 12.5 12.3   HCT 37.9 37.3    271     Recent Labs     07/02/19  0336 06/30/19  0309    138   K 3.8 3.6    105   CO2 24 25   BUN 15 11   CREA 1.04 1.05   GLU 95 95   CA 8.7 8.8     No results for input(s): SGOT, GPT, AP, TBIL, TP, ALB, GLOB, GGT, AML, LPSE in the last 72 hours. No lab exists for component: AMYP, HLPSE  No results for input(s): INR, PTP, APTT in the last 72 hours. No lab exists for component: INREXT   No results for input(s): FE, TIBC, PSAT, FERR in the last 72 hours. No results for input(s): PH, PCO2, PO2 in the last 72 hours. No results for input(s): CPK, CKMB in the last 72 hours. No lab exists for component: TROPONINI  No components found for: [place]    Pertinent imaging studies:  CT abd:  Mild right-sided urothelial thickening without hydronephrosis or mass. No  enhancing renal lesions. 2.  No evidence of metastatic disease in the abdomen or pelvis.   3.  17 mm right adrenal adenoma. Time spent on discharge related activities today greater than 30 minutes.       Signed:  Makenna Padgett MD                 Hospitalist                 7/2/2019                 1:34 PM        Cc: Naveed Iyer DO

## 2019-07-02 NOTE — PROGRESS NOTES
Transitions of Care Plan:  - Discharge to [facility] at 3 pm via [facility] ambulance  -[facility] [phone]  [facility]      [facility] informed this morning that [facility] now has a bed for this patient and can take him this afternoon. Cm discussed with wife on the phone, after contacting [facility] rehab and apologizing for the time wasted with them. Wife asked that I arrange an ambulance transport for her  as she did not feel comfortable transporting him. Cm informed her that I could, but would not be able to guarantee coverage, as he is ambulating 140 feet. Wife then asked if I could make sure patient was seen by therapy before he left the hospital, and I said I could not. Wife then asked if he could get to rehab any sooner than 3 pm and I informed her he could not, as that is when the bed is available.       Neida Fink BSW, ACM

## 2019-07-03 LAB
ALBUMIN SERPL-MCNC: 2.8 G/DL (ref 3.5–5)
ALBUMIN/GLOB SERPL: 0.7 {RATIO} (ref 1.1–2.2)
ALP SERPL-CCNC: 93 U/L (ref 45–117)
ALT SERPL-CCNC: 33 U/L (ref 12–78)
ANION GAP SERPL CALC-SCNC: 9 MMOL/L (ref 5–15)
AST SERPL-CCNC: 18 U/L (ref 15–37)
BASOPHILS # BLD: 0.1 K/UL (ref 0–0.1)
BASOPHILS NFR BLD: 1 % (ref 0–1)
BILIRUB SERPL-MCNC: 0.5 MG/DL (ref 0.2–1)
BUN SERPL-MCNC: 16 MG/DL (ref 6–20)
BUN/CREAT SERPL: 14 (ref 12–20)
CALCIUM SERPL-MCNC: 8.6 MG/DL (ref 8.5–10.1)
CHLORIDE SERPL-SCNC: 105 MMOL/L (ref 97–108)
CO2 SERPL-SCNC: 24 MMOL/L (ref 21–32)
CREAT SERPL-MCNC: 1.18 MG/DL (ref 0.7–1.3)
DIFFERENTIAL METHOD BLD: NORMAL
EOSINOPHIL # BLD: 0.3 K/UL (ref 0–0.4)
EOSINOPHIL NFR BLD: 3 % (ref 0–7)
ERYTHROCYTE [DISTWIDTH] IN BLOOD BY AUTOMATED COUNT: 13.4 % (ref 11.5–14.5)
GLOBULIN SER CALC-MCNC: 4 G/DL (ref 2–4)
GLUCOSE SERPL-MCNC: 92 MG/DL (ref 65–100)
HCT VFR BLD AUTO: 39.8 % (ref 36.6–50.3)
HGB BLD-MCNC: 12.9 G/DL (ref 12.1–17)
IMM GRANULOCYTES # BLD AUTO: 0 K/UL (ref 0–0.04)
IMM GRANULOCYTES NFR BLD AUTO: 0 % (ref 0–0.5)
LYMPHOCYTES # BLD: 1.9 K/UL (ref 0.8–3.5)
LYMPHOCYTES NFR BLD: 18 % (ref 12–49)
MCH RBC QN AUTO: 31.5 PG (ref 26–34)
MCHC RBC AUTO-ENTMCNC: 32.4 G/DL (ref 30–36.5)
MCV RBC AUTO: 97.1 FL (ref 80–99)
METAMYELOCYTES NFR BLD MANUAL: 1 %
MONOCYTES # BLD: 0.5 K/UL (ref 0–1)
MONOCYTES NFR BLD: 5 % (ref 5–13)
MYELOCYTES NFR BLD MANUAL: 1 %
NEUTS BAND NFR BLD MANUAL: 2 %
NEUTS SEG # BLD: 7.3 K/UL (ref 1.8–8)
NEUTS SEG NFR BLD: 69 % (ref 32–75)
NRBC # BLD: 0 K/UL (ref 0–0.01)
NRBC BLD-RTO: 0 PER 100 WBC
PLATELET # BLD AUTO: 278 K/UL (ref 150–400)
PMV BLD AUTO: 9.9 FL (ref 8.9–12.9)
POTASSIUM SERPL-SCNC: 3.8 MMOL/L (ref 3.5–5.1)
PROT SERPL-MCNC: 6.8 G/DL (ref 6.4–8.2)
RBC # BLD AUTO: 4.1 M/UL (ref 4.1–5.7)
RBC MORPH BLD: NORMAL
SODIUM SERPL-SCNC: 138 MMOL/L (ref 136–145)
WBC # BLD AUTO: 10.3 K/UL (ref 4.1–11.1)

## 2019-07-03 PROCEDURE — 36415 COLL VENOUS BLD VENIPUNCTURE: CPT

## 2019-07-03 PROCEDURE — 85025 COMPLETE CBC W/AUTO DIFF WBC: CPT

## 2019-07-03 PROCEDURE — 80053 COMPREHEN METABOLIC PANEL: CPT

## 2019-07-04 LAB
ANION GAP SERPL CALC-SCNC: 7 MMOL/L (ref 5–15)
BUN SERPL-MCNC: 19 MG/DL (ref 6–20)
BUN/CREAT SERPL: 14 (ref 12–20)
CALCIUM SERPL-MCNC: 8.8 MG/DL (ref 8.5–10.1)
CHLORIDE SERPL-SCNC: 104 MMOL/L (ref 97–108)
CO2 SERPL-SCNC: 25 MMOL/L (ref 21–32)
CREAT SERPL-MCNC: 1.4 MG/DL (ref 0.7–1.3)
ERYTHROCYTE [DISTWIDTH] IN BLOOD BY AUTOMATED COUNT: 13.5 % (ref 11.5–14.5)
GLUCOSE SERPL-MCNC: 87 MG/DL (ref 65–100)
HCT VFR BLD AUTO: 41.2 % (ref 36.6–50.3)
HGB BLD-MCNC: 13.4 G/DL (ref 12.1–17)
MCH RBC QN AUTO: 31.6 PG (ref 26–34)
MCHC RBC AUTO-ENTMCNC: 32.5 G/DL (ref 30–36.5)
MCV RBC AUTO: 97.2 FL (ref 80–99)
NRBC # BLD: 0 K/UL (ref 0–0.01)
NRBC BLD-RTO: 0 PER 100 WBC
PLATELET # BLD AUTO: 277 K/UL (ref 150–400)
PMV BLD AUTO: 10.1 FL (ref 8.9–12.9)
POTASSIUM SERPL-SCNC: 4 MMOL/L (ref 3.5–5.1)
RBC # BLD AUTO: 4.24 M/UL (ref 4.1–5.7)
SODIUM SERPL-SCNC: 136 MMOL/L (ref 136–145)
WBC # BLD AUTO: 9 K/UL (ref 4.1–11.1)

## 2019-07-04 PROCEDURE — 80048 BASIC METABOLIC PNL TOTAL CA: CPT

## 2019-07-04 PROCEDURE — 36415 COLL VENOUS BLD VENIPUNCTURE: CPT

## 2019-07-04 PROCEDURE — 85027 COMPLETE CBC AUTOMATED: CPT

## 2019-07-08 LAB
ANION GAP SERPL CALC-SCNC: 9 MMOL/L (ref 5–15)
BUN SERPL-MCNC: 17 MG/DL (ref 6–20)
BUN/CREAT SERPL: 13 (ref 12–20)
CALCIUM SERPL-MCNC: 9 MG/DL (ref 8.5–10.1)
CHLORIDE SERPL-SCNC: 103 MMOL/L (ref 97–108)
CO2 SERPL-SCNC: 24 MMOL/L (ref 21–32)
CREAT SERPL-MCNC: 1.32 MG/DL (ref 0.7–1.3)
ERYTHROCYTE [DISTWIDTH] IN BLOOD BY AUTOMATED COUNT: 13.6 % (ref 11.5–14.5)
GLUCOSE SERPL-MCNC: 109 MG/DL (ref 65–100)
HCT VFR BLD AUTO: 41.9 % (ref 36.6–50.3)
HGB BLD-MCNC: 14 G/DL (ref 12.1–17)
MCH RBC QN AUTO: 32.2 PG (ref 26–34)
MCHC RBC AUTO-ENTMCNC: 33.4 G/DL (ref 30–36.5)
MCV RBC AUTO: 96.3 FL (ref 80–99)
NRBC # BLD: 0 K/UL (ref 0–0.01)
NRBC BLD-RTO: 0 PER 100 WBC
PLATELET # BLD AUTO: 213 K/UL (ref 150–400)
PMV BLD AUTO: 10.3 FL (ref 8.9–12.9)
POTASSIUM SERPL-SCNC: 3.8 MMOL/L (ref 3.5–5.1)
RBC # BLD AUTO: 4.35 M/UL (ref 4.1–5.7)
SODIUM SERPL-SCNC: 136 MMOL/L (ref 136–145)
WBC # BLD AUTO: 8.7 K/UL (ref 4.1–11.1)

## 2019-07-08 PROCEDURE — 85027 COMPLETE CBC AUTOMATED: CPT

## 2019-07-08 PROCEDURE — 36415 COLL VENOUS BLD VENIPUNCTURE: CPT

## 2019-07-08 PROCEDURE — 80048 BASIC METABOLIC PNL TOTAL CA: CPT

## 2019-07-11 LAB
ANION GAP SERPL CALC-SCNC: 8 MMOL/L (ref 5–15)
BUN SERPL-MCNC: 21 MG/DL (ref 6–20)
BUN/CREAT SERPL: 17 (ref 12–20)
CALCIUM SERPL-MCNC: 8.7 MG/DL (ref 8.5–10.1)
CHLORIDE SERPL-SCNC: 108 MMOL/L (ref 97–108)
CO2 SERPL-SCNC: 25 MMOL/L (ref 21–32)
CREAT SERPL-MCNC: 1.23 MG/DL (ref 0.7–1.3)
ERYTHROCYTE [DISTWIDTH] IN BLOOD BY AUTOMATED COUNT: 14.3 % (ref 11.5–14.5)
GLUCOSE SERPL-MCNC: 88 MG/DL (ref 65–100)
HCT VFR BLD AUTO: 38.4 % (ref 36.6–50.3)
HGB BLD-MCNC: 12.8 G/DL (ref 12.1–17)
MCH RBC QN AUTO: 33.1 PG (ref 26–34)
MCHC RBC AUTO-ENTMCNC: 33.3 G/DL (ref 30–36.5)
MCV RBC AUTO: 99.2 FL (ref 80–99)
NRBC # BLD: 0 K/UL (ref 0–0.01)
NRBC BLD-RTO: 0 PER 100 WBC
PLATELET # BLD AUTO: 178 K/UL (ref 150–400)
PMV BLD AUTO: 10.5 FL (ref 8.9–12.9)
POTASSIUM SERPL-SCNC: 3.9 MMOL/L (ref 3.5–5.1)
RBC # BLD AUTO: 3.87 M/UL (ref 4.1–5.7)
SODIUM SERPL-SCNC: 141 MMOL/L (ref 136–145)
WBC # BLD AUTO: 7.1 K/UL (ref 4.1–11.1)

## 2019-07-11 PROCEDURE — 85027 COMPLETE CBC AUTOMATED: CPT

## 2019-07-11 PROCEDURE — 80048 BASIC METABOLIC PNL TOTAL CA: CPT

## 2019-07-11 PROCEDURE — 36415 COLL VENOUS BLD VENIPUNCTURE: CPT

## 2021-12-25 ENCOUNTER — HOSPITAL ENCOUNTER (EMERGENCY)
Age: 81
Discharge: LEFT BEFORE DISCHARGE | End: 2021-12-25
Attending: STUDENT IN AN ORGANIZED HEALTH CARE EDUCATION/TRAINING PROGRAM
Payer: MEDICARE

## 2021-12-25 PROCEDURE — 75810000275 HC EMERGENCY DEPT VISIT NO LEVEL OF CARE

## 2022-03-18 PROBLEM — A41.9 SEPSIS (HCC): Status: ACTIVE | Noted: 2017-10-12

## 2022-03-18 PROBLEM — S72.002A FRACTURE OF FEMORAL NECK, LEFT, CLOSED (HCC): Status: ACTIVE | Noted: 2019-06-11

## 2022-03-18 PROBLEM — E86.0 DEHYDRATION: Status: ACTIVE | Noted: 2017-10-12

## 2022-03-19 PROBLEM — R00.0 SINUS TACHYCARDIA: Status: ACTIVE | Noted: 2017-10-12

## 2022-03-19 PROBLEM — N28.9 RENAL INSUFFICIENCY: Status: ACTIVE | Noted: 2017-10-12

## 2022-03-19 PROBLEM — R09.02 HYPOXIA: Status: ACTIVE | Noted: 2017-10-12

## 2022-03-19 PROBLEM — D72.829 LEUKOCYTOSIS: Status: ACTIVE | Noted: 2017-10-12

## 2022-03-19 PROBLEM — N39.0 UTI (URINARY TRACT INFECTION): Status: ACTIVE | Noted: 2017-10-12

## 2022-03-20 PROBLEM — R50.9 FEVER: Status: ACTIVE | Noted: 2017-10-12

## 2022-03-20 PROBLEM — R05.9 COUGH: Status: ACTIVE | Noted: 2017-10-12

## 2022-03-20 PROBLEM — J18.9 PNEUMONIA: Status: ACTIVE | Noted: 2019-06-25

## 2022-03-20 PROBLEM — R11.2 NAUSEA & VOMITING: Status: ACTIVE | Noted: 2017-10-12

## 2023-04-27 NOTE — PROGRESS NOTES
Bedside shift change report given to Althea Serna (oncoming nurse) by Charles Crawley (offgoing nurse). Report included the following information SBAR, Kardex, Intake/Output, MAR, Accordion, Recent Results, Med Rec Status and Quality Measures. day treatment center

## 2023-05-17 RX ORDER — DULOXETIN HYDROCHLORIDE 30 MG/1
30 CAPSULE, DELAYED RELEASE ORAL NIGHTLY
COMMUNITY

## 2023-05-17 RX ORDER — POLYETHYLENE GLYCOL 3350 17 G/17G
17 POWDER, FOR SOLUTION ORAL EVERY EVENING
COMMUNITY

## 2023-05-17 RX ORDER — PREDNISONE 2.5 MG/1
5 TABLET ORAL DAILY
COMMUNITY

## 2023-05-17 RX ORDER — AMOXICILLIN 250 MG
1 CAPSULE ORAL EVERY EVENING
COMMUNITY

## 2023-05-17 RX ORDER — ACETAMINOPHEN 325 MG/1
650 TABLET ORAL EVERY 6 HOURS PRN
COMMUNITY
Start: 2019-06-14

## 2023-08-10 ENCOUNTER — APPOINTMENT (OUTPATIENT)
Facility: HOSPITAL | Age: 83
End: 2023-08-10
Attending: INTERNAL MEDICINE
Payer: MEDICARE

## 2023-08-10 ENCOUNTER — HOSPITAL ENCOUNTER (INPATIENT)
Facility: HOSPITAL | Age: 83
LOS: 8 days | Discharge: HOME | End: 2023-08-18
Attending: EMERGENCY MEDICINE | Admitting: INTERNAL MEDICINE
Payer: MEDICARE

## 2023-08-10 DIAGNOSIS — R55 SYNCOPE AND COLLAPSE: Primary | ICD-10-CM

## 2023-08-10 LAB
ALBUMIN SERPL-MCNC: 3 G/DL (ref 3.5–5)
ALBUMIN/GLOB SERPL: 0.8 (ref 1.1–2.2)
ALP SERPL-CCNC: 75 U/L (ref 45–117)
ALT SERPL-CCNC: 15 U/L (ref 12–78)
ANION GAP SERPL CALC-SCNC: 10 MMOL/L (ref 5–15)
APPEARANCE UR: ABNORMAL
AST SERPL-CCNC: 16 U/L (ref 15–37)
BACTERIA URNS QL MICRO: NEGATIVE /HPF
BASOPHILS # BLD: 0.1 K/UL (ref 0–0.1)
BASOPHILS NFR BLD: 1 % (ref 0–1)
BILIRUB SERPL-MCNC: 0.6 MG/DL (ref 0.2–1)
BILIRUB UR QL: NEGATIVE
BUN SERPL-MCNC: 19 MG/DL (ref 6–20)
BUN/CREAT SERPL: 13 (ref 12–20)
CALCIUM SERPL-MCNC: 8.7 MG/DL (ref 8.5–10.1)
CHLORIDE SERPL-SCNC: 109 MMOL/L (ref 97–108)
CO2 SERPL-SCNC: 23 MMOL/L (ref 21–32)
COLOR UR: ABNORMAL
COMMENT:: NORMAL
CREAT SERPL-MCNC: 1.41 MG/DL (ref 0.7–1.3)
DIFFERENTIAL METHOD BLD: ABNORMAL
ECHO AO ASC DIAM: 4.2 CM
ECHO AO ASCENDING AORTA INDEX: 2.06 CM/M2
ECHO BSA: 2.04 M2
ECHO LA DIAMETER INDEX: 1.62 CM/M2
ECHO LA DIAMETER: 3.3 CM
ECHO LV EDV A2C: 68 ML
ECHO LV EDV A4C: 71 ML
ECHO LV EDV BP: 70 ML (ref 67–155)
ECHO LV EDV INDEX A4C: 35 ML/M2
ECHO LV EDV INDEX BP: 34 ML/M2
ECHO LV EDV NDEX A2C: 33 ML/M2
ECHO LV EJECTION FRACTION A2C: 65 %
ECHO LV EJECTION FRACTION A4C: 77 %
ECHO LV EJECTION FRACTION BIPLANE: 72 % (ref 55–100)
ECHO LV ESV A2C: 24 ML
ECHO LV ESV A4C: 16 ML
ECHO LV ESV BP: 19 ML (ref 22–58)
ECHO LV ESV INDEX A2C: 12 ML/M2
ECHO LV ESV INDEX A4C: 8 ML/M2
ECHO LV ESV INDEX BP: 9 ML/M2
ECHO LV FRACTIONAL SHORTENING: 32 % (ref 28–44)
ECHO LV INTERNAL DIMENSION DIASTOLE INDEX: 1.67 CM/M2
ECHO LV INTERNAL DIMENSION DIASTOLIC: 3.4 CM (ref 4.2–5.9)
ECHO LV INTERNAL DIMENSION SYSTOLIC INDEX: 1.13 CM/M2
ECHO LV INTERNAL DIMENSION SYSTOLIC: 2.3 CM
ECHO LV IVSD: 1.5 CM (ref 0.6–1)
ECHO LV MASS 2D: 156.7 G (ref 88–224)
ECHO LV MASS INDEX 2D: 76.8 G/M2 (ref 49–115)
ECHO LV POSTERIOR WALL DIASTOLIC: 1.2 CM (ref 0.6–1)
ECHO LV RELATIVE WALL THICKNESS RATIO: 0.71
ECHO LVOT AREA: 3.8 CM2
ECHO LVOT DIAM: 2.2 CM
EOSINOPHIL # BLD: 0.2 K/UL (ref 0–0.4)
EOSINOPHIL NFR BLD: 2 % (ref 0–7)
EPITH CASTS URNS QL MICRO: ABNORMAL /LPF
ERYTHROCYTE [DISTWIDTH] IN BLOOD BY AUTOMATED COUNT: 15.9 % (ref 11.5–14.5)
GLOBULIN SER CALC-MCNC: 4 G/DL (ref 2–4)
GLUCOSE SERPL-MCNC: 199 MG/DL (ref 65–100)
GLUCOSE UR STRIP.AUTO-MCNC: NEGATIVE MG/DL
HCT VFR BLD AUTO: 36.9 % (ref 36.6–50.3)
HGB BLD-MCNC: 11.5 G/DL (ref 12.1–17)
HGB UR QL STRIP: ABNORMAL
HYALINE CASTS URNS QL MICRO: ABNORMAL /LPF (ref 0–2)
IMM GRANULOCYTES # BLD AUTO: 0.1 K/UL (ref 0–0.04)
IMM GRANULOCYTES NFR BLD AUTO: 1 % (ref 0–0.5)
KETONES UR QL STRIP.AUTO: NEGATIVE MG/DL
LACTATE BLD-SCNC: 1.32 MMOL/L (ref 0.4–2)
LEUKOCYTE ESTERASE UR QL STRIP.AUTO: ABNORMAL
LYMPHOCYTES # BLD: 1.5 K/UL (ref 0.8–3.5)
LYMPHOCYTES NFR BLD: 13 % (ref 12–49)
MCH RBC QN AUTO: 26.4 PG (ref 26–34)
MCHC RBC AUTO-ENTMCNC: 31.2 G/DL (ref 30–36.5)
MCV RBC AUTO: 84.8 FL (ref 80–99)
MONOCYTES # BLD: 1 K/UL (ref 0–1)
MONOCYTES NFR BLD: 8 % (ref 5–13)
NEUTS SEG # BLD: 8.9 K/UL (ref 1.8–8)
NEUTS SEG NFR BLD: 75 % (ref 32–75)
NITRITE UR QL STRIP.AUTO: NEGATIVE
NRBC # BLD: 0 K/UL (ref 0–0.01)
NRBC BLD-RTO: 0 PER 100 WBC
PH UR STRIP: 7 (ref 5–8)
PLATELET # BLD AUTO: 192 K/UL (ref 150–400)
PMV BLD AUTO: 11.6 FL (ref 8.9–12.9)
POTASSIUM SERPL-SCNC: 3.7 MMOL/L (ref 3.5–5.1)
PROT SERPL-MCNC: 7 G/DL (ref 6.4–8.2)
PROT UR STRIP-MCNC: 100 MG/DL
RBC # BLD AUTO: 4.35 M/UL (ref 4.1–5.7)
RBC #/AREA URNS HPF: ABNORMAL /HPF (ref 0–5)
SODIUM SERPL-SCNC: 142 MMOL/L (ref 136–145)
SP GR UR REFRACTOMETRY: 1.01 (ref 1–1.03)
SPECIMEN HOLD: NORMAL
TROPONIN I SERPL HS-MCNC: 23 NG/L (ref 0–76)
URINE CULTURE IF INDICATED: ABNORMAL
UROBILINOGEN UR QL STRIP.AUTO: 1 EU/DL (ref 0.2–1)
WBC # BLD AUTO: 11.8 K/UL (ref 4.1–11.1)
WBC URNS QL MICRO: >100 /HPF (ref 0–4)

## 2023-08-10 PROCEDURE — 36415 COLL VENOUS BLD VENIPUNCTURE: CPT

## 2023-08-10 PROCEDURE — 80053 COMPREHEN METABOLIC PANEL: CPT

## 2023-08-10 PROCEDURE — 2580000003 HC RX 258: Performed by: INTERNAL MEDICINE

## 2023-08-10 PROCEDURE — 85025 COMPLETE CBC W/AUTO DIFF WBC: CPT

## 2023-08-10 PROCEDURE — 93005 ELECTROCARDIOGRAM TRACING: CPT | Performed by: EMERGENCY MEDICINE

## 2023-08-10 PROCEDURE — 99285 EMERGENCY DEPT VISIT HI MDM: CPT

## 2023-08-10 PROCEDURE — 81001 URINALYSIS AUTO W/SCOPE: CPT

## 2023-08-10 PROCEDURE — 6370000000 HC RX 637 (ALT 250 FOR IP): Performed by: EMERGENCY MEDICINE

## 2023-08-10 PROCEDURE — 93306 TTE W/DOPPLER COMPLETE: CPT

## 2023-08-10 PROCEDURE — 6360000002 HC RX W HCPCS: Performed by: INTERNAL MEDICINE

## 2023-08-10 PROCEDURE — 2060000000 HC ICU INTERMEDIATE R&B

## 2023-08-10 PROCEDURE — 84484 ASSAY OF TROPONIN QUANT: CPT

## 2023-08-10 PROCEDURE — 87040 BLOOD CULTURE FOR BACTERIA: CPT

## 2023-08-10 PROCEDURE — 82533 TOTAL CORTISOL: CPT

## 2023-08-10 PROCEDURE — 6370000000 HC RX 637 (ALT 250 FOR IP): Performed by: INTERNAL MEDICINE

## 2023-08-10 PROCEDURE — 93306 TTE W/DOPPLER COMPLETE: CPT | Performed by: SPECIALIST

## 2023-08-10 PROCEDURE — 87086 URINE CULTURE/COLONY COUNT: CPT

## 2023-08-10 PROCEDURE — APPSS30 APP SPLIT SHARED TIME 16-30 MINUTES: Performed by: NURSE PRACTITIONER

## 2023-08-10 PROCEDURE — 83605 ASSAY OF LACTIC ACID: CPT

## 2023-08-10 RX ORDER — ACETAMINOPHEN 650 MG/1
650 SUPPOSITORY RECTAL EVERY 6 HOURS PRN
Status: DISCONTINUED | OUTPATIENT
Start: 2023-08-10 | End: 2023-08-18 | Stop reason: HOSPADM

## 2023-08-10 RX ORDER — ONDANSETRON 2 MG/ML
4 INJECTION INTRAMUSCULAR; INTRAVENOUS EVERY 6 HOURS PRN
Status: DISCONTINUED | OUTPATIENT
Start: 2023-08-10 | End: 2023-08-18 | Stop reason: HOSPADM

## 2023-08-10 RX ORDER — ENOXAPARIN SODIUM 100 MG/ML
1 INJECTION SUBCUTANEOUS EVERY 12 HOURS
Status: DISCONTINUED | OUTPATIENT
Start: 2023-08-10 | End: 2023-08-11

## 2023-08-10 RX ORDER — ONDANSETRON 4 MG/1
4 TABLET, ORALLY DISINTEGRATING ORAL EVERY 8 HOURS PRN
Status: DISCONTINUED | OUTPATIENT
Start: 2023-08-10 | End: 2023-08-18 | Stop reason: HOSPADM

## 2023-08-10 RX ORDER — SODIUM CHLORIDE 0.9 % (FLUSH) 0.9 %
5-40 SYRINGE (ML) INJECTION EVERY 12 HOURS SCHEDULED
Status: DISCONTINUED | OUTPATIENT
Start: 2023-08-10 | End: 2023-08-18 | Stop reason: HOSPADM

## 2023-08-10 RX ORDER — DULOXETIN HYDROCHLORIDE 30 MG/1
30 CAPSULE, DELAYED RELEASE ORAL NIGHTLY
Status: DISCONTINUED | OUTPATIENT
Start: 2023-08-10 | End: 2023-08-18 | Stop reason: HOSPADM

## 2023-08-10 RX ORDER — ASPIRIN 81 MG/1
81 TABLET ORAL DAILY
Status: DISCONTINUED | OUTPATIENT
Start: 2023-08-10 | End: 2023-08-18 | Stop reason: HOSPADM

## 2023-08-10 RX ORDER — ACETAMINOPHEN 325 MG/1
650 TABLET ORAL EVERY 6 HOURS PRN
Status: DISCONTINUED | OUTPATIENT
Start: 2023-08-10 | End: 2023-08-18 | Stop reason: HOSPADM

## 2023-08-10 RX ORDER — SODIUM CHLORIDE 9 MG/ML
INJECTION, SOLUTION INTRAVENOUS PRN
Status: DISCONTINUED | OUTPATIENT
Start: 2023-08-10 | End: 2023-08-18 | Stop reason: HOSPADM

## 2023-08-10 RX ORDER — ENOXAPARIN SODIUM 100 MG/ML
40 INJECTION SUBCUTANEOUS DAILY
Status: DISCONTINUED | OUTPATIENT
Start: 2023-08-11 | End: 2023-08-10

## 2023-08-10 RX ORDER — POLYETHYLENE GLYCOL 3350 17 G/17G
17 POWDER, FOR SOLUTION ORAL EVERY EVENING
Status: DISCONTINUED | OUTPATIENT
Start: 2023-08-10 | End: 2023-08-18 | Stop reason: HOSPADM

## 2023-08-10 RX ORDER — POLYETHYLENE GLYCOL 3350 17 G/17G
17 POWDER, FOR SOLUTION ORAL DAILY PRN
Status: DISCONTINUED | OUTPATIENT
Start: 2023-08-10 | End: 2023-08-18 | Stop reason: HOSPADM

## 2023-08-10 RX ORDER — SODIUM CHLORIDE 0.9 % (FLUSH) 0.9 %
5-40 SYRINGE (ML) INJECTION PRN
Status: DISCONTINUED | OUTPATIENT
Start: 2023-08-10 | End: 2023-08-18 | Stop reason: HOSPADM

## 2023-08-10 RX ORDER — PREDNISONE 5 MG/1
5 TABLET ORAL ONCE
Status: COMPLETED | OUTPATIENT
Start: 2023-08-10 | End: 2023-08-10

## 2023-08-10 RX ORDER — SENNA AND DOCUSATE SODIUM 50; 8.6 MG/1; MG/1
1 TABLET, FILM COATED ORAL EVERY EVENING
Status: DISCONTINUED | OUTPATIENT
Start: 2023-08-10 | End: 2023-08-18 | Stop reason: HOSPADM

## 2023-08-10 RX ORDER — PREDNISONE 5 MG/1
5 TABLET ORAL DAILY
Status: DISCONTINUED | OUTPATIENT
Start: 2023-08-10 | End: 2023-08-18 | Stop reason: HOSPADM

## 2023-08-10 RX ADMIN — DULOXETINE HYDROCHLORIDE 30 MG: 30 CAPSULE, DELAYED RELEASE ORAL at 20:59

## 2023-08-10 RX ADMIN — PREDNISONE 5 MG: 5 TABLET ORAL at 11:57

## 2023-08-10 RX ADMIN — POLYETHYLENE GLYCOL 3350 17 G: 17 POWDER, FOR SOLUTION ORAL at 20:59

## 2023-08-10 RX ADMIN — SENNOSIDES AND DOCUSATE SODIUM 1 TABLET: 50; 8.6 TABLET ORAL at 20:59

## 2023-08-10 RX ADMIN — ASPIRIN 81 MG: 81 TABLET, COATED ORAL at 16:07

## 2023-08-10 RX ADMIN — SODIUM CHLORIDE, PRESERVATIVE FREE 10 ML: 5 INJECTION INTRAVENOUS at 22:22

## 2023-08-10 RX ADMIN — ENOXAPARIN SODIUM 80 MG: 80 INJECTION SUBCUTANEOUS at 16:07

## 2023-08-10 RX ADMIN — WATER 1000 MG: 1 INJECTION INTRAMUSCULAR; INTRAVENOUS; SUBCUTANEOUS at 16:07

## 2023-08-10 RX ADMIN — METOPROLOL TARTRATE 12.5 MG: 25 TABLET, FILM COATED ORAL at 16:07

## 2023-08-10 ASSESSMENT — PAIN - FUNCTIONAL ASSESSMENT: PAIN_FUNCTIONAL_ASSESSMENT: NONE - DENIES PAIN

## 2023-08-10 NOTE — ED NOTES
Attempted to ambulate patient with walker. Patient reports feeling lightheaded after taking 2 steps. Patient returned to stretcher without incident. Provider notified.       Colby Arvizu RN  08/10/23 [id number]

## 2023-08-10 NOTE — CONSULTS
10:04 AM    GLUCOSE 199 08/10/2023 10:04 AM    CALCIUM 8.7 08/10/2023 10:04 AM    LABGLOM 49 08/10/2023 10:04 AM      Lab Results   Component Value Date     06/25/2019     Lab Results   Component Value Date    WBC 11.8 (H) 08/10/2023    HGB 11.5 (L) 08/10/2023    HCT 36.9 08/10/2023    MCV 84.8 08/10/2023     08/10/2023     No results for input(s): CHOL, HDLC, LDLC in the last 72 hours.     Invalid input(s): TGL,  HBA1C    Current meds:    Current Facility-Administered Medications:     sodium chloride flush 0.9 % injection 5-40 mL, 5-40 mL, IntraVENous, 2 times per day, Lawson Garcia DO    sodium chloride flush 0.9 % injection 5-40 mL, 5-40 mL, IntraVENous, PRN, Lawson Garcia DO    0.9 % sodium chloride infusion, , IntraVENous, PRN, Lawson Garcia DO    ondansetron (ZOFRAN-ODT) disintegrating tablet 4 mg, 4 mg, Oral, Q8H PRN **OR** ondansetron (ZOFRAN) injection 4 mg, 4 mg, IntraVENous, Q6H PRN, Lawson Garcia DO    polyethylene glycol (GLYCOLAX) packet 17 g, 17 g, Oral, Daily PRN, Lawson Garcia DO    acetaminophen (TYLENOL) tablet 650 mg, 650 mg, Oral, Q6H PRN **OR** acetaminophen (TYLENOL) suppository 650 mg, 650 mg, Rectal, Q6H PRN, Lawson Garcia DO    acetaminophen (TYLENOL) tablet 650 mg, 650 mg, Oral, Q6H PRN, Lawson Garcia DO    DULoxetine (CYMBALTA) extended release capsule 30 mg, 30 mg, Oral, Nightly, Lawson Garcia DO    polyethylene glycol (GLYCOLAX) packet 17 g, 17 g, Oral, QPM, Lawson Garcia DO    predniSONE (DELTASONE) tablet 5 mg, 5 mg, Oral, Daily, Lawson Garcia DO    sennosides-docusate sodium (SENOKOT-S) 8.6-50 MG tablet 1 tablet, 1 tablet, Oral, QPM, Lawson Garcia DO    cefTRIAXone (ROCEPHIN) 1,000 mg in sterile water 10 mL IV syringe, 1,000 mg, IntraVENous, Q24H, Lawson Garcia DO    enoxaparin (LOVENOX) injection 80 mg, 1 mg/kg (Ideal), SubCUTAneous, BID, Lawson Garcia DO    aspirin EC tablet 81 mg, 81 mg, Oral, Daily, Lawson Garcia DO    metoprolol tartrate (LOPRESSOR) tablet 12.5 mg, 12.5 mg, Oral, BID, Teo Crocker DO    Current Outpatient Medications:     acetaminophen (TYLENOL) 325 MG tablet, Take 2 tablets by mouth every 6 hours as needed, Disp: , Rfl:     DULoxetine (CYMBALTA) 30 MG extended release capsule, Take 1 capsule by mouth nightly, Disp: , Rfl:     polyethylene glycol (GLYCOLAX) 17 GM/SCOOP powder, Take 17 g by mouth every evening, Disp: , Rfl:     predniSONE (DELTASONE) 2.5 MG tablet, Take 2 tablets by mouth daily, Disp: , Rfl:     senna-docusate (PERICOLACE) 8.6-50 MG per tablet, Take 1 tablet by mouth every evening, Disp: , Rfl:     SHEILA Ruiz - AMANDO    [facility]  Call center: Linnea Orta [phone] (F) [phone]      CC:Pan Sullivan DO

## 2023-08-10 NOTE — H&P
Hospitalist Admission Note    NAME:  Marisela Orosco   :  1940   MRN:  [id number]     Date/Time:  8/10/2023 2:23 PM    Patient PCP: Wallace Montiel, DO  ________________________________________________________________________    Given the patient's current clinical presentation, I have a high level of concern for decompensation if discharged from the emergency department. Complex decision making was performed, which includes reviewing the patient's available past medical records, laboratory results, and x-ray films. My assessment of this patient's clinical condition and my plan of care is as follows. Assessment / Plan:  Johanne Romero is a 80 y.o.  M with pmhx ckd3, sjogrens, afib, htn/hld, tobacco use, anemia, CAD and recent uti on Keflex who presented to ED with CC passing out. Syncope with collapse  Ddx: ivvd from gastroentiritis/uti, arrythmia, adrenal insufficiency   - Admit to [facility] tele. Obtain echo, watch on tele, cs cardiology for concerns arrhythmia, check cortisol   - check orthostatics, PT, OT, ambulate with assist only    AIDAN on  Ckd3  - gentle fluids and trend in am, conduct bladder sscan  Recent Cardiac Ablation for Afib/Flutter - Continue anticoagulation x 2 weeks in the form of Lovenox  Recent UTI - Contnue Rocephin while here, await culture, wife will obtain from last assessment culture a few days back    Sjogrens - continue low dose steroids, check a cortisol  Htn/hld - resume home meds, trend  Tobacco use - quit 7 years ago  hx CAD - was on aspirin, but held after DOAC following the ablation. Will add back while here. Dispo - likely to home  Dvt px - lovenox,  Contact - Page Pretty [phone]; she makes decisions for him if he cannot makes deciisons for self. Code status - Full code      I have personally reviewed the radiographs, laboratory data in Epic and decisions and statements above are based partially on this personal interpretation.        Subjective:   CHIEF COMPLAINT: \"Passed

## 2023-08-10 NOTE — ED TRIAGE NOTES
Patient arrives to the ED via EMS with complaints of syncopal episode that happened in the shower this morning. Patient denies hitting head. EMS found patient's systolic in 54'R upon arrival. NS given en route. Patient has abrasion to R elbow and reports falling at [facility] during last admission    PMH of cardiac ablation done Friday.

## 2023-08-10 NOTE — ED PROVIDER NOTES
[facility] EMERGENCY DEPT  EMERGENCY DEPARTMENT ENCOUNTER      Pt Name: Leigh Xavier  MRN: [id number]  [address] [facility] [place] 1940  Date of evaluation: 8/10/2023  Provider: Norah Corrigan       Chief Complaint   Patient presents with    Loss of Consciousness         HISTORY OF PRESENT ILLNESS   (Location/Symptom, Timing/Onset, Context/Setting, Quality, Duration, Modifying Factors, Severity)  Note limiting factors. 80-year-old male presents with his wife for multiple complaints. Patient recently had an ablation done and had a Auguste catheter placed. He was found to have a UTI several days later. He is currently on Keflex. He has had sepsis related to UTIs twice in the past.  Patient arrives here slightly confused about the timeline. He is here with his wife who explains the story. Keflex was started yesterday. Since then, he has been very weak and tired but really getting out of bed. At some point he was feeling better and try to take a shower when he got out of the shower started feeling very lightheaded and dizzy and was able to sit down at which point his wife states that he was unresponsive with his eyes open with deep breathing. It seems that the patient most likely had a syncopal episode. There was no actual fall or head strike. Patient here is awake and alert again, confused to the timeline which is unlike his normal.  No current chest pain or shortness of breath or other complaints          Review of External Medical Records:     Nursing Notes were reviewed. REVIEW OF SYSTEMS    (2-9 systems for level 4, 10 or more for level 5)     Review of Systems    Except as noted above the remainder of the review of systems was reviewed and negative.        PAST MEDICAL HISTORY     Past Medical History:   Diagnosis Date    CKD (chronic kidney disease), stage III ([address])     Sjoegren syndrome 04/2016    Urine retention          SURGICAL HISTORY       Past Surgical History:   Procedure

## 2023-08-11 LAB
ANION GAP SERPL CALC-SCNC: 6 MMOL/L (ref 5–15)
BACTERIA SPEC CULT: NORMAL
BASOPHILS # BLD: 0.1 K/UL (ref 0–0.1)
BASOPHILS NFR BLD: 1 % (ref 0–1)
BUN SERPL-MCNC: 15 MG/DL (ref 6–20)
BUN/CREAT SERPL: 16 (ref 12–20)
CALCIUM SERPL-MCNC: 8.4 MG/DL (ref 8.5–10.1)
CHLORIDE SERPL-SCNC: 115 MMOL/L (ref 97–108)
CO2 SERPL-SCNC: 23 MMOL/L (ref 21–32)
CORTIS SERPL-MCNC: 10.2 UG/DL
CREAT SERPL-MCNC: 0.92 MG/DL (ref 0.7–1.3)
D DIMER PPP FEU-MCNC: 0.53 MG/L FEU (ref 0–0.65)
DIFFERENTIAL METHOD BLD: ABNORMAL
EKG ATRIAL RATE: 101 BPM
EKG DIAGNOSIS: NORMAL
EKG Q-T INTERVAL: 442 MS
EKG QRS DURATION: 82 MS
EKG QTC CALCULATION (BAZETT): 573 MS
EKG R AXIS: -21 DEGREES
EKG T AXIS: 27 DEGREES
EKG VENTRICULAR RATE: 101 BPM
EOSINOPHIL # BLD: 0.2 K/UL (ref 0–0.4)
EOSINOPHIL NFR BLD: 2 % (ref 0–7)
ERYTHROCYTE [DISTWIDTH] IN BLOOD BY AUTOMATED COUNT: 15.8 % (ref 11.5–14.5)
GLUCOSE SERPL-MCNC: 95 MG/DL (ref 65–100)
HCT VFR BLD AUTO: 34.2 % (ref 36.6–50.3)
HGB BLD-MCNC: 10.6 G/DL (ref 12.1–17)
IMM GRANULOCYTES # BLD AUTO: 0.1 K/UL (ref 0–0.04)
IMM GRANULOCYTES NFR BLD AUTO: 1 % (ref 0–0.5)
LYMPHOCYTES # BLD: 1.7 K/UL (ref 0.8–3.5)
LYMPHOCYTES NFR BLD: 18 % (ref 12–49)
MAGNESIUM SERPL-MCNC: 2 MG/DL (ref 1.6–2.4)
MCH RBC QN AUTO: 26.4 PG (ref 26–34)
MCHC RBC AUTO-ENTMCNC: 31 G/DL (ref 30–36.5)
MCV RBC AUTO: 85.3 FL (ref 80–99)
MONOCYTES # BLD: 1.1 K/UL (ref 0–1)
MONOCYTES NFR BLD: 11 % (ref 5–13)
NEUTS SEG # BLD: 6.6 K/UL (ref 1.8–8)
NEUTS SEG NFR BLD: 67 % (ref 32–75)
NRBC # BLD: 0 K/UL (ref 0–0.01)
NRBC BLD-RTO: 0 PER 100 WBC
PLATELET # BLD AUTO: 172 K/UL (ref 150–400)
PMV BLD AUTO: 11.4 FL (ref 8.9–12.9)
POTASSIUM SERPL-SCNC: 3.5 MMOL/L (ref 3.5–5.1)
RBC # BLD AUTO: 4.01 M/UL (ref 4.1–5.7)
SERVICE CMNT-IMP: NORMAL
SODIUM SERPL-SCNC: 144 MMOL/L (ref 136–145)
WBC # BLD AUTO: 9.6 K/UL (ref 4.1–11.1)

## 2023-08-11 PROCEDURE — 83735 ASSAY OF MAGNESIUM: CPT

## 2023-08-11 PROCEDURE — 97535 SELF CARE MNGMENT TRAINING: CPT

## 2023-08-11 PROCEDURE — 36415 COLL VENOUS BLD VENIPUNCTURE: CPT

## 2023-08-11 PROCEDURE — 85379 FIBRIN DEGRADATION QUANT: CPT

## 2023-08-11 PROCEDURE — 2580000003 HC RX 258: Performed by: INTERNAL MEDICINE

## 2023-08-11 PROCEDURE — 6370000000 HC RX 637 (ALT 250 FOR IP): Performed by: NURSE PRACTITIONER

## 2023-08-11 PROCEDURE — 80048 BASIC METABOLIC PNL TOTAL CA: CPT

## 2023-08-11 PROCEDURE — 85025 COMPLETE CBC W/AUTO DIFF WBC: CPT

## 2023-08-11 PROCEDURE — 97165 OT EVAL LOW COMPLEX 30 MIN: CPT

## 2023-08-11 PROCEDURE — APPSS30 APP SPLIT SHARED TIME 16-30 MINUTES: Performed by: NURSE PRACTITIONER

## 2023-08-11 PROCEDURE — 6370000000 HC RX 637 (ALT 250 FOR IP): Performed by: INTERNAL MEDICINE

## 2023-08-11 PROCEDURE — 97530 THERAPEUTIC ACTIVITIES: CPT

## 2023-08-11 PROCEDURE — 97116 GAIT TRAINING THERAPY: CPT

## 2023-08-11 PROCEDURE — 6360000002 HC RX W HCPCS: Performed by: INTERNAL MEDICINE

## 2023-08-11 PROCEDURE — 93010 ELECTROCARDIOGRAM REPORT: CPT | Performed by: SPECIALIST

## 2023-08-11 PROCEDURE — 2060000000 HC ICU INTERMEDIATE R&B

## 2023-08-11 PROCEDURE — 97162 PT EVAL MOD COMPLEX 30 MIN: CPT

## 2023-08-11 RX ORDER — AMIODARONE HYDROCHLORIDE 200 MG/1
200 TABLET ORAL DAILY
Status: DISCONTINUED | OUTPATIENT
Start: 2023-08-11 | End: 2023-08-18 | Stop reason: HOSPADM

## 2023-08-11 RX ORDER — POTASSIUM CHLORIDE 750 MG/1
20 TABLET, FILM COATED, EXTENDED RELEASE ORAL ONCE
Status: COMPLETED | OUTPATIENT
Start: 2023-08-11 | End: 2023-08-11

## 2023-08-11 RX ADMIN — SODIUM CHLORIDE, PRESERVATIVE FREE 10 ML: 5 INJECTION INTRAVENOUS at 10:05

## 2023-08-11 RX ADMIN — WATER 1000 MG: 1 INJECTION INTRAMUSCULAR; INTRAVENOUS; SUBCUTANEOUS at 17:52

## 2023-08-11 RX ADMIN — ENOXAPARIN SODIUM 80 MG: 80 INJECTION SUBCUTANEOUS at 03:46

## 2023-08-11 RX ADMIN — METOPROLOL TARTRATE 12.5 MG: 25 TABLET, FILM COATED ORAL at 20:33

## 2023-08-11 RX ADMIN — DULOXETINE HYDROCHLORIDE 30 MG: 30 CAPSULE, DELAYED RELEASE ORAL at 20:33

## 2023-08-11 RX ADMIN — RIVAROXABAN 20 MG: 20 TABLET, FILM COATED ORAL at 17:51

## 2023-08-11 RX ADMIN — METOPROLOL TARTRATE 12.5 MG: 25 TABLET, FILM COATED ORAL at 10:05

## 2023-08-11 RX ADMIN — SODIUM CHLORIDE, PRESERVATIVE FREE 10 ML: 5 INJECTION INTRAVENOUS at 20:34

## 2023-08-11 RX ADMIN — POTASSIUM CHLORIDE 20 MEQ: 750 TABLET, EXTENDED RELEASE ORAL at 11:40

## 2023-08-11 RX ADMIN — PREDNISONE 5 MG: 5 TABLET ORAL at 10:05

## 2023-08-11 RX ADMIN — POLYETHYLENE GLYCOL 3350 17 G: 17 POWDER, FOR SOLUTION ORAL at 17:51

## 2023-08-11 RX ADMIN — ASPIRIN 81 MG: 81 TABLET, COATED ORAL at 10:05

## 2023-08-11 ASSESSMENT — PAIN SCALES - GENERAL: PAINLEVEL_OUTOF10: 0

## 2023-08-11 NOTE — PROGRESS NOTES
Bedside and Verbal shift change report given to Kennith Denver (oncoming nurse) by Rashad Cuevas (offgoing nurse). Report included the following information ED Encounter Summary, Surgery Report, Intake/Output, MAR, Recent Results, and Cardiac Rhythm NSR/ A Fib .

## 2023-08-11 NOTE — PROGRESS NOTES
Bedside and Verbal shift change report given to Jeremy Mccabe (oncoming nurse) by Cat Saunders (offgoing nurse). Report included the following information Nurse Handoff Report, Intake/Output, MAR, Recent Results, and Cardiac Rhythm NSR .

## 2023-08-11 NOTE — CARE COORDINATION
8/11/2023  3:47 PM     08/11/23 5830   Service Assessment   Patient Orientation Alert and Oriented   Cognition Alert   History Provided By Patient   Primary Caregiver Self   Support Systems Spouse/Significant Other  (spouse unable to provide assistance w/ ADLs)   Patient's Healthcare Decision Maker is: Legal Next of Kin  (spouse Chavo Joel X)[phone])   PCP Verified by CM Yes  Dio Lorenzana MD)   Last Visit to PCP Within last year   Prior Functional Level Independent in ADLs/IADLs;Mobility  (rollator for mobility)   Current Functional Level Mobility;Assistance with the following:;Toileting; Bathing   Can patient return to prior living arrangement Other (see comment)  (PT/OT is recommending IPR at DC, pt declining)   Ability to make needs known: Good   Family able to assist with home care needs: No  (spous unable to assist w/ mobility and transfers)   Would you like for me to discuss the discharge plan with any other family members/significant others, and if so, who? Yes  (spouse Chavo Joel)   Marta Noriega; Other (Comment)  (Medicare A/B,[employer] [place])   [employer] None   Social/Functional History   Lives With Spouse   Type of [address] [place] [address] Two level; Able to Live on Main level with bedroom/bathroom  (pt has bed/bath on 1st floor)   Entrance Stairs - Number of Steps 3   Entrance Stairs - Rails Both   Bathroom Shower/Tub Shower chair with back   Bathroom Toilet Bedside commode   Bathroom Equipment Grab bars in shower;3-in-1 commode; Shower chair   Home Equipment Rollator   Ambulation Assistance Needs assistance  (rollator for mobility)   Transfer Assistance Independent   Active  No   Patient's  Info Spouse   Mode of Transportation Car   Discharge [place]; Other (Comment)  (IPR vs HH)   Potential Assistance Purchasing Medications No  ([place] [place] [facility] [address] no difficulty affording

## 2023-08-11 NOTE — WOUND CARE
Wound Consult:  new consult Visit. Chart reviewed. Consulted for red sacrum and skin tears. Spoke with patients nurse,  Fawad Cruz RN. Patient is resting on a mariza bed with KIM mattress. Heels off loaded with pillows. Patient is awake, alert, cooperative; moves self side to side in bed. Abimael score 19. Assessment:all wounds POA  POA Right antecubital medial-  2x2x01. cm skin tear 20% flap, pink, moist, slight bleeding, no surrounding redness. POA Right lower medial arm- 3x1.6x0.1cm skin tear, 20% flap, moist, pink, no surrounding redness, slight bleeding after cleansing. POA Sacrum-blanching red with left side area of ecchymoses, dry, linear denuding in gluteal cleft. stage 2 PI    Right heel- blanching red  Left heel- no redness noted  Bilateral arms and legs- scattered ecchymosis and chronic hyperpigmentation  Treatment:  Right arm, skin tears, cleansed with NSS, mepitle1 to flaps and foam dressing. Sacrum-sacral foam reapplied  Educated patient on turning every 2 hours to prevent PI. Wound Recommendations:  Elevate heels on pillows  Right arm skin tears-cleanse with NSS,mepitle1 and foam dressing. Change every 3 days. Sacrum- cleanse with blue wipes, apply sacral foam. Change every 3 days  Turn patient every 2 days  Skin Care / PI Prevention Recommendations:  1. Minimize friction/shear: minimize layers of linen/pads under patient. 2. Off load pressure/reposition:  turn and reposition approximately every 2 hours; float heels with pillows or use off loading heel boots; waffle cushion for sitting; position wedge. 3. Manage Moisture - keep skin folds dry; incontinence skin care with incontinence wipes; zinc guard barrier ointment. 4. Continue to monitor nutrition, pain, and skin risk scale, and skin assessment. Plan:  Spoke with Dr. Mary Will regarding findings and proposed orders for treatment. We will continue to reassess weekly and as needed.   [facility], [facility]

## 2023-08-11 NOTE — PROGRESS NOTES
lesions. 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 Impression  IMPRESSION:  1. Mild right-sided urothelial thickening without hydronephrosis or mass. No  enhancing renal lesions. 2.  No evidence of metastatic disease in the abdomen or pelvis. 3.  17 mm right adrenal adenoma. Lab Results   Component Value Date/Time     08/11/2023 03:59 AM    K 3.5 08/11/2023 03:59 AM     08/11/2023 03:59 AM    CO2 23 08/11/2023 03:59 AM    BUN 15 08/11/2023 03:59 AM    CREATININE 0.92 08/11/2023 03:59 AM    GLUCOSE 95 08/11/2023 03:59 AM    CALCIUM 8.4 08/11/2023 03:59 AM    LABGLOM >60 08/11/2023 03:59 AM      Lab Results   Component Value Date     06/25/2019     Lab Results   Component Value Date    WBC 9.6 08/11/2023    HGB 10.6 (L) 08/11/2023    HCT 34.2 (L) 08/11/2023    MCV 85.3 08/11/2023     08/11/2023     No results for input(s): CHOL, HDLC, LDLC in the last 72 hours.     Invalid input(s): TGL,  HBA1C    Current meds:    Current Facility-Administered Medications:     sodium chloride flush 0.9 % injection 5-40 mL, 5-40 mL, IntraVENous, 2 times per day, Addie Farm, DO, 10 mL at 08/10/23 2222    sodium chloride flush 0.9 % injection 5-40 mL, 5-40 mL, IntraVENous, PRN, Addie Karimi, DO    0.9 % sodium chloride infusion, , IntraVENous, PRN, Penne Farm, DO    ondansetron (ZOFRAN-ODT) disintegrating tablet 4 mg, 4 mg, Oral, Q8H PRN **OR** ondansetron (ZOFRAN) injection 4 mg, 4 mg, IntraVENous, Q6H PRN, Addie Farm, DO    polyethylene glycol (GLYCOLAX) packet 17 g, 17 g, Oral, Daily PRN, Addie Farm, DO    acetaminophen (TYLENOL) tablet 650 mg, 650 mg, Oral, Q6H PRN **OR** acetaminophen (TYLENOL) suppository 650 mg, 650 mg, Rectal, Q6H PRN, Penne Farm, DO    acetaminophen (TYLENOL) tablet 650 mg, 650 mg, Oral, Q6H PRN, Addie Farm, DO    DULoxetine (CYMBALTA) extended release capsule 30 mg, 30 mg, Oral, Nightly, Pablo Rashid DO, 30 mg at 08/10/23 2059    polyethylene glycol (GLYCOLAX) packet 17 g, 17 g, Oral, QPM, Pablo Rashid DO, 17 g at 08/10/23 2059    predniSONE (DELTASONE) tablet 5 mg, 5 mg, Oral, Daily, Pablo Rashid DO    sennosides-docusate sodium (SENOKOT-S) 8.6-50 MG tablet 1 tablet, 1 tablet, Oral, QPM, Pablo Rashid DO, 1 tablet at 08/10/23 2059    cefTRIAXone (ROCEPHIN) 1,000 mg in sterile water 10 mL IV syringe, 1,000 mg, IntraVENous, Q24H, Pablo Rashid DO, 1,000 mg at 08/10/23 1607    enoxaparin (LOVENOX) injection 80 mg, 1 mg/kg (Ideal), SubCUTAneous, Q12H, Pablo Rashid DO, 80 mg at 08/11/23 0346    aspirin EC tablet 81 mg, 81 mg, Oral, Daily, Pablo Rashid DO, 81 mg at 08/10/23 1607    metoprolol tartrate (LOPRESSOR) tablet 12.5 mg, 12.5 mg, Oral, BID, Pablo Rashid DO, 12.5 mg at 08/10/23 [facility], APRN - NP    [facility] Cardiology  Call center: U) [phone] (u) [phone]      CC:Pan Sullivan DO

## 2023-08-11 NOTE — PROGRESS NOTES
0700: Bedside and Verbal shift change report given to [address] [place] (oncoming nurse) by Faizan Avendano RN (offgoing nurse). Report included the following information Nurse Handoff Report, Adult Overview, Intake/Output, MAR, Recent Results, and Cardiac Rhythm NSR/Afib . 1140: Pt and spouse refuse amiodorone at this time because wife states the patient had an adverse reaction with the medication.  Cardiology verified dosage with patient's primary cardiologist. Marina Sauceda NP notified of patient's refusal

## 2023-08-11 NOTE — CARE COORDINATION
8/11/2023  4:02 PM     08/11/23 1533   Service Assessment   Patient Orientation Alert and Oriented   Cognition Alert   History Provided By Patient   Primary Caregiver Self   Support Systems Spouse/Significant Other  (spouse unable to provide assistance w/ ADLs)   Patient's Healthcare Decision Maker is: Legal Next of Kin  (spouse Esther Diez ([phone])   PCP Verified by CM Yes  Troy Callejas MD)   Last Visit to PCP Within last year   Prior Functional Level Independent in ADLs/IADLs;Mobility  (rollator for mobility)   Current Functional Level Mobility;Assistance with the following:;Toileting; Bathing   Can patient return to prior living arrangement Other (see comment)  (PT/OT is recommending IPR at DC, pt declining)   Ability to make needs known: Good   Family able to assist with home care needs: No  (spous unable to assist w/ mobility and transfers)   Would you like for me to discuss the discharge plan with any other family members/significant others, and if so, who? Yes  (spouse Esther Diez)   Marta Noriega; Other (Comment)  (Medicare A/B,[place] [employer] [place])   [employer] None   Social/Functional History   Lives With Spouse   Type of [address] [place] [address] Two level; Able to Live on Main level with bedroom/bathroom  (pt has bed/bath on 1st floor)   Entrance Stairs - Number of Steps 3   Entrance Stairs - Rails Both   Bathroom Shower/Tub Shower chair with back   Bathroom Toilet Bedside commode   Bathroom Equipment Grab bars in shower;3-in-1 commode; Shower chair   Home Equipment Rollator   Ambulation Assistance Needs assistance  (rollator for mobility)   Transfer Assistance Independent   Active  No   Patient's  Info Spouse   Mode of Transportation Car   Discharge [place]; Other (Comment)  (IPR vs HH)   Potential Assistance Purchasing Medications No  ([place] [facility] [address] no difficulty affording

## 2023-08-11 NOTE — PLAN OF CARE
Problem: Occupational Therapy - Adult  Goal: By Discharge: Performs self-care activities at highest level of function for planned discharge setting. See evaluation for individualized goals. Description: FUNCTIONAL STATUS PRIOR TO ADMISSION:  Patient was ambulatory using rollator, h/o of falls  Receives Help From: Family, ADL Assistance: Independent,  ,  ,  ,  ,  ,  , Ambulation Assistance: Needs assistance (using rollator),  , Active : No     HOME SUPPORT: Patient lived with spouse but didn't require assistance. Occupational Therapy Goals:  Initiated 8/11/2023  1. Patient will perform grooming standing at sink with Modified [place] within 7 day(s). 2.  Patient will perform bathing with Modified [place] within 7 day(s). 3.  Patient will perform lower body dressing with [place] within 7 day(s). 4.  Patient will perform toilet transfers with [place]  within 7 day(s). 5.  Patient will perform all aspects of toileting with [place] within 7 day(s). 6.  Patient will participate in upper extremity therapeutic exercise/activities with Set-up for 10 minutes within 7 day(s). 7.  Patient will utilize energy conservation techniques during functional activities with verbal cues within 7 day(s). Outcome: Progressing   OCCUPATIONAL THERAPY EVALUATION    Patient: Michelle Caputo (64 y.o. male)  Date: 8/11/2023  Primary Diagnosis: Syncope and collapse [R55]         Precautions: Fall Risk                  ASSESSMENT :  The patient is limited by decreased functional mobility, independence in ADLs, strength, body mechanics, activity tolerance, endurance, balance     Based on the impairments listed above pt presents at [facility] to min assist level with mobility, toileting and LB self-care. Pt did report some dizziness with minimal ambulation. Orthostatics taken throughout session, BP remained stable. When ambulating with RW, pt did demonstrate increase B LE ataxia.

## 2023-08-12 PROCEDURE — 2580000003 HC RX 258: Performed by: INTERNAL MEDICINE

## 2023-08-12 PROCEDURE — 6370000000 HC RX 637 (ALT 250 FOR IP): Performed by: INTERNAL MEDICINE

## 2023-08-12 PROCEDURE — 6370000000 HC RX 637 (ALT 250 FOR IP): Performed by: NURSE PRACTITIONER

## 2023-08-12 PROCEDURE — 99233 SBSQ HOSP IP/OBS HIGH 50: CPT | Performed by: INTERNAL MEDICINE

## 2023-08-12 PROCEDURE — 1100000000 HC RM PRIVATE

## 2023-08-12 PROCEDURE — 98960 EDU&TRN PT SELF-MGMT NQHP 1: CPT

## 2023-08-12 PROCEDURE — 6360000002 HC RX W HCPCS: Performed by: INTERNAL MEDICINE

## 2023-08-12 PROCEDURE — 94761 N-INVAS EAR/PLS OXIMETRY MLT: CPT

## 2023-08-12 RX ORDER — SODIUM CHLORIDE 9 MG/ML
INJECTION, SOLUTION INTRAVENOUS CONTINUOUS
Status: DISCONTINUED | OUTPATIENT
Start: 2023-08-12 | End: 2023-08-15

## 2023-08-12 RX ORDER — MAGNESIUM SULFATE 1 G/100ML
1000 INJECTION INTRAVENOUS ONCE
Status: COMPLETED | OUTPATIENT
Start: 2023-08-12 | End: 2023-08-12

## 2023-08-12 RX ADMIN — RIVAROXABAN 20 MG: 20 TABLET, FILM COATED ORAL at 17:39

## 2023-08-12 RX ADMIN — SODIUM CHLORIDE, PRESERVATIVE FREE 10 ML: 5 INJECTION INTRAVENOUS at 21:13

## 2023-08-12 RX ADMIN — POLYETHYLENE GLYCOL 3350 17 G: 17 POWDER, FOR SOLUTION ORAL at 17:38

## 2023-08-12 RX ADMIN — WATER 1000 MG: 1 INJECTION INTRAMUSCULAR; INTRAVENOUS; SUBCUTANEOUS at 17:39

## 2023-08-12 RX ADMIN — METOPROLOL TARTRATE 12.5 MG: 25 TABLET, FILM COATED ORAL at 08:33

## 2023-08-12 RX ADMIN — ASPIRIN 81 MG: 81 TABLET, COATED ORAL at 08:32

## 2023-08-12 RX ADMIN — AMIODARONE HYDROCHLORIDE 200 MG: 200 TABLET ORAL at 12:30

## 2023-08-12 RX ADMIN — PREDNISONE 5 MG: 5 TABLET ORAL at 08:33

## 2023-08-12 RX ADMIN — DULOXETINE HYDROCHLORIDE 30 MG: 30 CAPSULE, DELAYED RELEASE ORAL at 21:14

## 2023-08-12 RX ADMIN — SODIUM CHLORIDE: 9 INJECTION, SOLUTION INTRAVENOUS at 12:00

## 2023-08-12 RX ADMIN — MAGNESIUM SULFATE HEPTAHYDRATE 1000 MG: 1 INJECTION, SOLUTION INTRAVENOUS at 17:38

## 2023-08-12 RX ADMIN — METOPROLOL TARTRATE 12.5 MG: 25 TABLET, FILM COATED ORAL at 21:14

## 2023-08-12 ASSESSMENT — PAIN SCALES - GENERAL
PAINLEVEL_OUTOF10: 0
PAINLEVEL_OUTOF10: 0

## 2023-08-12 NOTE — PROGRESS NOTES
[facility]                    Cardiology Care Note     []Initial Encounter     [x]Follow-up    Patient Name: Micky Retana - PTL:[birth year] - [facility]:[id number]  Primary Cardiologist: So Herrera - Dr. Araceli Bowles Cardiologist: Dc Conklin MD     Reason for encounter: Syncope    HPI:       Micky Retana is a 80 y.o. male with PMH significant for a-fib s/p recent ablation last Friday s/p Watchman, hx GI bleed, CAD?, PVD s/p arthrectomy to SFA, HTN, HLD, CKD stage III, Sjoegren syndrome, frequent UTI's, and depression. Pt presented to the ED following possible syncopal episode at home. He was in the shower earlier today when he felt lightheaded and dizzy. He was able to sit down at which point wife reported he became unresponsive with eyes open and abnormal breathing pattern. Does not remember the episode, apparently has been a little confused since then. He did not fall or hit his head. BP was in the 80's on EMS arrival. He recently underwent ablation for a-fib on Friday at [address] [facility]. After discharge home, he developed N/V and several episodes of diarrhea. He was found to have a UTI and is currently on Keflex. Unclear if GI symptoms started before or after the Keflex. He denies any CP, palpitations, SOB or edema. Subjective:      Micky Retana reports none. Assessment and Plan     ? Syncopal episode  - like d/t dehydration, vagal episode  - recent UTI/sepsis, UA remains +  - IVF  - Mildly orthostatic here (BP lying 111/71 to standing 92/64), hx of orthostasis in records  - TTE w/ normal EF  - offered event monitor on d/c but pt declines, [facility] to [facility] with [facility]    2.  A-fib s/p ablation, s/p Watchman in 2021   - do not think junctional rhythm as EKG reads  - HR more elevated w/ activity up into 140's, then recovers  - symptomatic w/ elevated HR's   - BP softer this morning w/ activity, may try amio   - remains on Xarelto for 30 days post ablation    - Amio 200mg po QD started glycol (GLYCOLAX) packet 17 g, 17 g, Oral, Daily PRN, Pablo Rashid DO    acetaminophen (TYLENOL) tablet 650 mg, 650 mg, Oral, Q6H PRN **OR** acetaminophen (TYLENOL) suppository 650 mg, 650 mg, Rectal, Q6H PRN, Pablo Rashid DO    acetaminophen (TYLENOL) tablet 650 mg, 650 mg, Oral, Q6H PRN, Pablo Rashid DO    DULoxetine (CYMBALTA) extended release capsule 30 mg, 30 mg, Oral, Nightly, Pablo Rashid DO, 30 mg at 08/11/23 2033    polyethylene glycol (GLYCOLAX) packet 17 g, 17 g, Oral, QPM, [facility], DO, 17 g at 08/11/23 1751    predniSONE (DELTASONE) tablet 5 mg, 5 mg, Oral, Daily, Pablo Rashid DO, 5 mg at 08/12/23 4448    sennosides-docusate sodium (SENOKOT-S) 8.6-50 MG tablet 1 tablet, 1 tablet, Oral, QPM, Pablo Rashid DO, 1 tablet at 08/10/23 2059    cefTRIAXone (ROCEPHIN) 1,000 mg in sterile water 10 mL IV syringe, 1,000 mg, IntraVENous, Q24H, Pablo Rashid DO, 1,000 mg at 08/11/23 1752    aspirin EC tablet 81 mg, 81 mg, Oral, Daily, Pablo Rashid DO, 81 mg at 08/12/23 2809    metoprolol tartrate (LOPRESSOR) tablet 12.5 mg, 12.5 mg, Oral, BID, [facility], APRN - NP, 12.5 mg at 08/12/23 160 [facility] MD Leonidas    [facility] Cardiology  Call center: (I) [phone] (X) [phone]      CC:Pan Sullivan DO

## 2023-08-12 NOTE — PROGRESS NOTES
0700: Bedside and Verbal shift change report given to [address] [place] (oncoming nurse) by Carolynne Schaumann RN (offgoing nurse). Report included the following information Nurse Handoff Report, Adult Overview, Intake/Output, MAR, and Cardiac Rhythm NSR/Afib .      0830: MD at bedside discussing plan of care. Pt declining rehab and refusing amiodarone. 1230: Pt wife at bedside. Pt now agreeable to rehab and amiodarone. Pt requesting [facility], CM notified. 1700: Pt wife has concerns that the pt is receiving too much metoprolol and questioning cardiology sign off. Pt wife also wants PT/OT evaluation. Dr. Darion Mahmood notified. Orders to replace cardiology consult. Dr Darion Mahmood to call pt wife to discuss plan of care.

## 2023-08-12 NOTE — ACP (ADVANCE CARE PLANNING)
Advance Care Planning (ACP) Provider Note - Comprehensive      Date of ACP Conversation:  08/12/23    Persons included in Conversation:  patient   Length of ACP Conversation in minutes:  16 minutes        Active Diagnoses:   Syncope with collapse   History of atrial fibrillation status post ablation  Jose on ckd stage 3     These active diagnoses are of sufficient risk that focused discussion on advance care planning is indicated in order to allow the patient to thoughtfully consider personal goals of care; and, if situations arise that prevent the ability to personally give input, to ensure appropriate representation of their personal desires for different levels and aggressiveness of care. For Serious or Chronic Illness:  Discussed atrial fib, syncope diagnoses. Discussed planned treatment plan for atrial fibrillation including recent ablation and amiodarone. Encouraged patient to consider rehab per PT recs in order to decrease chance of falls and prevent morbidity and mortality. Discussed code status; patient would like to  be DNR. States wife is decision maker in the vent he cannot make his own decisions.

## 2023-08-13 ENCOUNTER — APPOINTMENT (OUTPATIENT)
Facility: HOSPITAL | Age: 83
End: 2023-08-13
Attending: INTERNAL MEDICINE
Payer: MEDICARE

## 2023-08-13 LAB
ANION GAP SERPL CALC-SCNC: 6 MMOL/L (ref 5–15)
BASOPHILS # BLD: 0.1 K/UL (ref 0–0.1)
BASOPHILS NFR BLD: 1 % (ref 0–1)
BUN SERPL-MCNC: 16 MG/DL (ref 6–20)
BUN/CREAT SERPL: 17 (ref 12–20)
CALCIUM SERPL-MCNC: 9.2 MG/DL (ref 8.5–10.1)
CHLORIDE SERPL-SCNC: 111 MMOL/L (ref 97–108)
CO2 SERPL-SCNC: 24 MMOL/L (ref 21–32)
CREAT SERPL-MCNC: 0.92 MG/DL (ref 0.7–1.3)
DIFFERENTIAL METHOD BLD: ABNORMAL
EOSINOPHIL # BLD: 0.2 K/UL (ref 0–0.4)
EOSINOPHIL NFR BLD: 2 % (ref 0–7)
ERYTHROCYTE [DISTWIDTH] IN BLOOD BY AUTOMATED COUNT: 15.8 % (ref 11.5–14.5)
GLUCOSE SERPL-MCNC: 118 MG/DL (ref 65–100)
HCT VFR BLD AUTO: 35.2 % (ref 36.6–50.3)
HGB BLD-MCNC: 10.8 G/DL (ref 12.1–17)
IMM GRANULOCYTES # BLD AUTO: 0.1 K/UL (ref 0–0.04)
IMM GRANULOCYTES NFR BLD AUTO: 1 % (ref 0–0.5)
LYMPHOCYTES # BLD: 1.4 K/UL (ref 0.8–3.5)
LYMPHOCYTES NFR BLD: 17 % (ref 12–49)
MAGNESIUM SERPL-MCNC: 2.4 MG/DL (ref 1.6–2.4)
MCH RBC QN AUTO: 26.2 PG (ref 26–34)
MCHC RBC AUTO-ENTMCNC: 30.7 G/DL (ref 30–36.5)
MCV RBC AUTO: 85.2 FL (ref 80–99)
MONOCYTES # BLD: 1 K/UL (ref 0–1)
MONOCYTES NFR BLD: 11 % (ref 5–13)
NEUTS SEG # BLD: 6 K/UL (ref 1.8–8)
NEUTS SEG NFR BLD: 68 % (ref 32–75)
NRBC # BLD: 0 K/UL (ref 0–0.01)
NRBC BLD-RTO: 0 PER 100 WBC
PLATELET # BLD AUTO: 202 K/UL (ref 150–400)
PMV BLD AUTO: 11.4 FL (ref 8.9–12.9)
POTASSIUM SERPL-SCNC: 4.3 MMOL/L (ref 3.5–5.1)
RBC # BLD AUTO: 4.13 M/UL (ref 4.1–5.7)
SODIUM SERPL-SCNC: 141 MMOL/L (ref 136–145)
WBC # BLD AUTO: 8.6 K/UL (ref 4.1–11.1)

## 2023-08-13 PROCEDURE — 99232 SBSQ HOSP IP/OBS MODERATE 35: CPT | Performed by: INTERNAL MEDICINE

## 2023-08-13 PROCEDURE — 1100000000 HC RM PRIVATE

## 2023-08-13 PROCEDURE — 36415 COLL VENOUS BLD VENIPUNCTURE: CPT

## 2023-08-13 PROCEDURE — 6370000000 HC RX 637 (ALT 250 FOR IP): Performed by: NURSE PRACTITIONER

## 2023-08-13 PROCEDURE — 2580000003 HC RX 258: Performed by: INTERNAL MEDICINE

## 2023-08-13 PROCEDURE — 6370000000 HC RX 637 (ALT 250 FOR IP): Performed by: INTERNAL MEDICINE

## 2023-08-13 PROCEDURE — 94761 N-INVAS EAR/PLS OXIMETRY MLT: CPT

## 2023-08-13 PROCEDURE — 85025 COMPLETE CBC W/AUTO DIFF WBC: CPT

## 2023-08-13 PROCEDURE — 6360000002 HC RX W HCPCS: Performed by: INTERNAL MEDICINE

## 2023-08-13 PROCEDURE — 6370000000 HC RX 637 (ALT 250 FOR IP): Performed by: FAMILY MEDICINE

## 2023-08-13 PROCEDURE — 98960 EDU&TRN PT SELF-MGMT NQHP 1: CPT

## 2023-08-13 PROCEDURE — 83735 ASSAY OF MAGNESIUM: CPT

## 2023-08-13 PROCEDURE — 80048 BASIC METABOLIC PNL TOTAL CA: CPT

## 2023-08-13 RX ORDER — MIDODRINE HYDROCHLORIDE 5 MG/1
2.5 TABLET ORAL
Status: DISCONTINUED | OUTPATIENT
Start: 2023-08-13 | End: 2023-08-15

## 2023-08-13 RX ADMIN — AMIODARONE HYDROCHLORIDE 200 MG: 200 TABLET ORAL at 09:25

## 2023-08-13 RX ADMIN — MIDODRINE HYDROCHLORIDE 2.5 MG: 5 TABLET ORAL at 14:00

## 2023-08-13 RX ADMIN — PREDNISONE 5 MG: 5 TABLET ORAL at 09:25

## 2023-08-13 RX ADMIN — DULOXETINE HYDROCHLORIDE 30 MG: 30 CAPSULE, DELAYED RELEASE ORAL at 21:36

## 2023-08-13 RX ADMIN — METOPROLOL TARTRATE 12.5 MG: 25 TABLET, FILM COATED ORAL at 09:25

## 2023-08-13 RX ADMIN — SODIUM CHLORIDE, PRESERVATIVE FREE 5 ML: 5 INJECTION INTRAVENOUS at 21:38

## 2023-08-13 RX ADMIN — ASPIRIN 81 MG: 81 TABLET, COATED ORAL at 09:25

## 2023-08-13 RX ADMIN — POLYETHYLENE GLYCOL 3350 17 G: 17 POWDER, FOR SOLUTION ORAL at 16:50

## 2023-08-13 RX ADMIN — RIVAROXABAN 20 MG: 20 TABLET, FILM COATED ORAL at 16:51

## 2023-08-13 RX ADMIN — WATER 1000 MG: 1 INJECTION INTRAMUSCULAR; INTRAVENOUS; SUBCUTANEOUS at 16:51

## 2023-08-13 RX ADMIN — METOPROLOL TARTRATE 6.25 MG: 25 TABLET, FILM COATED ORAL at 21:37

## 2023-08-13 RX ADMIN — MIDODRINE HYDROCHLORIDE 2.5 MG: 5 TABLET ORAL at 16:49

## 2023-08-13 ASSESSMENT — PAIN SCALES - GENERAL: PAINLEVEL_OUTOF10: 0

## 2023-08-13 NOTE — PROGRESS NOTES
If sympoms faill to improve during thanks giving seekend, presnt to the [facility] urgent care and to have the staff concat Dr. Agata CRUM.    flush 0.9 % injection 5-40 mL  5-40 mL IntraVENous PRN    0.9 % sodium chloride infusion   IntraVENous PRN    ondansetron (ZOFRAN-ODT) disintegrating tablet 4 mg  4 mg Oral Q8H PRN    Or    ondansetron (ZOFRAN) injection 4 mg  4 mg IntraVENous Q6H PRN    polyethylene glycol (GLYCOLAX) packet 17 g  17 g Oral Daily PRN    acetaminophen (TYLENOL) tablet 650 mg  650 mg Oral Q6H PRN    Or    acetaminophen (TYLENOL) suppository 650 mg  650 mg Rectal Q6H PRN    acetaminophen (TYLENOL) tablet 650 mg  650 mg Oral Q6H PRN    DULoxetine (CYMBALTA) extended release capsule 30 mg  30 mg Oral Nightly    polyethylene glycol (GLYCOLAX) packet 17 g  17 g Oral QPM    predniSONE (DELTASONE) tablet 5 mg  5 mg Oral Daily    sennosides-docusate sodium (SENOKOT-S) 8.6-50 MG tablet 1 tablet  1 tablet Oral QPM    cefTRIAXone (ROCEPHIN) 1,000 mg in sterile water 10 mL IV syringe  1,000 mg IntraVENous Q24H    aspirin EC tablet 81 mg  81 mg Oral Daily    metoprolol tartrate (LOPRESSOR) tablet 12.5 mg  12.5 mg Oral BID          Lab Review:     Recent Labs     08/11/23  0359 08/13/23  0005   WBC 9.6 8.6   HGB 10.6* 10.8*   HCT 34.2* 35.2*    202       Recent Labs     08/11/23  0359 08/13/23  0005    141   K 3.5 4.3   * 111*   CO2 23 24   BUN 15 16   MG 2.0 2.4       No results found for: GLUCPOC       Assessment / Plan:     Sumit Marroquin is a 80 y.o. male with a pmhx ckd3, sjogrens, afib, htn/hld, tobacco use, anemia, CAD and recent uti on Keflex who presented to ED with due to passing out. Syncope with collapse POA: unclear etiology but suspect due to intervascular volume depletion and vasovagal episode. Patient's history of adrenal insufficiency however cortisol level around 10 this admission. Echo with normal EF. D-dimer neg. Patient does have a history of arrhythmia with atrial fibrillation underwent recent ablation; ecnourage event monitor on discharge. Also suspicious for orthostatic hypotension.   I will add a low-dose midodrine and see if this helps. Reduce metoprolol dose back to home regimen of 6.25 mg. Cardiology following     History of atrial fibrillation status post ablation/ Hx of HTN POA: Patient had Watchman procedure placed in 2021. Heart rate noted to be elevated with activity. Amiodarone started per cardiology - patient and wife concerned about a possible side effect in past from amiodarone but unclear what side effect; cardiology discussed with patient's EP who was in agreement with amiodarone. Encouraged patient to take amiodarone while here in order for us to monitor him carefullly. Reduce beta-blocker to 6.25 per patient's home regimen. Per cardiology note patient is supposed to be on Xarelto for 30 days post ablation; continue Xarelto     AIDAN on  Ckd3 POA: Suspect due to intervascular volume depletion. Monitor     Recent UTI: appears to have picked up keflex 8/09 for planned 7 day course.; continue IV ceftriaxone while here. Urine cx neg but not reliable given patient was already on abx when he came in     Hx CAD: holding aspirin; on AC      Sjogrens: Cortisol level around 10.   Continue low dose steroids    HLD: continue statin     Tobacco use: quit 7 years ago                     Care Plan discussed with: Patient and Nursing Staff    Discussed:  Care Plan    Prophylaxis: Xarelto    Disposition:   PT, OT, RN           ___________________________________________________    Attending Physician: Ngoc Ch MD

## 2023-08-13 NOTE — PROGRESS NOTES
0700: Bedside and Verbal shift change report given to [address] [place] (oncoming nurse) by Dayton Gonzalez (offgoing nurse). Report included the following information Nurse Handoff Report, Adult Overview, Intake/Output, MAR, Recent Results, and Cardiac Rhythm NSR/Afib .

## 2023-08-13 NOTE — PROGRESS NOTES
Plans per prior notes. Plans for rehab. FU with [facility] EP Dr. Alvarado Course. Call with further questions.

## 2023-08-13 NOTE — PROGRESS NOTES
Pt refused to be hooked up for fluids stating the told him that fluids were no longer needed. Reached to the provider to confirm.

## 2023-08-13 NOTE — PROGRESS NOTES
[address] [facility]                    Cardiology Care Note     []Initial Encounter     [x]Follow-up    Patient Name: Kierra Byrd - GCW:[id number] - TLJ:[id number]  Primary Cardiologist: Flako Hoyos - Dr. Staci Leo Cardiologist: Suad Ruiz MD     Reason for encounter: Syncope    HPI:       Kierra Byrd is a 80 y.o. male with PMH significant for a-fib s/p recent ablation last Friday s/p Watchman, hx GI bleed, CAD?, PVD s/p arthrectomy to SFA, HTN, HLD, CKD stage III, Sjoegren syndrome, frequent UTI's, and depression. Pt presented to the ED following possible syncopal episode at home. He was in the shower earlier today when he felt lightheaded and dizzy. He was able to sit down at which point wife reported he became unresponsive with eyes open and abnormal breathing pattern. Does not remember the episode, apparently has been a little confused since then. He did not fall or hit his head. BP was in the 80's on EMS arrival. He recently underwent ablation for a-fib on Friday at [address] [facility]. After discharge home, he developed N/V and several episodes of diarrhea. He was found to have a UTI and is currently on Keflex. Unclear if GI symptoms started before or after the Keflex. He denies any CP, palpitations, SOB or edema. Subjective:      Kierra Byrd reports none. Called back to talk to patient and wife. Continues to have PAF, junctional rhythms, sinus pauses on tele. LH at times, which he attributes to prednisone, but perhaps could be secondary to his rhythm. Assessment and Plan     ? Syncopal episode  - recent confounders of ?dehydration, UTI/sepsis, UA remains + etc provided possible explanation for his syncope.   But given the irregularity on telemetry monitoring here at the hospital I have concerned about tacky bradycardia and sinus pauses in the setting of age, PAF, post ablation that may eventually require a PPM.  He originally declined outpatient 30-day event monitor, but I 08/13/2023 12:05 AM    K 4.3 08/13/2023 12:05 AM     08/13/2023 12:05 AM    CO2 24 08/13/2023 12:05 AM    BUN 16 08/13/2023 12:05 AM    CREATININE 0.92 08/13/2023 12:05 AM    GLUCOSE 118 08/13/2023 12:05 AM    CALCIUM 9.2 08/13/2023 12:05 AM    LABGLOM >60 08/13/2023 12:05 AM      Lab Results   Component Value Date     06/25/2019     Lab Results   Component Value Date    WBC 8.6 08/13/2023    HGB 10.8 (L) 08/13/2023    HCT 35.2 (L) 08/13/2023    MCV 85.2 08/13/2023     08/13/2023     No results for input(s): CHOL, HDLC, LDLC in the last 72 hours.     Invalid input(s): TGL,  HBA1C    Current meds:    Current Facility-Administered Medications:     0.9 % sodium chloride infusion, , IntraVENous, Continuous, Darylene West, DO, Last Rate: 50 mL/hr at 08/12/23 1200, New Bag at 08/12/23 1200    amiodarone (CORDARONE) tablet 200 mg, 200 mg, Oral, Daily, SHEILA Severino NP, 200 mg at 08/13/23 6057    rivaroxaban (XARELTO) tablet 20 mg, 20 mg, Oral, Dinner, Darylene Dun, DO, 20 mg at 08/12/23 1739    sodium chloride flush 0.9 % injection 5-40 mL, 5-40 mL, IntraVENous, 2 times per day, Penne Farm, DO, 10 mL at 08/12/23 2113    sodium chloride flush 0.9 % injection 5-40 mL, 5-40 mL, IntraVENous, PRN, Penne Farm, DO    0.9 % sodium chloride infusion, , IntraVENous, PRN, Penne Farm, DO    ondansetron (ZOFRAN-ODT) disintegrating tablet 4 mg, 4 mg, Oral, Q8H PRN **OR** ondansetron (ZOFRAN) injection 4 mg, 4 mg, IntraVENous, Q6H PRN, Penne Farm, DO    polyethylene glycol (GLYCOLAX) packet 17 g, 17 g, Oral, Daily PRN, Addie [facility], DO    acetaminophen (TYLENOL) tablet 650 mg, 650 mg, Oral, Q6H PRN **OR** acetaminophen (TYLENOL) suppository 650 mg, 650 mg, Rectal, Q6H PRN, Addie [facility], DO    acetaminophen (TYLENOL) tablet 650 mg, 650 mg, Oral, Q6H PRN, Addie Farm, DO    DULoxetine (CYMBALTA) extended release capsule 30 mg, 30 mg, Oral, Nightly, Addie [facility], DO, 30 mg at 08/12/23 1896    polyethylene

## 2023-08-14 LAB
ANION GAP SERPL CALC-SCNC: 6 MMOL/L (ref 5–15)
BASOPHILS # BLD: 0.1 K/UL (ref 0–0.1)
BASOPHILS NFR BLD: 1 % (ref 0–1)
BUN SERPL-MCNC: 19 MG/DL (ref 6–20)
BUN/CREAT SERPL: 19 (ref 12–20)
CALCIUM SERPL-MCNC: 9 MG/DL (ref 8.5–10.1)
CHLORIDE SERPL-SCNC: 111 MMOL/L (ref 97–108)
CO2 SERPL-SCNC: 24 MMOL/L (ref 21–32)
CREAT SERPL-MCNC: 1 MG/DL (ref 0.7–1.3)
DIFFERENTIAL METHOD BLD: ABNORMAL
EOSINOPHIL # BLD: 0.2 K/UL (ref 0–0.4)
EOSINOPHIL NFR BLD: 2 % (ref 0–7)
ERYTHROCYTE [DISTWIDTH] IN BLOOD BY AUTOMATED COUNT: 15.8 % (ref 11.5–14.5)
GLUCOSE SERPL-MCNC: 96 MG/DL (ref 65–100)
HCT VFR BLD AUTO: 35.8 % (ref 36.6–50.3)
HGB BLD-MCNC: 11 G/DL (ref 12.1–17)
IMM GRANULOCYTES # BLD AUTO: 0.1 K/UL (ref 0–0.04)
IMM GRANULOCYTES NFR BLD AUTO: 1 % (ref 0–0.5)
LYMPHOCYTES # BLD: 1.5 K/UL (ref 0.8–3.5)
LYMPHOCYTES NFR BLD: 17 % (ref 12–49)
MAGNESIUM SERPL-MCNC: 2.3 MG/DL (ref 1.6–2.4)
MCH RBC QN AUTO: 26 PG (ref 26–34)
MCHC RBC AUTO-ENTMCNC: 30.7 G/DL (ref 30–36.5)
MCV RBC AUTO: 84.6 FL (ref 80–99)
MONOCYTES # BLD: 0.9 K/UL (ref 0–1)
MONOCYTES NFR BLD: 10 % (ref 5–13)
NEUTS SEG # BLD: 6.1 K/UL (ref 1.8–8)
NEUTS SEG NFR BLD: 69 % (ref 32–75)
NRBC # BLD: 0 K/UL (ref 0–0.01)
NRBC BLD-RTO: 0 PER 100 WBC
PLATELET # BLD AUTO: 202 K/UL (ref 150–400)
PMV BLD AUTO: 11.7 FL (ref 8.9–12.9)
POTASSIUM SERPL-SCNC: 3.7 MMOL/L (ref 3.5–5.1)
RBC # BLD AUTO: 4.23 M/UL (ref 4.1–5.7)
SODIUM SERPL-SCNC: 141 MMOL/L (ref 136–145)
WBC # BLD AUTO: 8.8 K/UL (ref 4.1–11.1)

## 2023-08-14 PROCEDURE — 6360000002 HC RX W HCPCS: Performed by: INTERNAL MEDICINE

## 2023-08-14 PROCEDURE — 6370000000 HC RX 637 (ALT 250 FOR IP): Performed by: FAMILY MEDICINE

## 2023-08-14 PROCEDURE — 1100000000 HC RM PRIVATE

## 2023-08-14 PROCEDURE — 94761 N-INVAS EAR/PLS OXIMETRY MLT: CPT

## 2023-08-14 PROCEDURE — 6370000000 HC RX 637 (ALT 250 FOR IP): Performed by: INTERNAL MEDICINE

## 2023-08-14 PROCEDURE — 97530 THERAPEUTIC ACTIVITIES: CPT

## 2023-08-14 PROCEDURE — 85025 COMPLETE CBC W/AUTO DIFF WBC: CPT

## 2023-08-14 PROCEDURE — 80048 BASIC METABOLIC PNL TOTAL CA: CPT

## 2023-08-14 PROCEDURE — 36415 COLL VENOUS BLD VENIPUNCTURE: CPT

## 2023-08-14 PROCEDURE — 2580000003 HC RX 258: Performed by: INTERNAL MEDICINE

## 2023-08-14 PROCEDURE — 83735 ASSAY OF MAGNESIUM: CPT

## 2023-08-14 PROCEDURE — 6370000000 HC RX 637 (ALT 250 FOR IP): Performed by: NURSE PRACTITIONER

## 2023-08-14 RX ADMIN — AMIODARONE HYDROCHLORIDE 200 MG: 200 TABLET ORAL at 09:19

## 2023-08-14 RX ADMIN — WATER 1000 MG: 1 INJECTION INTRAMUSCULAR; INTRAVENOUS; SUBCUTANEOUS at 16:36

## 2023-08-14 RX ADMIN — PREDNISONE 5 MG: 5 TABLET ORAL at 09:20

## 2023-08-14 RX ADMIN — ASPIRIN 81 MG: 81 TABLET, COATED ORAL at 09:20

## 2023-08-14 RX ADMIN — MIDODRINE HYDROCHLORIDE 2.5 MG: 5 TABLET ORAL at 16:36

## 2023-08-14 RX ADMIN — SODIUM CHLORIDE, PRESERVATIVE FREE 10 ML: 5 INJECTION INTRAVENOUS at 09:22

## 2023-08-14 RX ADMIN — MIDODRINE HYDROCHLORIDE 2.5 MG: 5 TABLET ORAL at 09:20

## 2023-08-14 RX ADMIN — METOPROLOL TARTRATE 6.25 MG: 25 TABLET, FILM COATED ORAL at 09:22

## 2023-08-14 RX ADMIN — DULOXETINE HYDROCHLORIDE 30 MG: 30 CAPSULE, DELAYED RELEASE ORAL at 22:07

## 2023-08-14 RX ADMIN — SENNOSIDES AND DOCUSATE SODIUM 1 TABLET: 50; 8.6 TABLET ORAL at 20:51

## 2023-08-14 RX ADMIN — SODIUM CHLORIDE, PRESERVATIVE FREE 10 ML: 5 INJECTION INTRAVENOUS at 22:08

## 2023-08-14 RX ADMIN — RIVAROXABAN 20 MG: 20 TABLET, FILM COATED ORAL at 16:37

## 2023-08-14 RX ADMIN — METOPROLOL TARTRATE 6.25 MG: 25 TABLET, FILM COATED ORAL at 22:15

## 2023-08-14 RX ADMIN — MIDODRINE HYDROCHLORIDE 2.5 MG: 5 TABLET ORAL at 12:17

## 2023-08-14 RX ADMIN — POLYETHYLENE GLYCOL 3350 17 G: 17 POWDER, FOR SOLUTION ORAL at 20:51

## 2023-08-14 ASSESSMENT — PAIN SCALES - GENERAL: PAINLEVEL_OUTOF10: 0

## 2023-08-14 NOTE — CARE COORDINATION
8/14/2023  Case Management Progress Note    4:07 PM  Gave [facility] a heads up that patient's PT note is in for today. BENJAMIN Vizcarra    12:15 PM  Patient is 80year old male admitted 8/10 with syncope and collapse   Patient's RUR is 11% green/low risk for readmission  Covid test: none noted this admission  Chart reviewed--patient discussed at interdisciplinary rounds  Per rounds this morning, patient is almost ready for discharge medically. Referral is pending with [facility], they would like to see what therapy says today. Patient is on the list and will keep an eye out for notes. Will continue to follow and assist with discharge planning as needed.      Transition of Care Plan   Continue medical management/treatment  Referral pending for [facility], was also accepted by [facility]  Transportation tbd pending progress  CM will continue to follow    BENJAMIN Vizcarra

## 2023-08-14 NOTE — PLAN OF CARE
Problem: Physical Therapy - Adult  Goal: By Discharge: Performs mobility at highest level of function for planned discharge setting. See evaluation for individualized goals. Description: FUNCTIONAL STATUS PRIOR TO ADMISSION: Patient was modified independent using a rollator for functional mobility. HOME SUPPORT PRIOR TO ADMISSION: The patient lived with his wife but did not require assistance. Physical Therapy Goals  Initiated 8/11/2023  1. Patient will move from supine to sit and sit to supine in bed with modified independence within 7 day(s). 2.  Patient will perform sit to stand with modified independence within 7 day(s). 3.  Patient will transfer from bed to chair and chair to bed with modified independence using the least restrictive device within 7 day(s). 4.  Patient will ambulate with modified independence for 75 feet with the least restrictive device within 7 day(s). 5.  Patient will ascend/descend 4 stairs with 1 handrail(s) with modified independence within 7 day(s). Outcome: Progressing   PHYSICAL THERAPY TREATMENT    Patient: Niko Salvador (30 y.o. male)  Date: 8/14/2023  Diagnosis: Syncope and collapse [R55] Syncope and collapse      Precautions: Fall Risk        ASSESSMENT:  Patient continues to benefit from skilled PT services and is progressing towards goals. Barriers to indep with functional mobility include impaired balance, symptomatic BP fluctuation/ orthostatic hypotension, general weakness, gait dysfunction, increased risk for fall. Pt received with supportive wife present. Pt reported dizziness with position change this date; orthostatic check completed with findings as noted below. Pt noted discussion of possible use of compression hose to assist with BP mgmt; spoke with RN who will follow up with physician as no order in place; notes addition of midodrine today. Pt tolerated prolonged EOB and brief standing activity with UE support on walker for balance.  Gait training deferred due to pt c/o dizziness. After seated rest, pt transferred to chair with min A for lift/ balance. Lengthy discussion of pt concern about ongoing dizziness/ BP issues and falls. Pt stated preference for finding medical solutions to problem to enable maximum benefit from IPR. Will benefit from con't acute PT for activity/ mobility progression as tolerated. Pt remains appropriate for IPR at d/c to facilitate return to [facility].     08/14/23 1249 08/14/23 1256 08/14/23 1306   Vital Signs   /69 119/72 96/69   MAP (Calculated) 84 88 78   BP Location Left upper arm Left upper arm Left upper arm   BP Method Automatic Automatic Automatic   Patient Position Semi fowlers Sitting  (EOB) Standing  (after brief standing LE ex; c/o dizziness)          PLAN:  Patient continues to benefit from skilled intervention to address the above impairments. Continue treatment per established plan of care. Recommendation for discharge: (in order for the patient to meet his/her long term goals): Therapy 3 hours/day 5-7 days/week    Other factors to consider for discharge: high risk for falls and concern for safely navigating or managing the home environment    IF patient discharges home will need the following DME: continuing to assess with progress       SUBJECTIVE:   Patient stated, \"I really want some answers now that I'm here. \"    OBJECTIVE DATA SUMMARY:     Functional Mobility Training:  Bed Mobility:  Bed Mobility Training  Bed Mobility Training: Yes  Supine to Sit: Contact-guard assistance  Sit to Supine: Minimum assistance  Transfers:  Transfer Training  Transfer Training: Yes  Sit to Stand: Minimum assistance  Stand to Sit: Minimum assistance  Bed to Chair: Minimum assistance  Balance:  Balance  Sitting: Intact  Standing: Impaired  Standing - Static: Constant support;Good;Fair  Standing - Dynamic: Constant support; Fair       Pain Rating:  No c/o pain    Activity Tolerance:   Good and requires

## 2023-08-14 NOTE — PLAN OF CARE
Problem: Safety - Adult  Goal: Free from fall injury  Outcome: Progressing     Problem: ABCDS Injury Assessment  Goal: Absence of physical injury  Outcome: Progressing     Problem: Pain  Goal: Verbalizes/displays adequate comfort level or baseline comfort level  Outcome: Progressing  Flowsheets (Taken 8/13/2023 1938)  Verbalizes/displays adequate comfort level or baseline comfort level: Assess pain using appropriate pain scale     Problem: Skin/Tissue Integrity  Goal: Absence of new skin breakdown  Description: 1. Monitor for areas of redness and/or skin breakdown  2. Assess vascular access sites hourly  3. Every 4-6 hours minimum:  Change oxygen saturation probe site  4. Every 4-6 hours:  If on nasal continuous positive airway pressure, respiratory therapy assess nares and determine need for appliance change or resting period.   Outcome: Progressing

## 2023-08-15 ENCOUNTER — APPOINTMENT (OUTPATIENT)
Facility: HOSPITAL | Age: 83
End: 2023-08-15
Payer: MEDICARE

## 2023-08-15 LAB
ALBUMIN SERPL-MCNC: 3.1 G/DL (ref 3.5–5)
ALBUMIN SERPL-MCNC: 3.2 G/DL (ref 3.5–5)
ALBUMIN/GLOB SERPL: 0.8 (ref 1.1–2.2)
ALP SERPL-CCNC: 84 U/L (ref 45–117)
ALT SERPL-CCNC: 24 U/L (ref 12–78)
ANION GAP SERPL CALC-SCNC: 6 MMOL/L (ref 5–15)
ANION GAP SERPL CALC-SCNC: 9 MMOL/L (ref 5–15)
APPEARANCE UR: CLEAR
AST SERPL-CCNC: 15 U/L (ref 15–37)
BACTERIA URNS QL MICRO: NEGATIVE /HPF
BASOPHILS # BLD: 0 K/UL (ref 0–0.1)
BASOPHILS # BLD: 0.1 K/UL (ref 0–0.1)
BASOPHILS NFR BLD: 0 % (ref 0–1)
BASOPHILS NFR BLD: 0 % (ref 0–1)
BILIRUB DIRECT SERPL-MCNC: 0.2 MG/DL (ref 0–0.2)
BILIRUB SERPL-MCNC: 0.8 MG/DL (ref 0.2–1)
BILIRUB UR QL: NEGATIVE
BUN SERPL-MCNC: 19 MG/DL (ref 6–20)
BUN SERPL-MCNC: 19 MG/DL (ref 6–20)
BUN/CREAT SERPL: 15 (ref 12–20)
BUN/CREAT SERPL: 16 (ref 12–20)
CALCIUM SERPL-MCNC: 8.6 MG/DL (ref 8.5–10.1)
CALCIUM SERPL-MCNC: 9.7 MG/DL (ref 8.5–10.1)
CHLORIDE SERPL-SCNC: 108 MMOL/L (ref 97–108)
CHLORIDE SERPL-SCNC: 109 MMOL/L (ref 97–108)
CO2 SERPL-SCNC: 24 MMOL/L (ref 21–32)
CO2 SERPL-SCNC: 25 MMOL/L (ref 21–32)
COLOR UR: ABNORMAL
CREAT SERPL-MCNC: 1.21 MG/DL (ref 0.7–1.3)
CREAT SERPL-MCNC: 1.28 MG/DL (ref 0.7–1.3)
DIFFERENTIAL METHOD BLD: ABNORMAL
DIFFERENTIAL METHOD BLD: ABNORMAL
EOSINOPHIL # BLD: 0 K/UL (ref 0–0.4)
EOSINOPHIL # BLD: 0.2 K/UL (ref 0–0.4)
EOSINOPHIL NFR BLD: 0 % (ref 0–7)
EOSINOPHIL NFR BLD: 1 % (ref 0–7)
EPITH CASTS URNS QL MICRO: ABNORMAL /LPF
ERYTHROCYTE [DISTWIDTH] IN BLOOD BY AUTOMATED COUNT: 15.9 % (ref 11.5–14.5)
ERYTHROCYTE [DISTWIDTH] IN BLOOD BY AUTOMATED COUNT: 16.1 % (ref 11.5–14.5)
GLOBULIN SER CALC-MCNC: 3.9 G/DL (ref 2–4)
GLUCOSE SERPL-MCNC: 126 MG/DL (ref 65–100)
GLUCOSE SERPL-MCNC: 136 MG/DL (ref 65–100)
GLUCOSE UR STRIP.AUTO-MCNC: NEGATIVE MG/DL
HCT VFR BLD AUTO: 35.4 % (ref 36.6–50.3)
HCT VFR BLD AUTO: 39.4 % (ref 36.6–50.3)
HGB BLD-MCNC: 11.1 G/DL (ref 12.1–17)
HGB BLD-MCNC: 12.3 G/DL (ref 12.1–17)
HGB UR QL STRIP: ABNORMAL
HYALINE CASTS URNS QL MICRO: ABNORMAL /LPF (ref 0–2)
IMM GRANULOCYTES # BLD AUTO: 0.1 K/UL (ref 0–0.04)
IMM GRANULOCYTES # BLD AUTO: 0.2 K/UL (ref 0–0.04)
IMM GRANULOCYTES NFR BLD AUTO: 1 % (ref 0–0.5)
IMM GRANULOCYTES NFR BLD AUTO: 1 % (ref 0–0.5)
KETONES UR QL STRIP.AUTO: ABNORMAL MG/DL
LACTATE SERPL-SCNC: 1.3 MMOL/L (ref 0.4–2)
LEUKOCYTE ESTERASE UR QL STRIP.AUTO: ABNORMAL
LYMPHOCYTES # BLD: 0.7 K/UL (ref 0.8–3.5)
LYMPHOCYTES # BLD: 2 K/UL (ref 0.8–3.5)
LYMPHOCYTES NFR BLD: 13 % (ref 12–49)
LYMPHOCYTES NFR BLD: 4 % (ref 12–49)
MAGNESIUM SERPL-MCNC: 2 MG/DL (ref 1.6–2.4)
MAGNESIUM SERPL-MCNC: 2.3 MG/DL (ref 1.6–2.4)
MCH RBC QN AUTO: 26.9 PG (ref 26–34)
MCH RBC QN AUTO: 27 PG (ref 26–34)
MCHC RBC AUTO-ENTMCNC: 31.2 G/DL (ref 30–36.5)
MCHC RBC AUTO-ENTMCNC: 31.4 G/DL (ref 30–36.5)
MCV RBC AUTO: 85.9 FL (ref 80–99)
MCV RBC AUTO: 86.4 FL (ref 80–99)
MONOCYTES # BLD: 1.1 K/UL (ref 0–1)
MONOCYTES # BLD: 1.4 K/UL (ref 0–1)
MONOCYTES NFR BLD: 6 % (ref 5–13)
MONOCYTES NFR BLD: 9 % (ref 5–13)
NEUTS SEG # BLD: 12.4 K/UL (ref 1.8–8)
NEUTS SEG # BLD: 15.5 K/UL (ref 1.8–8)
NEUTS SEG NFR BLD: 76 % (ref 32–75)
NEUTS SEG NFR BLD: 89 % (ref 32–75)
NITRITE UR QL STRIP.AUTO: NEGATIVE
NRBC # BLD: 0 K/UL (ref 0–0.01)
NRBC # BLD: 0 K/UL (ref 0–0.01)
NRBC BLD-RTO: 0 PER 100 WBC
NRBC BLD-RTO: 0 PER 100 WBC
PH UR STRIP: 5.5 (ref 5–8)
PHOSPHATE SERPL-MCNC: 3.4 MG/DL (ref 2.6–4.7)
PHOSPHATE SERPL-MCNC: 3.5 MG/DL (ref 2.6–4.7)
PLATELET # BLD AUTO: 201 K/UL (ref 150–400)
PLATELET # BLD AUTO: 236 K/UL (ref 150–400)
PLATELET COMMENT: ABNORMAL
PMV BLD AUTO: 11.2 FL (ref 8.9–12.9)
PMV BLD AUTO: 11.2 FL (ref 8.9–12.9)
POTASSIUM SERPL-SCNC: 3.8 MMOL/L (ref 3.5–5.1)
POTASSIUM SERPL-SCNC: 4.1 MMOL/L (ref 3.5–5.1)
PROCALCITONIN SERPL-MCNC: <0.05 NG/ML
PROT SERPL-MCNC: 7 G/DL (ref 6.4–8.2)
PROT UR STRIP-MCNC: ABNORMAL MG/DL
RBC # BLD AUTO: 4.12 M/UL (ref 4.1–5.7)
RBC # BLD AUTO: 4.56 M/UL (ref 4.1–5.7)
RBC #/AREA URNS HPF: ABNORMAL /HPF (ref 0–5)
RBC MORPH BLD: ABNORMAL
RBC MORPH BLD: ABNORMAL
SODIUM SERPL-SCNC: 139 MMOL/L (ref 136–145)
SODIUM SERPL-SCNC: 142 MMOL/L (ref 136–145)
SP GR UR REFRACTOMETRY: 1.02 (ref 1–1.03)
URINE CULTURE IF INDICATED: ABNORMAL
UROBILINOGEN UR QL STRIP.AUTO: 0.2 EU/DL (ref 0.2–1)
WBC # BLD AUTO: 16.2 K/UL (ref 4.1–11.1)
WBC # BLD AUTO: 17.5 K/UL (ref 4.1–11.1)
WBC URNS QL MICRO: ABNORMAL /HPF (ref 0–4)

## 2023-08-15 PROCEDURE — 6370000000 HC RX 637 (ALT 250 FOR IP): Performed by: INTERNAL MEDICINE

## 2023-08-15 PROCEDURE — 81001 URINALYSIS AUTO W/SCOPE: CPT

## 2023-08-15 PROCEDURE — 1100000000 HC RM PRIVATE

## 2023-08-15 PROCEDURE — 74177 CT ABD & PELVIS W/CONTRAST: CPT

## 2023-08-15 PROCEDURE — 6370000000 HC RX 637 (ALT 250 FOR IP): Performed by: NURSE PRACTITIONER

## 2023-08-15 PROCEDURE — 83735 ASSAY OF MAGNESIUM: CPT

## 2023-08-15 PROCEDURE — 80069 RENAL FUNCTION PANEL: CPT

## 2023-08-15 PROCEDURE — 80076 HEPATIC FUNCTION PANEL: CPT

## 2023-08-15 PROCEDURE — 36415 COLL VENOUS BLD VENIPUNCTURE: CPT

## 2023-08-15 PROCEDURE — 85025 COMPLETE CBC W/AUTO DIFF WBC: CPT

## 2023-08-15 PROCEDURE — 71045 X-RAY EXAM CHEST 1 VIEW: CPT

## 2023-08-15 PROCEDURE — 2580000003 HC RX 258: Performed by: INTERNAL MEDICINE

## 2023-08-15 PROCEDURE — 84100 ASSAY OF PHOSPHORUS: CPT

## 2023-08-15 PROCEDURE — 94761 N-INVAS EAR/PLS OXIMETRY MLT: CPT

## 2023-08-15 PROCEDURE — 80048 BASIC METABOLIC PNL TOTAL CA: CPT

## 2023-08-15 PROCEDURE — 6370000000 HC RX 637 (ALT 250 FOR IP): Performed by: FAMILY MEDICINE

## 2023-08-15 PROCEDURE — 6360000004 HC RX CONTRAST MEDICATION: Performed by: INTERNAL MEDICINE

## 2023-08-15 PROCEDURE — 6360000002 HC RX W HCPCS: Performed by: INTERNAL MEDICINE

## 2023-08-15 PROCEDURE — 83605 ASSAY OF LACTIC ACID: CPT

## 2023-08-15 PROCEDURE — 87040 BLOOD CULTURE FOR BACTERIA: CPT

## 2023-08-15 PROCEDURE — 84145 PROCALCITONIN (PCT): CPT

## 2023-08-15 RX ORDER — MECLIZINE HCL 12.5 MG/1
12.5 TABLET ORAL 3 TIMES DAILY PRN
Status: DISCONTINUED | OUTPATIENT
Start: 2023-08-15 | End: 2023-08-18 | Stop reason: HOSPADM

## 2023-08-15 RX ORDER — MIDODRINE HYDROCHLORIDE 5 MG/1
5 TABLET ORAL
Status: DISCONTINUED | OUTPATIENT
Start: 2023-08-15 | End: 2023-08-18 | Stop reason: HOSPADM

## 2023-08-15 RX ORDER — PANTOPRAZOLE SODIUM 40 MG/1
40 TABLET, DELAYED RELEASE ORAL
Status: DISCONTINUED | OUTPATIENT
Start: 2023-08-16 | End: 2023-08-15

## 2023-08-15 RX ORDER — PANTOPRAZOLE SODIUM 40 MG/1
40 TABLET, DELAYED RELEASE ORAL
Status: DISCONTINUED | OUTPATIENT
Start: 2023-08-15 | End: 2023-08-18 | Stop reason: HOSPADM

## 2023-08-15 RX ORDER — PROCHLORPERAZINE EDISYLATE 5 MG/ML
5 INJECTION INTRAMUSCULAR; INTRAVENOUS ONCE
Status: COMPLETED | OUTPATIENT
Start: 2023-08-15 | End: 2023-08-15

## 2023-08-15 RX ADMIN — ALUMINUM HYDROXIDE AND MAGNESIUM HYDROXIDE 30 ML: 200; 200 SUSPENSION ORAL at 09:12

## 2023-08-15 RX ADMIN — PROCHLORPERAZINE EDISYLATE 5 MG: 5 INJECTION INTRAMUSCULAR; INTRAVENOUS at 11:30

## 2023-08-15 RX ADMIN — PIPERACILLIN AND TAZOBACTAM 3375 MG: 3; .375 INJECTION, POWDER, LYOPHILIZED, FOR SOLUTION INTRAVENOUS at 11:30

## 2023-08-15 RX ADMIN — ONDANSETRON 4 MG: 2 INJECTION INTRAMUSCULAR; INTRAVENOUS at 02:36

## 2023-08-15 RX ADMIN — POLYETHYLENE GLYCOL 3350 17 G: 17 POWDER, FOR SOLUTION ORAL at 17:36

## 2023-08-15 RX ADMIN — SODIUM CHLORIDE, PRESERVATIVE FREE 5 ML: 5 INJECTION INTRAVENOUS at 21:16

## 2023-08-15 RX ADMIN — RIVAROXABAN 20 MG: 20 TABLET, FILM COATED ORAL at 17:36

## 2023-08-15 RX ADMIN — PREDNISONE 5 MG: 5 TABLET ORAL at 08:42

## 2023-08-15 RX ADMIN — AMIODARONE HYDROCHLORIDE 200 MG: 200 TABLET ORAL at 08:42

## 2023-08-15 RX ADMIN — SENNOSIDES AND DOCUSATE SODIUM 1 TABLET: 50; 8.6 TABLET ORAL at 17:36

## 2023-08-15 RX ADMIN — IOPAMIDOL 95 ML: 755 INJECTION, SOLUTION INTRAVENOUS at 20:38

## 2023-08-15 RX ADMIN — PIPERACILLIN AND TAZOBACTAM 3375 MG: 3; .375 INJECTION, POWDER, LYOPHILIZED, FOR SOLUTION INTRAVENOUS at 17:38

## 2023-08-15 RX ADMIN — ASPIRIN 81 MG: 81 TABLET, COATED ORAL at 08:42

## 2023-08-15 RX ADMIN — MIDODRINE HYDROCHLORIDE 2.5 MG: 5 TABLET ORAL at 08:42

## 2023-08-15 RX ADMIN — METOPROLOL TARTRATE 6.25 MG: 25 TABLET, FILM COATED ORAL at 21:14

## 2023-08-15 RX ADMIN — MIDODRINE HYDROCHLORIDE 5 MG: 5 TABLET ORAL at 17:36

## 2023-08-15 RX ADMIN — SODIUM CHLORIDE, PRESERVATIVE FREE 10 ML: 5 INJECTION INTRAVENOUS at 02:36

## 2023-08-15 RX ADMIN — DULOXETINE HYDROCHLORIDE 30 MG: 30 CAPSULE, DELAYED RELEASE ORAL at 21:13

## 2023-08-15 RX ADMIN — PANTOPRAZOLE SODIUM 40 MG: 40 TABLET, DELAYED RELEASE ORAL at 21:36

## 2023-08-15 RX ADMIN — ONDANSETRON 4 MG: 2 INJECTION INTRAMUSCULAR; INTRAVENOUS at 09:12

## 2023-08-15 ASSESSMENT — PAIN SCALES - GENERAL
PAINLEVEL_OUTOF10: 0

## 2023-08-15 NOTE — PROGRESS NOTES
0900: Patient complains of dizziness and a small amount of vomit. PRN zofran given to patient. RN was told during bedside shift report this morning, that patient vomited over night each time patient felt dizzy. 0915: Patients wife at bedside demanding new orders be placed for patient. Raising her voice at RN stating that patient has a fever. Patients temperature has been taken twice (orally and axillary) and does not show patient has a fever. Demanding a new urine culture be collected. Stating that patient is possibly on the wrong antibiotics and that patient could have obtained an infection while being in hospital. Asked RN if patient caught something going around in hospital that is making patient vomit. Angry that midodrine was added to patients medication regimen and she was not told about it. Angry that MD is unavailable to speak with her at this moment. MD made aware of all of this and stated she will round on patient ASAP. Patient remains in bed, calm, with no complaints. 0940: RN instructed by MD to give patient PRN dose of meclizine and increased dose of midodrine. Patients wife at bedside stating she does not want him to have either d/t people vomiting. RN held doses and updated MD.     3101: UA order in place as well as chest xray order. New labs in place as well as bladder scan order and rectal temp. Rectal temp presents temp of 98.3. Bladder scan shows 236mL of urine. MD made aware. 1050: RN went into room to give patient his antibiotic. Patients wife is at bedside stating that she does not want patient to have antibiotic until his urine culture results come back. Patient states he does not have to urinate at this time. RN spoke with MD and obtained a verbal order for a straight cath for patient. 1110: RN gave transfer shift report to Ervin Nxi whom will be taking over care for this patient. Made oncoming RN aware of the pending urine culture and straight cath order.

## 2023-08-15 NOTE — CARE COORDINATION
8/15/2023  Case Management Progress Note    11:40 AM  Patient is 80year old male admitted 8/10 with syncope and collapse   Patient's RUR is 12% green/low risk for readmission  Covid test: none noted this admission  Chart reviewed--patient discussed at interdisciplinary rounds  Per rounds this morning patient is not yet ready for discharge from the hospital. [facility] Mera is interested in patient however want to see how he does with therapy again today due to his orthostatics yesterday. He does not need auth for post acute placement. Will continue to follow and assist with discharge planning as needed.      Transition of Care Plan   Continue medical management/treatment  [facility] referral pending   Transportation tbd pending progress  CM will continue to follow    BENJAMIN Mckeon

## 2023-08-15 NOTE — PLAN OF CARE
Problem: Safety - Adult  Goal: Free from fall injury  Outcome: Progressing     Problem: ABCDS Injury Assessment  Goal: Absence of physical injury  Outcome: Progressing     Problem: Physical Therapy - Adult  Goal: By Discharge: Performs mobility at highest level of function for planned discharge setting. See evaluation for individualized goals. Description: FUNCTIONAL STATUS PRIOR TO ADMISSION: Patient was modified independent using a rollator for functional mobility. HOME SUPPORT PRIOR TO ADMISSION: The patient lived with his wife but did not require assistance. Physical Therapy Goals  Initiated 8/11/2023  1. Patient will move from supine to sit and sit to supine in bed with modified independence within 7 day(s). 2.  Patient will perform sit to stand with modified independence within 7 day(s). 3.  Patient will transfer from bed to chair and chair to bed with modified independence using the least restrictive device within 7 day(s). 4.  Patient will ambulate with modified independence for 75 feet with the least restrictive device within 7 day(s). 5.  Patient will ascend/descend 4 stairs with 1 handrail(s) with modified independence within 7 day(s). 8/14/2023 1514 by Tevin Box, PT  Outcome: Progressing     Problem: Pain  Goal: Verbalizes/displays adequate comfort level or baseline comfort level  Outcome: Progressing     Problem: Skin/Tissue Integrity  Goal: Absence of new skin breakdown  Description: 1. Monitor for areas of redness and/or skin breakdown  2. Assess vascular access sites hourly  3. Every 4-6 hours minimum:  Change oxygen saturation probe site  4. Every 4-6 hours:  If on nasal continuous positive airway pressure, respiratory therapy assess nares and determine need for appliance change or resting period.   Outcome: Progressing

## 2023-08-15 NOTE — PROGRESS NOTES
Occupational Therapy Note: Pt pleasantly declining PT/OT stating he was \"up most of the night\"due to nausea and vomiting.

## 2023-08-16 LAB
ANION GAP SERPL CALC-SCNC: 6 MMOL/L (ref 5–15)
BACTERIA SPEC CULT: NORMAL
BACTERIA SPEC CULT: NORMAL
BASOPHILS # BLD: 0.1 K/UL (ref 0–0.1)
BASOPHILS NFR BLD: 1 % (ref 0–1)
BUN SERPL-MCNC: 16 MG/DL (ref 6–20)
BUN/CREAT SERPL: 13 (ref 12–20)
CALCIUM SERPL-MCNC: 8.5 MG/DL (ref 8.5–10.1)
CHLORIDE SERPL-SCNC: 109 MMOL/L (ref 97–108)
CO2 SERPL-SCNC: 25 MMOL/L (ref 21–32)
CREAT SERPL-MCNC: 1.2 MG/DL (ref 0.7–1.3)
DIFFERENTIAL METHOD BLD: ABNORMAL
EOSINOPHIL # BLD: 0.2 K/UL (ref 0–0.4)
EOSINOPHIL NFR BLD: 2 % (ref 0–7)
ERYTHROCYTE [DISTWIDTH] IN BLOOD BY AUTOMATED COUNT: 16 % (ref 11.5–14.5)
GLUCOSE SERPL-MCNC: 104 MG/DL (ref 65–100)
HCT VFR BLD AUTO: 34.6 % (ref 36.6–50.3)
HGB BLD-MCNC: 10.8 G/DL (ref 12.1–17)
IMM GRANULOCYTES # BLD AUTO: 0.1 K/UL (ref 0–0.04)
IMM GRANULOCYTES NFR BLD AUTO: 1 % (ref 0–0.5)
LYMPHOCYTES # BLD: 1.6 K/UL (ref 0.8–3.5)
LYMPHOCYTES NFR BLD: 15 % (ref 12–49)
MAGNESIUM SERPL-MCNC: 2.1 MG/DL (ref 1.6–2.4)
MCH RBC QN AUTO: 27 PG (ref 26–34)
MCHC RBC AUTO-ENTMCNC: 31.2 G/DL (ref 30–36.5)
MCV RBC AUTO: 86.5 FL (ref 80–99)
MONOCYTES # BLD: 0.9 K/UL (ref 0–1)
MONOCYTES NFR BLD: 8 % (ref 5–13)
NEUTS SEG # BLD: 7.9 K/UL (ref 1.8–8)
NEUTS SEG NFR BLD: 73 % (ref 32–75)
NRBC # BLD: 0 K/UL (ref 0–0.01)
NRBC BLD-RTO: 0 PER 100 WBC
PLATELET # BLD AUTO: 204 K/UL (ref 150–400)
PMV BLD AUTO: 11.5 FL (ref 8.9–12.9)
POTASSIUM SERPL-SCNC: 3.8 MMOL/L (ref 3.5–5.1)
RBC # BLD AUTO: 4 M/UL (ref 4.1–5.7)
SERVICE CMNT-IMP: NORMAL
SERVICE CMNT-IMP: NORMAL
SODIUM SERPL-SCNC: 140 MMOL/L (ref 136–145)
WBC # BLD AUTO: 10.7 K/UL (ref 4.1–11.1)

## 2023-08-16 PROCEDURE — 97530 THERAPEUTIC ACTIVITIES: CPT

## 2023-08-16 PROCEDURE — 36415 COLL VENOUS BLD VENIPUNCTURE: CPT

## 2023-08-16 PROCEDURE — 1100000000 HC RM PRIVATE

## 2023-08-16 PROCEDURE — 85025 COMPLETE CBC W/AUTO DIFF WBC: CPT

## 2023-08-16 PROCEDURE — 97116 GAIT TRAINING THERAPY: CPT

## 2023-08-16 PROCEDURE — 80048 BASIC METABOLIC PNL TOTAL CA: CPT

## 2023-08-16 PROCEDURE — 6370000000 HC RX 637 (ALT 250 FOR IP): Performed by: NURSE PRACTITIONER

## 2023-08-16 PROCEDURE — 6360000002 HC RX W HCPCS: Performed by: INTERNAL MEDICINE

## 2023-08-16 PROCEDURE — 6370000000 HC RX 637 (ALT 250 FOR IP): Performed by: INTERNAL MEDICINE

## 2023-08-16 PROCEDURE — 83735 ASSAY OF MAGNESIUM: CPT

## 2023-08-16 PROCEDURE — 2580000003 HC RX 258: Performed by: INTERNAL MEDICINE

## 2023-08-16 PROCEDURE — 97535 SELF CARE MNGMENT TRAINING: CPT

## 2023-08-16 PROCEDURE — 6370000000 HC RX 637 (ALT 250 FOR IP): Performed by: FAMILY MEDICINE

## 2023-08-16 PROCEDURE — 94761 N-INVAS EAR/PLS OXIMETRY MLT: CPT

## 2023-08-16 RX ADMIN — AMIODARONE HYDROCHLORIDE 200 MG: 200 TABLET ORAL at 09:08

## 2023-08-16 RX ADMIN — SENNOSIDES AND DOCUSATE SODIUM 1 TABLET: 50; 8.6 TABLET ORAL at 18:21

## 2023-08-16 RX ADMIN — MIDODRINE HYDROCHLORIDE 5 MG: 5 TABLET ORAL at 12:27

## 2023-08-16 RX ADMIN — ASPIRIN 81 MG: 81 TABLET, COATED ORAL at 09:09

## 2023-08-16 RX ADMIN — PIPERACILLIN AND TAZOBACTAM 3375 MG: 3; .375 INJECTION, POWDER, LYOPHILIZED, FOR SOLUTION INTRAVENOUS at 18:21

## 2023-08-16 RX ADMIN — PREDNISONE 5 MG: 5 TABLET ORAL at 09:09

## 2023-08-16 RX ADMIN — PIPERACILLIN AND TAZOBACTAM 3375 MG: 3; .375 INJECTION, POWDER, LYOPHILIZED, FOR SOLUTION INTRAVENOUS at 01:54

## 2023-08-16 RX ADMIN — METOPROLOL TARTRATE 6.25 MG: 25 TABLET, FILM COATED ORAL at 20:34

## 2023-08-16 RX ADMIN — METOPROLOL TARTRATE 6.25 MG: 25 TABLET, FILM COATED ORAL at 09:09

## 2023-08-16 RX ADMIN — DULOXETINE HYDROCHLORIDE 30 MG: 30 CAPSULE, DELAYED RELEASE ORAL at 20:34

## 2023-08-16 RX ADMIN — PANTOPRAZOLE SODIUM 40 MG: 40 TABLET, DELAYED RELEASE ORAL at 16:58

## 2023-08-16 RX ADMIN — PIPERACILLIN AND TAZOBACTAM 3375 MG: 3; .375 INJECTION, POWDER, LYOPHILIZED, FOR SOLUTION INTRAVENOUS at 10:47

## 2023-08-16 RX ADMIN — PANTOPRAZOLE SODIUM 40 MG: 40 TABLET, DELAYED RELEASE ORAL at 05:19

## 2023-08-16 RX ADMIN — MIDODRINE HYDROCHLORIDE 5 MG: 5 TABLET ORAL at 09:09

## 2023-08-16 RX ADMIN — SODIUM CHLORIDE, PRESERVATIVE FREE 10 ML: 5 INJECTION INTRAVENOUS at 09:10

## 2023-08-16 RX ADMIN — SODIUM CHLORIDE, PRESERVATIVE FREE 5 ML: 5 INJECTION INTRAVENOUS at 20:35

## 2023-08-16 RX ADMIN — MIDODRINE HYDROCHLORIDE 5 MG: 5 TABLET ORAL at 18:21

## 2023-08-16 RX ADMIN — POLYETHYLENE GLYCOL 3350 17 G: 17 POWDER, FOR SOLUTION ORAL at 18:21

## 2023-08-16 RX ADMIN — RIVAROXABAN 20 MG: 20 TABLET, FILM COATED ORAL at 18:21

## 2023-08-16 NOTE — WOUND CARE
Wound Consult:  follow up Visit. Patient is sitting on a chair with leg elevated  Patient is awake, oriented x4;  stood with walker and assist for assessment of sacrum  Assessment:  Right arm skin tears- moist, pink, nonviable flap removed, no drainage. no surrounding redness  Sacrum- blanching red, gluteal cleft resurfaced. Bilateral heels - no redness  Treatment:  Waffle cushion placed on chair  Sacral foam changed after cleansing with blue wipes  Wound Recommendations:  Continue current treatments  Plan: We will continue to reassess weekly and as needed. Please re-consult should concerns arise despite continued skin/PI prevention measures.   [facility], Wound / [address] [facility] [phone]

## 2023-08-16 NOTE — PROGRESS NOTES
Comprehensive Nutrition Assessment    Type and Reason for Visit:  Initial    Nutrition Recommendations/Plan:   Continue regular diet order  Provide Ensure High Protein once daily (160 kcal, 19 g carbs, 16 g protein)       Malnutrition Assessment:  Malnutrition Status:  No malnutrition (08/16/23 1342)    Context:  Acute Illness     Findings of the 6 clinical characteristics of malnutrition:  Energy Intake:  No significant decrease in energy intake  Weight Loss:  No significant weight loss     Body Fat Loss:  No significant body fat loss     Muscle Mass Loss:  No significant muscle mass loss    Fluid Accumulation:  No significant fluid accumulation     Strength:  Not Performed    Nutrition Assessment:     Patient is an 80year old male admitted with Syncope and collapse [R55]. He  has a past medical history of CKD (chronic kidney disease), stage III ([address]), Sjoegren syndrome, and Urine retention. RD screen for LOS. Patient and wife at bedside, both provide information. Patient states appetite is 'okay' but per documentation has been consuming 75% or more of most meals. Reports prior problems chewing 2/2 issues with dental plate but no current problems. NKFA. No nausea/vomiting today but stated he had problems with these yesterday. No diarrhea. Drinks Ensure at home and voiced acceptance of ONS during admission, wife has apparently been providing some from home. States -180# with no recent weight changes. Discussed during IDRs - hoping to discharge to [facility] soon. Wt Readings from Last 10 Encounters:   08/12/23 80.1 kg (176 lb 9.6 oz)     Meal Intake:   Patient Vitals for the past 168 hrs:   PO Meals Eaten (%)   08/15/23 0956 0%   08/13/23 1500 51 - 75%   08/13/23 1055 76 - 100%   08/12/23 0836 76 - 100%   08/11/23 1038 76 - 100%     Supplement Intake:  No data found. Nutrition Related Findings:      Wound Type: None     Last BM: 08/11/23  Edema: None                    Nutr.  Labs:    Lab Needs:  Energy Requirements Based On: Formula  Weight Used for Energy Requirements: Current  Energy (kcal/day): 1996 (MSJ x 1.3)  Weight Used for Protein Requirements: Current  Protein (g/day): 80-96 (1.0-1.2g/kg)  Method Used for Fluid Requirements: 1 ml/kcal  Fluid (ml/day): 1996    Nutrition Diagnosis:   No nutrition diagnosis at this time     Nutrition Interventions:   Food and/or Nutrient Delivery: Continue Current Diet  Nutrition Education/Counseling: No recommendation at this time  Coordination of Nutrition Care: Continue to monitor while inpatient, Interdisciplinary Rounds  Plan of Care discussed with: IDR team    Goals:     Goals: Meet at least 75% of estimated needs, by next RD assessment       Nutrition Monitoring and Evaluation:   Behavioral-Environmental Outcomes: None Identified  Food/Nutrient Intake Outcomes: Food and Nutrient Intake  Physical Signs/Symptoms Outcomes: Biochemical Data    Discharge Planning:    No discharge needs at this time     Stephon Ziegler RD MS  Contact: Ext: [phone], or via [employer]

## 2023-08-16 NOTE — CARE COORDINATION
8/16/2023  Case Management Progress Note    12:20 PM  Patient is 80year old male admitted 8/10 with syncope and collapse   Patient's RUR is 12% green/low risk for readmission  Covid test: none noted this admission  Chart reviewed--patient discussed at interdisciplinary rounds  Per rounds this morning patient is improving on new antibiotic, feeling better today. [facility] continues to follow, although patient refused therapy yesterday and requested they come back later this morning--discussed that therapy needs to eval for [facility] to officially accept. He does not need auth, so once approved he can go there same day. Will continue to follow and assist with discharge planning.      Transition of Care Plan   Continue medical management/treatment  IPR--[facility] following  Transportation tbd pending progress  CM will continue to follow    BENJAMIN Holland

## 2023-08-16 NOTE — PLAN OF CARE
Problem: Occupational Therapy - Adult  Goal: By Discharge: Performs self-care activities at highest level of function for planned discharge setting. See evaluation for individualized goals. Description: FUNCTIONAL STATUS PRIOR TO ADMISSION:  Patient was ambulatory using rollator, h/o of falls  Receives Help From: Family, ADL Assistance: Independent,  ,  ,  ,  ,  ,  , Ambulation Assistance: Needs assistance (using rollator),  , Active : No     HOME SUPPORT: Patient lived with spouse but didn't require assistance. Occupational Therapy Goals:  Initiated 8/11/2023  1. Patient will perform grooming standing at sink with Modified Pearson within 7 day(s). 2.  Patient will perform bathing with Modified Pearson within 7 day(s). 3.  Patient will perform lower body dressing with Modified Pearson within 7 day(s). 4.  Patient will perform toilet transfers with Modified Pearson  within 7 day(s). 5.  Patient will perform all aspects of toileting with Modified Pearson within 7 day(s). 6.  Patient will participate in upper extremity therapeutic exercise/activities with Set-up for 10 minutes within 7 day(s). 7.  Patient will utilize energy conservation techniques during functional activities with verbal cues within 7 day(s). Outcome: Progressing   OCCUPATIONAL THERAPY TREATMENT  Patient: Niko Salvador (1 y.o. male)  Date: 8/16/2023  Primary Diagnosis: Syncope and collapse [R55]       Precautions: Fall Risk                Chart, occupational therapy assessment, plan of care, and goals were reviewed. ASSESSMENT  Patient continues to benefit from skilled OT services and is progressing towards goals. Mr. Albino Heimlich was agreeable to activity. Patient performed bed mobility, transfer into the bathroom for ADL retraining, and return transfer to bed with x1 person assist.  Education provided regarding improved transfer technique and adaptive ADL techniques.   Patient engaged in all aspects of toileting and completed grooming tasks seated EOB. Patient with drop in BP in standing however asymptomatic and improved with seated rest break (see doc flowsheets for details). Patient would benefit form continued skilled OT to progress towards goals and improve overall independence. PLAN :  Patient continues to benefit from skilled intervention to address the above impairments. Continue treatment per established plan of care to address goals. Recommend with staff:   Recommend patient be OOB to chair as frequently as tolerated; Goal of 3x/day for all meals for 60 minutes at a time. For toileting needs, recommend transfers to/from bathroom with staff assist.   Encourage patient involvement in personal care as able. Recommend next OT session: Continue towards set OT goals. Recommendation for discharge: (in order for the patient to meet his/her long term goals): Therapy up to 5 days/week in rehab setting    Other factors to consider for discharge: no additional factors    IF patient discharges home will need the following DME: none       SUBJECTIVE:   Patient agreeable to OT tx. OBJECTIVE DATA SUMMARY:   Cognitive/Behavioral Status:  Orientation  Overall Orientation Status: Within Normal Limits  Orientation Level: Oriented X4  Cognition  Overall Cognitive Status: WNL    Functional Mobility and Transfers for ADLs:  Bed Mobility:  Bed Mobility Training  Bed Mobility Training: Yes  Overall Level of Assistance: Supervision;Assist X1  Rolling: Modified independent;Supervision  Supine to Sit: Supervision  Sit to Supine: Supervision  Scooting: Supervision     Transfers:   Transfer Training  Transfer Training: Yes  Overall Level of Assistance: Minimum assistance; Additional time  Interventions: Verbal cues; Safety awareness training  Sit to Stand: Minimum assistance; Additional time  Stand to Sit: Minimum assistance  Stand Pivot Transfers: Minimum assistance  Bed to Chair: Minimum

## 2023-08-16 NOTE — PLAN OF CARE
Problem: Safety - Adult  Goal: Free from fall injury  Outcome: Progressing     Problem: ABCDS Injury Assessment  Goal: Absence of physical injury  Outcome: Progressing     Problem: Occupational Therapy - Adult  Goal: By Discharge: Performs self-care activities at highest level of function for planned discharge setting. See evaluation for individualized goals. Description: FUNCTIONAL STATUS PRIOR TO ADMISSION:  Patient was ambulatory using rollator, h/o of falls  Receives Help From: Family, ADL Assistance: Independent,  ,  ,  ,  ,  ,  , Ambulation Assistance: Needs assistance (using rollator),  , Active : No     HOME SUPPORT: Patient lived with spouse but didn't require assistance. Occupational Therapy Goals:  Initiated 8/11/2023  1. Patient will perform grooming standing at sink with Modified [place] within 7 day(s). 2.  Patient will perform bathing with Modified [place] within 7 day(s). 3.  Patient will perform lower body dressing with Modified [place] within 7 day(s). 4.  Patient will perform toilet transfers with Modified [place]  within 7 day(s). 5.  Patient will perform all aspects of toileting with Modified [place] within 7 day(s). 6.  Patient will participate in upper extremity therapeutic exercise/activities with Set-up for 10 minutes within 7 day(s). 7.  Patient will utilize energy conservation techniques during functional activities with verbal cues within 7 day(s). 8/16/2023 1716 by Maryam Wang OT  Outcome: Progressing     Problem: Physical Therapy - Adult  Goal: By Discharge: Performs mobility at highest level of function for planned discharge setting. See evaluation for individualized goals. Description: FUNCTIONAL STATUS PRIOR TO ADMISSION: Patient was modified independent using a rollator for functional mobility. HOME SUPPORT PRIOR TO ADMISSION: The patient lived with his wife but did not require assistance.     Physical Therapy Goals  Initiated

## 2023-08-16 NOTE — PROGRESS NOTES
Physician Progress Note      PATIENT:               Bekah Rashid  CSN #:                  [id number]  :                       1940  ADMIT DATE:       8/10/2023 9:52 AM  DISCH DATE:  eLxx Choi  PROVIDER #:        Genna Decker MD          QUERY TEXT:    Good afternoon. Patient admitted with syncope, noted to have atrial fibrillation and is   maintained on Xarelto. If possible, please document in progress notes and discharge summary if you   are evaluating and/or treating any of the following: The medical record reflects the following:      Risk Factors: 80 yr old male, hx of atrial fibrillation s/p ablation, CKD,   HTN, HLD, tobacco use, CAD, anemia    Clinical Indicators: from  Cardiology PN: \"A-fib s/p ablation, s/p   Watchman in - do not think junctional rhythm as EKG reads - HR more   elevated w/ activity up into 140's, then recovers - symptomatic w/ elevated   HR's - remains on Xarelto for 30 days post ablation - Amio 200mg po QD started   per EP\"    Treatment: Xarelto, Amiodarone, Cardiology consult        Thank you,  Emily Schneider RN, CDI  Options provided:  -- Secondary hypercoagulable state in a patient with atrial fibrillation  -- Other - I will add my own diagnosis  -- Disagree - Not applicable / Not valid  -- Disagree - Clinically unable to determine / Unknown  -- Refer to Clinical Documentation Reviewer    PROVIDER RESPONSE TEXT:    This patient has secondary hypercoagulable state in a patient with atrial   fibrillation.     Query created by: Emily Schneider on 8/15/2023 6:50 PM      Electronically signed by:  Genna Decker MD 8/15/2023 8:09 PM

## 2023-08-16 NOTE — PROGRESS NOTES
Physical Therapy Note:  Pt received supine in bed. Wife at bedside. Both requesting to not receive PT tx until 3hrs post prednisone. Requesting tx at 12noon today. Will defer and continue to follow near requested time.   Cecy Marte, PT

## 2023-08-17 LAB
ANION GAP SERPL CALC-SCNC: 6 MMOL/L (ref 5–15)
BASOPHILS # BLD: 0.1 K/UL (ref 0–0.1)
BASOPHILS NFR BLD: 1 % (ref 0–1)
BUN SERPL-MCNC: 20 MG/DL (ref 6–20)
BUN/CREAT SERPL: 15 (ref 12–20)
CALCIUM SERPL-MCNC: 8.7 MG/DL (ref 8.5–10.1)
CHLORIDE SERPL-SCNC: 109 MMOL/L (ref 97–108)
CO2 SERPL-SCNC: 23 MMOL/L (ref 21–32)
CREAT SERPL-MCNC: 1.3 MG/DL (ref 0.7–1.3)
DIFFERENTIAL METHOD BLD: ABNORMAL
EOSINOPHIL # BLD: 0.2 K/UL (ref 0–0.4)
EOSINOPHIL NFR BLD: 2 % (ref 0–7)
ERYTHROCYTE [DISTWIDTH] IN BLOOD BY AUTOMATED COUNT: 16.1 % (ref 11.5–14.5)
GLUCOSE SERPL-MCNC: 107 MG/DL (ref 65–100)
HCT VFR BLD AUTO: 33.3 % (ref 36.6–50.3)
HGB BLD-MCNC: 10.4 G/DL (ref 12.1–17)
IMM GRANULOCYTES # BLD AUTO: 0.1 K/UL (ref 0–0.04)
IMM GRANULOCYTES NFR BLD AUTO: 1 % (ref 0–0.5)
LYMPHOCYTES # BLD: 1.7 K/UL (ref 0.8–3.5)
LYMPHOCYTES NFR BLD: 16 % (ref 12–49)
MAGNESIUM SERPL-MCNC: 2.2 MG/DL (ref 1.6–2.4)
MCH RBC QN AUTO: 26.9 PG (ref 26–34)
MCHC RBC AUTO-ENTMCNC: 31.2 G/DL (ref 30–36.5)
MCV RBC AUTO: 86.3 FL (ref 80–99)
MONOCYTES # BLD: 1.1 K/UL (ref 0–1)
MONOCYTES NFR BLD: 11 % (ref 5–13)
NEUTS SEG # BLD: 7.5 K/UL (ref 1.8–8)
NEUTS SEG NFR BLD: 69 % (ref 32–75)
NRBC # BLD: 0 K/UL (ref 0–0.01)
NRBC BLD-RTO: 0 PER 100 WBC
PLATELET # BLD AUTO: 212 K/UL (ref 150–400)
PMV BLD AUTO: 11.4 FL (ref 8.9–12.9)
POTASSIUM SERPL-SCNC: 3.9 MMOL/L (ref 3.5–5.1)
RBC # BLD AUTO: 3.86 M/UL (ref 4.1–5.7)
SODIUM SERPL-SCNC: 138 MMOL/L (ref 136–145)
WBC # BLD AUTO: 10.7 K/UL (ref 4.1–11.1)

## 2023-08-17 PROCEDURE — 1100000000 HC RM PRIVATE

## 2023-08-17 PROCEDURE — 36415 COLL VENOUS BLD VENIPUNCTURE: CPT

## 2023-08-17 PROCEDURE — 6370000000 HC RX 637 (ALT 250 FOR IP): Performed by: INTERNAL MEDICINE

## 2023-08-17 PROCEDURE — 6370000000 HC RX 637 (ALT 250 FOR IP): Performed by: NURSE PRACTITIONER

## 2023-08-17 PROCEDURE — 6370000000 HC RX 637 (ALT 250 FOR IP): Performed by: FAMILY MEDICINE

## 2023-08-17 PROCEDURE — 2580000003 HC RX 258: Performed by: INTERNAL MEDICINE

## 2023-08-17 PROCEDURE — 85025 COMPLETE CBC W/AUTO DIFF WBC: CPT

## 2023-08-17 PROCEDURE — 97116 GAIT TRAINING THERAPY: CPT

## 2023-08-17 PROCEDURE — 97535 SELF CARE MNGMENT TRAINING: CPT | Performed by: OCCUPATIONAL THERAPIST

## 2023-08-17 PROCEDURE — 97530 THERAPEUTIC ACTIVITIES: CPT

## 2023-08-17 PROCEDURE — 6360000002 HC RX W HCPCS: Performed by: INTERNAL MEDICINE

## 2023-08-17 PROCEDURE — 80048 BASIC METABOLIC PNL TOTAL CA: CPT

## 2023-08-17 PROCEDURE — 97530 THERAPEUTIC ACTIVITIES: CPT | Performed by: OCCUPATIONAL THERAPIST

## 2023-08-17 PROCEDURE — 83735 ASSAY OF MAGNESIUM: CPT

## 2023-08-17 RX ADMIN — PIPERACILLIN AND TAZOBACTAM 3375 MG: 3; .375 INJECTION, POWDER, LYOPHILIZED, FOR SOLUTION INTRAVENOUS at 09:13

## 2023-08-17 RX ADMIN — PIPERACILLIN AND TAZOBACTAM 3375 MG: 3; .375 INJECTION, POWDER, LYOPHILIZED, FOR SOLUTION INTRAVENOUS at 17:47

## 2023-08-17 RX ADMIN — METOPROLOL TARTRATE 6.25 MG: 25 TABLET, FILM COATED ORAL at 21:07

## 2023-08-17 RX ADMIN — MIDODRINE HYDROCHLORIDE 5 MG: 5 TABLET ORAL at 09:13

## 2023-08-17 RX ADMIN — PREDNISONE 5 MG: 5 TABLET ORAL at 09:13

## 2023-08-17 RX ADMIN — MIDODRINE HYDROCHLORIDE 5 MG: 5 TABLET ORAL at 16:39

## 2023-08-17 RX ADMIN — RIVAROXABAN 20 MG: 20 TABLET, FILM COATED ORAL at 16:39

## 2023-08-17 RX ADMIN — DULOXETINE HYDROCHLORIDE 30 MG: 30 CAPSULE, DELAYED RELEASE ORAL at 21:07

## 2023-08-17 RX ADMIN — ASPIRIN 81 MG: 81 TABLET, COATED ORAL at 09:13

## 2023-08-17 RX ADMIN — SENNOSIDES AND DOCUSATE SODIUM 1 TABLET: 50; 8.6 TABLET ORAL at 17:47

## 2023-08-17 RX ADMIN — METOPROLOL TARTRATE 6.25 MG: 25 TABLET, FILM COATED ORAL at 10:13

## 2023-08-17 RX ADMIN — PANTOPRAZOLE SODIUM 40 MG: 40 TABLET, DELAYED RELEASE ORAL at 05:09

## 2023-08-17 RX ADMIN — AMIODARONE HYDROCHLORIDE 200 MG: 200 TABLET ORAL at 09:13

## 2023-08-17 RX ADMIN — SODIUM CHLORIDE, PRESERVATIVE FREE 10 ML: 5 INJECTION INTRAVENOUS at 09:13

## 2023-08-17 RX ADMIN — PIPERACILLIN AND TAZOBACTAM 3375 MG: 3; .375 INJECTION, POWDER, LYOPHILIZED, FOR SOLUTION INTRAVENOUS at 01:54

## 2023-08-17 RX ADMIN — PANTOPRAZOLE SODIUM 40 MG: 40 TABLET, DELAYED RELEASE ORAL at 16:39

## 2023-08-17 RX ADMIN — POLYETHYLENE GLYCOL 3350 17 G: 17 POWDER, FOR SOLUTION ORAL at 17:47

## 2023-08-17 RX ADMIN — SODIUM CHLORIDE, PRESERVATIVE FREE 10 ML: 5 INJECTION INTRAVENOUS at 21:16

## 2023-08-17 ASSESSMENT — PAIN SCALES - GENERAL: PAINLEVEL_OUTOF10: 0

## 2023-08-17 NOTE — PROGRESS NOTES
[address] [place] [address]  [phone]      Medical Progress Note      NAME: Slick Rivera   :  1940  MRM:  [id number]    Date/Time of service: 2023      Subjective:     Chief Complaint:  Patient was personally seen and examined by me during this time period. Chart reviewed. Follow-up syncope. No more fevers. Feels much better, still having some dizziness with standing. He has had dizziness with standing for 20 years and states he has been working out in the past.  His lowest blood pressure on standing is 94 systolic. Patient is complaining today of hematuria, and would like to see a urologist.  I have not seen his urine as he has been using the toilet, and the one time he used a urinal it was dumped out. He is reporting a large amount of hematuria, no clots. Urinalysis pending       Objective:       Vitals:       Last 24hrs VS reviewed since prior progress note.  Most recent are:    Vitals:    23 1444   BP: 114/70   Pulse: 96   Resp:    Temp:    SpO2:      SpO2 Readings from Last 6 Encounters:   23 96%          Intake/Output Summary (Last 24 hours) at 2023 1459  Last data filed at 2023 0915  Gross per 24 hour   Intake 527.71 ml   Output 700 ml   Net -172.29 ml          Exam:     Physical Exam:    Gen:   in no acute distress  HEENT:  Pink conjunctivae, PERRL, hearing intact to voice  Resp:  No accessory muscle use, clear breath sounds without wheezes rales or rhonchi  Card:  irregularly irregular, No murmurs, normal S1, S2, no peripheral edema  Abd:  Soft, non-tender, non-distended, normoactive bowel sounds are present  Musc:  No cyanosis or clubbing  Skin:  No rashes or ulcers, skin turgor is good  Neuro:  Cranial nerves 3-12 are grossly intact, follows commands appropriately  Psych:  fair insight      Medications Reviewed: (see below)    Lab Data Reviewed: (see below)    ______________________________________________________________________    Medications:     Current Facility-Administered Medications   Medication Dose Route Frequency    midodrine (PROAMATINE) tablet 5 mg  5 mg Oral TID WC    aluminum-magnesium hydroxide 200-200 MG/5ML suspension 30 mL  30 mL Oral Q4H PRN    meclizine (ANTIVERT) tablet 12.5 mg  12.5 mg Oral TID PRN    piperacillin-tazobactam (ZOSYN) 3,375 mg in sodium chloride 0.9 % 50 mL IVPB (mini-bag)  3,375 mg IntraVENous Q8H    pantoprazole (PROTONIX) tablet 40 mg  40 mg Oral BID AC    metoprolol tartrate (LOPRESSOR) tablet 6.25 mg  6.25 mg Oral BID    amiodarone (CORDARONE) tablet 200 mg  200 mg Oral Daily    rivaroxaban (XARELTO) tablet 20 mg  20 mg Oral Dinner    sodium chloride flush 0.9 % injection 5-40 mL  5-40 mL IntraVENous 2 times per day    sodium chloride flush 0.9 % injection 5-40 mL  5-40 mL IntraVENous PRN    0.9 % sodium chloride infusion   IntraVENous PRN    ondansetron (ZOFRAN-ODT) disintegrating tablet 4 mg  4 mg Oral Q8H PRN    Or    ondansetron (ZOFRAN) injection 4 mg  4 mg IntraVENous Q6H PRN    polyethylene glycol (GLYCOLAX) packet 17 g  17 g Oral Daily PRN    acetaminophen (TYLENOL) tablet 650 mg  650 mg Oral Q6H PRN    Or    acetaminophen (TYLENOL) suppository 650 mg  650 mg Rectal Q6H PRN    acetaminophen (TYLENOL) tablet 650 mg  650 mg Oral Q6H PRN    DULoxetine (CYMBALTA) extended release capsule 30 mg  30 mg Oral Nightly    polyethylene glycol (GLYCOLAX) packet 17 g  17 g Oral QPM    predniSONE (DELTASONE) tablet 5 mg  5 mg Oral Daily    sennosides-docusate sodium (SENOKOT-S) 8.6-50 MG tablet 1 tablet  1 tablet Oral QPM    aspirin EC tablet 81 mg  81 mg Oral Daily          Lab Review:     Recent Labs     08/15/23  1155 08/16/23  0152 08/17/23  0101   WBC 17.5* 10.7 10.7   HGB 11.1* 10.8* 10.4*   HCT 35.4* 34.6* 33.3*    204 212       Recent Labs     08/15/23  1155 08/16/23  0152 08/17/23  0101    140 138   K

## 2023-08-17 NOTE — PLAN OF CARE
Problem: Physical Therapy - Adult  Goal: By Discharge: Performs mobility at highest level of function for planned discharge setting. See evaluation for individualized goals. Description: FUNCTIONAL STATUS PRIOR TO ADMISSION: Patient was modified independent using a rollator for functional mobility. HOME SUPPORT PRIOR TO ADMISSION: The patient lived with his wife but did not require assistance. Physical Therapy Goals  Initiated 8/11/2023  1. Patient will move from supine to sit and sit to supine in bed with modified independence within 7 day(s). 2.  Patient will perform sit to stand with modified independence within 7 day(s). 3.  Patient will transfer from bed to chair and chair to bed with modified independence using the least restrictive device within 7 day(s). 4.  Patient will ambulate with modified independence for 75 feet with the least restrictive device within 7 day(s). 5.  Patient will ascend/descend 4 stairs with 1 handrail(s) with modified independence within 7 day(s). Outcome: Progressing   PHYSICAL THERAPY TREATMENT    Patient: Raiza Fernandes (87 y.o. male)  Date: 8/17/2023  Diagnosis: Syncope and collapse [R55] Syncope and collapse      Precautions: Fall Risk                    ASSESSMENT:  Patient continues to benefit from skilled PT services and is progressing towards goals. Patient this afternoon with good participation and tolerance to physical therapy session emphasizing gait training. He is received in sitting with compression hose donned. He does demonstrate fair drop in blood pressure as below to 82/60 and endorses dizziness upon initially standing up, but improved with standing marching and 2 minute rest. He then tolerates 65ft ambulation in hallways with constant support and minAx1 and rollator per patient request. Moderate instability with mild steppage gait during gait and path deviations. He continues to present high fall risk. Continue to recommend IPR upon dc. Patient Vitals for the past 6 hrs:   Pulse BP Patient Position   08/17/23 1605 (!) 105 116/70 Standing   08/17/23 1600 98 108/69 Standing   08/17/23 1559 97 82/60 Standing   08/17/23 1558 59 100/64 Sitting              PLAN:  Patient continues to benefit from skilled intervention to address the above impairments. Continue treatment per established plan of care. Recommendation for discharge: (in order for the patient to meet his/her long term goals): Therapy 3 hours/day 5-7 days/week    Other factors to consider for discharge: high risk for falls and concern for safely navigating or managing the home environment    IF patient discharges home will need the following DME: continuing to assess with progress       SUBJECTIVE:   Patient stated, \"I'm being mean. \" Re: Patient telling PT to shush when PT asked what she could do to assist in his mood. OBJECTIVE DATA SUMMARY:   Patient received seated in bedside chair, agreeable to participate in PT session. Patient was cleared by nursing to participate in PT session. Patient's spouse was present for physical therapy session.     Critical Behavior:  Orientation  Overall Orientation Status: Within Normal Limits  Orientation Level: Oriented X4  Cognition  Overall Cognitive Status: WNL    Functional Mobility Training:  Bed Mobility:  Bed Mobility Training  Bed Mobility Training: Yes  Overall Level of Assistance: Supervision  Rolling: Supervision  Supine to Sit: Supervision  Sit to Supine: Supervision  Scooting: Supervision  Transfers:  Transfer Training  Transfer Training: Yes  Overall Level of Assistance: Contact-guard assistance  Interventions: Verbal cues  Sit to Stand: Contact-guard assistance  Stand to Sit: Contact-guard assistance  Stand Pivot Transfers: Contact-guard assistance  Bed to Chair: Minimum assistance  Balance:  Balance  Sitting: Intact  Standing: Impaired  Standing - Static: Fair  Standing - Dynamic: Fair;Constant support   Ambulation/Gait

## 2023-08-17 NOTE — PROGRESS NOTES
Spiritual Care Assessment/Progress Note  [address] [facility]    Name: Dale Maldonado MRN: [id number]    Age: 80 y.o. Sex: male   Language: English     Date: 8/17/2023            Total Time Calculated: 25 min              Spiritual Assessment begun in SF 5M1 MED SURG 1  Service Provided For[de-identified] Patient     Encounter Overview/Reason : Initial Encounter    Spiritual beliefs:      [x] Involved in a paul tradition/spiritual practice:      [] Supported by a paul community:      [] Claims no spiritual orientation:      [] Seeking spiritual identity:           [] Adheres to an individual form of spirituality:      [] Not able to assess:                Identified resources for coping and support system:   Support System: Spouse       [] Prayer                  [] Devotional reading               [] Music                  [] Guided Imagery     [] Pet visits                                        [] Other: (COMMENT)     Specific area/focus of visit   Encounter:    Crisis:    Spiritual/Emotional needs: Type: Spiritual Support  Ritual, Rites and Sacraments:    Grief, Loss, and Adjustments:    Ethics/Mediation:    Behavioral Health:    Palliative Care: Advance Care Planning:      Plan/Referrals: Other (Comment) (Please contact Spiritual Care for further consults)    Narrative:  visit for the patient on Med Surg for length of stay. Reviewed pt's chart and spoke with pt's nurse. Pt shared recent medical events. Pt shared he johnny through his strong paul in God, and is grateful for God's care. Pt is hopeful for resolution of symptoms and improvement for discharge. Please contact Spiritual Care for further referrals.     [address] [facility], [address] [facility], [address] [facility]  Staff   Paging service: [phone] (OSCAR)

## 2023-08-17 NOTE — CONSULTS
New Urology Consult Note    Patient: Leigh Xavier MRN: [id number]  SSN: [id number]    YOB: 1940  Age: 80 y.o. Sex: male            Assessment:     Leigh Xavier is a 80 y.o. male with [address] that started 2 days ago following the insertion of a straight cath to obtain urine specimen. He was admitted for UTI intially dx 9 days ago at [facility], he was dc'ed from there home on abx but his symptoms worsened and his wife called EMS when he fainted at home. He is on [facility] for recent cardiac ablation. Urine in urinal is clear/yellow with 2 small clots. Labs stable. Multiple possible causes - recent UTI, AC, [place] trauma. Wife is very concerned. Recommendations:     1. Rack urine specimen from each shift for Urology to reassess in am. I think this will be self limiting and educated him and his wife about potential etiologies and when to seek urgent help. If bleeding persists beyond 5-7 days, recommend office f/u for non urgent cysto     Thank you for this consult. Please contact [facility] with any further questions/concerns.   Kat Mendoza, BARBIP-C [phone]      Subjective     Past Medical History  Past Medical History:   Diagnosis Date    CKD (chronic kidney disease), stage III ([address] [facility])     Sjoegren syndrome 04/2016    Urine retention        Past Surgical History:   Past Surgical History:   Procedure Laterality Date    APPENDECTOMY      CERVICAL DISCECTOMY      HEENT      Zygomatic arch repair    HERNIA REPAIR      ORTHOPEDIC SURGERY      Broken tibia and fibula    PROSTATE SURGERY      TURP    TOTAL HIP ARTHROPLASTY Left 06/11/2019    VASECTOMY         Medication:  Current Facility-Administered Medications   Medication Dose Route Frequency Provider Last Rate Last Admin    midodrine (PROAMATINE) tablet 5 mg  5 mg Oral TID  Gabbi Watson MD   5 mg at 08/17/23 1639    aluminum-magnesium hydroxide 200-200 MG/5ML suspension 30 mL  30 mL Oral Q4H PRN

## 2023-08-17 NOTE — CARE COORDINATION
8/17/2023  Case Management Progress Note    2:43 PM  [facility] accepts, patient can transfer there tomorrow. BENJAMIN Bullock    1:43 PM  Patient is 80year old male admitted 8/10 with syncope and collapse   Patient's RUR is 13% green/low risk for readmission  Covid test: none noted this admission  Chart reviewed--patient discussed at interdisciplinary rounds  Per rounds this morning patient's blood pressure is likely as optimized as possible. [facility] has submitted for medical approval from their physician and will get back to me later this afternoon with an official answer. Patient does not need auth and could go whenever there's a bed. Will continue to follow and assist with discharge planning.      Transition of Care Plan   Continue medical management/treatment  [facility] will have an answer today   Transportation tbd pending progress  Cm will continue to follow    BENJAMIN Bullock

## 2023-08-18 VITALS
HEART RATE: 62 BPM | DIASTOLIC BLOOD PRESSURE: 80 MMHG | RESPIRATION RATE: 16 BRPM | WEIGHT: 176.6 LBS | HEIGHT: 72 IN | BODY MASS INDEX: 23.92 KG/M2 | OXYGEN SATURATION: 95 % | SYSTOLIC BLOOD PRESSURE: 132 MMHG | TEMPERATURE: 97.9 F

## 2023-08-18 LAB
ANION GAP SERPL CALC-SCNC: 5 MMOL/L (ref 5–15)
APPEARANCE UR: ABNORMAL
BACTERIA URNS QL MICRO: NEGATIVE /HPF
BASOPHILS # BLD: 0 K/UL (ref 0–0.1)
BASOPHILS NFR BLD: 1 % (ref 0–1)
BILIRUB UR QL: NEGATIVE
BUN SERPL-MCNC: 16 MG/DL (ref 6–20)
BUN/CREAT SERPL: 14 (ref 12–20)
CALCIUM SERPL-MCNC: 8.7 MG/DL (ref 8.5–10.1)
CHLORIDE SERPL-SCNC: 111 MMOL/L (ref 97–108)
CO2 SERPL-SCNC: 26 MMOL/L (ref 21–32)
COLOR UR: ABNORMAL
CREAT SERPL-MCNC: 1.14 MG/DL (ref 0.7–1.3)
DIFFERENTIAL METHOD BLD: ABNORMAL
EOSINOPHIL # BLD: 0.2 K/UL (ref 0–0.4)
EOSINOPHIL NFR BLD: 2 % (ref 0–7)
EPITH CASTS URNS QL MICRO: ABNORMAL /LPF
ERYTHROCYTE [DISTWIDTH] IN BLOOD BY AUTOMATED COUNT: 15.9 % (ref 11.5–14.5)
GLUCOSE SERPL-MCNC: 151 MG/DL (ref 65–100)
GLUCOSE UR STRIP.AUTO-MCNC: NEGATIVE MG/DL
HCT VFR BLD AUTO: 32.9 % (ref 36.6–50.3)
HGB BLD-MCNC: 10 G/DL (ref 12.1–17)
HGB UR QL STRIP: ABNORMAL
HYALINE CASTS URNS QL MICRO: ABNORMAL /LPF (ref 0–2)
IMM GRANULOCYTES # BLD AUTO: 0.1 K/UL (ref 0–0.04)
IMM GRANULOCYTES NFR BLD AUTO: 1 % (ref 0–0.5)
KETONES UR QL STRIP.AUTO: NEGATIVE MG/DL
LEUKOCYTE ESTERASE UR QL STRIP.AUTO: ABNORMAL
LYMPHOCYTES # BLD: 1.6 K/UL (ref 0.8–3.5)
LYMPHOCYTES NFR BLD: 19 % (ref 12–49)
MAGNESIUM SERPL-MCNC: 2.4 MG/DL (ref 1.6–2.4)
MCH RBC QN AUTO: 26.4 PG (ref 26–34)
MCHC RBC AUTO-ENTMCNC: 30.4 G/DL (ref 30–36.5)
MCV RBC AUTO: 86.8 FL (ref 80–99)
MONOCYTES # BLD: 0.8 K/UL (ref 0–1)
MONOCYTES NFR BLD: 10 % (ref 5–13)
NEUTS SEG # BLD: 6 K/UL (ref 1.8–8)
NEUTS SEG NFR BLD: 67 % (ref 32–75)
NITRITE UR QL STRIP.AUTO: NEGATIVE
NRBC # BLD: 0 K/UL (ref 0–0.01)
NRBC BLD-RTO: 0 PER 100 WBC
PH UR STRIP: 6.5 (ref 5–8)
PLATELET # BLD AUTO: 219 K/UL (ref 150–400)
PMV BLD AUTO: 11.4 FL (ref 8.9–12.9)
POTASSIUM SERPL-SCNC: 3.6 MMOL/L (ref 3.5–5.1)
PROT UR STRIP-MCNC: ABNORMAL MG/DL
RBC # BLD AUTO: 3.79 M/UL (ref 4.1–5.7)
RBC #/AREA URNS HPF: >100 /HPF (ref 0–5)
SODIUM SERPL-SCNC: 142 MMOL/L (ref 136–145)
SP GR UR REFRACTOMETRY: 1.02 (ref 1–1.03)
UROBILINOGEN UR QL STRIP.AUTO: 1 EU/DL (ref 0.2–1)
WBC # BLD AUTO: 8.7 K/UL (ref 4.1–11.1)
WBC URNS QL MICRO: ABNORMAL /HPF (ref 0–4)

## 2023-08-18 PROCEDURE — 94761 N-INVAS EAR/PLS OXIMETRY MLT: CPT

## 2023-08-18 PROCEDURE — 6370000000 HC RX 637 (ALT 250 FOR IP): Performed by: NURSE PRACTITIONER

## 2023-08-18 PROCEDURE — 85025 COMPLETE CBC W/AUTO DIFF WBC: CPT

## 2023-08-18 PROCEDURE — 6370000000 HC RX 637 (ALT 250 FOR IP): Performed by: INTERNAL MEDICINE

## 2023-08-18 PROCEDURE — 6360000002 HC RX W HCPCS: Performed by: INTERNAL MEDICINE

## 2023-08-18 PROCEDURE — 2580000003 HC RX 258: Performed by: INTERNAL MEDICINE

## 2023-08-18 PROCEDURE — 83735 ASSAY OF MAGNESIUM: CPT

## 2023-08-18 PROCEDURE — 80048 BASIC METABOLIC PNL TOTAL CA: CPT

## 2023-08-18 PROCEDURE — 6370000000 HC RX 637 (ALT 250 FOR IP): Performed by: FAMILY MEDICINE

## 2023-08-18 PROCEDURE — 81001 URINALYSIS AUTO W/SCOPE: CPT

## 2023-08-18 PROCEDURE — 36415 COLL VENOUS BLD VENIPUNCTURE: CPT

## 2023-08-18 PROCEDURE — 6360000002 HC RX W HCPCS: Performed by: HOSPITALIST

## 2023-08-18 RX ORDER — FERROUS SULFATE 325(65) MG
325 TABLET ORAL
Qty: 30 TABLET | Refills: 0 | Status: SHIPPED
Start: 2023-08-18

## 2023-08-18 RX ORDER — AMIODARONE HYDROCHLORIDE 200 MG/1
200 TABLET ORAL DAILY
Qty: 10 TABLET | Refills: 0 | Status: SHIPPED
Start: 2023-08-19

## 2023-08-18 RX ORDER — MECLIZINE HCL 12.5 MG/1
12.5 TABLET ORAL 3 TIMES DAILY PRN
Qty: 10 TABLET | Refills: 0 | Status: SHIPPED
Start: 2023-08-18 | End: 2023-08-28

## 2023-08-18 RX ORDER — PANTOPRAZOLE SODIUM 40 MG/1
40 TABLET, DELAYED RELEASE ORAL
Qty: 30 TABLET | Refills: 0 | Status: SHIPPED
Start: 2023-08-18

## 2023-08-18 RX ORDER — MIDODRINE HYDROCHLORIDE 5 MG/1
5 TABLET ORAL
Qty: 90 TABLET | Refills: 0 | Status: SHIPPED
Start: 2023-08-18

## 2023-08-18 RX ORDER — AMOXICILLIN AND CLAVULANATE POTASSIUM 875; 125 MG/1; MG/1
1 TABLET, FILM COATED ORAL 2 TIMES DAILY
Qty: 14 TABLET | Refills: 0 | Status: SHIPPED
Start: 2023-08-18 | End: 2023-08-26

## 2023-08-18 RX ADMIN — IRON SUCROSE 200 MG: 20 INJECTION, SOLUTION INTRAVENOUS at 13:29

## 2023-08-18 RX ADMIN — AMIODARONE HYDROCHLORIDE 200 MG: 200 TABLET ORAL at 08:48

## 2023-08-18 RX ADMIN — SODIUM CHLORIDE, PRESERVATIVE FREE 10 ML: 5 INJECTION INTRAVENOUS at 09:00

## 2023-08-18 RX ADMIN — PANTOPRAZOLE SODIUM 40 MG: 40 TABLET, DELAYED RELEASE ORAL at 06:32

## 2023-08-18 RX ADMIN — MIDODRINE HYDROCHLORIDE 5 MG: 5 TABLET ORAL at 08:49

## 2023-08-18 RX ADMIN — PIPERACILLIN AND TAZOBACTAM 3375 MG: 3; .375 INJECTION, POWDER, LYOPHILIZED, FOR SOLUTION INTRAVENOUS at 09:36

## 2023-08-18 RX ADMIN — SODIUM CHLORIDE, PRESERVATIVE FREE 10 ML: 5 INJECTION INTRAVENOUS at 08:51

## 2023-08-18 RX ADMIN — PIPERACILLIN AND TAZOBACTAM 3375 MG: 3; .375 INJECTION, POWDER, LYOPHILIZED, FOR SOLUTION INTRAVENOUS at 02:46

## 2023-08-18 RX ADMIN — METOPROLOL TARTRATE 6.25 MG: 25 TABLET, FILM COATED ORAL at 08:48

## 2023-08-18 RX ADMIN — PREDNISONE 5 MG: 5 TABLET ORAL at 08:49

## 2023-08-18 RX ADMIN — MIDODRINE HYDROCHLORIDE 5 MG: 5 TABLET ORAL at 12:15

## 2023-08-18 ASSESSMENT — PAIN SCALES - GENERAL
PAINLEVEL_OUTOF10: 0
PAINLEVEL_OUTOF10: 0

## 2023-08-18 NOTE — PROGRESS NOTES
[address] [facility] [address] in for report to [facility], talked to [email or web address]. Gave report . 1600 Removed IV , educated regarding the discharge instructions and given a chance to ask questions . Family (Wife) at bedside [facility].

## 2023-08-18 NOTE — CARE COORDINATION
8/18/2023  Case Management Progress Note    12:33 PM  Patient is 80year old male admitted 8/10 with syncope and collapse   Patient's RUR is 12% green/low risk for readmission  Covid test: none noted this admission  Chart reviewed--patient discussed at interdisciplinary rounds  Per rounds patient is ready for discharge today. I spent time discussing transfer to [facility] with patient and his wife at bedside. After discussion, they are agreeable to transfer today. MD aware, RN aware. Patient's wife will transport later this afternoon. Patient will be admitted by Dr Wiley Sauceda to room 2122, RN please call report to [phone]. Will continue to follow and assist as needed.      Transition of Care Plan   Discharge today, need order  To [facility]  Wife will transport   CM will continue to follow    BENJAMIN Doty

## 2023-08-18 NOTE — DISCHARGE INSTRUCTIONS
Patient Discharge Instructions    Avel Weiss / [id number] : 1940    Admitted 8/10/2023 Discharged: 2023       Discharge Medications: It is important that you take the medication exactly as they are prescribed. Keep your medication in the bottles provided by the pharmacist and keep a list of the medication names, dosages, and times to be taken in your wallet. Do not take other medications without consulting your doctor. Call your PCP for medication refills      What to do at Home    Take medications as prescribed and follow up with {HR FOLLOW UPS:04516}. Call your PCP for refills of your medications. Recommended Diet: {diet:66905} ADULT DIET; Regular  ADULT ORAL NUTRITION SUPPLEMENT; Dinner; Low Calorie/High Protein Oral Supplement       Recommended Activity: {discharge activity:20873}      **If you experience any of the following symptoms {HR F/U IF:68252::\"chest pain\",\"shortness of breath\",\"fevers\",\"chills\",\"symptoms of stroke\"}, please follow up with PCP or go to the nearest ER.**    Mr. Maritza Lazar, it was a pleasure meeting you and your wife. It was also a pleasure for the brief time that I met you to have taking care of you. I hope you continue to do well. I am sure you will be taking care [facility]. Please make sure to follow-up with your primary care and you are primary cardiologist on discharge. Your urine test that we just got shows that you have blood in the urine as I anticipated and it kind of makes it difficult to interpret. But no bacteria seen. I will continue with antibiotics as mentioned until the  of this month.          James Sams MD     2023

## 2023-08-18 NOTE — DISCHARGE SUMMARY
[address] [place] [address] [id number]  [phone] SUMMARY        Name:  Raiza Fernandes, 80 y.o.  :    1940  MRN:    [id number]  PCP:    Bethany Contreras DO    Code: DNR     Date of Admission: 8/10/2023  Date of Discharge:   2023 3:08 PM  Disposition:  [facility]/Rehab  Condition:  improved, stable, and good    Consultations:  IP CONSULT TO CARDIOLOGY  IP WOUND CARE NURSE CONSULT TO EVAL  IP WOUND CARE NURSE CONSULT TO EVAL  IP CONSULT TO PHARMACY  IP CONSULT TO CARDIOLOGY  IP CONSULT TO UROLOGY    Reason for Admission:   Syncope and collapse [R55]    Discharge Diagnoses:    Principal Problem:    Syncope and collapse  Resolved Problems:    * No resolved hospital problems. *        Procedures:  None    Imaging  CT ABDOMEN PELVIS W IV CONTRAST Additional Contrast? None    Result Date: 8/15/2023  EXAM: CT ABDOMEN PELVIS W IV CONTRAST INDICATION: Intractable N/V/D, leukocytosis COMPARISON: CT abdomen pelvis 2019 CONTRAST: 5 mL of Isovue-370. ORAL CONTRAST: None TECHNIQUE: Following the uneventful intravenous administration of contrast, thin axial images were obtained through the abdomen and pelvis. Coronal and sagittal reconstructions were generated. CT dose reduction was achieved through use of a standardized protocol tailored for this examination and automatic exposure control for dose modulation. FINDINGS: LOWER THORAX: No significant abnormality in the incidentally imaged lower chest. LIVER: No mass. BILIARY TREE: Gallbladder is within normal limits. CBD is not dilated. SPLEEN: within normal limits. PANCREAS: No mass or ductal dilatation. ADRENALS: 1.6 cm right adrenal nodule, unchanged compared to 2019. KIDNEYS: No mass, calculus, or hydronephrosis. Bilateral renal cysts, measuring up to 2.7 cm. These are benign entities requiring no further evaluation. STOMACH: Small hiatal hernia.  SMALL BOWEL: No dilatation or wall

## 2023-08-18 NOTE — PLAN OF CARE
Problem: Safety - Adult  Goal: Free from fall injury  Outcome: Progressing     Problem: Occupational Therapy - Adult  Goal: By Discharge: Performs self-care activities at highest level of function for planned discharge setting. See evaluation for individualized goals. Description: FUNCTIONAL STATUS PRIOR TO ADMISSION:  Patient was ambulatory using rollator, h/o of falls  Receives Help From: Family, ADL Assistance: Independent,  ,  ,  ,  ,  ,  , Ambulation Assistance: Needs assistance (using rollator),  , Active : No     HOME SUPPORT: Patient lived with spouse but didn't require assistance. Occupational Therapy Goals:  Initiated 8/11/2023  1. Patient will perform grooming standing at sink with Modified [place] within 7 day(s). 2.  Patient will perform bathing with Modified [place] within 7 day(s). 3.  Patient will perform lower body dressing with Modified [place] within 7 day(s). 4.  Patient will perform toilet transfers with Modified [place]  within 7 day(s). 5.  Patient will perform all aspects of toileting with Modified [place] within 7 day(s). 6.  Patient will participate in upper extremity therapeutic exercise/activities with Set-up for 10 minutes within 7 day(s). 7.  Patient will utilize energy conservation techniques during functional activities with verbal cues within 7 day(s). 8/17/2023 1505 by Elsy Bragg  Outcome: Progressing     Problem: Physical Therapy - Adult  Goal: By Discharge: Performs mobility at highest level of function for planned discharge setting. See evaluation for individualized goals. Description: FUNCTIONAL STATUS PRIOR TO ADMISSION: Patient was modified independent using a rollator for functional mobility. HOME SUPPORT PRIOR TO ADMISSION: The patient lived with his wife but did not require assistance. Physical Therapy Goals  Initiated 8/11/2023  1.   Patient will move from supine to sit and sit to supine in bed with modified

## 2023-08-20 LAB
BACTERIA SPEC CULT: NORMAL
BACTERIA SPEC CULT: NORMAL
SERVICE CMNT-IMP: NORMAL
SERVICE CMNT-IMP: NORMAL

## 2023-08-24 NOTE — PROGRESS NOTES
Physician Progress Note      PATIENT:               Slick Rivera  CSN #:                  [id number]  :                       1940  ADMIT DATE:       8/10/2023 9:52 AM  DISCH DATE:        2023 4:17 PM  RESPONDING  PROVIDER #:        Jean Pierre Dobson MD          QUERY TEXT:    Patient admitted with syncope. Discharge Summary dated  states: \"Syncope   with collapse POA Vertigo? ? History of adrenal insufficiency with orthostatic   hypotension Fall risk Both unclear cause but suspected IVVD and/or vasovagal\". If possible, after study, please further specify the etiology of the syncope: The medical record reflects the following:    Risk Factors: 80 yr old male, hx of atrial fibrillation s/p ablation, CKD,   HTN, HLD, tobacco use, CAD, anemia, adrenal insufficiency    Clinical Indicators: From  DC Summary: \"Syncope with collapse POA   Vertigo? ? History of adrenal insufficiency with orthostatic hypotension Fall   risk Both unclear cause but suspected IVVD and/or vasovagal\"; crea 1.41, 0.92,   0.92, 1.00, 1.28, 1.21, 1.20, 1.30, 1.14; BUN 19, 15, 16, 19, 19, 19, 16, 20,   16; GFR 40, >60, >60, >60, 56, 49, >60, 55, >60; Orthostatic VS from 08/10 at   13:44: BP lying 104/64, pulse lying 65, BP sitting 105/68, pulse sitting 110,   BP standing 110/78; pulse standing 115;    Treatment: labs, IV fluid, Meclizine, orthostatic vital signs      Thank you,  Vance Eckert RN, CDI  Options provided:  -- Syncope due to volume depletion  -- Syncope due to dehydration  -- Syncope due to orthostatic hypotension: The syncope was due to orthostatic   hypotension.   -- Syncope due to vasovagal episode  -- Syncope due to vertigo  -- Other - I will add my own diagnosis  -- Disagree - Not applicable / Not valid  -- Disagree - Clinically unable to determine / Unknown  -- Refer to Clinical Documentation Reviewer    PROVIDER RESPONSE TEXT:    Syncope due to orthostatic hypotension: The syncope was due to

## 2023-08-30 LAB — ECHO BSA: 2.04 M2

## 2023-09-13 ENCOUNTER — APPOINTMENT (OUTPATIENT)
Facility: HOSPITAL | Age: 83
End: 2023-09-13
Payer: MEDICARE

## 2023-09-13 ENCOUNTER — HOSPITAL ENCOUNTER (INPATIENT)
Facility: HOSPITAL | Age: 83
LOS: 16 days | Discharge: HOME | End: 2023-09-29
Attending: STUDENT IN AN ORGANIZED HEALTH CARE EDUCATION/TRAINING PROGRAM | Admitting: STUDENT IN AN ORGANIZED HEALTH CARE EDUCATION/TRAINING PROGRAM
Payer: MEDICARE

## 2023-09-13 DIAGNOSIS — R35.0 URINARY FREQUENCY: ICD-10-CM

## 2023-09-13 DIAGNOSIS — R11.11 VOMITING WITHOUT NAUSEA, UNSPECIFIED VOMITING TYPE: ICD-10-CM

## 2023-09-13 DIAGNOSIS — R53.1 GENERALIZED WEAKNESS: Primary | ICD-10-CM

## 2023-09-13 PROBLEM — R11.2 INTRACTABLE NAUSEA AND VOMITING: Status: ACTIVE | Noted: 2023-09-13

## 2023-09-13 LAB
ALBUMIN SERPL-MCNC: 3.2 G/DL (ref 3.5–5)
ALBUMIN/GLOB SERPL: 0.8 (ref 1.1–2.2)
ALP SERPL-CCNC: 85 U/L (ref 45–117)
ALT SERPL-CCNC: 20 U/L (ref 12–78)
ANION GAP SERPL CALC-SCNC: 7 MMOL/L (ref 5–15)
APPEARANCE UR: CLEAR
AST SERPL-CCNC: 14 U/L (ref 15–37)
BACTERIA URNS QL MICRO: NEGATIVE /HPF
BASOPHILS # BLD: 0.1 K/UL (ref 0–0.1)
BASOPHILS NFR BLD: 1 % (ref 0–1)
BILIRUB SERPL-MCNC: 0.6 MG/DL (ref 0.2–1)
BILIRUB UR QL: NEGATIVE
BUN SERPL-MCNC: 8 MG/DL (ref 6–20)
BUN/CREAT SERPL: 6 (ref 12–20)
CALCIUM SERPL-MCNC: 8.9 MG/DL (ref 8.5–10.1)
CHLORIDE SERPL-SCNC: 107 MMOL/L (ref 97–108)
CO2 SERPL-SCNC: 27 MMOL/L (ref 21–32)
COLOR UR: ABNORMAL
COMMENT:: NORMAL
CREAT SERPL-MCNC: 1.39 MG/DL (ref 0.7–1.3)
DIFFERENTIAL METHOD BLD: ABNORMAL
EOSINOPHIL # BLD: 0.1 K/UL (ref 0–0.4)
EOSINOPHIL NFR BLD: 1 % (ref 0–7)
EPITH CASTS URNS QL MICRO: ABNORMAL /LPF
ERYTHROCYTE [DISTWIDTH] IN BLOOD BY AUTOMATED COUNT: 16.4 % (ref 11.5–14.5)
GLOBULIN SER CALC-MCNC: 4 G/DL (ref 2–4)
GLUCOSE SERPL-MCNC: 138 MG/DL (ref 65–100)
GLUCOSE UR STRIP.AUTO-MCNC: NEGATIVE MG/DL
HCT VFR BLD AUTO: 42.1 % (ref 36.6–50.3)
HGB BLD-MCNC: 13.3 G/DL (ref 12.1–17)
HGB UR QL STRIP: NEGATIVE
HYALINE CASTS URNS QL MICRO: ABNORMAL /LPF (ref 0–2)
IMM GRANULOCYTES # BLD AUTO: 0.1 K/UL (ref 0–0.04)
IMM GRANULOCYTES NFR BLD AUTO: 1 % (ref 0–0.5)
KETONES UR QL STRIP.AUTO: NEGATIVE MG/DL
LEUKOCYTE ESTERASE UR QL STRIP.AUTO: ABNORMAL
LIPASE SERPL-CCNC: 77 U/L (ref 73–393)
LYMPHOCYTES # BLD: 1.1 K/UL (ref 0.8–3.5)
LYMPHOCYTES NFR BLD: 15 % (ref 12–49)
MAGNESIUM SERPL-MCNC: 2.2 MG/DL (ref 1.6–2.4)
MCH RBC QN AUTO: 28.1 PG (ref 26–34)
MCHC RBC AUTO-ENTMCNC: 31.6 G/DL (ref 30–36.5)
MCV RBC AUTO: 89 FL (ref 80–99)
MONOCYTES # BLD: 0.7 K/UL (ref 0–1)
MONOCYTES NFR BLD: 10 % (ref 5–13)
NEUTS SEG # BLD: 5.2 K/UL (ref 1.8–8)
NEUTS SEG NFR BLD: 73 % (ref 32–75)
NITRITE UR QL STRIP.AUTO: NEGATIVE
NRBC # BLD: 0 K/UL (ref 0–0.01)
NRBC BLD-RTO: 0 PER 100 WBC
PH UR STRIP: 7 (ref 5–8)
PLATELET # BLD AUTO: 174 K/UL (ref 150–400)
PMV BLD AUTO: 11 FL (ref 8.9–12.9)
POTASSIUM SERPL-SCNC: 3.6 MMOL/L (ref 3.5–5.1)
PROT SERPL-MCNC: 7.2 G/DL (ref 6.4–8.2)
PROT UR STRIP-MCNC: NEGATIVE MG/DL
RBC # BLD AUTO: 4.73 M/UL (ref 4.1–5.7)
RBC #/AREA URNS HPF: ABNORMAL /HPF (ref 0–5)
SODIUM SERPL-SCNC: 141 MMOL/L (ref 136–145)
SP GR UR REFRACTOMETRY: 1.01 (ref 1–1.03)
SPECIMEN HOLD: NORMAL
UROBILINOGEN UR QL STRIP.AUTO: 0.2 EU/DL (ref 0.2–1)
WBC # BLD AUTO: 7.1 K/UL (ref 4.1–11.1)
WBC URNS QL MICRO: ABNORMAL /HPF (ref 0–4)

## 2023-09-13 PROCEDURE — 81001 URINALYSIS AUTO W/SCOPE: CPT

## 2023-09-13 PROCEDURE — 6370000000 HC RX 637 (ALT 250 FOR IP): Performed by: STUDENT IN AN ORGANIZED HEALTH CARE EDUCATION/TRAINING PROGRAM

## 2023-09-13 PROCEDURE — 83690 ASSAY OF LIPASE: CPT

## 2023-09-13 PROCEDURE — 85025 COMPLETE CBC W/AUTO DIFF WBC: CPT

## 2023-09-13 PROCEDURE — 36415 COLL VENOUS BLD VENIPUNCTURE: CPT

## 2023-09-13 PROCEDURE — 87086 URINE CULTURE/COLONY COUNT: CPT

## 2023-09-13 PROCEDURE — 83735 ASSAY OF MAGNESIUM: CPT

## 2023-09-13 PROCEDURE — 2580000003 HC RX 258: Performed by: STUDENT IN AN ORGANIZED HEALTH CARE EDUCATION/TRAINING PROGRAM

## 2023-09-13 PROCEDURE — 80053 COMPREHEN METABOLIC PANEL: CPT

## 2023-09-13 PROCEDURE — C9113 INJ PANTOPRAZOLE SODIUM, VIA: HCPCS | Performed by: STUDENT IN AN ORGANIZED HEALTH CARE EDUCATION/TRAINING PROGRAM

## 2023-09-13 PROCEDURE — 6360000002 HC RX W HCPCS: Performed by: STUDENT IN AN ORGANIZED HEALTH CARE EDUCATION/TRAINING PROGRAM

## 2023-09-13 PROCEDURE — 99285 EMERGENCY DEPT VISIT HI MDM: CPT

## 2023-09-13 PROCEDURE — 74177 CT ABD & PELVIS W/CONTRAST: CPT

## 2023-09-13 PROCEDURE — 6360000004 HC RX CONTRAST MEDICATION: Performed by: STUDENT IN AN ORGANIZED HEALTH CARE EDUCATION/TRAINING PROGRAM

## 2023-09-13 PROCEDURE — A4216 STERILE WATER/SALINE, 10 ML: HCPCS | Performed by: STUDENT IN AN ORGANIZED HEALTH CARE EDUCATION/TRAINING PROGRAM

## 2023-09-13 PROCEDURE — 1100000000 HC RM PRIVATE

## 2023-09-13 RX ORDER — SODIUM CHLORIDE 0.9 % (FLUSH) 0.9 %
5-40 SYRINGE (ML) INJECTION EVERY 12 HOURS SCHEDULED
Status: DISCONTINUED | OUTPATIENT
Start: 2023-09-13 | End: 2023-09-29 | Stop reason: HOSPADM

## 2023-09-13 RX ORDER — SODIUM CHLORIDE 0.9 % (FLUSH) 0.9 %
5-40 SYRINGE (ML) INJECTION PRN
Status: DISCONTINUED | OUTPATIENT
Start: 2023-09-13 | End: 2023-09-29 | Stop reason: HOSPADM

## 2023-09-13 RX ORDER — ONDANSETRON 4 MG/1
4 TABLET, ORALLY DISINTEGRATING ORAL EVERY 8 HOURS PRN
Status: DISCONTINUED | OUTPATIENT
Start: 2023-09-13 | End: 2023-09-29 | Stop reason: HOSPADM

## 2023-09-13 RX ORDER — SODIUM CHLORIDE 9 MG/ML
INJECTION, SOLUTION INTRAVENOUS PRN
Status: DISCONTINUED | OUTPATIENT
Start: 2023-09-13 | End: 2023-09-29 | Stop reason: HOSPADM

## 2023-09-13 RX ORDER — PRAVASTATIN SODIUM 20 MG
20 TABLET ORAL DAILY
COMMUNITY

## 2023-09-13 RX ORDER — ACETAMINOPHEN 325 MG/1
650 TABLET ORAL EVERY 6 HOURS PRN
Status: DISCONTINUED | OUTPATIENT
Start: 2023-09-13 | End: 2023-09-29 | Stop reason: HOSPADM

## 2023-09-13 RX ORDER — LEVOFLOXACIN 5 MG/ML
750 INJECTION, SOLUTION INTRAVENOUS
Status: DISCONTINUED | OUTPATIENT
Start: 2023-09-13 | End: 2023-09-19

## 2023-09-13 RX ORDER — SODIUM CHLORIDE, SODIUM LACTATE, POTASSIUM CHLORIDE, CALCIUM CHLORIDE 600; 310; 30; 20 MG/100ML; MG/100ML; MG/100ML; MG/100ML
INJECTION, SOLUTION INTRAVENOUS CONTINUOUS
Status: DISCONTINUED | OUTPATIENT
Start: 2023-09-13 | End: 2023-09-20

## 2023-09-13 RX ORDER — 0.9 % SODIUM CHLORIDE 0.9 %
1000 INTRAVENOUS SOLUTION INTRAVENOUS ONCE
Status: COMPLETED | OUTPATIENT
Start: 2023-09-13 | End: 2023-09-13

## 2023-09-13 RX ORDER — POLYETHYLENE GLYCOL 3350 17 G/17G
17 POWDER, FOR SOLUTION ORAL DAILY PRN
Status: DISCONTINUED | OUTPATIENT
Start: 2023-09-13 | End: 2023-09-25

## 2023-09-13 RX ORDER — PRAVASTATIN SODIUM 20 MG
20 TABLET ORAL NIGHTLY
Status: DISCONTINUED | OUTPATIENT
Start: 2023-09-13 | End: 2023-09-29 | Stop reason: HOSPADM

## 2023-09-13 RX ORDER — ONDANSETRON 2 MG/ML
4 INJECTION INTRAMUSCULAR; INTRAVENOUS EVERY 6 HOURS PRN
Status: DISCONTINUED | OUTPATIENT
Start: 2023-09-13 | End: 2023-09-29 | Stop reason: HOSPADM

## 2023-09-13 RX ORDER — ACETAMINOPHEN 650 MG/1
650 SUPPOSITORY RECTAL EVERY 6 HOURS PRN
Status: DISCONTINUED | OUTPATIENT
Start: 2023-09-13 | End: 2023-09-29 | Stop reason: HOSPADM

## 2023-09-13 RX ORDER — HYDROCORTISONE 10 MG/1
50 TABLET ORAL 2 TIMES DAILY
Status: DISCONTINUED | OUTPATIENT
Start: 2023-09-13 | End: 2023-09-14

## 2023-09-13 RX ORDER — ASPIRIN 81 MG/1
81 TABLET, CHEWABLE ORAL DAILY
Status: DISCONTINUED | OUTPATIENT
Start: 2023-09-13 | End: 2023-09-29 | Stop reason: HOSPADM

## 2023-09-13 RX ORDER — DULOXETIN HYDROCHLORIDE 30 MG/1
30 CAPSULE, DELAYED RELEASE ORAL DAILY
Status: DISCONTINUED | OUTPATIENT
Start: 2023-09-14 | End: 2023-09-16

## 2023-09-13 RX ORDER — MIDODRINE HYDROCHLORIDE 5 MG/1
5 TABLET ORAL
Status: DISCONTINUED | OUTPATIENT
Start: 2023-09-13 | End: 2023-09-22

## 2023-09-13 RX ORDER — ONDANSETRON 2 MG/ML
4 INJECTION INTRAMUSCULAR; INTRAVENOUS EVERY 6 HOURS PRN
Status: DISCONTINUED | OUTPATIENT
Start: 2023-09-13 | End: 2023-09-13

## 2023-09-13 RX ORDER — ASPIRIN 81 MG/1
81 TABLET, CHEWABLE ORAL DAILY
COMMUNITY

## 2023-09-13 RX ORDER — ENOXAPARIN SODIUM 100 MG/ML
40 INJECTION SUBCUTANEOUS DAILY
Status: DISCONTINUED | OUTPATIENT
Start: 2023-09-13 | End: 2023-09-29 | Stop reason: HOSPADM

## 2023-09-13 RX ORDER — AMIODARONE HYDROCHLORIDE 200 MG/1
200 TABLET ORAL DAILY
Status: DISCONTINUED | OUTPATIENT
Start: 2023-09-14 | End: 2023-09-13

## 2023-09-13 RX ORDER — ONDANSETRON 4 MG/1
4 TABLET, FILM COATED ORAL 2 TIMES DAILY PRN
COMMUNITY

## 2023-09-13 RX ADMIN — PANTOPRAZOLE SODIUM 40 MG: 40 INJECTION, POWDER, FOR SOLUTION INTRAVENOUS at 18:47

## 2023-09-13 RX ADMIN — IOPAMIDOL 100 ML: 755 INJECTION, SOLUTION INTRAVENOUS at 10:57

## 2023-09-13 RX ADMIN — ONDANSETRON 4 MG: 2 INJECTION INTRAMUSCULAR; INTRAVENOUS at 20:26

## 2023-09-13 RX ADMIN — SODIUM CHLORIDE 1000 ML: 9 INJECTION, SOLUTION INTRAVENOUS at 10:42

## 2023-09-13 RX ADMIN — ENOXAPARIN SODIUM 40 MG: 100 INJECTION SUBCUTANEOUS at 18:47

## 2023-09-13 RX ADMIN — SODIUM CHLORIDE, POTASSIUM CHLORIDE, SODIUM LACTATE AND CALCIUM CHLORIDE: 600; 310; 30; 20 INJECTION, SOLUTION INTRAVENOUS at 18:42

## 2023-09-13 RX ADMIN — LEVOFLOXACIN 750 MG: 5 INJECTION, SOLUTION INTRAVENOUS at 18:43

## 2023-09-13 RX ADMIN — SODIUM CHLORIDE, PRESERVATIVE FREE 10 ML: 5 INJECTION INTRAVENOUS at 22:17

## 2023-09-13 RX ADMIN — SODIUM CHLORIDE 1000 ML: 9 INJECTION, SOLUTION INTRAVENOUS at 13:21

## 2023-09-13 RX ADMIN — ASPIRIN 81 MG: 81 TABLET, CHEWABLE ORAL at 18:46

## 2023-09-13 ASSESSMENT — PAIN - FUNCTIONAL ASSESSMENT: PAIN_FUNCTIONAL_ASSESSMENT: NONE - DENIES PAIN

## 2023-09-13 NOTE — PROGRESS NOTES
D/w GI. Take out GI consult for now. Reconsult if symptoms remain after UTI resolved.     José Miguel Hernandez MD

## 2023-09-13 NOTE — CARE COORDINATION
CARE MANAGEMENT NOTE      Pt is open to [facility] through [email or web address].  CM received a call from [facility] liaison) to update CM.     _____________________________________  Savanna Arthur, [address] Management   Available via [facility]  9/13/2023   2:43 PM

## 2023-09-13 NOTE — H&P
accessory muscle use, clear breath sounds without wheezes rales or rhonchi  Card: No murmurs, normal S1, S2 without thrills, bruits or peripheral edema  Abd:  Soft, non-tender, non-distended, normoactive bowel sounds are present, no palpable organomegaly and no detectable hernias  Lymph:  No cervical or inguinal adenopathy  Musc: No cyanosis or clubbing  Skin: No rashes or ulcers, skin turgor is good  Neuro:  Cranial nerves are grossly intact, no focal motor weakness, follows commands appropriately  Psych:  Good insight, oriented to person, place and time, alert          _______________________________________________________________________  Care Plan discussed with:    Comments   Patient X Discussed with patient in room. POC outlined and Questions answered    Family  X    RN x    Care Manager                    Consultant:  tanisha LIVINGSTON MD   _______________________________________________________________________  Recommended Disposition:   Home with Family    HH/PT/OT/RN    SNF/LTC X   REMA    ________________________________________________________________________  TOTAL TIME:  X Minutes        Comments   >50% of visit spent in counseling and coordination of care X Chart reviewed  Discussion with patient and/or family and questions answered     ________________________________________________________________________  Signed: Sherren Ehrlich, MD        Procedures: see electronic medical records for all procedures/Xrays/labs and details which were not copied into this note but were reviewed prior to creation of Plan.

## 2023-09-13 NOTE — ED PROVIDER NOTES
HPI    80 y.o. male presenting with vomiting. Patient had a A-fib ablation over a month ago at [facility].  He had a short admission and overnight he had a Auguste catheter placed. He states that it was rather painful and he developed a UTI subsequently. He was started on ceftriaxone and transition to Keflex. He was subsequently placed on PIP Tazo and then discharged on amoxicillin. He was in rehab for a few weeks and has been home for the past 2 weeks. He started vomiting about 2 weeks ago. He was found to have a positive urine for 2 organisms after a ED visit at [facility] 5 days ago. He had Enterococcus faecalis and a gram-negative organism. He was started on Levaquin yesterday and Bactrim 2 days ago. He has had intermittent vomiting since discharge from rehab. He has been taking Zofran and has been taking Protonix on and off. History is obtained from the patient and primarily from his wife, also reviewed medical records from pharmacy. Wife is very concerned that patient has continued to have significant vomiting. Also the fact that he continues to have urinary infection, she is concerned he may have issues with traumatic injury to the prostate or urethra. He has been very weak to the point that he has been unable to walk steadily, he is had to miss appointments (today was meant to see cardiology) and he was unable to see urology last week due to the patient being unable to travel by private vehicle.    he  has a past medical history of CKD (chronic kidney disease), stage III ([address]), Sjoegren syndrome, and Urine retention. He does have history of prostatic hypertrophy and TURP. Physical Exam  Constitutional:       Comments: Chronically ill-appearing, frail elderly gentleman   Cardiovascular:      Rate and Rhythm: Normal rate and regular rhythm. Pulmonary:      Effort: Pulmonary effort is normal. No respiratory distress. Breath sounds: No wheezing.    Abdominal: range)   metoprolol tartrate (LOPRESSOR) tablet 25 mg (has no administration in time range)   midodrine (PROAMATINE) tablet 5 mg (has no administration in time range)   pantoprazole (PROTONIX) 40 mg in sodium chloride (PF) 0.9 % 10 mL injection (has no administration in time range)   pravastatin (PRAVACHOL) tablet 20 mg (has no administration in time range)   hydrocortisone (CORTEF) tablet 50 mg (has no administration in time range)   sodium chloride flush 0.9 % injection 5-40 mL (has no administration in time range)   sodium chloride flush 0.9 % injection 5-40 mL (has no administration in time range)   0.9 % sodium chloride infusion (has no administration in time range)   enoxaparin (LOVENOX) injection 40 mg (has no administration in time range)   ondansetron (ZOFRAN-ODT) disintegrating tablet 4 mg (has no administration in time range)     Or   ondansetron (ZOFRAN) injection 4 mg (has no administration in time range)   polyethylene glycol (GLYCOLAX) packet 17 g (has no administration in time range)   acetaminophen (TYLENOL) tablet 650 mg (has no administration in time range)     Or   acetaminophen (TYLENOL) suppository 650 mg (has no administration in time range)   sodium chloride 0.9 % bolus 1,000 mL (0 mLs IntraVENous Stopped 9/13/23 1320)   iopamidol (ISOVUE-370) 76 % injection 100 mL (100 mLs IntraVENous Given 9/13/23 1057)       CONSULTS:  IP CONSULT TO HOSPITALIST  IP CONSULT TO CASE MANAGEMENT    ED Course as of 09/13/23 1513   Wed Sep 13, 2023   1018 Reviewed pharmacy dispense report. Patient was prescribed Bactrim, Keflex in the past [NS]   1019 Bactrim was prescribed 2 days ago. Keflex was prescribed approximately 1 month ago. [NS]   4567 [id number] prescribed yesterday. [NS]   3399 Discussed with case management, admitting hospitalist, per family with the patient. Does not appear to be significantly different than his baseline at which she was discharged. Vital signs of been observed to be stable.   His

## 2023-09-13 NOTE — ED TRIAGE NOTES
Patient arrived via EMS from home. Patient c/o nausea and vomiting x 3 weeks. Patient stated he is having diarrhea but thinks its the abx for UTI causing it. Unsure on what abx, waiting on wife. Patient had an ablation about 1 month ago and has been on and off abx since from UTI. Patient denies abdominal pain, chest pain, shortness of breath.

## 2023-09-13 NOTE — ACP (ADVANCE CARE PLANNING)
[facility]                                   Advance Care Planning Note    Name: Gigi Rey  YOB: 1940  MRN: [id number]  Admission Date: 9/13/2023  9:46 AM    Date of discussion: 9/13/2023    Active Diagnoses: These active diagnoses are of sufficient risk that focused discussion on advance care planning is indicated in order to allow the patient to thoughtfully consider personal goals of care, and if situations arise that prevent the ability to personally give input, to ensure appropriate representation of their personal desires for different levels and aggressiveness of care. Discussion:     Persons present and participating in discussion: Roberto Mendez MD, patients spouse    Topics Discussed:  Patient's medical condition and diagnosis: [  X] yes [  ] no   Surrogate decision maker: [ X ] yes [  ] no   Patient's current physical function/cognitive function/frailty: [email or web address]  ] yes [  ] no   Code Status: [  ] yes [ X ] no   Artificial Nutrition / Dialysis / Non-Invasive Ventilation / Blood Transfusion: [  ] yes [ X ] no  Potential Resources for home (durable medical equipment, home nursing, home O2): [  ] yes [  Jeniffer Jarquin no    Overview of Discussion: Discussed code status, its definition and components. Patient elected to be Full code. Discussed POA. Its definition and under which circumstances POA would be able to make decisions. Patients POA is his spouse. Discussed current diagnosis, current plan and expected hospital course. Answered all questions. Time Spent:     Total time spent face-to-face in education and discussion: 20 minutes.      Jesus Manuel Gee MD  Date of Service:  9/13/2023  3:52 PM

## 2023-09-13 NOTE — PROGRESS NOTES
Pharmacy Dosing Note    Ordered medication: Levofloxacin 750 mg IV every 24 hours    New medication: Change to levofloxacin 750 mg IV every 48 hours due to creatinine clearance between 20 and 50 mL/min per [facility] protocol. Estimated Creatinine Clearance: 44 mL/min (A) (based on SCr of 1.39 mg/dL (H)).     Thank you,  Hiro Gil, [address] [place] [address]

## 2023-09-14 PROBLEM — R11.11 VOMITING WITHOUT NAUSEA: Status: ACTIVE | Noted: 2023-09-14

## 2023-09-14 PROBLEM — R53.1 GENERALIZED WEAKNESS: Status: ACTIVE | Noted: 2023-09-14

## 2023-09-14 PROBLEM — R82.81 PYURIA: Status: ACTIVE | Noted: 2023-09-14

## 2023-09-14 PROBLEM — R35.0 URINARY FREQUENCY: Status: ACTIVE | Noted: 2023-09-14

## 2023-09-14 LAB
ALBUMIN SERPL-MCNC: 2.9 G/DL (ref 3.5–5)
ALBUMIN/GLOB SERPL: 0.9 (ref 1.1–2.2)
ALP SERPL-CCNC: 74 U/L (ref 45–117)
ALT SERPL-CCNC: 19 U/L (ref 12–78)
ANION GAP SERPL CALC-SCNC: 4 MMOL/L (ref 5–15)
AST SERPL-CCNC: 15 U/L (ref 15–37)
BASOPHILS # BLD: 0.1 K/UL (ref 0–0.1)
BASOPHILS NFR BLD: 1 % (ref 0–1)
BILIRUB SERPL-MCNC: 0.8 MG/DL (ref 0.2–1)
BUN SERPL-MCNC: 6 MG/DL (ref 6–20)
BUN/CREAT SERPL: 5 (ref 12–20)
CALCIUM SERPL-MCNC: 8.4 MG/DL (ref 8.5–10.1)
CHLORIDE SERPL-SCNC: 111 MMOL/L (ref 97–108)
CO2 SERPL-SCNC: 26 MMOL/L (ref 21–32)
CREAT SERPL-MCNC: 1.18 MG/DL (ref 0.7–1.3)
DIFFERENTIAL METHOD BLD: ABNORMAL
EOSINOPHIL # BLD: 0.1 K/UL (ref 0–0.4)
EOSINOPHIL NFR BLD: 2 % (ref 0–7)
ERYTHROCYTE [DISTWIDTH] IN BLOOD BY AUTOMATED COUNT: 16.5 % (ref 11.5–14.5)
GLOBULIN SER CALC-MCNC: 3.4 G/DL (ref 2–4)
GLUCOSE SERPL-MCNC: 83 MG/DL (ref 65–100)
HCT VFR BLD AUTO: 37.1 % (ref 36.6–50.3)
HGB BLD-MCNC: 11.4 G/DL (ref 12.1–17)
IMM GRANULOCYTES # BLD AUTO: 0 K/UL (ref 0–0.04)
IMM GRANULOCYTES NFR BLD AUTO: 1 % (ref 0–0.5)
LYMPHOCYTES # BLD: 1 K/UL (ref 0.8–3.5)
LYMPHOCYTES NFR BLD: 18 % (ref 12–49)
MAGNESIUM SERPL-MCNC: 2.2 MG/DL (ref 1.6–2.4)
MCH RBC QN AUTO: 27.2 PG (ref 26–34)
MCHC RBC AUTO-ENTMCNC: 30.7 G/DL (ref 30–36.5)
MCV RBC AUTO: 88.5 FL (ref 80–99)
MONOCYTES # BLD: 0.6 K/UL (ref 0–1)
MONOCYTES NFR BLD: 11 % (ref 5–13)
NEUTS SEG # BLD: 3.8 K/UL (ref 1.8–8)
NEUTS SEG NFR BLD: 67 % (ref 32–75)
NRBC # BLD: 0 K/UL (ref 0–0.01)
NRBC BLD-RTO: 0 PER 100 WBC
PLATELET # BLD AUTO: 153 K/UL (ref 150–400)
PMV BLD AUTO: 11.3 FL (ref 8.9–12.9)
POTASSIUM SERPL-SCNC: 3.8 MMOL/L (ref 3.5–5.1)
PROT SERPL-MCNC: 6.3 G/DL (ref 6.4–8.2)
RBC # BLD AUTO: 4.19 M/UL (ref 4.1–5.7)
SODIUM SERPL-SCNC: 141 MMOL/L (ref 136–145)
WBC # BLD AUTO: 5.6 K/UL (ref 4.1–11.1)

## 2023-09-14 PROCEDURE — 99223 1ST HOSP IP/OBS HIGH 75: CPT | Performed by: INTERNAL MEDICINE

## 2023-09-14 PROCEDURE — 94761 N-INVAS EAR/PLS OXIMETRY MLT: CPT

## 2023-09-14 PROCEDURE — A4216 STERILE WATER/SALINE, 10 ML: HCPCS | Performed by: STUDENT IN AN ORGANIZED HEALTH CARE EDUCATION/TRAINING PROGRAM

## 2023-09-14 PROCEDURE — 85025 COMPLETE CBC W/AUTO DIFF WBC: CPT

## 2023-09-14 PROCEDURE — C9113 INJ PANTOPRAZOLE SODIUM, VIA: HCPCS | Performed by: STUDENT IN AN ORGANIZED HEALTH CARE EDUCATION/TRAINING PROGRAM

## 2023-09-14 PROCEDURE — 97530 THERAPEUTIC ACTIVITIES: CPT

## 2023-09-14 PROCEDURE — 97535 SELF CARE MNGMENT TRAINING: CPT | Performed by: OCCUPATIONAL THERAPIST

## 2023-09-14 PROCEDURE — 2580000003 HC RX 258: Performed by: STUDENT IN AN ORGANIZED HEALTH CARE EDUCATION/TRAINING PROGRAM

## 2023-09-14 PROCEDURE — 97165 OT EVAL LOW COMPLEX 30 MIN: CPT | Performed by: OCCUPATIONAL THERAPIST

## 2023-09-14 PROCEDURE — 97161 PT EVAL LOW COMPLEX 20 MIN: CPT

## 2023-09-14 PROCEDURE — 97530 THERAPEUTIC ACTIVITIES: CPT | Performed by: OCCUPATIONAL THERAPIST

## 2023-09-14 PROCEDURE — 80053 COMPREHEN METABOLIC PANEL: CPT

## 2023-09-14 PROCEDURE — 6360000002 HC RX W HCPCS: Performed by: STUDENT IN AN ORGANIZED HEALTH CARE EDUCATION/TRAINING PROGRAM

## 2023-09-14 PROCEDURE — 36415 COLL VENOUS BLD VENIPUNCTURE: CPT

## 2023-09-14 PROCEDURE — 6370000000 HC RX 637 (ALT 250 FOR IP): Performed by: STUDENT IN AN ORGANIZED HEALTH CARE EDUCATION/TRAINING PROGRAM

## 2023-09-14 PROCEDURE — 83735 ASSAY OF MAGNESIUM: CPT

## 2023-09-14 PROCEDURE — 1100000000 HC RM PRIVATE

## 2023-09-14 RX ORDER — HYDROCORTISONE 10 MG/1
20 TABLET ORAL 2 TIMES DAILY
Status: DISCONTINUED | OUTPATIENT
Start: 2023-09-14 | End: 2023-09-16

## 2023-09-14 RX ADMIN — HYDROCORTISONE 20 MG: 10 TABLET ORAL at 21:05

## 2023-09-14 RX ADMIN — SODIUM CHLORIDE, POTASSIUM CHLORIDE, SODIUM LACTATE AND CALCIUM CHLORIDE: 600; 310; 30; 20 INJECTION, SOLUTION INTRAVENOUS at 21:05

## 2023-09-14 RX ADMIN — ENOXAPARIN SODIUM 40 MG: 100 INJECTION SUBCUTANEOUS at 09:34

## 2023-09-14 RX ADMIN — SODIUM CHLORIDE, POTASSIUM CHLORIDE, SODIUM LACTATE AND CALCIUM CHLORIDE: 600; 310; 30; 20 INJECTION, SOLUTION INTRAVENOUS at 03:00

## 2023-09-14 RX ADMIN — PRAVASTATIN SODIUM 20 MG: 20 TABLET ORAL at 21:05

## 2023-09-14 RX ADMIN — SODIUM CHLORIDE, PRESERVATIVE FREE 10 ML: 5 INJECTION INTRAVENOUS at 09:35

## 2023-09-14 RX ADMIN — ASPIRIN 81 MG: 81 TABLET, CHEWABLE ORAL at 09:30

## 2023-09-14 RX ADMIN — PANTOPRAZOLE SODIUM 40 MG: 40 INJECTION, POWDER, FOR SOLUTION INTRAVENOUS at 09:35

## 2023-09-14 RX ADMIN — HYDROCORTISONE 50 MG: 10 TABLET ORAL at 09:33

## 2023-09-14 RX ADMIN — DULOXETINE HYDROCHLORIDE 30 MG: 30 CAPSULE, DELAYED RELEASE ORAL at 21:06

## 2023-09-14 ASSESSMENT — PAIN SCALES - GENERAL: PAINLEVEL_OUTOF10: 0

## 2023-09-14 NOTE — PROGRESS NOTES
At [address] made [address] NP aware that /76, and he has been concerned about BP since the last reading of 164/85 earlier tonight. His wife asked if he should get metoprolol? She said he hasnt taken any BP meds in a week.      Np advised no BP meds will be ordered at this time, concern for orthostatic hypotension RN made wife and patient aware

## 2023-09-14 NOTE — CARE COORDINATION
9/14/2023 3:42 PM     Care Management Readmission Assessment      Reason for Admission:  Emergency -     Urinary frequency [R35.0]  Generalized weakness [R53.1]  Intractable nausea and vomiting [R11.2]  Vomiting without nausea, unspecified vomiting type [R11.11]       Pt was recently at [facility] from 8/10-8/18 and was discharged to [address] [place] [address]. Pt was discharged home from [address] [place] [address] after a week and half stay. Pt was set up with [facility]. Pt also has been to [facility] ED since last admission. Pt's wife has been in contact with pt's Cardiologist and PCP closely, pt has been to [facility] to go in for appts. Assessment:   [x]In person with pt and pt's wife. Charted address and phone numbers confirmed. RUR: Readmission Risk              Risk of Unplanned Readmission:  18           Risk Level: []Low [x]Moderate []High  Value-based purchasing: [] Yes [x] No    Advance Directive: Full Code     [x] No AD on file. LNOK. [] AD on file. [] Current AD not on file. Copy requested. [] Requests AD, and referral submitted to \[facility]\"" Care. Healthcare Decision Maker:         Assessment:     09/14/23 0124   Service Assessment   Patient Orientation Alert and Oriented   Cognition Alert   History Provided By Patient;Significant Other   Primary Caregiver Self   Accompanied By/Relationship Pt's wife   Support Systems Spouse/Significant Other   Patient's Healthcare Decision Maker is: Legal Next of Kin   PCP Verified by CM Yes   Last Visit to PCP Within last 3 months   Prior Functional Level Assistance with the following:;Bathing;Dressing; Toileting;Feeding;Cooking;Housework; Shopping;Mobility   Can patient return to prior living arrangement Yes   Ability to make needs known: Good   Family able to assist with home care needs: Yes   Would you like for me to discuss the discharge plan with any other family members/significant others, and if so, who? Yes   Financial Resources Medicare; Other (Comment) Number Payor Fax Number Effective Dates    [address] [address] [phone]  6/1/2005 - None Entered    [address] Name [address] Birth Date Member ID       Leigh Xavier 1940 [id number]               Secondary Coverage       Payor Plan Insurance Group Employer/Plan Group    [employer] MEDICARE SUPP        Payor Plan Address Payor Plan Phone Number Payor Plan Fax Number Effective Dates    [address] [phone]  5/1/2016 - None Entered    [address] [facility] [address]         [address] Name [address] Birth Date Member ID       Leigh Xavier 1940 [id number]                     PCP: Emil Huang MD   Address: [address] [place] [address] / [address] [place] [id number]   Phone number: [phone]   Current patient: [x]Yes []No   Approximate date of last visit:   Access to virtual PCP visits: []Yes []No       Transition of care plan:      Pt and pt's wife prefer pt to return home with home health and family support. [x] Home with [address] [place] [address],[address] of Choice offered? [x] Yes, Preference:  Vasu Mauricio. Preliminary referral sent to [facility] via [facility] will follow.  HORTENSIA Dickerson    Readmission Assessment  Number of Days since last admission?: 8-30 days  [facility] Disposition[facility], pt was there for a week and a half following discharge)  Who is being Interviewed: Patient, Caregiver  What was the patient's/caregiver's perception as to why they think they needed to return back to the hospital?: Other (Comment) (Still feeling ill, dehydration)  Did you visit your Primary Care Physician after you left the hospital, before you returned this time?: Yes (Family talked with pt and PCP over the phone.)  Did you see a specialist, such as Cardiac, Pulmonary, Orthopedic Physician, etc. after you left the hospital?: Yes  Who advised the patient to return to the hospital?: Self-referral  Does the patient report anything

## 2023-09-14 NOTE — PLAN OF CARE
Problem: Physical Therapy - Adult  Goal: By Discharge: Performs mobility at highest level of function for planned discharge setting. See evaluation for individualized goals. Description: FUNCTIONAL STATUS PRIOR TO ADMISSION: Patient was recently discharged from [facility] to [facility] and was home for 1 week with HHPT/OT prior to this admission. Patient reports that his wife provides physical assistance (at the least, supervision) with a gait belt for functional mobility and occasionally assists with ADL's. Patient uses a RW at all times and also utilizes a wheelchair and transport chair when needed. HOME SUPPORT PRIOR TO ADMISSION: The patient lived with his wife who performed IADL's. Physical Therapy Goals  Initiated 9/14/2023  1. Patient will move from supine to sit and sit to supine, scoot up and down, and roll side to side in bed with independence within 7 day(s). 2.  Patient will perform sit to stand with supervision/set-up within 7 day(s). 3.  Patient will transfer from bed to chair and chair to bed with supervision/set-up using the least restrictive device within 7 day(s). 4.  Patient will ambulate with supervision/set-up for 75 feet with the least restrictive device within 7 day(s). 5.  Patient will ascend/descend 2 stairs with B handrail(s) with minimal assistance within 7 day(s). Outcome: Progressing     PHYSICAL THERAPY EVALUATION    Patient: Niko Salvador (81 y.o. male)  Date: 9/14/2023  Primary Diagnosis: Urinary frequency [R35.0]  Generalized weakness [R53.1]  Intractable nausea and vomiting [R11.2]  Vomiting without nausea, unspecified vomiting type [R11.11]       Precautions: Fall Risk                  ASSESSMENT :   DEFICITS/IMPAIRMENTS:   The patient is limited by decreased functional mobility, independence in ADLs, activity tolerance, safety awareness, balance, orthostatic hypotension s/p admission for debility, nausea/vomiting, and UTI.  Patient received in bed, of care was discussed with: occupational therapist and registered nurse    Patient Education  Education Given To: Patient; Family  Education Provided: Role of Therapy;Plan of Care  Education Method: Verbal  Barriers to Learning: Other (Comment) (Perspective on physical therapy)  Education Outcome: Continued education needed    Thank you for this referral.  Mayra Diez, PT  Minutes: 33      Physical Therapy Evaluation Charge Determination   History Examination Presentation Decision-Making   MEDIUM  Complexity : 1-2 comorbidities / personal factors will impact the outcome/ POC  LOW Complexity : 1-2 Standardized tests and measures addressing body structure, function, activity limitation and / or participation in recreation  LOW Complexity : Stable, uncomplicated  AM-PAC  LOW    Based on the above components, the patient evaluation is determined to be of the following complexity level: Low

## 2023-09-14 NOTE — PLAN OF CARE
Problem: Occupational Therapy - Adult  Goal: By Discharge: Performs self-care activities at highest level of function for planned discharge setting. See evaluation for individualized goals. Description: FUNCTIONAL STATUS PRIOR TO ADMISSION: Patient was recently discharged from [facility] to [facility] and was home for 1 week with HHPT/OT prior to this admission. Patient reports that his wife provides physical assistance (at the least, supervision) with a gait belt for functional mobility and occasionally assists with ADL's. Patient uses a RW at all times and also utilizes a wheelchair and transport chair when needed. HOME SUPPORT PRIOR TO ADMISSION: The patient lived with his wife who performed IADL's. Receives Help From: Other (comment) (Has been receiving HHPT/OT for the past week since being discharged from ), ADL Assistance: (P) Needs assistance, Bath:  (On occasion his wife supervises/helps when patient is having acute medical issues),  ,  , Feeding: Independent,  , Homemaking Assistance: Needs assistance, Ambulation Assistance: Needs assistance, Transfer Assistance: Needs assistance, Active : No     Occupational Therapy Goals:  Initiated 9/14/2023  1. Patient will tolerate standing to perform grooming > or = 5 minutes with Supervision without significant decrease in blood pressure within 7 day(s). 2.  Patient will perform lower body dressing with Supervision within 7 day(s). 3.  Patient will perform toilet transfers with Stand by Assist  using rolling walker to transfer into and out of bathroom within 7 day(s). 4.  Patient will perform all aspects of toileting with Supervision within 7 day(s). 5.  Patient will sit in chair or edge of bed for all meals within 7 day(s).    Outcome: Not Progressing    OCCUPATIONAL THERAPY EVALUATION    Patient: Hanny Hernandez (87 y.o. male)  Date: 9/14/2023  Primary Diagnosis: Urinary frequency [R35.0]  Generalized weakness [R53.1]  Intractable nausea

## 2023-09-14 NOTE — PROGRESS NOTES
Occupational [facility], [address] OT order and completed chart review. Nursing cleared for therapy. Received patient in bed semifowler, shaving with wife assisting with holding mirror. Patient and wife politely requesting OT return at later time as he was orthostatic earlier with PT and recently received a steroid which wife reports usually helps with his BP. They both requested for therapy to be completed in PM as patient does better at that time. Will hold and complete eval as available and able.        Thank you,    Lamar Diego, OT

## 2023-09-14 NOTE — CONSULTS
Infectious Disease Consult    Impression/Plan   Urinary hesitancy with incomplete bladder emptying. Ongoing several weeks since Auguste catheter placed during cardiac ablation procedure. Recent urine culture grew Pseudomonas and Enterococcus both reportedly sensitive to levofloxacin. Awaiting urine culture however UA is bland. No acute urinary tract abnormalities seen on CT. Patient afebrile and WBC is within normal limits. Suspect we will need to look for noninfectious causes of patient's symptoms but will await urine culture results. Continue levofloxacin  Intractable nausea and vomiting. Ongoing x2 weeks. CT abdomen pelvis negative for any acute findings. May need GI consultation  Sjogren's disease. Usually on prednisone 5 mg daily. Now on stress dose hydrocortisone  Atrial fibrillation. Status post retreatment Watchman procedure. Was on amiodarone in the past but does not appear to be on it now. We will need to clarify with patient. With history of prolonged QTc we will repeat an EKG. Levofloxacin may need to be stopped    Anti-infectives:   Levofloxacin    Subjective:   Date of Consultation:  September 14, 2023  Date of Admission: 9/13/2023   Referring Physician:     Patient is a 80 y.o. male who is being seen for possible UTI. Patient is a poor historian. Per ER admission note: \" 80 y.o. male presenting with vomiting. Patient had a A-fib ablation over a month ago at [facility].  He had a short admission and overnight he had a Auguste catheter placed. He states that it was rather painful and he developed a UTI subsequently. He was started on ceftriaxone and transition to Keflex. He was subsequently placed on PIP Tazo and then discharged on amoxicillin. He was in rehab for a few weeks and has been home for the past 2 weeks. He started vomiting about 2 weeks ago. He was found to have a positive urine for 2 organisms after a ED visit at [facility] 5 days ago.   He had Enterococcus Phosphatase 74 45 - 117 U/L    Total Protein 6.3 (L) 6.4 - 8.2 g/dL    Albumin 2.9 (L) 3.5 - 5.0 g/dL    Globulin 3.4 2.0 - 4.0 g/dL    Albumin/Globulin Ratio 0.9 (L) 1.1 - 2.2     CBC with Auto Differential    Collection Time: 09/14/23  3:45 AM   Result Value Ref Range    WBC 5.6 4.1 - 11.1 K/uL    RBC 4.19 4. 10 - 5.70 M/uL    Hemoglobin 11.4 (L) 12.1 - 17.0 g/dL    Hematocrit 37.1 36.6 - 50.3 %    MCV 88.5 80.0 - 99.0 FL    MCH 27.2 26.0 - 34.0 PG    MCHC 30.7 30.0 - 36.5 g/dL    RDW 16.5 (H) 11.5 - 14.5 %    Platelets 125 411 - 007 K/uL    MPV 11.3 8.9 - 12.9 FL    Nucleated RBCs 0.0 0  WBC    nRBC 0.00 0.00 - 0.01 K/uL    Neutrophils % 67 32 - 75 %    Lymphocytes % 18 12 - 49 %    Monocytes % 11 5 - 13 %    Eosinophils % 2 0 - 7 %    Basophils % 1 0 - 1 %    Immature Granulocytes 1 (H) 0.0 - 0.5 %    Neutrophils Absolute 3.8 1.8 - 8.0 K/UL    Lymphocytes Absolute 1.0 0.8 - 3.5 K/UL    Monocytes Absolute 0.6 0.0 - 1.0 K/UL    Eosinophils Absolute 0.1 0.0 - 0.4 K/UL    Basophils Absolute 0.1 0.0 - 0.1 K/UL    Absolute Immature Granulocyte 0.0 0.00 - 0.04 K/UL    Differential Type AUTOMATED          Microbiology:      Studies:      Signed By: Selma Robertson DO     September 14, 2023

## 2023-09-14 NOTE — PROGRESS NOTES
Comprehensive Nutrition Assessment    Type and Reason for Visit: Initial, Wound, Positive Nutrition Screen    Nutrition Recommendations/Plan:   Adjusted diet to Full liquids - BG WNL, No A1c. No hx of DM. Added Ensure Enlive TID for ease of kcal/protein intake        Malnutrition Assessment:  Malnutrition Status: Moderate malnutrition (09/14/23 1537)    Context:  Acute Illness     Findings of the 6 clinical characteristics of malnutrition:  Energy Intake:  75% or less of estimated energy requirements for 7 or more days  Weight Loss:  7.5% over 3 months     Body Fat Loss:  Mild body fat loss     Muscle Mass Loss:  Mild muscle mass loss    Fluid Accumulation:  No significant fluid accumulation     Strength:  Not Performed       Nutrition Assessment:    Past medical hx:       Diagnosis Date    CKD (chronic kidney disease), stage III ([address])     Sjoegren syndrome 04/2016    Urine retention      MST received for wt loss. Pt reports dental issues in the recent past which lead to teeth extraction/replacement. Pt with n/v for ~2 weeks as well. Pt reports ~25 lbs wt loss over the last few months related to these multiple health issues. UBW ~190-200 lbs. ~10% of body weight lost over last few months and recent poor PO meet malnutrition criteria. Pt with some mild fat/muscle wasting present. Pt had been drinking 2 Ensure per day in attempts to prevent more wt loss and ease of kcal/protein intake. Pt reports 2 soft Bms today, possibly related to Abx. No known food allergies. When able advance diet to Soft and bite sized. Check bowel status, nausea status and urology plan. Current Nutrition Therapies:  ADULT DIET; Full Liquid  ADULT ORAL NUTRITION SUPPLEMENT; Breakfast, Lunch, Dinner; Standard High Calorie/High Protein Oral Supplement  Meal Intake:   No data found. Supplement Intake:  No data found.   Nutrition Support: n/a    Nutritionally Significant Medications:  Scheduled Meds:   hydrocortisone  20 mg Oral BID

## 2023-09-14 NOTE — PROGRESS NOTES
sensitive to levaquin. He has been feeling worse and worse with decreasing PO intake and worsening nausea and vomiting prior to admission CT abd w/o acute process. AIDAN d/t IVVD. - Strict IO  - IVF  - Uro consult to address obstructive component  - Levaquin IV - ID thinking about switching to something else, or stopping anx alltogether? d/t Qtc prolongation. Lets see what this next EKG shows but I am hesitant to stop levaquin  - urine culture  - Appreciate ID and uro recs     N / V: POA. Going on for 2 weeks now. Likely caused by UTI but may warrant further eval if doesn't improve rapidly with treatment during admission. Dr Christian Corcoran thinking this isnt d/t UTI. GI curbsided and wanted us to rule out or completely manage UTI before they see patient. Both he and his spouse state he has been improving since abx started which makes me think the cause really is UTI. - Address ? UTI as above  - Zofran PRN  - PPI     Schogrens: POA. On pred 5 QD at home  - Hold pred  - Stress dose hydrocortisone weaned down today     Orthostatic hypotension / IVVD: POA based on vitals collected by home health nurse. - Fall precautions  - IVF  - Address ? UTI  - watch orthostatics     Afib: S/p watchman. Not on anticoag, just ASA. Amio was recently held because cards had suspicion that it may have been contributing to N/V - this is not the case since symptoms continues off of amio - therefore I have restarted amio since admission.  - Cont toprol and amio     HLD / PAD: POA. Chronic. Noncontributory. - Cont statin and ASA     CT abdomen incidentals: POA. None of them contributory or acute  - Outpt follow up     Debility: POA.  Acute on chronic  - PT/OT/CM  - Will likely need placement    **Prior records, notes, labs, radiology, and medications reviewed in [address] discussed with: Patient and Family    Discussed:  Care Plan and D/C Planning    Prophylaxis:  Lovenox    Disposition:

## 2023-09-14 NOTE — WOUND CARE
Wound consult: new patient consult for sacrum POA redness/non-blanching and lower extremity weeping. Patient is resting on Cynthia bed with hercules system in place. He is independently mobile in  currently but his wife reports he has been in bed at home much of the time since August.  Alert, oriented x 4, Abimael score 15. Uses urinal.  Assessment:  Left buttock - two areas of redness, fairly well demarcated with blanching to slow to diane intact skin, appears to be resolving from non-blanching on admission. Stage 1 PI POA. No redness to sacrum or right buttock. Left heel blanching red, boggy, dry. Right heel also with redness to lesser extent, blanching. Treatment:  Reseated sacral foam to cover buttocks area. Floated heels. Recommendations/Plan:  Sacral foam for protection buttocks/sacrum, change every three days. Float and moisturize heels. Waffle cushion for sitting. Please re-consult if concerns arise despite interventions.   Lashanda Armstrong RN, [employer]

## 2023-09-15 LAB
ALBUMIN SERPL-MCNC: 2.8 G/DL (ref 3.5–5)
ALBUMIN/GLOB SERPL: 0.8 (ref 1.1–2.2)
ALP SERPL-CCNC: 73 U/L (ref 45–117)
ALT SERPL-CCNC: 17 U/L (ref 12–78)
ANION GAP SERPL CALC-SCNC: 9 MMOL/L (ref 5–15)
AST SERPL-CCNC: 10 U/L (ref 15–37)
BACTERIA SPEC CULT: NORMAL
BASOPHILS # BLD: 0 K/UL (ref 0–0.1)
BASOPHILS NFR BLD: 1 % (ref 0–1)
BILIRUB SERPL-MCNC: 0.8 MG/DL (ref 0.2–1)
BUN SERPL-MCNC: 7 MG/DL (ref 6–20)
BUN/CREAT SERPL: 7 (ref 12–20)
CALCIUM SERPL-MCNC: 8.7 MG/DL (ref 8.5–10.1)
CHLORIDE SERPL-SCNC: 110 MMOL/L (ref 97–108)
CO2 SERPL-SCNC: 22 MMOL/L (ref 21–32)
CREAT SERPL-MCNC: 1.06 MG/DL (ref 0.7–1.3)
DIFFERENTIAL METHOD BLD: ABNORMAL
EKG ATRIAL RATE: 576 BPM
EKG DIAGNOSIS: NORMAL
EKG Q-T INTERVAL: 408 MS
EKG QRS DURATION: 86 MS
EKG QTC CALCULATION (BAZETT): 446 MS
EKG R AXIS: -40 DEGREES
EKG T AXIS: 15 DEGREES
EKG VENTRICULAR RATE: 72 BPM
EOSINOPHIL # BLD: 0 K/UL (ref 0–0.4)
EOSINOPHIL NFR BLD: 0 % (ref 0–7)
ERYTHROCYTE [DISTWIDTH] IN BLOOD BY AUTOMATED COUNT: 16.4 % (ref 11.5–14.5)
EST. AVERAGE GLUCOSE BLD GHB EST-MCNC: 111 MG/DL
GLOBULIN SER CALC-MCNC: 3.5 G/DL (ref 2–4)
GLUCOSE SERPL-MCNC: 109 MG/DL (ref 65–100)
HBA1C MFR BLD: 5.5 % (ref 4–5.6)
HCT VFR BLD AUTO: 35.6 % (ref 36.6–50.3)
HGB BLD-MCNC: 11.3 G/DL (ref 12.1–17)
IMM GRANULOCYTES # BLD AUTO: 0 K/UL (ref 0–0.04)
IMM GRANULOCYTES NFR BLD AUTO: 1 % (ref 0–0.5)
LYMPHOCYTES # BLD: 0.9 K/UL (ref 0.8–3.5)
LYMPHOCYTES NFR BLD: 14 % (ref 12–49)
MAGNESIUM SERPL-MCNC: 2 MG/DL (ref 1.6–2.4)
MCH RBC QN AUTO: 27.5 PG (ref 26–34)
MCHC RBC AUTO-ENTMCNC: 31.7 G/DL (ref 30–36.5)
MCV RBC AUTO: 86.6 FL (ref 80–99)
MONOCYTES # BLD: 0.5 K/UL (ref 0–1)
MONOCYTES NFR BLD: 8 % (ref 5–13)
NEUTS SEG # BLD: 4.7 K/UL (ref 1.8–8)
NEUTS SEG NFR BLD: 76 % (ref 32–75)
NRBC # BLD: 0 K/UL (ref 0–0.01)
NRBC BLD-RTO: 0 PER 100 WBC
PLATELET # BLD AUTO: 162 K/UL (ref 150–400)
PMV BLD AUTO: 10.2 FL (ref 8.9–12.9)
POTASSIUM SERPL-SCNC: 3.8 MMOL/L (ref 3.5–5.1)
PROT SERPL-MCNC: 6.3 G/DL (ref 6.4–8.2)
RBC # BLD AUTO: 4.11 M/UL (ref 4.1–5.7)
SERVICE CMNT-IMP: NORMAL
SODIUM SERPL-SCNC: 141 MMOL/L (ref 136–145)
WBC # BLD AUTO: 6.1 K/UL (ref 4.1–11.1)

## 2023-09-15 PROCEDURE — 85025 COMPLETE CBC W/AUTO DIFF WBC: CPT

## 2023-09-15 PROCEDURE — A4216 STERILE WATER/SALINE, 10 ML: HCPCS | Performed by: STUDENT IN AN ORGANIZED HEALTH CARE EDUCATION/TRAINING PROGRAM

## 2023-09-15 PROCEDURE — 6370000000 HC RX 637 (ALT 250 FOR IP): Performed by: STUDENT IN AN ORGANIZED HEALTH CARE EDUCATION/TRAINING PROGRAM

## 2023-09-15 PROCEDURE — 6360000002 HC RX W HCPCS: Performed by: STUDENT IN AN ORGANIZED HEALTH CARE EDUCATION/TRAINING PROGRAM

## 2023-09-15 PROCEDURE — 36415 COLL VENOUS BLD VENIPUNCTURE: CPT

## 2023-09-15 PROCEDURE — 97116 GAIT TRAINING THERAPY: CPT

## 2023-09-15 PROCEDURE — 94761 N-INVAS EAR/PLS OXIMETRY MLT: CPT

## 2023-09-15 PROCEDURE — C9113 INJ PANTOPRAZOLE SODIUM, VIA: HCPCS | Performed by: STUDENT IN AN ORGANIZED HEALTH CARE EDUCATION/TRAINING PROGRAM

## 2023-09-15 PROCEDURE — 2580000003 HC RX 258: Performed by: STUDENT IN AN ORGANIZED HEALTH CARE EDUCATION/TRAINING PROGRAM

## 2023-09-15 PROCEDURE — 1100000000 HC RM PRIVATE

## 2023-09-15 PROCEDURE — 99232 SBSQ HOSP IP/OBS MODERATE 35: CPT | Performed by: INTERNAL MEDICINE

## 2023-09-15 PROCEDURE — 80053 COMPREHEN METABOLIC PANEL: CPT

## 2023-09-15 PROCEDURE — 83735 ASSAY OF MAGNESIUM: CPT

## 2023-09-15 PROCEDURE — 93010 ELECTROCARDIOGRAM REPORT: CPT | Performed by: SPECIALIST

## 2023-09-15 PROCEDURE — 83036 HEMOGLOBIN GLYCOSYLATED A1C: CPT

## 2023-09-15 PROCEDURE — 93005 ELECTROCARDIOGRAM TRACING: CPT | Performed by: INTERNAL MEDICINE

## 2023-09-15 PROCEDURE — 97530 THERAPEUTIC ACTIVITIES: CPT

## 2023-09-15 RX ORDER — PANTOPRAZOLE SODIUM 40 MG/1
40 TABLET, DELAYED RELEASE ORAL
Status: DISCONTINUED | OUTPATIENT
Start: 2023-09-16 | End: 2023-09-21

## 2023-09-15 RX ADMIN — LEVOFLOXACIN 750 MG: 5 INJECTION, SOLUTION INTRAVENOUS at 17:18

## 2023-09-15 RX ADMIN — PRAVASTATIN SODIUM 20 MG: 20 TABLET ORAL at 22:28

## 2023-09-15 RX ADMIN — PANTOPRAZOLE SODIUM 40 MG: 40 INJECTION, POWDER, FOR SOLUTION INTRAVENOUS at 08:29

## 2023-09-15 RX ADMIN — HYDROCORTISONE 20 MG: 10 TABLET ORAL at 08:29

## 2023-09-15 RX ADMIN — ENOXAPARIN SODIUM 40 MG: 100 INJECTION SUBCUTANEOUS at 08:29

## 2023-09-15 RX ADMIN — SODIUM CHLORIDE, POTASSIUM CHLORIDE, SODIUM LACTATE AND CALCIUM CHLORIDE: 600; 310; 30; 20 INJECTION, SOLUTION INTRAVENOUS at 04:51

## 2023-09-15 RX ADMIN — ASPIRIN 81 MG: 81 TABLET, CHEWABLE ORAL at 08:29

## 2023-09-15 RX ADMIN — HYDROCORTISONE 20 MG: 10 TABLET ORAL at 22:28

## 2023-09-15 NOTE — CARE COORDINATION
9/15/2023 1:38 PM   Care Management Progress Note      ICD-10-CM    1. Generalized weakness  R53.1       2. Urinary frequency  R35.0       3. Vomiting without nausea, unspecified vomiting type  R11.11           RUR:  18&  Risk Level: []Low [x]Moderate []High  Value-based purchasing: [] Yes [x] No  Bundle patient: [] Yes [x] No   Specify:     Transition of care plan:  Ongoing medical management, home on po abx  Home with family at discharge, pt declining SNF placement  Home health arranged through [facility], PT/OT/RN. Resumption order sent via [employer] to [facility]. CM requested Radha Otero with [employer] contact pt's wife to discuss home health. Outpatient follow-up.   Pt's family to transport

## 2023-09-15 NOTE — PROGRESS NOTES
Pharmacy Dosing Services: 09/15/23    The pharmacist has determined that this patient meets P & T approved criteria for conversion from IV to oral therapy for the following medication:Protonix 40 mg IV daily      The pharmacist has written the following order for the patient: Protonix 40 mg po daily    The pharmacist will continue to monitor the patient's status and advise the physician if conversion back to IV therapy is recommended.     [facility]   [phone]

## 2023-09-15 NOTE — PLAN OF CARE
Problem: Physical Therapy - Adult  Goal: By Discharge: Performs mobility at highest level of function for planned discharge setting. See evaluation for individualized goals. Description: FUNCTIONAL STATUS PRIOR TO ADMISSION: Patient was recently discharged from [facility] to [facility] and was home for 1 week with HHPT/OT prior to this admission. Patient reports that his wife provides physical assistance (at the least, supervision) with a gait belt for functional mobility and occasionally assists with ADL's. Patient uses a RW at all times and also utilizes a wheelchair and transport chair when needed. HOME SUPPORT PRIOR TO ADMISSION: The patient lived with his wife who performed IADL's. Physical Therapy Goals  Initiated 9/14/2023  1. Patient will move from supine to sit and sit to supine, scoot up and down, and roll side to side in bed with independence within 7 day(s). 2.  Patient will perform sit to stand with supervision/set-up within 7 day(s). 3.  Patient will transfer from bed to chair and chair to bed with supervision/set-up using the least restrictive device within 7 day(s). 4.  Patient will ambulate with supervision/set-up for 75 feet with the least restrictive device within 7 day(s). 5.  Patient will ascend/descend 2 stairs with B handrail(s) with minimal assistance within 7 day(s). Outcome: Progressing     PHYSICAL THERAPY TREATMENT    Patient: Duyen Peoples (58 y.o. male)  Date: 9/15/2023  Diagnosis: Urinary frequency [R35.0]  Generalized weakness [R53.1]  Intractable nausea and vomiting [R11.2]  Vomiting without nausea, unspecified vomiting type [R11.11] Intractable nausea and vomiting      Precautions: Fall Risk                    ASSESSMENT:  Patient continues to benefit from skilled PT services and is progressing towards goals. Patient remains most limited by impaired gait, balance, activity tolerance, strength and motor control, and safety awareness.  Patient found to be Minimum assistance; Moderate assistance  Interventions: Verbal cues; Safety awareness training;Visual cues  Speed/Kelly: Pace decreased (< 100 feet/min)  Step Length: Left shortened;Right shortened  Gait Abnormalities: Ataxic;Trunk sway increased  Distance (ft): 3 Feet (3+5)  Assistive Device: Walker, rolling;Gait belt              Pain Ratin/10   Pain Intervention(s): Activity Tolerance:   Good, Fair , and tolerates ADLS without rest breaks    After treatment:   Patient left in no apparent distress sitting up in chair, Call bell within reach, Bed/ chair alarm activated, and Caregiver / family present      COMMUNICATION/EDUCATION:   The patient's plan of care was discussed with: occupational therapist, registered nurse, and     Patient Education  Education Given To: Family; Patient  Education Provided: Role of Therapy;Plan of Care  Education Provided Comments: Continue mobilizing over the weekend with nursing staff  Education Method: Verbal  Barriers to Learning: None  Education Outcome: Verbalized understanding      Markell Garcia, PT  Minutes: 40

## 2023-09-15 NOTE — PROGRESS NOTES
I spoke with the patient. I reviewed his records. His CT shows an unremarkable urinary tract. He has incomplete emptying. He reports it was around 230 cc. Urine culture is negative recently. He is on antibiotics under the direction of infectious disease. He states he cannot take tamsulosin. He reports his PSA was around 6 recently. He has had multiple negative biopsies in the past as well as a laser ablation. We discussed pros and cons of catheterization. I do not recommended at this point in time. We will follow-up with him over the weekend. Likely arrange for outpatient follow-up.

## 2023-09-15 NOTE — PROGRESS NOTES
3413: Pt states he takes his duloxetine at night and will not take this morning. MD notified. 1245: Pt and Pt's wife c/o IV occlusion alarms d/t IV location. Per MD, IVF okay to be stopped unless something indicates otherwise in chart when MD reviews further. IVF stopped at this time. Pt's wife washing Pt's urinals in the bathroom. Pt and wife aware of Pt's infections currently. Pt's IV was not alarming when RN at bedside, however IVF discontinued per Pt request and MD verbal bedside order.

## 2023-09-16 LAB
ALBUMIN SERPL-MCNC: 2.8 G/DL (ref 3.5–5)
ALBUMIN/GLOB SERPL: 0.9 (ref 1.1–2.2)
ALP SERPL-CCNC: 68 U/L (ref 45–117)
ALT SERPL-CCNC: 17 U/L (ref 12–78)
ANION GAP SERPL CALC-SCNC: 5 MMOL/L (ref 5–15)
AST SERPL-CCNC: 13 U/L (ref 15–37)
BASOPHILS # BLD: 0 K/UL (ref 0–0.1)
BASOPHILS NFR BLD: 0 % (ref 0–1)
BILIRUB SERPL-MCNC: 0.5 MG/DL (ref 0.2–1)
BUN SERPL-MCNC: 7 MG/DL (ref 6–20)
BUN/CREAT SERPL: 6 (ref 12–20)
CALCIUM SERPL-MCNC: 9 MG/DL (ref 8.5–10.1)
CHLORIDE SERPL-SCNC: 111 MMOL/L (ref 97–108)
CO2 SERPL-SCNC: 26 MMOL/L (ref 21–32)
CREAT SERPL-MCNC: 1.2 MG/DL (ref 0.7–1.3)
DIFFERENTIAL METHOD BLD: ABNORMAL
EOSINOPHIL # BLD: 0 K/UL (ref 0–0.4)
EOSINOPHIL NFR BLD: 0 % (ref 0–7)
ERYTHROCYTE [DISTWIDTH] IN BLOOD BY AUTOMATED COUNT: 16.4 % (ref 11.5–14.5)
GLOBULIN SER CALC-MCNC: 3.2 G/DL (ref 2–4)
GLUCOSE SERPL-MCNC: 115 MG/DL (ref 65–100)
HCT VFR BLD AUTO: 35.3 % (ref 36.6–50.3)
HGB BLD-MCNC: 10.9 G/DL (ref 12.1–17)
IMM GRANULOCYTES # BLD AUTO: 0.1 K/UL (ref 0–0.04)
IMM GRANULOCYTES NFR BLD AUTO: 1 % (ref 0–0.5)
LYMPHOCYTES # BLD: 1.3 K/UL (ref 0.8–3.5)
LYMPHOCYTES NFR BLD: 19 % (ref 12–49)
MAGNESIUM SERPL-MCNC: 2.2 MG/DL (ref 1.6–2.4)
MCH RBC QN AUTO: 27.6 PG (ref 26–34)
MCHC RBC AUTO-ENTMCNC: 30.9 G/DL (ref 30–36.5)
MCV RBC AUTO: 89.4 FL (ref 80–99)
MONOCYTES # BLD: 0.6 K/UL (ref 0–1)
MONOCYTES NFR BLD: 9 % (ref 5–13)
NEUTS SEG # BLD: 4.7 K/UL (ref 1.8–8)
NEUTS SEG NFR BLD: 71 % (ref 32–75)
NRBC # BLD: 0 K/UL (ref 0–0.01)
NRBC BLD-RTO: 0 PER 100 WBC
PLATELET # BLD AUTO: 158 K/UL (ref 150–400)
PMV BLD AUTO: 11.3 FL (ref 8.9–12.9)
POTASSIUM SERPL-SCNC: 3.9 MMOL/L (ref 3.5–5.1)
PROT SERPL-MCNC: 6 G/DL (ref 6.4–8.2)
RBC # BLD AUTO: 3.95 M/UL (ref 4.1–5.7)
SODIUM SERPL-SCNC: 142 MMOL/L (ref 136–145)
WBC # BLD AUTO: 6.7 K/UL (ref 4.1–11.1)

## 2023-09-16 PROCEDURE — 1100000000 HC RM PRIVATE

## 2023-09-16 PROCEDURE — 83735 ASSAY OF MAGNESIUM: CPT

## 2023-09-16 PROCEDURE — 6370000000 HC RX 637 (ALT 250 FOR IP): Performed by: INTERNAL MEDICINE

## 2023-09-16 PROCEDURE — 36415 COLL VENOUS BLD VENIPUNCTURE: CPT

## 2023-09-16 PROCEDURE — 85025 COMPLETE CBC W/AUTO DIFF WBC: CPT

## 2023-09-16 PROCEDURE — 6360000002 HC RX W HCPCS: Performed by: STUDENT IN AN ORGANIZED HEALTH CARE EDUCATION/TRAINING PROGRAM

## 2023-09-16 PROCEDURE — 2580000003 HC RX 258: Performed by: STUDENT IN AN ORGANIZED HEALTH CARE EDUCATION/TRAINING PROGRAM

## 2023-09-16 PROCEDURE — 6370000000 HC RX 637 (ALT 250 FOR IP): Performed by: UROLOGY

## 2023-09-16 PROCEDURE — 80053 COMPREHEN METABOLIC PANEL: CPT

## 2023-09-16 PROCEDURE — 6370000000 HC RX 637 (ALT 250 FOR IP): Performed by: STUDENT IN AN ORGANIZED HEALTH CARE EDUCATION/TRAINING PROGRAM

## 2023-09-16 RX ORDER — HYDROCORTISONE 10 MG/1
10 TABLET ORAL 2 TIMES DAILY
Status: DISCONTINUED | OUTPATIENT
Start: 2023-09-16 | End: 2023-09-20

## 2023-09-16 RX ORDER — LACTOBACILLUS ACIDOPHILUS / LACTOBACILLUS BULGARICUS 100 MILLION CFU STRENGTH
1 GRANULES ORAL 3 TIMES DAILY
Status: DISCONTINUED | OUTPATIENT
Start: 2023-09-16 | End: 2023-09-29 | Stop reason: HOSPADM

## 2023-09-16 RX ORDER — DULOXETIN HYDROCHLORIDE 30 MG/1
30 CAPSULE, DELAYED RELEASE ORAL
Status: DISCONTINUED | OUTPATIENT
Start: 2023-09-17 | End: 2023-09-29 | Stop reason: HOSPADM

## 2023-09-16 RX ORDER — DUTASTERIDE 0.5 MG/1
0.5 CAPSULE, LIQUID FILLED ORAL DAILY
Status: DISCONTINUED | OUTPATIENT
Start: 2023-09-16 | End: 2023-09-29 | Stop reason: HOSPADM

## 2023-09-16 RX ADMIN — PRAVASTATIN SODIUM 20 MG: 20 TABLET ORAL at 21:21

## 2023-09-16 RX ADMIN — SODIUM CHLORIDE, PRESERVATIVE FREE 10 ML: 5 INJECTION INTRAVENOUS at 12:05

## 2023-09-16 RX ADMIN — PANTOPRAZOLE SODIUM 40 MG: 40 TABLET, DELAYED RELEASE ORAL at 06:00

## 2023-09-16 RX ADMIN — SODIUM CHLORIDE, PRESERVATIVE FREE 10 ML: 5 INJECTION INTRAVENOUS at 21:21

## 2023-09-16 RX ADMIN — ASPIRIN 81 MG: 81 TABLET, CHEWABLE ORAL at 09:51

## 2023-09-16 RX ADMIN — Medication 1 PACKET: at 12:05

## 2023-09-16 RX ADMIN — ENOXAPARIN SODIUM 40 MG: 100 INJECTION SUBCUTANEOUS at 09:51

## 2023-09-16 RX ADMIN — DUTASTERIDE 0.5 MG: 0.5 CAPSULE, LIQUID FILLED ORAL at 12:05

## 2023-09-16 RX ADMIN — Medication 1 PACKET: at 21:19

## 2023-09-16 RX ADMIN — Medication 1 PACKET: at 16:31

## 2023-09-16 RX ADMIN — HYDROCORTISONE 10 MG: 10 TABLET ORAL at 21:19

## 2023-09-16 RX ADMIN — HYDROCORTISONE 10 MG: 10 TABLET ORAL at 09:51

## 2023-09-16 ASSESSMENT — PAIN SCALES - GENERAL: PAINLEVEL_OUTOF10: 0

## 2023-09-16 NOTE — CONSULTS
[address] [place] [address]    Name:  Julius Weinberg  MR#:  [id number]  :  1940  ACCOUNT #:  [de-identified]  DATE OF SERVICE:  2023      REASON FOR CONSULTATION:  BPH, UTIs, incomplete bladder emptying, hematuria. HISTORY OF PRESENT ILLNESS:  He is an 80-year-old white male with wife present and very involved. She has strongly requested urologic consultation from an MD provider. He is a patient of Dr. Ray Chen who has had known prostate enlargement and green light laser performed in the past.  He was set to see another partner Dr. Felix Iqbal but never made it there. He has had a cardiac ablation a month or so ago and had nothing but trouble since. He states that when he was catheterized then, he had some pain and bleeding, afterwards had some frequency, generalized weakness etc.  She does note that he had Sjogren's syndrome since that time. He has been in the hospital several times. He has had a visit at the [facility] emergency room which according to his wife showed pseudomonas and enterococcus sensitive to the Levaquin that he was put on after initially starting Bactrim. Those records are not available to me. Here in the hospital, he has had continued frequency and bladder scans demonstrating a residual urine around 230 mL. Blood and urine cultures were negative. Infectious disease has him on IV Levaquin and supposedly cannot take tamsulosin. He does have a history of an elevated PSA, negative previous biopsies. He spoke with Dr. Tremayne Bass over the phone yesterday. He recommended against the catheterization. He had a CT which I have reviewed which is only remarkable for enlarged prostate and some appropriate bladder wall thickening from that. A repeat PVR has not been performed at this time although it is requested. His wife is very informed and involved and is convinced that he has a problem from his catheterizations.   I do note that he has an enlarged prostate, history of laser TURP, and has had a successful catheterization effectively ruling out an urethral stricture. PAST MEDICAL HISTORY:  On the chart and include chronic kidney disease, Sjogren's syndrome, and a number of other orthopedic procedures. MEDICATIONS:  On the chart. ALLERGIES:  ALLERGIC TO SILODOSIN, AND TAMSULOSIN CAUSE HIM DIZZINESS. SOCIAL HISTORY:  On the chart. Still is a smoker although I do not know that he is doing that in hospital.  Denies any alcohol use. PHYSICAL EXAMINATION:  He appears possibly older than stated age, defers to his wife, appears to be in no discomfort, and is breathing easily. Abdominal exam only remarkable for umbilical hernia. Genitalia normal.  Rectal exam shows slightly decreased tone and enlarged prostate which is nontender without nodules and no fluctuance. LABS AND IMAGING:  Reviewed. IMMPRESSION:  Benign prostatic hyperplasia with incomplete bladder emptying, hematuria from traumatic catheterization which was explicable by his prostate size and previous laser procedure. I see no evidence of persistent UTIs, no prostatitis. Previous proof through documentation not available to me. I do not believe that his current systemic symptoms have a urologic source. RECOMMENDATIONS:  It is inarguable that he does have some symptoms and has incomplete emptying. I do believe that he would benefit from a cystoscopy at some point. His wife is insistent that he has it while he is in the hospital, and in fact, I am not sure if and when he will be discharged, so we will discuss this further with Dr. Zuly Thornton or Dr. Radha Isaacs to give their opinion on timing of and benefit of cystoscopy. In the meantime, I have offered and they have accepted to start dutasteride. Add:  repeat  cc, voided 325 cc 20 minutes later per staff.       Dalton Bartholomew MD      [id number]  D:  09/16/2023 10:24  T:  09/16/2023 15:21  JOB #:  [id number]

## 2023-09-16 NOTE — PROGRESS NOTES
Hospitalist Progress Note  Trev Gifford MD  Answering service: [phone] OR [phone] from in house phone        Date of Service:  2023  NAME:  Ashtyn Record  :  1940  MRN:  [id number]      Admission Summary/HPI:   Diana Segura is a 80 y.o.  male with PMHx afib s/p watchman device, MDR UTI, obstuctive uropathy, HLD, PAD, debility/walker dependent, afib presented with nausea and vomiting for 2 weeks and recurrent UTI for multiple months. He has obstructive uropathy and states he started getting UTIs since he had a TURP. He also, despite the turp, continues to have symptoms of urinary retention. Recently he was admitted at [facility] for a UTI and after discharge was contacted and told his Ucx is growing 2 colonies, both sensitive to levaquin, one of them pseudomonas. He received a levaquin PO ABX script. He took the first dose yesterday. Endorses NBNB vomiting worsening for the last 2 weeks. At first it was episodic but now the nausea is constant and he throws up once a day. He endorses weight loss. No fever, chills, night sweats. No hearing or vision changes. No severe headaches. No confusion. No numbness, tingling. Endorses generalized weakness. No abdominal pain, diarrhea, constipation. No CP, SOB, FUNK, LE edema. No dysuria, frequency, flank pain. Interval history / Subjective:   N/V resolved. Asking for advanced diet.  Discussed with patient and wife over phone     Assessment & Plan:     Pseudomonas UTI - no noted drug resistance on review of HCA micro  obstructive uropathy   AIDAN  - Culture data not in EMR  - CT abd w/o acute process  - on levaquin  - urology and ID consulted  - IVF  - repeat urine cx  - QTC ok     N / V:  - main symptom of UTI  - GI was also consulted  - Zofran PRN  - PPI     Schogrens syndrome  - On pred 5 QD at home, held  - Stress dose hydrocortisone     Orthostatic

## 2023-09-17 LAB
ALBUMIN SERPL-MCNC: 2.9 G/DL (ref 3.5–5)
ALBUMIN/GLOB SERPL: 0.9 (ref 1.1–2.2)
ALP SERPL-CCNC: 69 U/L (ref 45–117)
ALT SERPL-CCNC: 22 U/L (ref 12–78)
ANION GAP SERPL CALC-SCNC: 9 MMOL/L (ref 5–15)
AST SERPL-CCNC: 16 U/L (ref 15–37)
BASOPHILS # BLD: 0 K/UL (ref 0–0.1)
BASOPHILS NFR BLD: 1 % (ref 0–1)
BILIRUB SERPL-MCNC: 0.5 MG/DL (ref 0.2–1)
BUN SERPL-MCNC: 7 MG/DL (ref 6–20)
BUN/CREAT SERPL: 6 (ref 12–20)
CALCIUM SERPL-MCNC: 8.8 MG/DL (ref 8.5–10.1)
CHLORIDE SERPL-SCNC: 110 MMOL/L (ref 97–108)
CO2 SERPL-SCNC: 25 MMOL/L (ref 21–32)
CREAT SERPL-MCNC: 1.24 MG/DL (ref 0.7–1.3)
DIFFERENTIAL METHOD BLD: ABNORMAL
EOSINOPHIL # BLD: 0 K/UL (ref 0–0.4)
EOSINOPHIL NFR BLD: 1 % (ref 0–7)
ERYTHROCYTE [DISTWIDTH] IN BLOOD BY AUTOMATED COUNT: 16.6 % (ref 11.5–14.5)
GLOBULIN SER CALC-MCNC: 3.3 G/DL (ref 2–4)
GLUCOSE SERPL-MCNC: 121 MG/DL (ref 65–100)
HCT VFR BLD AUTO: 36.9 % (ref 36.6–50.3)
HGB BLD-MCNC: 11.3 G/DL (ref 12.1–17)
IMM GRANULOCYTES # BLD AUTO: 0 K/UL (ref 0–0.04)
IMM GRANULOCYTES NFR BLD AUTO: 1 % (ref 0–0.5)
LYMPHOCYTES # BLD: 1.3 K/UL (ref 0.8–3.5)
LYMPHOCYTES NFR BLD: 18 % (ref 12–49)
MAGNESIUM SERPL-MCNC: 2.1 MG/DL (ref 1.6–2.4)
MCH RBC QN AUTO: 27.8 PG (ref 26–34)
MCHC RBC AUTO-ENTMCNC: 30.6 G/DL (ref 30–36.5)
MCV RBC AUTO: 90.9 FL (ref 80–99)
MONOCYTES # BLD: 0.6 K/UL (ref 0–1)
MONOCYTES NFR BLD: 9 % (ref 5–13)
NEUTS SEG # BLD: 5 K/UL (ref 1.8–8)
NEUTS SEG NFR BLD: 70 % (ref 32–75)
NRBC # BLD: 0 K/UL (ref 0–0.01)
NRBC BLD-RTO: 0 PER 100 WBC
PHOSPHATE SERPL-MCNC: 3.7 MG/DL (ref 2.6–4.7)
PLATELET # BLD AUTO: 152 K/UL (ref 150–400)
PMV BLD AUTO: 11.2 FL (ref 8.9–12.9)
POTASSIUM SERPL-SCNC: 3.7 MMOL/L (ref 3.5–5.1)
PROT SERPL-MCNC: 6.2 G/DL (ref 6.4–8.2)
RBC # BLD AUTO: 4.06 M/UL (ref 4.1–5.7)
SODIUM SERPL-SCNC: 144 MMOL/L (ref 136–145)
WBC # BLD AUTO: 7 K/UL (ref 4.1–11.1)

## 2023-09-17 PROCEDURE — 94761 N-INVAS EAR/PLS OXIMETRY MLT: CPT

## 2023-09-17 PROCEDURE — 6370000000 HC RX 637 (ALT 250 FOR IP): Performed by: UROLOGY

## 2023-09-17 PROCEDURE — 83735 ASSAY OF MAGNESIUM: CPT

## 2023-09-17 PROCEDURE — 1100000000 HC RM PRIVATE

## 2023-09-17 PROCEDURE — 6370000000 HC RX 637 (ALT 250 FOR IP): Performed by: INTERNAL MEDICINE

## 2023-09-17 PROCEDURE — 6370000000 HC RX 637 (ALT 250 FOR IP): Performed by: STUDENT IN AN ORGANIZED HEALTH CARE EDUCATION/TRAINING PROGRAM

## 2023-09-17 PROCEDURE — 2580000003 HC RX 258: Performed by: STUDENT IN AN ORGANIZED HEALTH CARE EDUCATION/TRAINING PROGRAM

## 2023-09-17 PROCEDURE — 36415 COLL VENOUS BLD VENIPUNCTURE: CPT

## 2023-09-17 PROCEDURE — 6360000002 HC RX W HCPCS: Performed by: STUDENT IN AN ORGANIZED HEALTH CARE EDUCATION/TRAINING PROGRAM

## 2023-09-17 PROCEDURE — 85025 COMPLETE CBC W/AUTO DIFF WBC: CPT

## 2023-09-17 PROCEDURE — 80053 COMPREHEN METABOLIC PANEL: CPT

## 2023-09-17 PROCEDURE — 84100 ASSAY OF PHOSPHORUS: CPT

## 2023-09-17 RX ADMIN — HYDROCORTISONE 10 MG: 10 TABLET ORAL at 09:24

## 2023-09-17 RX ADMIN — HYDROCORTISONE 10 MG: 10 TABLET ORAL at 22:33

## 2023-09-17 RX ADMIN — Medication 1 PACKET: at 22:33

## 2023-09-17 RX ADMIN — Medication 1 PACKET: at 09:24

## 2023-09-17 RX ADMIN — ASPIRIN 81 MG: 81 TABLET, CHEWABLE ORAL at 09:24

## 2023-09-17 RX ADMIN — DUTASTERIDE 0.5 MG: 0.5 CAPSULE, LIQUID FILLED ORAL at 09:24

## 2023-09-17 RX ADMIN — SODIUM CHLORIDE, PRESERVATIVE FREE 10 ML: 5 INJECTION INTRAVENOUS at 09:25

## 2023-09-17 RX ADMIN — ENOXAPARIN SODIUM 40 MG: 100 INJECTION SUBCUTANEOUS at 09:25

## 2023-09-17 RX ADMIN — ONDANSETRON 4 MG: 2 INJECTION INTRAMUSCULAR; INTRAVENOUS at 17:51

## 2023-09-17 RX ADMIN — SODIUM CHLORIDE, PRESERVATIVE FREE 10 ML: 5 INJECTION INTRAVENOUS at 22:37

## 2023-09-17 RX ADMIN — LEVOFLOXACIN 750 MG: 5 INJECTION, SOLUTION INTRAVENOUS at 15:38

## 2023-09-17 RX ADMIN — Medication 1 PACKET: at 15:38

## 2023-09-17 RX ADMIN — DULOXETINE HYDROCHLORIDE 30 MG: 30 CAPSULE, DELAYED RELEASE ORAL at 22:33

## 2023-09-17 RX ADMIN — PANTOPRAZOLE SODIUM 40 MG: 40 TABLET, DELAYED RELEASE ORAL at 06:47

## 2023-09-17 RX ADMIN — PRAVASTATIN SODIUM 20 MG: 20 TABLET ORAL at 22:33

## 2023-09-17 NOTE — PROGRESS NOTES
Hospitalist Progress Note  Jairon Lopez MD  Answering service: [phone] OR [phone] from in house phone        Date of Service:  2023  NAME:  Aysha Zazueta  :  1940  MRN:  [id number]      Admission Summary/HPI:   Ganesh Elias is a 80 y.o.  male with PMHx afib s/p watchman device, MDR UTI, obstuctive uropathy, HLD, PAD, debility/walker dependent, afib presented with nausea and vomiting for 2 weeks and recurrent UTI for multiple months. He has obstructive uropathy and states he started getting UTIs since he had a TURP. He also, despite the turp, continues to have symptoms of urinary retention. Recently he was admitted at [facility] for a UTI and after discharge was contacted and told his Ucx is growing 2 colonies, both sensitive to levaquin, one of them pseudomonas. He received a levaquin PO ABX script. He took the first dose yesterday. Endorses NBNB vomiting worsening for the last 2 weeks. At first it was episodic but now the nausea is constant and he throws up once a day. He endorses weight loss. No fever, chills, night sweats. No hearing or vision changes. No severe headaches. No confusion. No numbness, tingling. Endorses generalized weakness. No abdominal pain, diarrhea, constipation. No CP, SOB, FUNK, LE edema. No dysuria, frequency, flank pain. Interval history / Subjective:   N/V resolved. Diet advanced. Still on Levaquin. Discussing cystoscopy with urology.    Patient's wife does not want him Dcd until it is done     Assessment & Plan:     Pseudomonas UTI - no noted drug resistance on review of HCA micro  obstructive uropathy   BPH with recent urethral trauma  AIDAN  - Culture data not in EMR  - CT abd w/o acute process  - on levaquin  - urology and ID consulted  - IVF  - repeat urine cx  - QTC ok  - Fosfomycin before DC  - Family requesting cystoscopy be done IP     N / V:  - main for: \"GLUCPOC\"      Medications Reviewed:     Current Facility-Administered Medications   Medication Dose Route Frequency    hydrocortisone (CORTEF) tablet 10 mg  10 mg Oral BID    lactobacillus acidophilus (FLORANEX) 1 packet  1 packet Oral TID    dutasteride (AVODART) capsule 0.5 mg  0.5 mg Oral Daily    DULoxetine (CYMBALTA) extended release capsule 30 mg  30 mg Oral QHS    pantoprazole (PROTONIX) tablet 40 mg  40 mg Oral QAM AC    [Held by provider] midodrine (PROAMATINE) tablet 5 mg  5 mg Oral TID WC    pravastatin (PRAVACHOL) tablet 20 mg  20 mg Oral Nightly    sodium chloride flush 0.9 % injection 5-40 mL  5-40 mL IntraVENous 2 times per day    sodium chloride flush 0.9 % injection 5-40 mL  5-40 mL IntraVENous PRN    0.9 % sodium chloride infusion   IntraVENous PRN    enoxaparin (LOVENOX) injection 40 mg  40 mg SubCUTAneous Daily    ondansetron (ZOFRAN-ODT) disintegrating tablet 4 mg  4 mg Oral Q8H PRN    Or    ondansetron (ZOFRAN) injection 4 mg  4 mg IntraVENous Q6H PRN    polyethylene glycol (GLYCOLAX) packet 17 g  17 g Oral Daily PRN    acetaminophen (TYLENOL) tablet 650 mg  650 mg Oral Q6H PRN    Or    acetaminophen (TYLENOL) suppository 650 mg  650 mg Rectal Q6H PRN    lactated ringers IV soln infusion   IntraVENous Continuous    levoFLOXacin (LEVAQUIN) 750 MG/150ML infusion 750 mg  750 mg IntraVENous Q48H    aspirin chewable tablet 81 mg  81 mg Oral Daily     ______________________________________________________________________  ACTUAL LENGTH OF STAY:          4                 Jeniffer Ny MD

## 2023-09-18 ENCOUNTER — APPOINTMENT (OUTPATIENT)
Facility: HOSPITAL | Age: 83
End: 2023-09-18
Payer: MEDICARE

## 2023-09-18 LAB
ALBUMIN SERPL-MCNC: 3 G/DL (ref 3.5–5)
ALBUMIN/GLOB SERPL: 0.9 (ref 1.1–2.2)
ALP SERPL-CCNC: 76 U/L (ref 45–117)
ALT SERPL-CCNC: 21 U/L (ref 12–78)
ANION GAP SERPL CALC-SCNC: 9 MMOL/L (ref 5–15)
AST SERPL-CCNC: 14 U/L (ref 15–37)
BASOPHILS # BLD: 0 K/UL (ref 0–0.1)
BASOPHILS NFR BLD: 0 % (ref 0–1)
BILIRUB SERPL-MCNC: 0.5 MG/DL (ref 0.2–1)
BUN SERPL-MCNC: 8 MG/DL (ref 6–20)
BUN/CREAT SERPL: 6 (ref 12–20)
CALCIUM SERPL-MCNC: 8.9 MG/DL (ref 8.5–10.1)
CHLORIDE SERPL-SCNC: 109 MMOL/L (ref 97–108)
CO2 SERPL-SCNC: 26 MMOL/L (ref 21–32)
CREAT SERPL-MCNC: 1.28 MG/DL (ref 0.7–1.3)
DIFFERENTIAL METHOD BLD: ABNORMAL
EOSINOPHIL # BLD: 0 K/UL (ref 0–0.4)
EOSINOPHIL NFR BLD: 1 % (ref 0–7)
ERYTHROCYTE [DISTWIDTH] IN BLOOD BY AUTOMATED COUNT: 16.7 % (ref 11.5–14.5)
GLOBULIN SER CALC-MCNC: 3.5 G/DL (ref 2–4)
GLUCOSE SERPL-MCNC: 118 MG/DL (ref 65–100)
HCT VFR BLD AUTO: 38.6 % (ref 36.6–50.3)
HGB BLD-MCNC: 12 G/DL (ref 12.1–17)
IMM GRANULOCYTES # BLD AUTO: 0 K/UL (ref 0–0.04)
IMM GRANULOCYTES NFR BLD AUTO: 0 % (ref 0–0.5)
LYMPHOCYTES # BLD: 1.4 K/UL (ref 0.8–3.5)
LYMPHOCYTES NFR BLD: 20 % (ref 12–49)
MAGNESIUM SERPL-MCNC: 2.1 MG/DL (ref 1.6–2.4)
MCH RBC QN AUTO: 28.2 PG (ref 26–34)
MCHC RBC AUTO-ENTMCNC: 31.1 G/DL (ref 30–36.5)
MCV RBC AUTO: 90.8 FL (ref 80–99)
MONOCYTES # BLD: 0.6 K/UL (ref 0–1)
MONOCYTES NFR BLD: 8 % (ref 5–13)
NEUTS SEG # BLD: 4.9 K/UL (ref 1.8–8)
NEUTS SEG NFR BLD: 71 % (ref 32–75)
NRBC # BLD: 0 K/UL (ref 0–0.01)
NRBC BLD-RTO: 0 PER 100 WBC
PHOSPHATE SERPL-MCNC: 3.4 MG/DL (ref 2.6–4.7)
PLATELET # BLD AUTO: 161 K/UL (ref 150–400)
PMV BLD AUTO: 11.6 FL (ref 8.9–12.9)
POTASSIUM SERPL-SCNC: 3.8 MMOL/L (ref 3.5–5.1)
PROT SERPL-MCNC: 6.5 G/DL (ref 6.4–8.2)
RBC # BLD AUTO: 4.25 M/UL (ref 4.1–5.7)
SODIUM SERPL-SCNC: 144 MMOL/L (ref 136–145)
WBC # BLD AUTO: 7 K/UL (ref 4.1–11.1)

## 2023-09-18 PROCEDURE — 36415 COLL VENOUS BLD VENIPUNCTURE: CPT

## 2023-09-18 PROCEDURE — 6360000002 HC RX W HCPCS: Performed by: STUDENT IN AN ORGANIZED HEALTH CARE EDUCATION/TRAINING PROGRAM

## 2023-09-18 PROCEDURE — 99223 1ST HOSP IP/OBS HIGH 75: CPT | Performed by: NURSE PRACTITIONER

## 2023-09-18 PROCEDURE — 6370000000 HC RX 637 (ALT 250 FOR IP): Performed by: NURSE PRACTITIONER

## 2023-09-18 PROCEDURE — 6370000000 HC RX 637 (ALT 250 FOR IP): Performed by: STUDENT IN AN ORGANIZED HEALTH CARE EDUCATION/TRAINING PROGRAM

## 2023-09-18 PROCEDURE — 6370000000 HC RX 637 (ALT 250 FOR IP): Performed by: UROLOGY

## 2023-09-18 PROCEDURE — 6370000000 HC RX 637 (ALT 250 FOR IP): Performed by: INTERNAL MEDICINE

## 2023-09-18 PROCEDURE — 72148 MRI LUMBAR SPINE W/O DYE: CPT

## 2023-09-18 PROCEDURE — 72141 MRI NECK SPINE W/O DYE: CPT

## 2023-09-18 PROCEDURE — 2580000003 HC RX 258: Performed by: STUDENT IN AN ORGANIZED HEALTH CARE EDUCATION/TRAINING PROGRAM

## 2023-09-18 PROCEDURE — 70551 MRI BRAIN STEM W/O DYE: CPT

## 2023-09-18 PROCEDURE — 84100 ASSAY OF PHOSPHORUS: CPT

## 2023-09-18 PROCEDURE — 80053 COMPREHEN METABOLIC PANEL: CPT

## 2023-09-18 PROCEDURE — 85025 COMPLETE CBC W/AUTO DIFF WBC: CPT

## 2023-09-18 PROCEDURE — 94761 N-INVAS EAR/PLS OXIMETRY MLT: CPT

## 2023-09-18 PROCEDURE — 83735 ASSAY OF MAGNESIUM: CPT

## 2023-09-18 PROCEDURE — 1100000000 HC RM PRIVATE

## 2023-09-18 RX ADMIN — ENOXAPARIN SODIUM 40 MG: 100 INJECTION SUBCUTANEOUS at 08:31

## 2023-09-18 RX ADMIN — HYDROCORTISONE 10 MG: 10 TABLET ORAL at 22:07

## 2023-09-18 RX ADMIN — CARBIDOPA AND LEVODOPA 0.5 TABLET: 25; 100 TABLET ORAL at 22:07

## 2023-09-18 RX ADMIN — PANTOPRAZOLE SODIUM 40 MG: 40 TABLET, DELAYED RELEASE ORAL at 06:00

## 2023-09-18 RX ADMIN — DUTASTERIDE 0.5 MG: 0.5 CAPSULE, LIQUID FILLED ORAL at 08:31

## 2023-09-18 RX ADMIN — SODIUM CHLORIDE, PRESERVATIVE FREE 10 ML: 5 INJECTION INTRAVENOUS at 22:09

## 2023-09-18 RX ADMIN — Medication 1 PACKET: at 22:08

## 2023-09-18 RX ADMIN — Medication 1 PACKET: at 15:53

## 2023-09-18 RX ADMIN — ASPIRIN 81 MG: 81 TABLET, CHEWABLE ORAL at 08:31

## 2023-09-18 RX ADMIN — Medication 1 PACKET: at 08:29

## 2023-09-18 RX ADMIN — HYDROCORTISONE 10 MG: 10 TABLET ORAL at 08:31

## 2023-09-18 RX ADMIN — DULOXETINE HYDROCHLORIDE 30 MG: 30 CAPSULE, DELAYED RELEASE ORAL at 22:08

## 2023-09-18 RX ADMIN — SODIUM CHLORIDE, PRESERVATIVE FREE 10 ML: 5 INJECTION INTRAVENOUS at 08:31

## 2023-09-18 RX ADMIN — PRAVASTATIN SODIUM 20 MG: 20 TABLET ORAL at 22:07

## 2023-09-18 NOTE — PROGRESS NOTES
Hospitalist Progress Note  Mark Anthony Kebede MD  Answering service: [phone] OR [phone] from in house phone        Date of Service:  2023  NAME:  Santana Vences  :  1940  MRN:  [id number]      Admission Summary/HPI:   Sherin Ponce is a 80 y.o.  male with PMHx afib s/p watchman device, MDR UTI, obstuctive uropathy, HLD, PAD, debility/walker dependent, afib presented with nausea and vomiting for 2 weeks and recurrent UTI for multiple months. He has obstructive uropathy and states he started getting UTIs since he had a TURP. He also, despite the turp, continues to have symptoms of urinary retention. Recently he was admitted at [facility] for a UTI and after discharge was contacted and told his Ucx is growing 2 colonies, both sensitive to levaquin, one of them pseudomonas. He received a levaquin PO ABX script. He took the first dose yesterday. Endorses NBNB vomiting worsening for the last 2 weeks. At first it was episodic but now the nausea is constant and he throws up once a day. He endorses weight loss. No fever, chills, night sweats. No hearing or vision changes. No severe headaches. No confusion. No numbness, tingling. Endorses generalized weakness. No abdominal pain, diarrhea, constipation. No CP, SOB, FUNK, LE edema. No dysuria, frequency, flank pain. Interval history / Subjective:   N/V resolved. Diet advanced. Still on Levaquin, cystocsopy planned for tomorrow at 10AM. NPO at midnight.  Neurology evaluated - consulted due to newly noted tremot in setting of balance decline and autonomic sx      Assessment & Plan:     Pseudomonas UTI - no noted drug resistance on review of HCA micro  obstructive uropathy   BPH with recent urethral trauma  AIDAN  - Culture data not in EMR  - CT abd w/o acute process  - on levaquin  - urology and ID consulted  - IVF  - repeat urine cx  - QTC ok  - Fosfomycin before

## 2023-09-18 NOTE — CARE COORDINATION
9/18/2023 9:28 AM   Care Management Progress Note         ICD-10-CM     1. Generalized weakness  R53.1         2. Urinary frequency  R35.0         3. Vomiting without nausea, unspecified vomiting type  R11.11               RUR:  18&  Risk Level: []Low [x]Moderate []High  Value-based purchasing: [] Yes [x] No  Bundle patient: [] Yes [x] No              Specify:      Transition of care plan:  Ongoing medical management, home on po abx  Home with family at discharge, pt declining SNF placement  Home health arranged through [facility], PT/OT/RN. Outpatient follow-up.   Pt's family to transport

## 2023-09-18 NOTE — PROGRESS NOTES
Occupational/Physical Therapy Note  09/18/2023    OT/PT treatment attempted at [phone] however patient requesting to rest at this time stating recently returned to bed (has been up in chair today) and awaiting on transport to take patient down for MRI.     Thank you,  Rachana Elias OTR/L

## 2023-09-18 NOTE — PROGRESS NOTES
Spiritual Care Assessment/Progress Note  [facility]    Name: Michelle Caputo MRN: [id number]    Age: 80 y.o. Sex: male   Language: English     Date: 9/18/2023            Total Time Calculated: 60 min              Spiritual Assessment begun in [facility] 4M POST SURG ORT 2  Service Provided For[de-identified] Patient     Encounter Overview/Reason : Initial Encounter    Spiritual beliefs:      [x] Involved in a paul tradition/spiritual practice: Jose     [] Supported by a paul community:      [] Claims no spiritual orientation:      [] Seeking spiritual identity:           [] Adheres to an individual form of spirituality:      [] Not able to assess:                Identified resources for coping and support system:   Support System: Spouse       [] Prayer                  [] Devotional reading               [] Music                  [] Guided Imagery     [] Pet visits                                        [] Other: (COMMENT)     Specific area/focus of visit   Encounter:    Crisis:    Spiritual/Emotional needs: Type: Spiritual Support, Difficult news received  Ritual, Rites and Sacraments:    Grief, Loss, and Adjustments:    Ethics/Mediation:    Behavioral Health:    Palliative Care: Advance Care Planning:      Plan/Referrals: Other (Comment) (Please contact Spiritual Care for further consults.)    Narrative:  visit for the patient on Post Surg for length of stay. Reviewed pt's chart and spoke with pt's nurse. Pt and pt's wife were present. Pt shared he received difficult news today, a possible diagnosis. Pt has been looking for a diagnosis for over a year. A treatment is available, but the disease is progressive. Pt johnny through the support of his wife and his paul in God. Finds shared stories of his life. Currently, pt is working a project that gives him great meaning and is hoping to see it through. [place] Area of  availability.  Please contact Spiritual Care for further referrals.     [address], [address] [facility], [facility]  Staff   Paging service: [phone] (OSCAR)

## 2023-09-18 NOTE — PROGRESS NOTES
Comprehensive Nutrition Assessment    Type and Reason for Visit: Initial, Wound, Positive Nutrition Screen    Nutrition Recommendations/Plan:   Continue Regular diet. No A1c. Continue Ensure Enlive TID for ease of kcal/protein intake        Malnutrition Assessment:  Malnutrition Status: Moderate malnutrition (09/14/23 1537)    Context:  Acute Illness     Findings of the 6 clinical characteristics of malnutrition:  Energy Intake:  75% or less of estimated energy requirements for 7 or more days  Weight Loss:  7.5% over 3 months     Body Fat Loss:  Mild body fat loss     Muscle Mass Loss:  Mild muscle mass loss    Fluid Accumulation:  No significant fluid accumulation     Strength:  Not Performed       Nutrition Assessment:    Past medical hx:       Diagnosis Date    CKD (chronic kidney disease), stage III ([address] )     Sjoegren syndrome 04/2016    Urine retention        9/18: Follow up. Pt diet advanced. Eating fair Drinking ~2 ONS per day. Diarrhea improving. Last BM 9/16. Pt to be NPO tomorrow for cystoscopy. 9/14: MST received for wt loss. Pt reports dental issues in the recent past which lead to teeth extraction/replacement. Pt with n/v for ~2 weeks as well. Pt reports ~25 lbs wt loss over the last few months related to these multiple health issues. UBW ~190-200 lbs. ~10% of body weight lost over last few months and recent poor PO meet malnutrition criteria. Pt with some mild fat/muscle wasting present. Pt had been drinking 2 Ensure per day in attempts to prevent more wt loss and ease of kcal/protein intake. Pt reports 2 soft Bms today, possibly related to Abx. No known food allergies. When able advance diet to Soft and bite sized. Check bowel status, nausea status and urology plan. Current Nutrition Therapies:  ADULT ORAL NUTRITION SUPPLEMENT; Breakfast, Lunch, Dinner; Standard High Calorie/High Protein Oral Supplement  ADULT DIET;  Regular  Diet NPO Exceptions are: Sips of Water with Meds  Meal Nutrient Delivery: Modify Current Diet, Start Oral Nutrition Supplement  Nutrition Education/Counseling: No recommendation at this time  Coordination of Nutrition Care: Continue to monitor while inpatient, Interdisciplinary Rounds       Goals:     Goals: PO intake 50% or greater, by next RD assessment       Nutrition Monitoring and Evaluation:   Behavioral-Environmental Outcomes: None Identified  Food/Nutrient Intake Outcomes: Diet Advancement/Tolerance, Food and Nutrient Intake, Supplement Intake  Physical Signs/Symptoms Outcomes: Biochemical Data, Weight, GI Status, Nausea or Vomiting    Discharge Planning:    Continue Oral Nutrition Supplement, Continue current diet     Kailee Starr RD  Available via [address] [place] [address]  Office # [phone]

## 2023-09-18 NOTE — PROGRESS NOTES
81 y/o male with known BPH s/p TURP, incomplete bladder emptying, Sjogren's syndrome and CKD. Admitted for generalized weakness, nausea and vomiting. Seen in consultation at patient and his wife's request to address concern for UTI, of note UA not consistent with UTI, Ucx negative. TATYANA completed by Dr. Yamilet Alas not concerning for prostatitis. Current systemic symptoms likely not related urologic source. It is reasonable to evaluate further with cystoscopy, which can be completed on OP basis.  Patient and his wife are adamant about having cystoscopy prior to DC.    - Posted for Cystoscopy 09/19/23 @ 10am with Dr. Josiane Garcia  - OP FU with Dr. Marshall Tabares 9/26/23 at 9 am

## 2023-09-18 NOTE — PROGRESS NOTES
Physician Progress Note      PATIENT:               Kev Vega  CSN #:                  [id number]  :                       1940  ADMIT DATE:       2023 9:46 AM  DISCH DATE:  Clint Blevins  PROVIDER #:        Theo Bernardo MD          QUERY TEXT:    Good afternoon. Patient admitted with intractable nausea and vomiting thought to be from UTI. Noted documentation of Acute Kidney Injury in  H&P and  PN. CKD 3   is listed on Patient Active Problem list in  ID consult note. In order to support the diagnosis of AIDAN, please include additional clinical   indicators in your documentation. ? Or please document if the diagnosis of AIDAN   has been ruled out after further study. The medical record reflects the following:    Risk Factors:  Afib s/p watchman device, MDR UTI, obstuctive uropathy, HLD,   PAD, debility/walker dependent    Clinical Indicators: Crea 1.39, 1.18, 1.06; BUN 8, 6, 7; GFR 50, >60, >60    Treatment: labs, IV fluids, monitor      Thank you,  Aaliyah Senior RN, CDI  _________________________________________________    Defined by Kidney Disease Improving Global Outcomes (KDIGO) clinical practice   guideline for acute kidney injury:  -Increase in SCr by greater than or equal to 0.3 mg/dl within 48 hours; or  -Increase or decrease in SCr to greater than or equal to 1.5 times baseline,   which is known or presumed to have occurred within the prior 7 days; or  -Urine volume < 0.5ml/kg/h for 6 hours. Options provided:  -- Acute kidney injury evidenced by, Please document evidence as well as a   numerical baseline creatinine, if known.   -- CKD 3  -- Other - I will add my own diagnosis  -- Disagree - Not applicable / Not valid  -- Disagree - Clinically unable to determine / Unknown  -- Refer to Clinical Documentation Reviewer    PROVIDER RESPONSE TEXT:    This patient has acute kidney injury as evidenced by cr    Query created by: Aaliyah Senior on 9/15/2023 4:14

## 2023-09-18 NOTE — PROGRESS NOTES
Physician Progress Note      PATIENT:               Chika Herrera  CSN #:                  [id number]  :                       1940  ADMIT DATE:       2023 9:46 AM  DISCH DATE:  Pina Durbin  PROVIDER #:        Rashaun Barnett MD          QUERY TEXT:    Good morning. Patient admitted with intractable nausea and vomiting. Noted to have moderate   malnutrition in 23 Registered Dietician note. If possible, please document in progress notes and discharge summary if you   are evaluating and /or treating any of the following: The medical record reflects the following:    Risk Factors: Afib s/p watchman device, MDR UTI, obstructive uropathy, HLD,   PAD, debility/walker dependent    Clinical Indicators: from  RD note: \"Malnutrition Assessment:   Malnutrition Status: Moderate malnutrition (23 1537) Context:  Acute   Illness Findings of the 6 clinical characteristics of malnutrition: Energy   Intake:  75% or less of estimated energy requirements for 7 or more days   Weight Loss:  7.5% over 3 months Body Fat Loss:  Mild body fat loss Muscle   Mass Loss:  Mild muscle mass loss Fluid Accumulation:  No significant fluid   accumulation  Strength:  Not Performed; Albumin 3.2, 2.9, 2.8, 2.8, 2.9,   3.0; hgb 13.3, 11.4, 11.3, 10.9, 11.3, 12.0    Treatment: labs, RD assessment      Thank you,  Arcenio Nava RN, CDI  _____________________________________________________    Lashonda Finley Criteria:    https://aspenjournals. onlinelibrary. kate. com/doi/full/10.1177/032538472106010  5  Options provided:  -- Protein calorie malnutrition moderate  -- Other - I will add my own diagnosis  -- Disagree - Not applicable / Not valid  -- Disagree - Clinically unable to determine / Unknown  -- Refer to Clinical Documentation Reviewer    PROVIDER RESPONSE TEXT:    This patient has moderate protein calorie malnutrition.     Query created by: Arcenio Nava on 2023 10:08 AM      Electronically signed by: Vijay Gallagher MD 9/18/2023 10:14 AM

## 2023-09-19 ENCOUNTER — ANESTHESIA (OUTPATIENT)
Facility: HOSPITAL | Age: 83
End: 2023-09-19
Payer: MEDICARE

## 2023-09-19 ENCOUNTER — ANESTHESIA EVENT (OUTPATIENT)
Facility: HOSPITAL | Age: 83
End: 2023-09-19
Payer: MEDICARE

## 2023-09-19 LAB
ALBUMIN SERPL-MCNC: 2.8 G/DL (ref 3.5–5)
ALBUMIN/GLOB SERPL: 0.8 (ref 1.1–2.2)
ALP SERPL-CCNC: 72 U/L (ref 45–117)
ALT SERPL-CCNC: 8 U/L (ref 12–78)
ANION GAP SERPL CALC-SCNC: 5 MMOL/L (ref 5–15)
AST SERPL-CCNC: 17 U/L (ref 15–37)
BASOPHILS # BLD: 0 K/UL (ref 0–0.1)
BASOPHILS NFR BLD: 0 % (ref 0–1)
BILIRUB SERPL-MCNC: 0.6 MG/DL (ref 0.2–1)
BUN SERPL-MCNC: 7 MG/DL (ref 6–20)
BUN/CREAT SERPL: 6 (ref 12–20)
CALCIUM SERPL-MCNC: 8.5 MG/DL (ref 8.5–10.1)
CHLORIDE SERPL-SCNC: 111 MMOL/L (ref 97–108)
CO2 SERPL-SCNC: 26 MMOL/L (ref 21–32)
CREAT SERPL-MCNC: 1.11 MG/DL (ref 0.7–1.3)
DIFFERENTIAL METHOD BLD: ABNORMAL
EOSINOPHIL # BLD: 0.1 K/UL (ref 0–0.4)
EOSINOPHIL NFR BLD: 1 % (ref 0–7)
ERYTHROCYTE [DISTWIDTH] IN BLOOD BY AUTOMATED COUNT: 16.5 % (ref 11.5–14.5)
GLOBULIN SER CALC-MCNC: 3.3 G/DL (ref 2–4)
GLUCOSE SERPL-MCNC: 124 MG/DL (ref 65–100)
HCT VFR BLD AUTO: 35.7 % (ref 36.6–50.3)
HGB BLD-MCNC: 11.4 G/DL (ref 12.1–17)
IMM GRANULOCYTES # BLD AUTO: 0.1 K/UL (ref 0–0.04)
IMM GRANULOCYTES NFR BLD AUTO: 1 % (ref 0–0.5)
LYMPHOCYTES # BLD: 1.3 K/UL (ref 0.8–3.5)
LYMPHOCYTES NFR BLD: 17 % (ref 12–49)
MAGNESIUM SERPL-MCNC: 2.1 MG/DL (ref 1.6–2.4)
MCH RBC QN AUTO: 27.7 PG (ref 26–34)
MCHC RBC AUTO-ENTMCNC: 31.9 G/DL (ref 30–36.5)
MCV RBC AUTO: 86.9 FL (ref 80–99)
MONOCYTES # BLD: 0.6 K/UL (ref 0–1)
MONOCYTES NFR BLD: 8 % (ref 5–13)
NEUTS SEG # BLD: 5.3 K/UL (ref 1.8–8)
NEUTS SEG NFR BLD: 73 % (ref 32–75)
NRBC # BLD: 0 K/UL (ref 0–0.01)
NRBC BLD-RTO: 0 PER 100 WBC
PHOSPHATE SERPL-MCNC: 3.4 MG/DL (ref 2.6–4.7)
PLATELET # BLD AUTO: 159 K/UL (ref 150–400)
PMV BLD AUTO: 10.9 FL (ref 8.9–12.9)
POTASSIUM SERPL-SCNC: 3.5 MMOL/L (ref 3.5–5.1)
PROT SERPL-MCNC: 6.1 G/DL (ref 6.4–8.2)
RBC # BLD AUTO: 4.11 M/UL (ref 4.1–5.7)
SODIUM SERPL-SCNC: 142 MMOL/L (ref 136–145)
WBC # BLD AUTO: 7.3 K/UL (ref 4.1–11.1)

## 2023-09-19 PROCEDURE — 83735 ASSAY OF MAGNESIUM: CPT

## 2023-09-19 PROCEDURE — 6360000002 HC RX W HCPCS: Performed by: STUDENT IN AN ORGANIZED HEALTH CARE EDUCATION/TRAINING PROGRAM

## 2023-09-19 PROCEDURE — 36415 COLL VENOUS BLD VENIPUNCTURE: CPT

## 2023-09-19 PROCEDURE — 0TJB8ZZ INSPECTION OF BLADDER, VIA NATURAL OR ARTIFICIAL OPENING ENDOSCOPIC: ICD-10-PCS | Performed by: UROLOGY

## 2023-09-19 PROCEDURE — 6360000002 HC RX W HCPCS: Performed by: NURSE ANESTHETIST, CERTIFIED REGISTERED

## 2023-09-19 PROCEDURE — 85025 COMPLETE CBC W/AUTO DIFF WBC: CPT

## 2023-09-19 PROCEDURE — 7100000000 HC PACU RECOVERY - FIRST 15 MIN: Performed by: UROLOGY

## 2023-09-19 PROCEDURE — 6370000000 HC RX 637 (ALT 250 FOR IP): Performed by: UROLOGY

## 2023-09-19 PROCEDURE — 6370000000 HC RX 637 (ALT 250 FOR IP): Performed by: STUDENT IN AN ORGANIZED HEALTH CARE EDUCATION/TRAINING PROGRAM

## 2023-09-19 PROCEDURE — 3600000002 HC SURGERY LEVEL 2 BASE: Performed by: UROLOGY

## 2023-09-19 PROCEDURE — 3700000000 HC ANESTHESIA ATTENDED CARE: Performed by: UROLOGY

## 2023-09-19 PROCEDURE — 6370000000 HC RX 637 (ALT 250 FOR IP): Performed by: INTERNAL MEDICINE

## 2023-09-19 PROCEDURE — 2500000003 HC RX 250 WO HCPCS: Performed by: NURSE ANESTHETIST, CERTIFIED REGISTERED

## 2023-09-19 PROCEDURE — 1100000000 HC RM PRIVATE

## 2023-09-19 PROCEDURE — 3700000001 HC ADD 15 MINUTES (ANESTHESIA): Performed by: UROLOGY

## 2023-09-19 PROCEDURE — 2709999900 HC NON-CHARGEABLE SUPPLY: Performed by: UROLOGY

## 2023-09-19 PROCEDURE — 2580000003 HC RX 258: Performed by: STUDENT IN AN ORGANIZED HEALTH CARE EDUCATION/TRAINING PROGRAM

## 2023-09-19 PROCEDURE — 94761 N-INVAS EAR/PLS OXIMETRY MLT: CPT

## 2023-09-19 PROCEDURE — 84100 ASSAY OF PHOSPHORUS: CPT

## 2023-09-19 PROCEDURE — 80053 COMPREHEN METABOLIC PANEL: CPT

## 2023-09-19 PROCEDURE — 3600000012 HC SURGERY LEVEL 2 ADDTL 15MIN: Performed by: UROLOGY

## 2023-09-19 PROCEDURE — 7100000001 HC PACU RECOVERY - ADDTL 15 MIN: Performed by: UROLOGY

## 2023-09-19 RX ORDER — LIDOCAINE HYDROCHLORIDE 20 MG/ML
JELLY TOPICAL PRN
Status: DISCONTINUED | OUTPATIENT
Start: 2023-09-19 | End: 2023-09-19 | Stop reason: ALTCHOICE

## 2023-09-19 RX ORDER — PROPOFOL 10 MG/ML
INJECTION, EMULSION INTRAVENOUS PRN
Status: DISCONTINUED | OUTPATIENT
Start: 2023-09-19 | End: 2023-09-19 | Stop reason: SDUPTHER

## 2023-09-19 RX ORDER — FENTANYL CITRATE 50 UG/ML
INJECTION, SOLUTION INTRAMUSCULAR; INTRAVENOUS PRN
Status: DISCONTINUED | OUTPATIENT
Start: 2023-09-19 | End: 2023-09-19 | Stop reason: SDUPTHER

## 2023-09-19 RX ORDER — LEVOFLOXACIN 5 MG/ML
750 INJECTION, SOLUTION INTRAVENOUS EVERY 24 HOURS
Status: DISCONTINUED | OUTPATIENT
Start: 2023-09-19 | End: 2023-09-20

## 2023-09-19 RX ORDER — LIDOCAINE HYDROCHLORIDE 20 MG/ML
INJECTION, SOLUTION EPIDURAL; INFILTRATION; INTRACAUDAL; PERINEURAL PRN
Status: DISCONTINUED | OUTPATIENT
Start: 2023-09-19 | End: 2023-09-19 | Stop reason: SDUPTHER

## 2023-09-19 RX ADMIN — LIDOCAINE HYDROCHLORIDE 100 MG: 20 INJECTION, SOLUTION EPIDURAL; INFILTRATION; INTRACAUDAL; PERINEURAL at 10:59

## 2023-09-19 RX ADMIN — SODIUM CHLORIDE, PRESERVATIVE FREE 10 ML: 5 INJECTION INTRAVENOUS at 13:09

## 2023-09-19 RX ADMIN — HYDROCORTISONE 10 MG: 10 TABLET ORAL at 19:53

## 2023-09-19 RX ADMIN — Medication 1 PACKET: at 19:53

## 2023-09-19 RX ADMIN — DUTASTERIDE 0.5 MG: 0.5 CAPSULE, LIQUID FILLED ORAL at 13:06

## 2023-09-19 RX ADMIN — ONDANSETRON 4 MG: 2 INJECTION INTRAMUSCULAR; INTRAVENOUS at 02:21

## 2023-09-19 RX ADMIN — ONDANSETRON 4 MG: 2 INJECTION INTRAMUSCULAR; INTRAVENOUS at 23:35

## 2023-09-19 RX ADMIN — PANTOPRAZOLE SODIUM 40 MG: 40 TABLET, DELAYED RELEASE ORAL at 06:26

## 2023-09-19 RX ADMIN — ASPIRIN 81 MG: 81 TABLET, CHEWABLE ORAL at 13:02

## 2023-09-19 RX ADMIN — PRAVASTATIN SODIUM 20 MG: 20 TABLET ORAL at 19:53

## 2023-09-19 RX ADMIN — SODIUM CHLORIDE, POTASSIUM CHLORIDE, SODIUM LACTATE AND CALCIUM CHLORIDE: 600; 310; 30; 20 INJECTION, SOLUTION INTRAVENOUS at 10:51

## 2023-09-19 RX ADMIN — LEVOFLOXACIN 750 MG: 5 INJECTION, SOLUTION INTRAVENOUS at 16:54

## 2023-09-19 RX ADMIN — DULOXETINE HYDROCHLORIDE 30 MG: 30 CAPSULE, DELAYED RELEASE ORAL at 19:53

## 2023-09-19 RX ADMIN — Medication 1 PACKET: at 13:07

## 2023-09-19 RX ADMIN — SODIUM CHLORIDE, PRESERVATIVE FREE 10 ML: 5 INJECTION INTRAVENOUS at 19:54

## 2023-09-19 RX ADMIN — PROPOFOL 50 MG: 10 INJECTION, EMULSION INTRAVENOUS at 11:01

## 2023-09-19 RX ADMIN — HYDROCORTISONE 10 MG: 10 TABLET ORAL at 13:02

## 2023-09-19 RX ADMIN — PROPOFOL 100 MG: 10 INJECTION, EMULSION INTRAVENOUS at 10:59

## 2023-09-19 RX ADMIN — FENTANYL CITRATE 50 MCG: 50 INJECTION, SOLUTION INTRAMUSCULAR; INTRAVENOUS at 11:07

## 2023-09-19 ASSESSMENT — PAIN - FUNCTIONAL ASSESSMENT: PAIN_FUNCTIONAL_ASSESSMENT: 0-10

## 2023-09-19 NOTE — PROGRESS NOTES
Physical Therapy    Chart reviewed in prep for PT session. Patient currently ISIDORO undergoing cystoscopy. Will try to follow later as able and appropriate.     Salbador Felder MS, PT

## 2023-09-19 NOTE — BRIEF OP NOTE
Brief Postoperative Note      Patient: Marisela Orosco  YOB: 1940  MRN: [id number]    Date of Procedure: 9/19/2023    Pre-Op Diagnosis Codes:     * Resistance to multiple antibiotics [Z16.24] BPH, hx UTI,    Post-Op Diagnosis: Same       Procedure(s):  CYSTOSCOPY    Surgeon(s):  Linsey Rodriguez MD    Assistant:  * No surgical staff found *    Anesthesia: General    Estimated Blood Loss (mL): Minimal    Complications: None    Specimens:   * No specimens in log *    Implants:  * No implants in log *      Drains: * No LDAs found *    Findings: Urethra WNL, Mild prostate lateral lobe obstruction, fragile prostatic urethra, bladder trabeculation, no stones, or lesions    OK to resume pre op meds    dictated    Electronically signed by Agapito Groves MD on 9/19/2023 at 11:19 AM

## 2023-09-19 NOTE — OP NOTE
[address] [place] [address]  OPERATIVE REPORT    Name:  Duyen Peoples  MR#:  [id number]  :  1940  ACCOUNT #:  [de-identified]  DATE OF SERVICE:  2023    PREOPERATIVE DIAGNOSES:  History of urinary tract infection, benign prostatic hyperplasia, and hematuria. POSTOPERATIVE DIAGNOSES:  History of urinary tract infection, benign prostatic hyperplasia, and hematuria. PROCEDURE PERFORMED:  Cystoscopy. SURGEON:  Maddy Rey MD    ASSISTANT:  None. ANESTHESIA:  General.    COMPLICATIONS:  None. SPECIMENS REMOVED:  None. IMPLANTS:  None. ESTIMATED BLOOD LOSS:  Minimal.    INDICATIONS:  The patient is an 70-year-old male. Prior history of laser prostate procedure by Dr. Roberta Pineda in 2017. Recent possible recurrent UTI. In hospital with negative urine culture. Creatinine 1.1. Currently on Levaquin. Recent CT scan with no significant findings for kidneys. Positive trabeculation of the bladder. He is here for cystoscopy today for evaluation related to prior recurrent UTI. History of negative prostate biopsies x3. DESCRIPTION OF PROCEDURE:  The patient was taken to the operating room and given general anesthesia and placed on a dorsal lithotomy position, prepped and draped in standard fashion. A 19.5-scope was inserted using the camera. Urethra was unremarkable. Prostatic urethra shows some mild prostatic regrowth with mild lateral lobe obstruction. Fragile prostatic urothelium. Some bullous edema at the bladder neck/prostate. Bladder fully inspected. No significant mucosal lesions noted. No stones. He does have moderate trabeculation with some small cellules. Ureteral orifices slightly difficult to visualize, given prostatic anatomy and trabeculation. Bladder was drained. Scope was slowly removed. There was no significant hematuria. A URO-Jet was instilled. He was reversed from anesthesia and taken to recovery room in stable condition.   He can return to

## 2023-09-19 NOTE — PERIOP NOTE
TRANSFER - OUT REPORT:    Verbal report given to ESTUARDO Dillard on Devin Dowd  being transferred to [phone] for routine post-op       Report consisted of patient's Situation, Background, Assessment and   Recommendations(SBAR). Information from the following report(s) Nurse Handoff Report, Surgery Report, Intake/Output, MAR, and Cardiac Rhythm nsr  was reviewed with the receiving nurse. Lines:   Peripheral IV 09/19/23 Left;Posterior Forearm (Active)   Site Assessment Bleeding;Ecchymotic;Leaking 09/19/23 1221   Line Status Blood return noted; Flushed; Other (Comment) 09/19/23 1221   Line Care Connections checked and tightened;Line pulled back; Other (Comment) 09/19/23 1221   Phlebitis Assessment No symptoms 09/19/23 1221   Infiltration Assessment 0 09/19/23 1221   Alcohol Cap Used Yes 09/19/23 1221   Dressing Status Clean, dry & intact; New dressing applied 09/19/23 1221   Dressing Type Securing device;Transparent; Other (Comment) 09/19/23 1221   Dressing Intervention New 09/19/23 0843        Opportunity for questions and clarification was provided.       Patient transported with:  Registered Nurse

## 2023-09-19 NOTE — PROGRESS NOTES
Pharmacy Dosing Services: 09/19/23  Levaquin dose change per renal protocol  Physician Dr Monika Jones  Indication: UTI    Serum Creatinine Lab Results   Component Value Date/Time    CREATININE 1.11 09/19/2023 02:24 AM      Creatinine Clearance Estimated Creatinine Clearance: 54 mL/min (based on SCr of 1.11 mg/dL).    BUN Lab Results   Component Value Date/Time    BUN 7 09/19/2023 02:24 AM         Current regimen: Levaquin 750 mg IV q48hr  New regimen: Levaquin 750 mg IV q24hr    Thank you  [phone]

## 2023-09-19 NOTE — PROGRESS NOTES
Occupational Therapy Note:  Chart reviewed. Patient is currently off the floor for a procedure, Cystoscopy, and unavailable for OT interventions.     Hailey Jones, OTR/L

## 2023-09-19 NOTE — PROGRESS NOTES
[facility]                                                                               Hospitalist Progress Note  Maggi Davidson MD          Date of Service:  2023  NAME:  Chika Herrera  :  1940  MRN:  [id number]    Please note that this dictation was completed with [employer], the computer voice recognition software. Quite often unanticipated grammatical, syntax, homophones, and other interpretive errors are inadvertently transcribed by the computer software. Please disregard these errors. Please excuse any errors that have escaped final proofreading. Admission Summary:   80 y.o.  male with PMHx afib s/p watchman device, MDR UTI, obstuctive uropathy, HLD, PAD, debility/walker dependent, afib presented with nausea and vomiting for 2 weeks and recurrent UTI for multiple months. He has obstructive uropathy and states he started getting UTIs since he had a TURP. He also, despite the turp, continues to have symptoms of urinary retention. Recently he was admitted at [facility] for a UTI and after discharge was contacted and told his Ucx is growing 2 colonies, both sensitive to levaquin, one of them pseudomonas. He received a levaquin PO ABX script. He took the first dose yesterday. Endorses NBNB vomiting worsening for the last 2 weeks. At first it was episodic but now the nausea is constant and he throws up once a day. He endorses weight loss. No fever, chills, night sweats. No hearing or vision changes. No severe headaches. No confusion. No numbness, tingling. Endorses generalized weakness. No abdominal pain, diarrhea, constipation. No CP, SOB, FUNK, LE edema. No dysuria, frequency, flank pain.           Interval history / Subjective:   Reports feeling sleepy, Concerned about starting sinemet      Assessment & Plan:       Pseudomonas UTI - no noted drug resistance on review of HCA micro  obstructive uropathy   BPH with recent urethral in the medicine section of [facility]      I have personally and independently reviewed all pertinent labs, diagnostic studies, imaging, and have provided independent interpretation of the same. Labs:     Recent Labs     09/18/23 0640 09/19/23 0224   WBC 7.0 7.3   HGB 12.0* 11.4*   HCT 38.6 35.7*    159     Recent Labs     09/17/23  0607 09/18/23  0640 09/19/23 0224    144 142   K 3.7 3.8 3.5   * 109* 111*   CO2 25 26 26   BUN 7 8 7   MG 2.1 2.1 2.1   PHOS 3.7 3.4 3.4     Recent Labs     09/17/23  0607 09/18/23 0640 09/19/23 0224   ALT 22 21 8*   GLOB 3.3 3.5 3.3     No results for input(s): \"INR\", \"APTT\" in the last 72 hours. Invalid input(s): \"PTP\"   No results for input(s): \"TIBC\", \"FERR\" in the last 72 hours. Invalid input(s): \"FE\", \"PSAT\"   No results found for: \"FOL\", \"RBCF\"   No results for input(s): \"PH\", \"PCO2\", \"PO2\" in the last 72 hours. No results for input(s): \"CPK\" in the last 72 hours. Invalid input(s): \"CPKMB\", \"CKNDX\", \"TROIQ\"  Lab Results   Component Value Date/Time    CHOL 98 06/26/2019 02:19 AM    HDL 43 06/26/2019 02:19 AM     No results found for: \"GLUCPOC\"  [unfilled]    Notes reviewed from all clinical/nonclinical/nursing services involved in patient's clinical care. Care coordination discussions were held with appropriate clinical/nonclinical/ nursing providers based on care coordination needs. Patients current active Medications were reviewed, considered, added and adjusted based on the clinical condition today. Home Medications were reconciled to the best of my ability given all available resources at the time of admission. Route is PO if not otherwise noted.       Admission Status:[id number]:::1}      Medications Reviewed:     Current Facility-Administered Medications   Medication Dose Route Frequency    carbidopa-levodopa (SINEMET)  MG per tablet 0.5 tablet  0.5 tablet Oral TID    hydrocortisone (CORTEF) tablet 10 mg  10 mg Oral BID

## 2023-09-19 NOTE — ANESTHESIA POSTPROCEDURE EVALUATION
Department of Anesthesiology  Postprocedure Note    Patient: Brown Cohen  MRN: [id number]  YOB: 1940  Date of evaluation: 9/19/2023      Procedure Summary     Date: 09/19/23 Room / Location: [facility] MAIN OR  / [facility] MAIN OR    Anesthesia Start: 1051 Anesthesia Stop: 1134    Procedure: CYSTOSCOPY (Bladder) Diagnosis:       Resistance to multiple antibiotics      (Resistance to multiple antibiotics [Z16.24])    Surgeons: Herb Vazquez MD Responsible Provider: Nanette Davidson MD    Anesthesia Type: general ASA Status: 3          Anesthesia Type: No value filed.     Татьяна Phase I: Татьяна Score: 9    Татьяна Phase II:        Anesthesia Post Evaluation    Patient location during evaluation: PACU  Patient participation: complete - patient participated  Level of consciousness: awake  Airway patency: patent  Nausea & Vomiting: no vomiting and no nausea  Complications: no  Cardiovascular status: hemodynamically stable  Respiratory status: acceptable  Hydration status: stable  Pain management: adequate

## 2023-09-19 NOTE — CARE COORDINATION
9/19/2023 1:33 PM   Care Management Progress Note         ICD-10-CM     1. Generalized weakness  R53.1         2. Urinary frequency  R35.0         3. Vomiting without nausea, unspecified vomiting type  R11.11               RUR:  18&  Risk Level: []Low [x]Moderate []High  Value-based purchasing: [] Yes [x] No  Bundle patient: [] Yes [x] No              Specify:      Transition of care plan:  Ongoing medical management, home on po abx, cystoscopy today   Home with family at discharge, pt declining SNF placement  Home health arranged through [facility], PT/OT/RN. Outpatient follow-up.   Pt's family to transport

## 2023-09-19 NOTE — PROGRESS NOTES
81 y/o male with known BPH s/p TURP, incomplete bladder emptying, Sjogren's syndrome and CKD. Admitted for generalized weakness, nausea and vomiting. Seen in consultation per patient and wife's request to address concern for UTI, of note UA not consistent with UTI, Ucx negative. Patient and family have requested inpatient cystoscopy. Discussed cystoscopy and risks with patient and his wife who is at bedside.     VSS, afebrile  Labs stable     - Posted for Cystoscopy 09/19/23 @ 10am with Dr. Henny Vogel  - OP FU with Dr. Beatriz Bragg 9/26/23 at 9 am

## 2023-09-19 NOTE — ANESTHESIA PRE PROCEDURE
Department of Anesthesiology  Preprocedure Note       Name:  Lesli Laurent   Age:  80 y.o.  :  1940                                          MRN:  [id number]         Date:  2023      Surgeon: Lio Romero):  Jeannette Simmons MD    Procedure: Procedure(s):  CYSTOSCOPY    Medications prior to admission:   Prior to Admission medications    Medication Sig Start Date End Date Taking?  Authorizing Provider   ondansetron (ZOFRAN) 4 MG tablet Take 1 tablet by mouth 2 times daily as needed   Yes Historical Provider, MD   aspirin 81 MG chewable tablet Take 1 tablet by mouth daily   Yes Historical Provider, MD   pravastatin (PRAVACHOL) 20 MG tablet Take 1 tablet by mouth daily   Yes Historical Provider, MD   amiodarone (CORDARONE) 200 MG tablet Take 1 tablet by mouth daily  Patient not taking: Reported on 2023   Catherine Brown MD   rivaroxaban Gui Macias) 20 MG TABS tablet Take 1 tablet by mouth Daily with supper for 17 doses 23  Catherine Brown MD   metoprolol tartrate (LOPRESSOR) 25 MG tablet Take 0.25 tablets by mouth 2 times daily  Patient not taking: Reported on 2023   Catherine Brown MD   midodrine (PROAMATINE) 5 MG tablet Take 1 tablet by mouth 3 times daily (with meals)  Patient taking differently: Take 1 tablet by mouth 3 times daily (with meals) Cardiologist told them not to take it BP higher sandy 120 or greater systolic    Catherine Brown MD   pantoprazole (PROTONIX) 40 MG tablet Take 1 tablet by mouth 2 times daily (before meals) 23   Catherine Brown MD   ferrous sulfate (IRON 325) 325 (65 Fe) MG tablet Take 1 tablet by mouth daily (with breakfast)  Patient not taking: Reported on 2023   Catherine Brown MD   acetaminophen (TYLENOL) 325 MG tablet Take 2 tablets by mouth every 6 hours as needed 19   Ar Automatic Reconciliation   DULoxetine (CYMBALTA) 30 MG extended release capsule Take 1 capsule by mouth nightly    Ar Automatic

## 2023-09-20 ENCOUNTER — PREP FOR PROCEDURE (OUTPATIENT)
Age: 83
End: 2023-09-20

## 2023-09-20 LAB
ALBUMIN SERPL-MCNC: 2.7 G/DL (ref 3.5–5)
ALBUMIN/GLOB SERPL: 0.8 (ref 1.1–2.2)
ALP SERPL-CCNC: 72 U/L (ref 45–117)
ALT SERPL-CCNC: 19 U/L (ref 12–78)
ANION GAP SERPL CALC-SCNC: 4 MMOL/L (ref 5–15)
AST SERPL-CCNC: 14 U/L (ref 15–37)
BILIRUB SERPL-MCNC: 0.7 MG/DL (ref 0.2–1)
BUN SERPL-MCNC: 9 MG/DL (ref 6–20)
BUN/CREAT SERPL: 7 (ref 12–20)
CALCIUM SERPL-MCNC: 8.6 MG/DL (ref 8.5–10.1)
CHLORIDE SERPL-SCNC: 110 MMOL/L (ref 97–108)
CO2 SERPL-SCNC: 27 MMOL/L (ref 21–32)
CREAT SERPL-MCNC: 1.24 MG/DL (ref 0.7–1.3)
GLOBULIN SER CALC-MCNC: 3.3 G/DL (ref 2–4)
GLUCOSE SERPL-MCNC: 117 MG/DL (ref 65–100)
POTASSIUM SERPL-SCNC: 3.9 MMOL/L (ref 3.5–5.1)
PROT SERPL-MCNC: 6 G/DL (ref 6.4–8.2)
SODIUM SERPL-SCNC: 141 MMOL/L (ref 136–145)

## 2023-09-20 PROCEDURE — 6370000000 HC RX 637 (ALT 250 FOR IP): Performed by: INTERNAL MEDICINE

## 2023-09-20 PROCEDURE — 36415 COLL VENOUS BLD VENIPUNCTURE: CPT

## 2023-09-20 PROCEDURE — 6360000002 HC RX W HCPCS: Performed by: UROLOGY

## 2023-09-20 PROCEDURE — 6370000000 HC RX 637 (ALT 250 FOR IP): Performed by: UROLOGY

## 2023-09-20 PROCEDURE — 2580000003 HC RX 258: Performed by: UROLOGY

## 2023-09-20 PROCEDURE — 80053 COMPREHEN METABOLIC PANEL: CPT

## 2023-09-20 PROCEDURE — 2580000003 HC RX 258

## 2023-09-20 PROCEDURE — 6360000002 HC RX W HCPCS: Performed by: INTERNAL MEDICINE

## 2023-09-20 PROCEDURE — 1100000000 HC RM PRIVATE

## 2023-09-20 PROCEDURE — 94761 N-INVAS EAR/PLS OXIMETRY MLT: CPT

## 2023-09-20 PROCEDURE — 6370000000 HC RX 637 (ALT 250 FOR IP): Performed by: STUDENT IN AN ORGANIZED HEALTH CARE EDUCATION/TRAINING PROGRAM

## 2023-09-20 PROCEDURE — 99232 SBSQ HOSP IP/OBS MODERATE 35: CPT | Performed by: NURSE PRACTITIONER

## 2023-09-20 PROCEDURE — 6370000000 HC RX 637 (ALT 250 FOR IP): Performed by: NURSE PRACTITIONER

## 2023-09-20 PROCEDURE — 2580000003 HC RX 258: Performed by: STUDENT IN AN ORGANIZED HEALTH CARE EDUCATION/TRAINING PROGRAM

## 2023-09-20 RX ORDER — PREDNISONE 5 MG/1
5 TABLET ORAL DAILY
Status: DISCONTINUED | OUTPATIENT
Start: 2023-09-21 | End: 2023-09-25

## 2023-09-20 RX ORDER — SODIUM CHLORIDE 9 MG/ML
INJECTION, SOLUTION INTRAVENOUS CONTINUOUS
Status: DISCONTINUED | OUTPATIENT
Start: 2023-09-20 | End: 2023-09-21

## 2023-09-20 RX ORDER — LEVOFLOXACIN 5 MG/ML
750 INJECTION, SOLUTION INTRAVENOUS EVERY 24 HOURS
Status: COMPLETED | OUTPATIENT
Start: 2023-09-20 | End: 2023-09-20

## 2023-09-20 RX ADMIN — CARBIDOPA AND LEVODOPA 0.5 TABLET: 25; 100 TABLET ORAL at 08:48

## 2023-09-20 RX ADMIN — ASPIRIN 81 MG: 81 TABLET, CHEWABLE ORAL at 08:48

## 2023-09-20 RX ADMIN — HYDROCORTISONE 10 MG: 10 TABLET ORAL at 08:48

## 2023-09-20 RX ADMIN — ONDANSETRON 4 MG: 2 INJECTION INTRAMUSCULAR; INTRAVENOUS at 23:26

## 2023-09-20 RX ADMIN — Medication 1 PACKET: at 08:48

## 2023-09-20 RX ADMIN — Medication 1 PACKET: at 20:51

## 2023-09-20 RX ADMIN — DULOXETINE HYDROCHLORIDE 30 MG: 30 CAPSULE, DELAYED RELEASE ORAL at 20:51

## 2023-09-20 RX ADMIN — SODIUM CHLORIDE, PRESERVATIVE FREE 10 ML: 5 INJECTION INTRAVENOUS at 08:48

## 2023-09-20 RX ADMIN — LEVOFLOXACIN 750 MG: 5 INJECTION, SOLUTION INTRAVENOUS at 17:45

## 2023-09-20 RX ADMIN — PANTOPRAZOLE SODIUM 40 MG: 40 TABLET, DELAYED RELEASE ORAL at 06:16

## 2023-09-20 RX ADMIN — ONDANSETRON 4 MG: 2 INJECTION INTRAMUSCULAR; INTRAVENOUS at 11:32

## 2023-09-20 RX ADMIN — SODIUM CHLORIDE: 9 INJECTION, SOLUTION INTRAVENOUS at 20:51

## 2023-09-20 RX ADMIN — ONDANSETRON 4 MG: 2 INJECTION INTRAMUSCULAR; INTRAVENOUS at 17:45

## 2023-09-20 RX ADMIN — DUTASTERIDE 0.5 MG: 0.5 CAPSULE, LIQUID FILLED ORAL at 09:01

## 2023-09-20 RX ADMIN — SODIUM CHLORIDE, PRESERVATIVE FREE 10 ML: 5 INJECTION INTRAVENOUS at 20:51

## 2023-09-20 RX ADMIN — PRAVASTATIN SODIUM 20 MG: 20 TABLET ORAL at 20:51

## 2023-09-20 NOTE — PLAN OF CARE
Problem: Discharge Planning  Goal: Discharge to home or other facility with appropriate resources  Outcome: Progressing     Problem: ABCDS Injury Assessment  Goal: Absence of physical injury  Outcome: Progressing     Problem: Safety - Adult  Goal: Free from fall injury  Outcome: Progressing     Problem: Nutrition Deficit:  Goal: Optimize nutritional status  Outcome: Progressing     Problem: Pain  Goal: Verbalizes/displays adequate comfort level or baseline comfort level  Outcome: Progressing

## 2023-09-20 NOTE — PROGRESS NOTES
81 y/o male with known BPH s/p TURP, incomplete bladder emptying, Sjogren's syndrome and CKD. Admitted for generalized weakness, nausea and vomiting. Seen in consultation per patient and wife's request to address concern for UTI, of note UA not consistent with UTI, Ucx negative. Patient and family have requested inpatient cystoscopy. VSS, afebrile  Labs stable   Cystoscopy: Urethra was unremarkable. Prostatic urethra shows some mild prostatic regrowth with mild lateral lobe obstruction. Fragile prostatic urothelium. Some bullous edema at the bladder neck with prostate. Bladder fully inspected. No significant mucosal lesions noted. No stones. He does have moderate trabeculation with some small cellules.   Ureteral orifices slightly difficult to visualize, given prostatic anatomy and trabeculation.      - Now s/p cystoscopy, results above, no intervention indicated at present  - OP FU with Dr. Елена Hobbs 9/26/23 at 9 am

## 2023-09-20 NOTE — PROGRESS NOTES
on both sides. Hearing:  normal.       Language:  no dysarthria, no aphasia, normal fluency, normal repetition. Tongue: midline. Soft palate: not examined  SCMs: normal, symmetric. Reflexes:   LUExt: 2+/ 4                 RUExt: 2+/ 4  LLExt: 2+/4                  RLExt: 2+/ 4          Sensory:   LT and Temp intact in all extremities            Cerebellar:  Resting tremor in upper extremities, intentional tremor in lower extremities. Improved cog wheel rigidity in BUE. Normal finger nose finger.   No pronator drift                            Motor:           LUExt: 5/ 5               RUExt: 5/ 5                                              LLExt: 5/ 5                RLExt: 5/ 5        Gait:   Not tested     MEDICAL DECISION MAKING (2 of 3: A +/- B +/- C)     A) Number and Complexity of Problem(s) Addressed:  [] 1 stable, acute illness (Low)  [] 1 stable, chronic illness (Low)  [] 1 acute illness with systemic symptoms (Moderate)  [] 1 undiagnosed new problem with uncertain prognosis (Moderate)  [] 1 or more chronic illness with exacerbation, progression, or side effects of treatment (Moderate)  [] 1 or more chronic illnesses with severe exacerbation, progression, or side effects of treatment (High)  [] 1 acute or chronic illness or injury that poses a threat to life or bodily function (High)     B) Amount/ Complexity of Data reviewed (1 of 3 categories = Moderate, 2 of 3 categories = High)  Test, documents, or independent historian(s) (any THREE ELEMENTS):   []  Review of external note(s) from unique source:  []  Review of the result(s) of each unique test  []  Ordered a unique test    [x]  Discussed with a historian other than the patient:      Independent interpretation of a test performed by another Physician / QHP:                     [] Yes        Discussion of management or test interpretation with another Physician / QHP:                              [x] Yes        C) Risk of Complication, Morbidity, or Mortality:      Escalation of hospital level of care (High Risk):  [] Yes     [] No  Decision to de-escalate care or DNR due to poor prognosis (High Risk):                  [] Yes     [] No  Parenteral controlled substances prescribed (High Risk):                                   [] Yes     [] No  Drug Therapy requiring intensive monitoring for toxicity (High Risk)  [] Yes     [] No  Prescription drug management (Moderate Risk):                                                          [] Yes     [] No  Decision regarding minor surgery (Moderate Risk):                                                     [] Yes                     [] No

## 2023-09-20 NOTE — CARE COORDINATION
9/20/2023 2:10 PM Met with pt and pt's wife at bedside. Confirmed discharge plan for home health. Pt nor pt's wife are open to SNF placement. CM provided pt's wife with listing of personal care agencies. CM will follow. HORTENSIA Ellsworth    Care Management Progress Note         ICD-10-CM     1. Generalized weakness  R53.1         2. Urinary frequency  R35.0         3. Vomiting without nausea, unspecified vomiting type  R11.11               RUR:  18&  Risk Level: []Low [x]Moderate []High  Value-based purchasing: [] Yes [x] No  Bundle patient: [] Yes [x] No              Specify:      Transition of care plan:  Ongoing medical management, home on po abx, pt and family requesting GI consult  Home with family at discharge, pt declining SNF placement  Home health arranged through [facility], PT/OT/RN. Outpatient follow-up.   Pt's family to transport

## 2023-09-20 NOTE — PROGRESS NOTES
might be too rigorous for him and will not align with his schedule. Patient and wife are also upset about discussion about discharge and do not want to be discharged until medically ready. Patient was given a dose of Sinemet on Monday evening that they are upset about as well because the discussion with neurology was that Sinemet would be started after cystoscopy. Assessment & Plan:     Anticipated discharge date : 9/21  Anticipated disposition : Home with   Barriers to discharge : GI consult pending        Pseudomonas UTI - no noted drug resistance on review of HCA micro  obstructive uropathy   BPH with recent urethral trauma  AIDAN  - Culture data not in EMR  - CT abd w/o acute process  - Cystoscopy 9/19 was unremarkable. - urology and ID consulted    - on levaquin, day 8    - QTC ok  - Fosfomycin before DC       Nausea/Vomitting  Was thought to be due to UTI but still has lingering nausea. - GI consulted  - Zofran PRN  - PPI     Sjogrens syndrome  - switch to prednisone      Orthostatic hypotension   IVVD:   - Fall precautions  - Neurology consulted, sinemet started for parkinson's      Afib: S/p watchman.   - Not on anticoag, just ASA.   - toprol and amio     HLD   PAD  - Cont statin and ASA     CT abdomen incidentals:   - Outpt follow up     Debility:   - PT/OT/CM        Code status: Full   Prophylaxis: Heparin, SCD  Care Plan discussed with: Patient, RN, Multidisciplinary Rounds  Anticipated Disposition:                         Social Determinants of Health     Tobacco Use: High Risk (9/19/2023)    Patient History     Smoking Tobacco Use: Every Day     Smokeless Tobacco Use: Never     Passive Exposure: Not on file   Alcohol Use: Not on file   Financial Resource Strain: Not on file   Food Insecurity: Not on file   Transportation Needs: Not on file   Physical Activity: Not on file   Stress: Not on file   Social Connections: Not on file   Intimate Partner Violence: Not on file   Depression: Not on file independently examined them on 9/20/2023 as outlined below:          General : alert x 3, awake, no acute distress,   HEENT: PEERL, EOMI, moist mucus membrane  Neck: supple, no JVD  Chest: Clear to auscultation bilaterally   CVS: S1 S2 heard, Capillary refill less than 2 seconds  Abd: soft/ non tender, non distended, BS physiological,   Ext: no clubbing, no cyanosis, no edema, brisk 2+ DP pulses  Neuro/Psych: pleasant mood and affect, moving all extremities spontaneously, no focal neurological deficit  Skin: warm     Data Review:    Review and/or order of clinical lab test  Review and/or order of tests in the radiology section of CPT  Review and/or order of tests in the medicine section of CPT      I have personally and independently reviewed all pertinent labs, diagnostic studies, imaging, and have provided independent interpretation of the same. Labs:     Recent Labs     09/18/23 0640 09/19/23 0224   WBC 7.0 7.3   HGB 12.0* 11.4*   HCT 38.6 35.7*    159     Recent Labs     09/18/23 0640 09/19/23 0224 09/20/23 0615    142 141   K 3.8 3.5 3.9   * 111* 110*   CO2 26 26 27   BUN 8 7 9   MG 2.1 2.1  --    PHOS 3.4 3.4  --      Recent Labs     09/18/23 0640 09/19/23 0224 09/20/23 0615   ALT 21 8* 19   GLOB 3.5 3.3 3.3     No results for input(s): \"INR\", \"APTT\" in the last 72 hours. Invalid input(s): \"PTP\"   No results for input(s): \"TIBC\", \"FERR\" in the last 72 hours. Invalid input(s): \"FE\", \"PSAT\"   No results found for: \"FOL\", \"RBCF\"   No results for input(s): \"PH\", \"PCO2\", \"PO2\" in the last 72 hours. No results for input(s): \"CPK\" in the last 72 hours. Invalid input(s): \"CPKMB\", \"CKNDX\", \"TROIQ\"  Lab Results   Component Value Date/Time    CHOL 98 06/26/2019 02:19 AM    HDL 43 06/26/2019 02:19 AM     No results found for: \"GLUCPOC\"  [unfilled]    Notes reviewed from all clinical/nonclinical/nursing services involved in patient's clinical care.  Care coordination discussions were

## 2023-09-20 NOTE — PROGRESS NOTES
Physical Therapy: Defer    Chart reviewed in preparation for PT treatment. Patient received in supine, disagreeable to participating in therapy, citing that he needs his medications and additional time for his steroid medication to set in. Will re-attempt intervention as able. Update - Attempted PT intervention second time but patient declined to participate due to recent episode of vomiting. Did educate on the importance of continuing to mobilize and prevent debility but patient was not interested in trying today. Will continue to follow.     Thank you,  Michael Hurtado

## 2023-09-20 NOTE — PROGRESS NOTES
Occupational Therapy Contact Note  09/20/23    Attempted to see pt for OT tx, pt refusing therapy at this time despite encouragement. Will continue to follow up per POC.     Thank you,  Bobbi Juarez, OTD, OTR/L

## 2023-09-21 ENCOUNTER — ANESTHESIA (OUTPATIENT)
Facility: HOSPITAL | Age: 83
End: 2023-09-21
Payer: MEDICARE

## 2023-09-21 ENCOUNTER — ANESTHESIA EVENT (OUTPATIENT)
Facility: HOSPITAL | Age: 83
End: 2023-09-21
Payer: MEDICARE

## 2023-09-21 LAB
ALBUMIN SERPL-MCNC: 2.8 G/DL (ref 3.5–5)
ALBUMIN/GLOB SERPL: 0.9 (ref 1.1–2.2)
ALP SERPL-CCNC: 76 U/L (ref 45–117)
ALT SERPL-CCNC: 17 U/L (ref 12–78)
ANION GAP SERPL CALC-SCNC: 4 MMOL/L (ref 5–15)
AST SERPL-CCNC: 11 U/L (ref 15–37)
BILIRUB SERPL-MCNC: 0.9 MG/DL (ref 0.2–1)
BUN SERPL-MCNC: 8 MG/DL (ref 6–20)
BUN/CREAT SERPL: 6 (ref 12–20)
CALCIUM SERPL-MCNC: 8.2 MG/DL (ref 8.5–10.1)
CHLORIDE SERPL-SCNC: 110 MMOL/L (ref 97–108)
CO2 SERPL-SCNC: 25 MMOL/L (ref 21–32)
CREAT SERPL-MCNC: 1.3 MG/DL (ref 0.7–1.3)
GLOBULIN SER CALC-MCNC: 3.2 G/DL (ref 2–4)
GLUCOSE SERPL-MCNC: 98 MG/DL (ref 65–100)
POTASSIUM SERPL-SCNC: 3.5 MMOL/L (ref 3.5–5.1)
PROT SERPL-MCNC: 6 G/DL (ref 6.4–8.2)
SODIUM SERPL-SCNC: 139 MMOL/L (ref 136–145)

## 2023-09-21 PROCEDURE — 3700000000 HC ANESTHESIA ATTENDED CARE: Performed by: INTERNAL MEDICINE

## 2023-09-21 PROCEDURE — 6370000000 HC RX 637 (ALT 250 FOR IP): Performed by: INTERNAL MEDICINE

## 2023-09-21 PROCEDURE — 36415 COLL VENOUS BLD VENIPUNCTURE: CPT

## 2023-09-21 PROCEDURE — 3700000001 HC ADD 15 MINUTES (ANESTHESIA): Performed by: INTERNAL MEDICINE

## 2023-09-21 PROCEDURE — 2580000003 HC RX 258: Performed by: UROLOGY

## 2023-09-21 PROCEDURE — 6370000000 HC RX 637 (ALT 250 FOR IP): Performed by: UROLOGY

## 2023-09-21 PROCEDURE — 6360000002 HC RX W HCPCS: Performed by: UROLOGY

## 2023-09-21 PROCEDURE — 6370000000 HC RX 637 (ALT 250 FOR IP): Performed by: FAMILY MEDICINE

## 2023-09-21 PROCEDURE — 0DB48ZX EXCISION OF ESOPHAGOGASTRIC JUNCTION, VIA NATURAL OR ARTIFICIAL OPENING ENDOSCOPIC, DIAGNOSTIC: ICD-10-PCS | Performed by: INTERNAL MEDICINE

## 2023-09-21 PROCEDURE — 2709999900 HC NON-CHARGEABLE SUPPLY: Performed by: INTERNAL MEDICINE

## 2023-09-21 PROCEDURE — 7100000011 HC PHASE II RECOVERY - ADDTL 15 MIN: Performed by: INTERNAL MEDICINE

## 2023-09-21 PROCEDURE — 6370000000 HC RX 637 (ALT 250 FOR IP): Performed by: STUDENT IN AN ORGANIZED HEALTH CARE EDUCATION/TRAINING PROGRAM

## 2023-09-21 PROCEDURE — 7100000010 HC PHASE II RECOVERY - FIRST 15 MIN: Performed by: INTERNAL MEDICINE

## 2023-09-21 PROCEDURE — 80053 COMPREHEN METABOLIC PANEL: CPT

## 2023-09-21 PROCEDURE — 1100000000 HC RM PRIVATE

## 2023-09-21 PROCEDURE — 99231 SBSQ HOSP IP/OBS SF/LOW 25: CPT | Performed by: NURSE PRACTITIONER

## 2023-09-21 PROCEDURE — 0DB68ZX EXCISION OF STOMACH, VIA NATURAL OR ARTIFICIAL OPENING ENDOSCOPIC, DIAGNOSTIC: ICD-10-PCS | Performed by: INTERNAL MEDICINE

## 2023-09-21 PROCEDURE — 97535 SELF CARE MNGMENT TRAINING: CPT

## 2023-09-21 PROCEDURE — 88305 TISSUE EXAM BY PATHOLOGIST: CPT

## 2023-09-21 PROCEDURE — 3600007502: Performed by: INTERNAL MEDICINE

## 2023-09-21 PROCEDURE — 97530 THERAPEUTIC ACTIVITIES: CPT

## 2023-09-21 PROCEDURE — 2580000003 HC RX 258: Performed by: INTERNAL MEDICINE

## 2023-09-21 PROCEDURE — 97116 GAIT TRAINING THERAPY: CPT

## 2023-09-21 PROCEDURE — 6360000002 HC RX W HCPCS: Performed by: NURSE ANESTHETIST, CERTIFIED REGISTERED

## 2023-09-21 PROCEDURE — 3600007512: Performed by: INTERNAL MEDICINE

## 2023-09-21 PROCEDURE — 94761 N-INVAS EAR/PLS OXIMETRY MLT: CPT

## 2023-09-21 RX ORDER — PANTOPRAZOLE SODIUM 40 MG/1
40 TABLET, DELAYED RELEASE ORAL
Status: DISCONTINUED | OUTPATIENT
Start: 2023-09-21 | End: 2023-09-29 | Stop reason: HOSPADM

## 2023-09-21 RX ORDER — LIDOCAINE HCL/PF 100 MG/5ML
SYRINGE (ML) INJECTION PRN
Status: DISCONTINUED | OUTPATIENT
Start: 2023-09-21 | End: 2023-09-21 | Stop reason: SDUPTHER

## 2023-09-21 RX ORDER — POLYETHYLENE GLYCOL 3350 17 G/17G
17 POWDER, FOR SOLUTION ORAL DAILY
Status: DISCONTINUED | OUTPATIENT
Start: 2023-09-21 | End: 2023-09-25

## 2023-09-21 RX ORDER — SODIUM CHLORIDE 9 MG/ML
INJECTION, SOLUTION INTRAVENOUS CONTINUOUS
Status: DISCONTINUED | OUTPATIENT
Start: 2023-09-21 | End: 2023-09-22

## 2023-09-21 RX ORDER — PROPOFOL 10 MG/ML
INJECTION, EMULSION INTRAVENOUS CONTINUOUS PRN
Status: DISCONTINUED | OUTPATIENT
Start: 2023-09-21 | End: 2023-09-21 | Stop reason: SDUPTHER

## 2023-09-21 RX ADMIN — PROPOFOL 150 MCG/KG/MIN: 10 INJECTION, EMULSION INTRAVENOUS at 10:54

## 2023-09-21 RX ADMIN — SODIUM CHLORIDE, PRESERVATIVE FREE 10 ML: 5 INJECTION INTRAVENOUS at 12:16

## 2023-09-21 RX ADMIN — Medication 1 PACKET: at 12:12

## 2023-09-21 RX ADMIN — PANTOPRAZOLE SODIUM 40 MG: 40 TABLET, DELAYED RELEASE ORAL at 15:39

## 2023-09-21 RX ADMIN — SODIUM CHLORIDE: 9 INJECTION, SOLUTION INTRAVENOUS at 12:16

## 2023-09-21 RX ADMIN — PROPOFOL 25 MG: 10 INJECTION, EMULSION INTRAVENOUS at 10:55

## 2023-09-21 RX ADMIN — POLYETHYLENE GLYCOL 3350 17 G: 17 POWDER, FOR SOLUTION ORAL at 15:39

## 2023-09-21 RX ADMIN — LIDOCAINE HYDROCHLORIDE 100 MG: 20 INJECTION, SOLUTION INTRAVENOUS at 10:54

## 2023-09-21 RX ADMIN — ONDANSETRON 4 MG: 2 INJECTION INTRAMUSCULAR; INTRAVENOUS at 05:22

## 2023-09-21 RX ADMIN — DUTASTERIDE 0.5 MG: 0.5 CAPSULE, LIQUID FILLED ORAL at 12:12

## 2023-09-21 RX ADMIN — PREDNISONE 5 MG: 5 TABLET ORAL at 12:11

## 2023-09-21 RX ADMIN — ASPIRIN 81 MG: 81 TABLET, CHEWABLE ORAL at 12:12

## 2023-09-21 RX ADMIN — Medication 1 PACKET: at 20:48

## 2023-09-21 RX ADMIN — PRAVASTATIN SODIUM 20 MG: 20 TABLET ORAL at 20:48

## 2023-09-21 RX ADMIN — PANTOPRAZOLE SODIUM 40 MG: 40 TABLET, DELAYED RELEASE ORAL at 12:15

## 2023-09-21 RX ADMIN — SODIUM CHLORIDE, PRESERVATIVE FREE 10 ML: 5 INJECTION INTRAVENOUS at 20:48

## 2023-09-21 RX ADMIN — DULOXETINE HYDROCHLORIDE 30 MG: 30 CAPSULE, DELAYED RELEASE ORAL at 20:48

## 2023-09-21 ASSESSMENT — PAIN - FUNCTIONAL ASSESSMENT: PAIN_FUNCTIONAL_ASSESSMENT: 0-10

## 2023-09-21 ASSESSMENT — ENCOUNTER SYMPTOMS: SHORTNESS OF BREATH: 1

## 2023-09-21 NOTE — PROGRESS NOTES
[facility]                                                                               Hospitalist Progress Note  Mauro Cordoba MD          Date of Service:  2023  NAME:  Tashi Bonds  :  1940  MRN:  [id number]    Please note that this dictation was completed with [employer], the computer voice recognition software. Quite often unanticipated grammatical, syntax, homophones, and other interpretive errors are inadvertently transcribed by the computer software. Please disregard these errors. Please excuse any errors that have escaped final proofreading. Admission Summary:   80 y.o.  male with PMHx afib s/p watchman device, MDR UTI, obstuctive uropathy, HLD, PAD, debility/walker dependent, afib presented with nausea and vomiting for 2 weeks and recurrent UTI for multiple months. He has obstructive uropathy and states he started getting UTIs since he had a TURP. He also, despite the turp, continues to have symptoms of urinary retention. Recently he was admitted at [facility] for a UTI and after discharge was contacted and told his Ucx is growing 2 colonies, both sensitive to levaquin, one of them pseudomonas. He received a levaquin PO ABX script. He took the first dose yesterday. Endorses NBNB vomiting worsening for the last 2 weeks. At first it was episodic but now the nausea is constant and he throws up once a day. He endorses weight loss. Interval history / Subjective:   Patient reports feeling weak. Had 1 episode of emesis after lunch yesterday, he is convinced it is due to sinemet and wants medication discontinued. NPO for EGD today. Wife is at bedside. Patient and wife mention \"cleaning colon out from above\" recommended by GI if EGD is unremarkable and they want that done inpatient. They are very upset about any discussion of discharge stating he is too weak to do anything at home.       Assessment & Plan:       Anticipated

## 2023-09-21 NOTE — PLAN OF CARE
Problem: Discharge Planning  Goal: Discharge to home or other facility with appropriate resources  9/21/2023 0056 by Long Vanessa RN  Outcome: Progressing  9/20/2023 1449 by Thomas Hardwick RN  Outcome: Progressing     Problem: ABCDS Injury Assessment  Goal: Absence of physical injury  9/21/2023 0056 by Long Vanessa RN  Outcome: Progressing  9/20/2023 1449 by Thomas Hardwick RN  Outcome: Progressing     Problem: Safety - Adult  Goal: Free from fall injury  9/21/2023 0056 by Long Vanessa RN  Outcome: Progressing  9/20/2023 1449 by Thomas Hardwick RN  Outcome: Progressing     Problem: Nutrition Deficit:  Goal: Optimize nutritional status  9/21/2023 0056 by Long Vanessa RN  Outcome: Progressing  9/20/2023 1449 by Thomas Hardwick RN  Outcome: Progressing     Problem: Pain  Goal: Verbalizes/displays adequate comfort level or baseline comfort level  9/21/2023 0056 by Long Vanessa RN  Outcome: Progressing  9/20/2023 1449 by Thomas Hardwick RN  Outcome: Progressing

## 2023-09-21 NOTE — PROGRESS NOTES
0900  TRANSFER - IN REPORT:    Verbal report received from Vinay Rousseau RN on [facility]  being received from [phone] for ordered procedure      Report consisted of patient's Situation, Background, Assessment and   Recommendations(SBAR). Information from the following report(s) Nurse Handoff Report was reviewed with the receiving nurse. Opportunity for questions and clarification was provided. Assessment completed upon patient's arrival to unit and care assumed. 1051  Timeout performed. Anesthesia staff at patient's bedside administering anesthesia and monitoring patients vital signs throughout procedure. See anesthesia note. Post procedure, report received from Alessia FIGUEROA. 1101  Endoscope was pre-cleaned at bedside immediately following procedure by endo tech,    [employer]. 1105  Patient tolerated procedure. Abdomen soft and patient arousable and voices no complaints. Patient transported to endoscopy recovery area. Report given to post procedure RNYoly. 1115  TRANSFER - OUT REPORT:    Verbal report given to Jeanine Hurst on [facility]  being transferred to [phone] for routine progression of patient care       Report consisted of patient's Situation, Background, Assessment and   Recommendations(SBAR). Information from the following report(s) Nurse Handoff Report was reviewed with the receiving nurse. Lines:   Peripheral IV 09/19/23 Left;Posterior Forearm (Active)   Site Assessment Clean, dry & intact 09/21/23 0740   Line Status Flushed; Infusing 09/21/23 0740   Line Care Connections checked and tightened 09/21/23 0740   Phlebitis Assessment No symptoms 09/21/23 0740   Infiltration Assessment 0 09/21/23 0740   Alcohol Cap Used Yes 09/21/23 0740   Dressing Status Clean, dry & intact 09/21/23 0740   Dressing Type Transparent 09/21/23 0740   Dressing Intervention New 09/19/23 0843        Opportunity for questions and clarification was

## 2023-09-21 NOTE — PROGRESS NOTES
[facility]  1940  [id number]    Situation:  Verbal report received from: Eleanor Hope RN   Procedure: Procedure(s):  EGD ESOPHAGOGASTRODUODENOSCOPY  EGD BIOPSY    Background:    Preoperative diagnosis: Abdominal pain, unspecified abdominal location [R10.9]  Postoperative diagnosis: * No post-op diagnosis entered *    :  Dr. Goldie Douglass  Assistant(s): Circulator: Felipa Santillan RN  Endoscopy Technician: Semaj Starr    Specimens:   ID Type Source Tests Collected by Time Destination   A : gastric biopsy Tissue Gastric SURGICAL PATHOLOGY Nessa Amin MD 9/21/2023 1057    B : ge junction biopsy Tissue GE Junction Biopsy SURGICAL PATHOLOGY Nessa Amin MD 9/21/2023 1059      H. Pylori  No    Assessment:    Anesthesia gave intra-procedure sedation and medications, see anesthesia flow sheet No    Intravenous fluids: NS@ KVO     Vital signs stable     Abdominal assessment: round and soft     Recommendation:  Discharge patient per MD order.   Return to floor  Family or Friend   Permission to share finding with family or friend Yes

## 2023-09-21 NOTE — ANESTHESIA POSTPROCEDURE EVALUATION
Department of Anesthesiology  Postprocedure Note    Patient: Ray Arroyo  MRN: [id number]  YOB: 1940  Date of evaluation: 9/21/2023      Procedure Summary     Date: 09/21/23 Room / [facility]    Anesthesia Start: 1048 Anesthesia Stop: 1106    Procedures:       EGD ESOPHAGOGASTRODUODENOSCOPY (Upper GI Region)      EGD BIOPSY (Upper GI Region) Diagnosis:       Abdominal pain, unspecified abdominal location      (Abdominal pain, unspecified abdominal location [R10.9])    Surgeons: Sim Cao MD Responsible Provider: Taurus Souza DO    Anesthesia Type: MAC ASA Status: 3          Anesthesia Type: No value filed. Татьяна Phase I: Татьяна Score: 10    Татьяна Phase II: Татьяна Score: 10      Anesthesia Post Evaluation    Patient location during evaluation: PACU  Patient participation: complete - patient participated  Level of consciousness: awake  Pain score: 0  Airway patency: patent  Nausea & Vomiting: no vomiting and no nausea  Complications: no  Cardiovascular status: hemodynamically stable  Respiratory status: acceptable  Hydration status: stable  Comments: Patient seen and examined. Ready for discharge from PACU.   Pain management: adequate

## 2023-09-21 NOTE — CARE COORDINATION
9/21/2023 8:45 AM   Care Management Progress Note         ICD-10-CM     1. Generalized weakness  R53.1         2. Urinary frequency  R35.0         3. Vomiting without nausea, unspecified vomiting type  R11.11               RUR:  18&  Risk Level: []Low [x]Moderate []High  Value-based purchasing: [] Yes [x] No  Bundle patient: [] Yes [x] No              Specify:      Transition of care plan:  Ongoing medical management, home on po abx, EGD today   Home with family at discharge, pt declining SNF placement  Home health arranged through [facility], PT/OT/RN. Outpatient follow-up.   Pt's family to transport

## 2023-09-21 NOTE — PERIOP NOTE
TRANSFER - IN REPORT:    Verbal report received from [facility] on Satnam Sinasaf  being received from 4th floor 423 for ordered procedure      Report consisted of patient's Situation, Background, Assessment and   Recommendations(SBAR). Information from the following report(s) Pre Procedure Checklist was reviewed with the receiving nurse. Opportunity for questions and clarification was provided. Assessment completed upon patient's arrival to unit and care assumed.

## 2023-09-21 NOTE — PLAN OF CARE
Problem: Discharge Planning  Goal: Discharge to home or other facility with appropriate resources  9/21/2023 1037 by Jagdish Pedraza RN  Outcome: Progressing  9/21/2023 0056 by Spencer Bauer RN  Outcome: Progressing     Problem: ABCDS Injury Assessment  Goal: Absence of physical injury  9/21/2023 1037 by Jagdish Pedraza RN  Outcome: Progressing  9/21/2023 0056 by Spencer Bauer RN  Outcome: Progressing     Problem: Safety - Adult  Goal: Free from fall injury  9/21/2023 1037 by Jagdish Pedraza RN  Outcome: Progressing  9/21/2023 0056 by Spencer Bauer RN  Outcome: Progressing     Problem: Nutrition Deficit:  Goal: Optimize nutritional status  9/21/2023 1037 by Jagdish Pedraza RN  Outcome: Progressing  9/21/2023 0056 by Spencer Bauer RN  Outcome: Progressing     Problem: Pain  Goal: Verbalizes/displays adequate comfort level or baseline comfort level  9/21/2023 1037 by Jagdish Pedraza RN  Outcome: Progressing  9/21/2023 0056 by Spencer Bauer RN  Outcome: Progressing

## 2023-09-21 NOTE — PROGRESS NOTES
[facility]             GI PROGRESS NOTE        NAME: Hanny Hernandez   :  1940   MRN:  [id number]       Subjective:   81yo male admitted for weakness, recurrent uti, n/v who we were asked to see for vomiting. S/p egd today which showed hiatal hernia and bryant's esophagus, otherwise mucosa within normal.   Patient reports no nausea or vomiting today. He is tolerating liquid diet well at this time      Objective:   Resting in bed in no distress      VITALS:   Last 24hrs VS reviewed since prior progress note. Most recent are:  Vitals:    23 1126   BP: 135/76   Pulse: 67   Resp: 15   Temp:    SpO2: 97%       Intake/Output Summary (Last 24 hours) at 2023 1504  Last data filed at 2023 1101  Gross per 24 hour   Intake 574.17 ml   Output 250 ml   Net 324.17 ml       PHYSICAL EXAM:  General: Alert, in no acute distress    HEENT: Anicteric sclerae.   Abdomen: Soft, Non distended, Non tender    Lab Data Reviewed:   Recent Labs     23  022   WBC 7.3   HGB 11.4*   HCT 35.7*        Recent Labs     234 23  0615 23  0515    141 139   K 3.5 3.9 3.5   * 110* 110*   CO2 26 27 25   BUN 7 9 8   PHOS 3.4  --   --      Recent Labs     23  0615 23  0515   GLOB 3.3 3.2       ________________________________________________________________________  Patient Active Problem List   Diagnosis    Sjogren's syndrome (HCC)    Sepsis ([address])    Dehydration    Fracture of femoral neck, left, closed (HCC)    UTI (urinary tract infection)    Urine retention    Sinus tachycardia    Renal insufficiency    Hypoxia    CKD (chronic kidney disease), stage III ([facility])    Leukocytosis    Cough    Fever    Pneumonia    Nausea & vomiting    Syncope and collapse    Intractable nausea and vomiting    Generalized weakness    Urinary frequency    Vomiting without nausea    Pyuria         Assessment and Plan:  81yo male admitted for weakness, recurrent uti, n/v

## 2023-09-21 NOTE — PROGRESS NOTES
Occupational Therapy Contact Note.  09/21/23    Attempted to work with pt for OT tx, pt ISIDORO to Endo for EGD. Will follow up per POC.     Thank you,  Myron Zazuetar, OTD, OTR/L

## 2023-09-21 NOTE — PROGRESS NOTES
Patient in Endoscopy Dept now. Patient's wife, Ugo Donnelly, would like to verify the patient has on his wedding ring. Ugo Donnelly is in our waiting area . Mr. Barrett Closs does have his ring on his left hand and she has been informed.

## 2023-09-21 NOTE — OP NOTE
Benjy Cox M.D.  [phone]           2023                EGD Operative Report  Hank Correa  :  1940  [address] [facility] Medical Record Number:  [id number]      Indication: Vomiting, persitent of unclear etilogy    : Paulina Tomas MD    Referring Provider:  Jai Lam MD      Anesthesia/Sedation:  MAC anesthesia    Airway assessment: No airway problems anticipated    Pre-Procedural Exam:      Airway: clear, no airway problems anticipated  Heart: RRR, without gallops or rubs  Lungs: clear bilaterally without wheezes, crackles, or rhonchi  Abdomen: soft, nontender, nondistended, bowel sounds present  Mental Status: awake, alert and oriented to person, place and time       Procedure Details     After infomed consent was obtained for the procedure, with all risks and benefits of procedure explained the patient was taken to the endoscopy suite and placed in the left lateral decubitus position. Following sequential administration of sedation as per above, the endoscope was inserted into the mouth and advanced under direct vision to second portion of the duodenum. A careful inspection was made as the gastroscope was withdrawn, including a retroflexed view of the proximal stomach; findings and interventions are described below. Findings:   Esophagus:Evidence of salmon colored mucosa seen extending from the GE-junction as two mucosal tongues, one is less than a centimeter, the other one is about 2 cm in length and 1 cm in width, no lesions or ulcers seen. Otherwise mucosa within normal. These findings are consistent with previously diagnosed Tay's esophagus, C0M2. Stomach: Moderate size hiatal hernia, otherwise mucosa within normal  Duodenum/jejunum: normal    Therapies:  none    Specimens:  Stomach and GE-junction           Complications:   None; patient tolerated the procedure well. EBL:  None.            Impression:    Hiatal hernia and Tay's esophagus, otherwise mucosa within normal.     Recommendations:    -Acid suppression with a proton pump inhibitor twice a day  -Anti-emetics only as needed  -Resume full liquids and advance as tolerated    Sapna Guerrero MD

## 2023-09-21 NOTE — ANESTHESIA PRE PROCEDURE
Department of Anesthesiology  Preprocedure Note       Name:  Brown Cohen   Age:  80 y.o.  :  1940                                          MRN:  [id number]         Date:  2023      Surgeon: Morteza Hamm):  Trang Jackson MD    Procedure: Procedure(s):  EGD ESOPHAGOGASTRODUODENOSCOPY    Medications prior to admission:   Prior to Admission medications    Medication Sig Start Date End Date Taking?  Authorizing Provider   ondansetron (ZOFRAN) 4 MG tablet Take 1 tablet by mouth 2 times daily as needed   Yes Historical Provider, MD   aspirin 81 MG chewable tablet Take 1 tablet by mouth daily   Yes Historical Provider, MD   pravastatin (PRAVACHOL) 20 MG tablet Take 1 tablet by mouth daily   Yes Historical Provider, MD   amiodarone (CORDARONE) 200 MG tablet Take 1 tablet by mouth daily  Patient not taking: Reported on 2023   Wilbur Morrison MD   rivaroxaban Nolberto Gun) 20 MG TABS tablet Take 1 tablet by mouth Daily with supper for 17 doses 23  Wilbur Morrison MD   metoprolol tartrate (LOPRESSOR) 25 MG tablet Take 0.25 tablets by mouth 2 times daily  Patient not taking: Reported on 2023   Wilbur Morrison MD   midodrine (PROAMATINE) 5 MG tablet Take 1 tablet by mouth 3 times daily (with meals)  Patient taking differently: Take 1 tablet by mouth 3 times daily (with meals) Cardiologist told them not to take it BP higher sandy 120 or greater systolic    Wilbur Morrison MD   pantoprazole (PROTONIX) 40 MG tablet Take 1 tablet by mouth 2 times daily (before meals) 23   Wilbur Morrison MD   ferrous sulfate (IRON 325) 325 (65 Fe) MG tablet Take 1 tablet by mouth daily (with breakfast)  Patient not taking: Reported on 2023   Wilbur Morrison MD   acetaminophen (TYLENOL) 325 MG tablet Take 2 tablets by mouth every 6 hours as needed 19   Ar Automatic Reconciliation   DULoxetine (CYMBALTA) 30 MG extended release capsule Take 1 capsule by mouth nightly

## 2023-09-22 PROBLEM — R26.81 GAIT INSTABILITY: Status: ACTIVE | Noted: 2023-09-22

## 2023-09-22 LAB
ALBUMIN SERPL-MCNC: 2.9 G/DL (ref 3.5–5)
ALBUMIN/GLOB SERPL: 0.9 (ref 1.1–2.2)
ALP SERPL-CCNC: 76 U/L (ref 45–117)
ALT SERPL-CCNC: 19 U/L (ref 12–78)
ANION GAP SERPL CALC-SCNC: 4 MMOL/L (ref 5–15)
AST SERPL-CCNC: 9 U/L (ref 15–37)
BASOPHILS # BLD: 0 K/UL (ref 0–0.1)
BASOPHILS NFR BLD: 0 % (ref 0–1)
BILIRUB SERPL-MCNC: 0.9 MG/DL (ref 0.2–1)
BUN SERPL-MCNC: 8 MG/DL (ref 6–20)
BUN/CREAT SERPL: 6 (ref 12–20)
CALCIUM SERPL-MCNC: 8.4 MG/DL (ref 8.5–10.1)
CHLORIDE SERPL-SCNC: 109 MMOL/L (ref 97–108)
CO2 SERPL-SCNC: 26 MMOL/L (ref 21–32)
CREAT SERPL-MCNC: 1.29 MG/DL (ref 0.7–1.3)
DIFFERENTIAL METHOD BLD: ABNORMAL
EOSINOPHIL # BLD: 0 K/UL (ref 0–0.4)
EOSINOPHIL NFR BLD: 0 % (ref 0–7)
ERYTHROCYTE [DISTWIDTH] IN BLOOD BY AUTOMATED COUNT: 16.3 % (ref 11.5–14.5)
GLOBULIN SER CALC-MCNC: 3.3 G/DL (ref 2–4)
GLUCOSE SERPL-MCNC: 112 MG/DL (ref 65–100)
HCT VFR BLD AUTO: 36.2 % (ref 36.6–50.3)
HGB BLD-MCNC: 11.3 G/DL (ref 12.1–17)
IMM GRANULOCYTES # BLD AUTO: 0 K/UL (ref 0–0.04)
IMM GRANULOCYTES NFR BLD AUTO: 0 % (ref 0–0.5)
LYMPHOCYTES # BLD: 1.2 K/UL (ref 0.8–3.5)
LYMPHOCYTES NFR BLD: 17 % (ref 12–49)
MAGNESIUM SERPL-MCNC: 2.2 MG/DL (ref 1.6–2.4)
MCH RBC QN AUTO: 27.6 PG (ref 26–34)
MCHC RBC AUTO-ENTMCNC: 31.2 G/DL (ref 30–36.5)
MCV RBC AUTO: 88.3 FL (ref 80–99)
MONOCYTES # BLD: 0.7 K/UL (ref 0–1)
MONOCYTES NFR BLD: 10 % (ref 5–13)
NEUTS SEG # BLD: 5.2 K/UL (ref 1.8–8)
NEUTS SEG NFR BLD: 73 % (ref 32–75)
NRBC # BLD: 0 K/UL (ref 0–0.01)
NRBC BLD-RTO: 0 PER 100 WBC
PHOSPHATE SERPL-MCNC: 4 MG/DL (ref 2.6–4.7)
PLATELET # BLD AUTO: 159 K/UL (ref 150–400)
PMV BLD AUTO: 10.9 FL (ref 8.9–12.9)
POTASSIUM SERPL-SCNC: 3.7 MMOL/L (ref 3.5–5.1)
PROT SERPL-MCNC: 6.2 G/DL (ref 6.4–8.2)
RBC # BLD AUTO: 4.1 M/UL (ref 4.1–5.7)
SODIUM SERPL-SCNC: 139 MMOL/L (ref 136–145)
WBC # BLD AUTO: 7.3 K/UL (ref 4.1–11.1)

## 2023-09-22 PROCEDURE — 6370000000 HC RX 637 (ALT 250 FOR IP): Performed by: UROLOGY

## 2023-09-22 PROCEDURE — 80053 COMPREHEN METABOLIC PANEL: CPT

## 2023-09-22 PROCEDURE — 6370000000 HC RX 637 (ALT 250 FOR IP): Performed by: FAMILY MEDICINE

## 2023-09-22 PROCEDURE — 99231 SBSQ HOSP IP/OBS SF/LOW 25: CPT | Performed by: NURSE PRACTITIONER

## 2023-09-22 PROCEDURE — 83735 ASSAY OF MAGNESIUM: CPT

## 2023-09-22 PROCEDURE — 6360000002 HC RX W HCPCS: Performed by: UROLOGY

## 2023-09-22 PROCEDURE — 97110 THERAPEUTIC EXERCISES: CPT

## 2023-09-22 PROCEDURE — 97535 SELF CARE MNGMENT TRAINING: CPT

## 2023-09-22 PROCEDURE — 85025 COMPLETE CBC W/AUTO DIFF WBC: CPT

## 2023-09-22 PROCEDURE — 6370000000 HC RX 637 (ALT 250 FOR IP): Performed by: STUDENT IN AN ORGANIZED HEALTH CARE EDUCATION/TRAINING PROGRAM

## 2023-09-22 PROCEDURE — 36415 COLL VENOUS BLD VENIPUNCTURE: CPT

## 2023-09-22 PROCEDURE — 2580000003 HC RX 258: Performed by: UROLOGY

## 2023-09-22 PROCEDURE — 84100 ASSAY OF PHOSPHORUS: CPT

## 2023-09-22 PROCEDURE — 6370000000 HC RX 637 (ALT 250 FOR IP): Performed by: INTERNAL MEDICINE

## 2023-09-22 PROCEDURE — 97530 THERAPEUTIC ACTIVITIES: CPT

## 2023-09-22 PROCEDURE — 97116 GAIT TRAINING THERAPY: CPT

## 2023-09-22 PROCEDURE — 94761 N-INVAS EAR/PLS OXIMETRY MLT: CPT

## 2023-09-22 PROCEDURE — 1100000000 HC RM PRIVATE

## 2023-09-22 RX ADMIN — ENOXAPARIN SODIUM 40 MG: 100 INJECTION SUBCUTANEOUS at 09:06

## 2023-09-22 RX ADMIN — PANTOPRAZOLE SODIUM 40 MG: 40 TABLET, DELAYED RELEASE ORAL at 16:49

## 2023-09-22 RX ADMIN — POLYETHYLENE GLYCOL 3350 17 G: 17 POWDER, FOR SOLUTION ORAL at 09:05

## 2023-09-22 RX ADMIN — DULOXETINE HYDROCHLORIDE 30 MG: 30 CAPSULE, DELAYED RELEASE ORAL at 21:05

## 2023-09-22 RX ADMIN — DUTASTERIDE 0.5 MG: 0.5 CAPSULE, LIQUID FILLED ORAL at 11:18

## 2023-09-22 RX ADMIN — PANTOPRAZOLE SODIUM 40 MG: 40 TABLET, DELAYED RELEASE ORAL at 06:38

## 2023-09-22 RX ADMIN — Medication 1 PACKET: at 13:59

## 2023-09-22 RX ADMIN — ASPIRIN 81 MG: 81 TABLET, CHEWABLE ORAL at 09:06

## 2023-09-22 RX ADMIN — Medication 1 PACKET: at 09:06

## 2023-09-22 RX ADMIN — PREDNISONE 5 MG: 5 TABLET ORAL at 09:06

## 2023-09-22 RX ADMIN — SODIUM CHLORIDE, PRESERVATIVE FREE 10 ML: 5 INJECTION INTRAVENOUS at 21:07

## 2023-09-22 RX ADMIN — PRAVASTATIN SODIUM 20 MG: 20 TABLET ORAL at 21:05

## 2023-09-22 RX ADMIN — Medication 1 PACKET: at 21:05

## 2023-09-22 RX ADMIN — SODIUM CHLORIDE, PRESERVATIVE FREE 10 ML: 5 INJECTION INTRAVENOUS at 09:07

## 2023-09-22 ASSESSMENT — PAIN SCALES - GENERAL
PAINLEVEL_OUTOF10: 0
PAINLEVEL_OUTOF10: 0

## 2023-09-22 NOTE — PLAN OF CARE
Problem: Occupational Therapy - Adult  Goal: By Discharge: Performs self-care activities at highest level of function for planned discharge setting. See evaluation for individualized goals. Description: FUNCTIONAL STATUS PRIOR TO ADMISSION: Patient was recently discharged from [facility] to [facility], [place] and was home for 1 week with HHPT/OT prior to this admission. Patient reports that his wife provides physical assistance (at the least, supervision) with a gait belt for functional mobility and occasionally assists with ADL's. Patient uses a RW at all times and also utilizes a wheelchair and transport chair when needed. HOME SUPPORT PRIOR TO ADMISSION: The patient lived with his wife who performed IADL's. Receives Help From: Other (comment) (Has been receiving HHPT/OT for the past week since being discharged from ), ADL Assistance: (P) Needs assistance, Bath:  (On occasion his wife supervises/helps when patient is having acute medical issues),  ,  , Feeding: Independent,  , Homemaking Assistance: Needs assistance, Ambulation Assistance: Needs assistance, Transfer Assistance: Needs assistance, Active : No     Occupational Therapy Goals:  Initiated 9/14/2023 Reviewed at weekly reassessment 9/21/23  1. Patient will tolerate standing to perform grooming > or = 5 minutes with Supervision without significant decrease in blood pressure within 7 day(s). Downgrade to 3 minutes  2. Patient will perform lower body dressing with Supervision within 7 day(s). Continue  3. Patient will perform toilet transfers with Stand by Assist  using rolling walker to transfer into and out of bathroom within 7 day(s). Downgrade to CGA  4. Patient will perform all aspects of toileting with Supervision within 7 day(s). Continue  5. Patient will sit in chair or edge of bed for all meals within 7 day(s).  Continue  Outcome: Progressing     OCCUPATIONAL THERAPY TREATMENT  Patient: Ashtyn Salvador (54 y.o. male)  Date: assistance;Assist X1;Additional time  Bed to Chair: Minimum assistance;Assist X1;Additional time  Toilet Transfer: Minimum assistance;Assist X1;Additional time      Balance:  Standing: Impaired  Balance  Sitting: Intact  Standing: Impaired  Standing - Static: Fair  Standing - Dynamic: Poor      ADL Intervention:  Feeding: Modified independent    Feeding Skilled Clinical Factors: seated in chair     Grooming: Setup   Grooming Skilled Clinical Factors: seated in chair    LE Dressing: Minimal assistance; Increased time to complete  LE Dressing Skilled Clinical Factors: education provided for adaptive technique; able to achieve tailor position    Toileting: Minimal assistance  Toileting Skilled Clinical Factors: increased assist for clothing management due to decresaed balance    Activity Tolerance:   Good  Please refer to the flowsheet for vital signs taken during this treatment. After treatment:   Patient left in no apparent distress sitting up in chair, Call bell within reach, and Bed/ chair alarm activated    COMMUNICATION/EDUCATION:   The patient's plan of care was discussed with: physical therapist, registered nurse, and patient.     Patient Education  Education Given To: Patient  Education Provided: Role of Therapy;Transfer Training;ADL Adaptive Strategies  Education Method: Verbal  Barriers to Learning: None  Education Outcome: Verbalized understanding;Continued education needed    Thank you for this referral.  Ritta Opitz, OTR/L  Minutes: 38

## 2023-09-22 NOTE — CARE COORDINATION
9/22/2023  4:58 PM  Case management note    Patient is declining snf placment. He and wife have arranged some personal help in home. He would like to discharge on Sunday and will most likely be in agreement with discharge at that time. Wife can transport. Will continue to follow.   [facility]

## 2023-09-22 NOTE — PROGRESS NOTES
Comprehensive Nutrition Assessment    Type and Reason for Visit: Reassess    Nutrition Recommendations/Plan:   Continue  Easy to Chew diet -    Restarted Ensure Enlive BID for ease of kcal/protein intake        Malnutrition Assessment:  Malnutrition Status: Moderate malnutrition (09/14/23 1537)    Context:  Acute Illness     Findings of the 6 clinical characteristics of malnutrition:  Energy Intake:  75% or less of estimated energy requirements for 7 or more days  Weight Loss:  7.5% over 3 months     Body Fat Loss:  Mild body fat loss     Muscle Mass Loss:  Mild muscle mass loss    Fluid Accumulation:  No significant fluid accumulation     Strength:  Not Performed       Nutrition Assessment:    Past medical hx:       Diagnosis Date    CKD (chronic kidney disease), stage III ([address])     Sjoegren syndrome 04/2016    Urine retention        9/22:  pt continues to need medical management. Pt had been NPO and then diet restarted as Full liquids and now upgraded to Easy to Chew 2/2 recent dental issues. EGD showed bryant's esophagus and hiatal hernia. Pt was drinking the Ensure before so this was restarted - may need to switch types due to backlog/shortages in supply of Ensure Enlive. Bowel movements improved but still feels a bit constipated. Gi recommended sennakot and miralax, pt would only like miralax. Noted discussion of bowel cleanse. Last BM 9/21.      9/18: Follow up. Pt diet advanced. Eating fair Drinking ~2 ONS per day. Diarrhea improving. Last BM 9/16. Pt to be NPO tomorrow for cystoscopy. 9/14: MST received for wt loss. Pt reports dental issues in the recent past which lead to teeth extraction/replacement. Pt with n/v for ~2 weeks as well. Pt reports ~25 lbs wt loss over the last few months related to these multiple health issues. UBW ~190-200 lbs. ~10% of body weight lost over last few months and recent poor PO meet malnutrition criteria. Pt with some mild fat/muscle wasting present.  Pt had been

## 2023-09-22 NOTE — PLAN OF CARE
Problem: Physical Therapy - Adult  Goal: By Discharge: Performs mobility at highest level of function for planned discharge setting. See evaluation for individualized goals. Description: FUNCTIONAL STATUS PRIOR TO ADMISSION: Patient was recently discharged from [facility] to [facility] and was home for 1 week with HHPT/OT prior to this admission. Patient reports that his wife provides physical assistance (at the least, supervision) with a gait belt for functional mobility and occasionally assists with ADL's. Patient uses a RW at all times and also utilizes a wheelchair and transport chair when needed. HOME SUPPORT PRIOR TO ADMISSION: The patient lived with his wife who performed IADL's. Physical Therapy Goals  Initiated 9/21/2023, downgraded:  1. Patient will move from supine to sit and sit to supine, scoot up and down, and roll side to side in bed with independence within 7 day(s). 2.  Patient will perform sit to stand with contact guard assist within 7 day(s). 3.  Patient will transfer from bed to chair and chair to bed with contact guard assist using the least restrictive device within 7 day(s). 4.  Patient will ambulate with minimal assistance for 15 feet with the least restrictive device within 7 day(s). 5.  Patient will ascend/descend 2 stairs with B handrail(s) with minimal assistance within 7 day(s). Physical Therapy Goals  Initiated 9/14/2023  1. Patient will move from supine to sit and sit to supine, scoot up and down, and roll side to side in bed with independence within 7 day(s). 2.  Patient will perform sit to stand with supervision/set-up within 7 day(s). 3.  Patient will transfer from bed to chair and chair to bed with supervision/set-up using the least restrictive device within 7 day(s). 4.  Patient will ambulate with supervision/set-up for 75 feet with the least restrictive device within 7 day(s).    5.  Patient will ascend/descend 2 stairs with B handrail(s) with Exercise Program;Precautions;Transfer Training  Education Method: Verbal  Barriers to Learning: None  Education Outcome: Verbalized understanding;Continued education needed      CINDI PARR, PT,WCC.   Minutes: 40

## 2023-09-22 NOTE — PLAN OF CARE
Problem: Discharge Planning  Goal: Discharge to home or other facility with appropriate resources  9/22/2023 1031 by Pooja Palumbo RN  Outcome: Progressing  9/21/2023 2153 by Author Reginaldo RN  Outcome: Progressing     Problem: ABCDS Injury Assessment  Goal: Absence of physical injury  9/21/2023 2153 by Author Reginaldo RN  Outcome: Progressing     Problem: Safety - Adult  Goal: Free from fall injury  9/22/2023 1031 by Pooja Palumbo RN  Outcome: Liliana Fairchild (Taken 9/15/2023 2015 by Rosa Cohen RN)  Free From Fall Injury: Instruct family/caregiver on patient safety  9/21/2023 2153 by Author Reginaldo RN  Outcome: Progressing     Problem: Nutrition Deficit:  Goal: Optimize nutritional status  9/21/2023 2153 by Author Reginaldo RN  Outcome: Progressing     Problem: Pain  Goal: Verbalizes/displays adequate comfort level or baseline comfort level  9/21/2023 2153 by Author Reginaldo RN  Outcome: Progressing

## 2023-09-22 NOTE — PROGRESS NOTES
Attempted to work with the patient for Physical Therapy, chart reviewed and discussed with nurse cleared for therapy. Patient supine on bed when received, offered to be OOB to chair patient declined stated he just went to the bathroom and not feeling well asked to check later today. Explained benefits of therapy patient verbalized understanding however continue to declined. We will continue to follow up with the patient for therapy.

## 2023-09-22 NOTE — PROGRESS NOTES
[facility]                                                                               Hospitalist Progress Note  Nury Del Rosario MD          Date of Service:  2023  NAME:  Marisela Orosco  :  1940  MRN:  [id number]    Please note that this dictation was completed with Recovers, the computer voice recognition software. Quite often unanticipated grammatical, syntax, homophones, and other interpretive errors are inadvertently transcribed by the computer software. Please disregard these errors. Please excuse any errors that have escaped final proofreading. Admission Summary:     80 y.o.  male with PMHx afib s/p watchman device, MDR UTI, obstuctive uropathy, HLD, PAD, debility/walker dependent, afib presented with nausea and vomiting for 2 weeks and recurrent UTI for multiple months. He has obstructive uropathy and states he started getting UTIs since he had a TURP. He also, despite the turp, continues to have symptoms of urinary retention. Recently he was admitted at [facility] for a UTI and after discharge was contacted and told his Ucx is growing 2 colonies, both sensitive to levaquin, one of them pseudomonas. He received a levaquin PO ABX script. He took the first dose yesterday. Endorses NBNB vomiting worsening for the last 2 weeks. At first it was episodic but now the nausea is constant and he throws up once a day. He endorses weight loss. Interval history / Subjective:     Patient had a bowel movement last night. No nausea this morning. Feels ready to advance diet. Assessment & Plan:       Anticipated discharge date :   Anticipated disposition : Home with [facility]  Barriers to discharge : medical stability        Parkinson's Disease   Clinical diagnosis per neurology. - discussed with wife Dionne Crump.  Wife and patient open to trying Sinemet this afternoon if patient is not too nauseous, Extensively discussed risks and

## 2023-09-22 NOTE — PLAN OF CARE
Problem: Discharge Planning  Goal: Discharge to home or other facility with appropriate resources  9/21/2023 2153 by Clif Dickson RN  Outcome: Progressing  9/21/2023 1037 by Markus Colunga RN  Outcome: Progressing     Problem: ABCDS Injury Assessment  Goal: Absence of physical injury  9/21/2023 2153 by Clif Dickson RN  Outcome: Progressing  9/21/2023 1037 by Markus Colunga RN  Outcome: Progressing     Problem: Safety - Adult  Goal: Free from fall injury  9/21/2023 2153 by Clif Dickson RN  Outcome: Progressing  9/21/2023 1037 by Markus Colunga RN  Outcome: Progressing     Problem: Physical Therapy - Adult  Goal: By Discharge: Performs mobility at highest level of function for planned discharge setting. See evaluation for individualized goals. Description: FUNCTIONAL STATUS PRIOR TO ADMISSION: Patient was recently discharged from [facility] to [facility] [place] and was home for 1 week with HHPT/OT prior to this admission. Patient reports that his wife provides physical assistance (at the least, supervision) with a gait belt for functional mobility and occasionally assists with ADL's. Patient uses a RW at all times and also utilizes a wheelchair and transport chair when needed. HOME SUPPORT PRIOR TO ADMISSION: The patient lived with his wife who performed IADL's. Physical Therapy Goals  Initiated 9/21/2023, downgraded:  1. Patient will move from supine to sit and sit to supine, scoot up and down, and roll side to side in bed with independence within 7 day(s). 2.  Patient will perform sit to stand with contact guard assist within 7 day(s). 3.  Patient will transfer from bed to chair and chair to bed with contact guard assist using the least restrictive device within 7 day(s). 4.  Patient will ambulate with minimal assistance for 15 feet with the least restrictive device within 7 day(s).    5.  Patient will ascend/descend 2 stairs with B handrail(s) with minimal assistance

## 2023-09-23 LAB
BASOPHILS # BLD: 0 K/UL (ref 0–0.1)
BASOPHILS NFR BLD: 1 % (ref 0–1)
DIFFERENTIAL METHOD BLD: ABNORMAL
EOSINOPHIL # BLD: 0.1 K/UL (ref 0–0.4)
EOSINOPHIL NFR BLD: 1 % (ref 0–7)
ERYTHROCYTE [DISTWIDTH] IN BLOOD BY AUTOMATED COUNT: 16.4 % (ref 11.5–14.5)
HCT VFR BLD AUTO: 39.2 % (ref 36.6–50.3)
HGB BLD-MCNC: 12.2 G/DL (ref 12.1–17)
IMM GRANULOCYTES # BLD AUTO: 0 K/UL (ref 0–0.04)
IMM GRANULOCYTES NFR BLD AUTO: 1 % (ref 0–0.5)
LYMPHOCYTES # BLD: 1.4 K/UL (ref 0.8–3.5)
LYMPHOCYTES NFR BLD: 18 % (ref 12–49)
MAGNESIUM SERPL-MCNC: 2.1 MG/DL (ref 1.6–2.4)
MCH RBC QN AUTO: 27.5 PG (ref 26–34)
MCHC RBC AUTO-ENTMCNC: 31.1 G/DL (ref 30–36.5)
MCV RBC AUTO: 88.5 FL (ref 80–99)
MONOCYTES # BLD: 0.9 K/UL (ref 0–1)
MONOCYTES NFR BLD: 11 % (ref 5–13)
NEUTS SEG # BLD: 5.3 K/UL (ref 1.8–8)
NEUTS SEG NFR BLD: 68 % (ref 32–75)
NRBC # BLD: 0 K/UL (ref 0–0.01)
NRBC BLD-RTO: 0 PER 100 WBC
PHOSPHATE SERPL-MCNC: 4.2 MG/DL (ref 2.6–4.7)
PLATELET # BLD AUTO: 170 K/UL (ref 150–400)
PMV BLD AUTO: 11 FL (ref 8.9–12.9)
RBC # BLD AUTO: 4.43 M/UL (ref 4.1–5.7)
WBC # BLD AUTO: 7.7 K/UL (ref 4.1–11.1)

## 2023-09-23 PROCEDURE — 2580000003 HC RX 258: Performed by: UROLOGY

## 2023-09-23 PROCEDURE — 36415 COLL VENOUS BLD VENIPUNCTURE: CPT

## 2023-09-23 PROCEDURE — 6370000000 HC RX 637 (ALT 250 FOR IP): Performed by: UROLOGY

## 2023-09-23 PROCEDURE — 83735 ASSAY OF MAGNESIUM: CPT

## 2023-09-23 PROCEDURE — 6370000000 HC RX 637 (ALT 250 FOR IP): Performed by: FAMILY MEDICINE

## 2023-09-23 PROCEDURE — 6370000000 HC RX 637 (ALT 250 FOR IP): Performed by: STUDENT IN AN ORGANIZED HEALTH CARE EDUCATION/TRAINING PROGRAM

## 2023-09-23 PROCEDURE — 84100 ASSAY OF PHOSPHORUS: CPT

## 2023-09-23 PROCEDURE — 94761 N-INVAS EAR/PLS OXIMETRY MLT: CPT

## 2023-09-23 PROCEDURE — 6360000002 HC RX W HCPCS: Performed by: UROLOGY

## 2023-09-23 PROCEDURE — 1100000000 HC RM PRIVATE

## 2023-09-23 PROCEDURE — 97116 GAIT TRAINING THERAPY: CPT

## 2023-09-23 PROCEDURE — 97530 THERAPEUTIC ACTIVITIES: CPT

## 2023-09-23 PROCEDURE — 6370000000 HC RX 637 (ALT 250 FOR IP): Performed by: INTERNAL MEDICINE

## 2023-09-23 PROCEDURE — 85025 COMPLETE CBC W/AUTO DIFF WBC: CPT

## 2023-09-23 RX ORDER — SENNOSIDES A AND B 8.6 MG/1
1 TABLET, FILM COATED ORAL NIGHTLY
Status: DISCONTINUED | OUTPATIENT
Start: 2023-09-23 | End: 2023-09-24

## 2023-09-23 RX ADMIN — PANTOPRAZOLE SODIUM 40 MG: 40 TABLET, DELAYED RELEASE ORAL at 16:27

## 2023-09-23 RX ADMIN — DULOXETINE HYDROCHLORIDE 30 MG: 30 CAPSULE, DELAYED RELEASE ORAL at 20:54

## 2023-09-23 RX ADMIN — Medication 1 PACKET: at 20:54

## 2023-09-23 RX ADMIN — SENNOSIDES 8.6 MG: 8.6 TABLET, FILM COATED ORAL at 12:52

## 2023-09-23 RX ADMIN — DUTASTERIDE 0.5 MG: 0.5 CAPSULE, LIQUID FILLED ORAL at 09:58

## 2023-09-23 RX ADMIN — PREDNISONE 5 MG: 5 TABLET ORAL at 09:56

## 2023-09-23 RX ADMIN — ASPIRIN 81 MG: 81 TABLET, CHEWABLE ORAL at 09:56

## 2023-09-23 RX ADMIN — POLYETHYLENE GLYCOL 3350 17 G: 17 POWDER, FOR SOLUTION ORAL at 09:56

## 2023-09-23 RX ADMIN — PRAVASTATIN SODIUM 20 MG: 20 TABLET ORAL at 20:54

## 2023-09-23 RX ADMIN — PANTOPRAZOLE SODIUM 40 MG: 40 TABLET, DELAYED RELEASE ORAL at 06:02

## 2023-09-23 RX ADMIN — ENOXAPARIN SODIUM 40 MG: 100 INJECTION SUBCUTANEOUS at 09:56

## 2023-09-23 RX ADMIN — Medication 1 PACKET: at 10:02

## 2023-09-23 RX ADMIN — SODIUM CHLORIDE, PRESERVATIVE FREE 10 ML: 5 INJECTION INTRAVENOUS at 09:56

## 2023-09-23 RX ADMIN — Medication 1 PACKET: at 12:59

## 2023-09-23 ASSESSMENT — PAIN SCALES - GENERAL: PAINLEVEL_OUTOF10: 0

## 2023-09-23 NOTE — PROGRESS NOTES
PT informed me that patient's blood pressure dropped 67/57 when standing and regulated when put back in bed. Provider notified via perfect serve.

## 2023-09-23 NOTE — PLAN OF CARE
Problem: Physical Therapy - Adult  Goal: By Discharge: Performs mobility at highest level of function for planned discharge setting. See evaluation for individualized goals. Description: FUNCTIONAL STATUS PRIOR TO ADMISSION: Patient was recently discharged from [facility] to [facility] and was home for 1 week with HHPT/OT prior to this admission. Patient reports that his wife provides physical assistance (at the least, supervision) with a gait belt for functional mobility and occasionally assists with ADL's. Patient uses a RW at all times and also utilizes a wheelchair and transport chair when needed. HOME SUPPORT PRIOR TO ADMISSION: The patient lived with his wife who performed IADL's. Physical Therapy Goals  Initiated 9/21/2023, downgraded:  1. Patient will move from supine to sit and sit to supine, scoot up and down, and roll side to side in bed with independence within 7 day(s). 2.  Patient will perform sit to stand with contact guard assist within 7 day(s). 3.  Patient will transfer from bed to chair and chair to bed with contact guard assist using the least restrictive device within 7 day(s). 4.  Patient will ambulate with minimal assistance for 15 feet with the least restrictive device within 7 day(s). 5.  Patient will ascend/descend 2 stairs with B handrail(s) with minimal assistance within 7 day(s). Physical Therapy Goals  Initiated 9/14/2023  1. Patient will move from supine to sit and sit to supine, scoot up and down, and roll side to side in bed with independence within 7 day(s). 2.  Patient will perform sit to stand with supervision/set-up within 7 day(s). 3.  Patient will transfer from bed to chair and chair to bed with supervision/set-up using the least restrictive device within 7 day(s). 4.  Patient will ambulate with supervision/set-up for 75 feet with the least restrictive device within 7 day(s).    5.  Patient will ascend/descend 2 stairs with B handrail(s) with

## 2023-09-24 LAB
ALBUMIN SERPL-MCNC: 3.1 G/DL (ref 3.5–5)
ALBUMIN/GLOB SERPL: 1 (ref 1.1–2.2)
ALP SERPL-CCNC: 81 U/L (ref 45–117)
ALT SERPL-CCNC: 17 U/L (ref 12–78)
ANION GAP SERPL CALC-SCNC: 4 MMOL/L (ref 5–15)
AST SERPL-CCNC: 10 U/L (ref 15–37)
BILIRUB SERPL-MCNC: 0.8 MG/DL (ref 0.2–1)
BUN SERPL-MCNC: 8 MG/DL (ref 6–20)
BUN/CREAT SERPL: 7 (ref 12–20)
CALCIUM SERPL-MCNC: 8.8 MG/DL (ref 8.5–10.1)
CHLORIDE SERPL-SCNC: 108 MMOL/L (ref 97–108)
CO2 SERPL-SCNC: 28 MMOL/L (ref 21–32)
CREAT SERPL-MCNC: 1.14 MG/DL (ref 0.7–1.3)
GLOBULIN SER CALC-MCNC: 3.1 G/DL (ref 2–4)
GLUCOSE SERPL-MCNC: 106 MG/DL (ref 65–100)
POTASSIUM SERPL-SCNC: 4.1 MMOL/L (ref 3.5–5.1)
PROT SERPL-MCNC: 6.2 G/DL (ref 6.4–8.2)
SODIUM SERPL-SCNC: 140 MMOL/L (ref 136–145)

## 2023-09-24 PROCEDURE — 6370000000 HC RX 637 (ALT 250 FOR IP): Performed by: INTERNAL MEDICINE

## 2023-09-24 PROCEDURE — 80053 COMPREHEN METABOLIC PANEL: CPT

## 2023-09-24 PROCEDURE — 6370000000 HC RX 637 (ALT 250 FOR IP): Performed by: FAMILY MEDICINE

## 2023-09-24 PROCEDURE — 6370000000 HC RX 637 (ALT 250 FOR IP): Performed by: UROLOGY

## 2023-09-24 PROCEDURE — 2580000003 HC RX 258: Performed by: UROLOGY

## 2023-09-24 PROCEDURE — 1100000000 HC RM PRIVATE

## 2023-09-24 PROCEDURE — 6360000002 HC RX W HCPCS: Performed by: UROLOGY

## 2023-09-24 PROCEDURE — 94761 N-INVAS EAR/PLS OXIMETRY MLT: CPT

## 2023-09-24 PROCEDURE — 36415 COLL VENOUS BLD VENIPUNCTURE: CPT

## 2023-09-24 PROCEDURE — 6370000000 HC RX 637 (ALT 250 FOR IP): Performed by: STUDENT IN AN ORGANIZED HEALTH CARE EDUCATION/TRAINING PROGRAM

## 2023-09-24 RX ORDER — FLUDROCORTISONE ACETATE 0.1 MG/1
0.1 TABLET ORAL
Status: DISCONTINUED | OUTPATIENT
Start: 2023-09-24 | End: 2023-09-29 | Stop reason: HOSPADM

## 2023-09-24 RX ORDER — SENNOSIDES A AND B 8.6 MG/1
1 TABLET, FILM COATED ORAL 2 TIMES DAILY
Status: DISCONTINUED | OUTPATIENT
Start: 2023-09-24 | End: 2023-09-29 | Stop reason: HOSPADM

## 2023-09-24 RX ORDER — MIDODRINE HYDROCHLORIDE 5 MG/1
2.5 TABLET ORAL
Status: DISCONTINUED | OUTPATIENT
Start: 2023-09-24 | End: 2023-09-24

## 2023-09-24 RX ADMIN — PANTOPRAZOLE SODIUM 40 MG: 40 TABLET, DELAYED RELEASE ORAL at 08:54

## 2023-09-24 RX ADMIN — Medication 1 PACKET: at 08:54

## 2023-09-24 RX ADMIN — POLYETHYLENE GLYCOL 3350 17 G: 17 POWDER, FOR SOLUTION ORAL at 14:01

## 2023-09-24 RX ADMIN — DUTASTERIDE 0.5 MG: 0.5 CAPSULE, LIQUID FILLED ORAL at 08:54

## 2023-09-24 RX ADMIN — Medication 1 PACKET: at 22:44

## 2023-09-24 RX ADMIN — SENNOSIDES 8.6 MG: 8.6 TABLET, FILM COATED ORAL at 22:34

## 2023-09-24 RX ADMIN — PRAVASTATIN SODIUM 20 MG: 20 TABLET ORAL at 22:34

## 2023-09-24 RX ADMIN — PANTOPRAZOLE SODIUM 40 MG: 40 TABLET, DELAYED RELEASE ORAL at 16:45

## 2023-09-24 RX ADMIN — PREDNISONE 5 MG: 5 TABLET ORAL at 08:54

## 2023-09-24 RX ADMIN — ENOXAPARIN SODIUM 40 MG: 100 INJECTION SUBCUTANEOUS at 08:54

## 2023-09-24 RX ADMIN — DULOXETINE HYDROCHLORIDE 30 MG: 30 CAPSULE, DELAYED RELEASE ORAL at 22:34

## 2023-09-24 RX ADMIN — POLYETHYLENE GLYCOL 3350 17 G: 17 POWDER, FOR SOLUTION ORAL at 08:54

## 2023-09-24 RX ADMIN — Medication 1 PACKET: at 14:24

## 2023-09-24 RX ADMIN — ASPIRIN 81 MG: 81 TABLET, CHEWABLE ORAL at 08:54

## 2023-09-24 RX ADMIN — FLUDROCORTISONE ACETATE 0.1 MG: 0.1 TABLET ORAL at 22:34

## 2023-09-24 RX ADMIN — SODIUM CHLORIDE, PRESERVATIVE FREE 10 ML: 5 INJECTION INTRAVENOUS at 22:35

## 2023-09-24 RX ADMIN — SODIUM CHLORIDE, PRESERVATIVE FREE 10 ML: 5 INJECTION INTRAVENOUS at 08:54

## 2023-09-24 NOTE — PLAN OF CARE
Problem: Discharge Planning  Goal: Discharge to home or other facility with appropriate resources  9/24/2023 0954 by Suzy Rosado LPN  Outcome: Progressing  9/24/2023 0951 by Suzy Rosado LPN  Outcome: Progressing     Problem: ABCDS Injury Assessment  Goal: Absence of physical injury  9/24/2023 0954 by Suzy Rosado LPN  Outcome: Progressing  9/24/2023 0951 by Suzy Rosado LPN  Outcome: Progressing     Problem: Safety - Adult  Goal: Free from fall injury  9/24/2023 0954 by Suzy Rosado LPN  Outcome: Progressing  9/24/2023 0951 by Suzy Rosado LPN  Outcome: Progressing     Problem: Nutrition Deficit:  Goal: Optimize nutritional status  9/24/2023 0954 by Suzy Rosado LPN  Outcome: Progressing  9/24/2023 0951 by Suzy Rosado LPN  Outcome: Progressing     Problem: Pain  Goal: Verbalizes/displays adequate comfort level or baseline comfort level  9/24/2023 0954 by Suzy Rosado LPN  Outcome: Progressing  9/24/2023 0951 by Suzy Rosado LPN  Outcome: Progressing

## 2023-09-25 PROCEDURE — APPNB30 APP NON BILLABLE TIME 0-30 MINS: Performed by: NURSE PRACTITIONER

## 2023-09-25 PROCEDURE — 6370000000 HC RX 637 (ALT 250 FOR IP): Performed by: UROLOGY

## 2023-09-25 PROCEDURE — 6370000000 HC RX 637 (ALT 250 FOR IP): Performed by: INTERNAL MEDICINE

## 2023-09-25 PROCEDURE — 6370000000 HC RX 637 (ALT 250 FOR IP): Performed by: STUDENT IN AN ORGANIZED HEALTH CARE EDUCATION/TRAINING PROGRAM

## 2023-09-25 PROCEDURE — 97530 THERAPEUTIC ACTIVITIES: CPT

## 2023-09-25 PROCEDURE — 2580000003 HC RX 258: Performed by: INTERNAL MEDICINE

## 2023-09-25 PROCEDURE — 1100000000 HC RM PRIVATE

## 2023-09-25 PROCEDURE — 97110 THERAPEUTIC EXERCISES: CPT

## 2023-09-25 PROCEDURE — 97116 GAIT TRAINING THERAPY: CPT

## 2023-09-25 PROCEDURE — 2580000003 HC RX 258: Performed by: UROLOGY

## 2023-09-25 PROCEDURE — 94761 N-INVAS EAR/PLS OXIMETRY MLT: CPT

## 2023-09-25 PROCEDURE — 6360000002 HC RX W HCPCS: Performed by: UROLOGY

## 2023-09-25 RX ORDER — MIDODRINE HYDROCHLORIDE 5 MG/1
2.5 TABLET ORAL
Status: DISCONTINUED | OUTPATIENT
Start: 2023-09-25 | End: 2023-09-29 | Stop reason: HOSPADM

## 2023-09-25 RX ORDER — PREDNISONE 5 MG/1
5 TABLET ORAL ONCE
Status: COMPLETED | OUTPATIENT
Start: 2023-09-25 | End: 2023-09-25

## 2023-09-25 RX ORDER — POLYETHYLENE GLYCOL 3350 17 G/17G
17 POWDER, FOR SOLUTION ORAL 2 TIMES DAILY
Status: DISCONTINUED | OUTPATIENT
Start: 2023-09-25 | End: 2023-09-29 | Stop reason: HOSPADM

## 2023-09-25 RX ORDER — POLYETHYLENE GLYCOL 3350 17 G/17G
17 POWDER, FOR SOLUTION ORAL
Status: DISCONTINUED | OUTPATIENT
Start: 2023-09-25 | End: 2023-09-29 | Stop reason: HOSPADM

## 2023-09-25 RX ORDER — PREDNISONE 10 MG/1
10 TABLET ORAL DAILY
Status: DISCONTINUED | OUTPATIENT
Start: 2023-09-26 | End: 2023-09-29 | Stop reason: HOSPADM

## 2023-09-25 RX ORDER — DEXTROSE AND SODIUM CHLORIDE 5; .45 G/100ML; G/100ML
INJECTION, SOLUTION INTRAVENOUS CONTINUOUS
Status: DISCONTINUED | OUTPATIENT
Start: 2023-09-25 | End: 2023-09-29 | Stop reason: HOSPADM

## 2023-09-25 RX ADMIN — SODIUM CHLORIDE, PRESERVATIVE FREE 10 ML: 5 INJECTION INTRAVENOUS at 21:53

## 2023-09-25 RX ADMIN — MIDODRINE HYDROCHLORIDE 2.5 MG: 5 TABLET ORAL at 15:55

## 2023-09-25 RX ADMIN — DULOXETINE HYDROCHLORIDE 30 MG: 30 CAPSULE, DELAYED RELEASE ORAL at 21:51

## 2023-09-25 RX ADMIN — SENNOSIDES 8.6 MG: 8.6 TABLET, FILM COATED ORAL at 21:52

## 2023-09-25 RX ADMIN — Medication 1 PACKET: at 08:34

## 2023-09-25 RX ADMIN — DUTASTERIDE 0.5 MG: 0.5 CAPSULE, LIQUID FILLED ORAL at 08:46

## 2023-09-25 RX ADMIN — DEXTROSE AND SODIUM CHLORIDE: 5; 450 INJECTION, SOLUTION INTRAVENOUS at 14:14

## 2023-09-25 RX ADMIN — PREDNISONE 5 MG: 5 TABLET ORAL at 08:35

## 2023-09-25 RX ADMIN — PRAVASTATIN SODIUM 20 MG: 20 TABLET ORAL at 21:53

## 2023-09-25 RX ADMIN — FLUDROCORTISONE ACETATE 0.1 MG: 0.1 TABLET ORAL at 21:52

## 2023-09-25 RX ADMIN — ENOXAPARIN SODIUM 40 MG: 100 INJECTION SUBCUTANEOUS at 08:39

## 2023-09-25 RX ADMIN — MIDODRINE HYDROCHLORIDE 2.5 MG: 5 TABLET ORAL at 11:47

## 2023-09-25 RX ADMIN — PANTOPRAZOLE SODIUM 40 MG: 40 TABLET, DELAYED RELEASE ORAL at 15:55

## 2023-09-25 RX ADMIN — ASPIRIN 81 MG: 81 TABLET, CHEWABLE ORAL at 08:35

## 2023-09-25 RX ADMIN — SODIUM CHLORIDE, PRESERVATIVE FREE 10 ML: 5 INJECTION INTRAVENOUS at 08:40

## 2023-09-25 RX ADMIN — PREDNISONE 5 MG: 5 TABLET ORAL at 14:13

## 2023-09-25 RX ADMIN — SENNOSIDES 8.6 MG: 8.6 TABLET, FILM COATED ORAL at 08:35

## 2023-09-25 RX ADMIN — Medication 1 PACKET: at 21:00

## 2023-09-25 RX ADMIN — PANTOPRAZOLE SODIUM 40 MG: 40 TABLET, DELAYED RELEASE ORAL at 06:00

## 2023-09-25 RX ADMIN — Medication 1 PACKET: at 14:08

## 2023-09-25 NOTE — PLAN OF CARE
Problem: Physical Therapy - Adult  Goal: By Discharge: Performs mobility at highest level of function for planned discharge setting. See evaluation for individualized goals. Description: FUNCTIONAL STATUS PRIOR TO ADMISSION: Patient was recently discharged from [facility] to [facility] and was home for 1 week with HHPT/OT prior to this admission. Patient reports that his wife provides physical assistance (at the least, supervision) with a gait belt for functional mobility and occasionally assists with ADL's. Patient uses a RW at all times and also utilizes a wheelchair and transport chair when needed. HOME SUPPORT PRIOR TO ADMISSION: The patient lived with his wife who performed IADL's. Physical Therapy Goals  Initiated 9/21/2023, downgraded:  1. Patient will move from supine to sit and sit to supine, scoot up and down, and roll side to side in bed with independence within 7 day(s). 2.  Patient will perform sit to stand with contact guard assist within 7 day(s). 3.  Patient will transfer from bed to chair and chair to bed with contact guard assist using the least restrictive device within 7 day(s). 4.  Patient will ambulate with minimal assistance for 15 feet with the least restrictive device within 7 day(s). 5.  Patient will ascend/descend 2 stairs with B handrail(s) with minimal assistance within 7 day(s). Physical Therapy Goals  Initiated 9/14/2023  1. Patient will move from supine to sit and sit to supine, scoot up and down, and roll side to side in bed with independence within 7 day(s). 2.  Patient will perform sit to stand with supervision/set-up within 7 day(s). 3.  Patient will transfer from bed to chair and chair to bed with supervision/set-up using the least restrictive device within 7 day(s). 4.  Patient will ambulate with supervision/set-up for 75 feet with the least restrictive device within 7 day(s).    5.  Patient will ascend/descend 2 stairs with B handrail(s) with minimal assistance within 7 day(s). Outcome: Progressing   PHYSICAL THERAPY TREATMENT    Patient: Stephen Mcallister (48 y.o. male)  Date: 9/25/2023  Diagnosis: Urinary frequency [R35.0]  Generalized weakness [R53.1]  Intractable nausea and vomiting [R11.2]  Vomiting without nausea, unspecified vomiting type [R11.11] Intractable nausea and vomiting  Procedure(s) (LRB):  EGD ESOPHAGOGASTRODUODENOSCOPY (N/A)  EGD BIOPSY (N/A) 4 Days Post-Op  Precautions: Fall Risk                    ASSESSMENT:  Patient continues to benefit from skilled PT services and is slowly progressing towards goals. Pt remains limited by orthostatic hypotension, high fall risk, impaired balance and altered gait mechanics. He is received in bed with APPLE hose teodoro. Discussed with pt and wife that APPLE hose do not provide enough pressure to stabilize BP. He remains symptomatic and has one episode of emesis following short gait trial. Symptoms improving with increased activity level and BP also stabilizing following each bout of gait training. Improved safety and AD management with rollator over RW. Pt completes gait distance of 10ft, 25ft and 60ft. Chair follow provided for safety. Continues to remain a high fall risk and would require supervision with all OOB mobility if he returns home. Patient Vitals for the past 6 hrs:   Pulse BP Patient Position         09/25/23 1250 (!) 111 124/88 Sitting(after walking 60ft)   09/25/23 1241 98 118/76 Sitting(after exercises)   09/25/23 1235 (!) 105 105/81 Sitting(after walking 25ft)   09/25/23 1232 95 134/77 Sitting(after walking 10ft)   09/25/23 1229 (!) 109 106/75 Standing   09/25/23 1228 (!) 102 102/63 Standing   09/25/23 1225 78 135/75 Sitting   09/25/23 1222 83 117/74 Sitting   09/25/23 1221 69 (!) 140/77 Semi fowlers                  PLAN:  Patient continues to benefit from skilled intervention to address the above impairments. Continue treatment per established plan of care.     Recommendation for

## 2023-09-25 NOTE — PROGRESS NOTES
Chart reviewed     Florinef and midodrine have been resumed. He has surgical hose on which will not help orthostasis. He is willing he tells me to wear Jobst.     He will follow up with [facility] after d/c.

## 2023-09-25 NOTE — PROGRESS NOTES
Physical Therapy  9/25/2023    Chart reviewed and cleared by RN. Pt received supine in bed. States he can not work with therapy d/t recently taking Prednisone. Stating he needs at least two hours for it \"to work it's way through his system. \" Servando Mast in his decision despite education on trying as he is set to d/c to home today despite therapy recommending rehab placement. Will re attempt as time allows this afternoon.     Thank Danyelle Do, PT, DPT

## 2023-09-25 NOTE — PLAN OF CARE
9/25/2023  Primary Diagnosis: Urinary frequency [R35.0]  Generalized weakness [R53.1]  Intractable nausea and vomiting [R11.2]  Vomiting without nausea, unspecified vomiting type [R11.11]  Procedure(s) (LRB):  EGD ESOPHAGOGASTRODUODENOSCOPY (N/A)  EGD BIOPSY (N/A) 4 Days Post-Op   Precautions: Fall Risk                Chart, occupational therapy assessment, plan of care, and goals were reviewed. ASSESSMENT  Patient continues to benefit from skilled OT services and is slowly progressing towards goals. Patient was agreeable to minimal activity with max encouragement. He reports increased fatigue this afternoon and agreeable to seated exercises only. Patient educated on the benefits of exercise, proper technique, and coordination of breathing techniques with each ex. Patient tolerated x1 set of each exercise and agreeable to complete additional exercises frequently as tolerated. Patient performed sit to stand transfer for repositioning the chair and returned to sitting at end of tx with all needs met. PLAN :  Patient continues to benefit from skilled intervention to address the above impairments. Continue treatment per established plan of care to address goals. Recommend with staff:   Recommend patient be OOB to chair as frequently as tolerated; Goal of 3x/day for all meals for 60 minutes at a time. For toileting needs, recommend transfers to/from [facility] or bathroom with staff assist.  Encourage patient involvement in personal care as able. Encourage exercises frequently throughout the day. Recommend next OT session: Continue towards set OT goals. Recommendation for discharge: (in order for the patient to meet his/her long term goals): Therapy up to 5 days/week in Skilled nursing facility    Other factors to consider for discharge: no additional factors    IF patient discharges home will need the following DME: none       SUBJECTIVE:   Patient agreeable to OT tx.       OBJECTIVE DATA SUMMARY:   Cognitive/Behavioral Status:  Orientation  Overall Orientation Status: Within Normal Limits  Orientation Level: Oriented X4  Cognition  Arousal/Alertness: Appropriate responses to stimuli    Functional Mobility and Transfers for ADLs:  Bed Mobility:  Bed Mobility Training  Bed Mobility Training: Yes  Overall Level of Assistance: Stand-by assistance  Interventions: Safety awareness training;Verbal cues  Rolling: Stand-by assistance  Supine to Sit: Stand-by assistance  Sit to Supine: Stand-by assistance     Transfers:   Transfer Training  Transfer Training: Yes  Overall Level of Assistance: Minimum assistance;Assist X1;Additional time;Contact-guard assistance  Interventions: Verbal cues; Safety awareness training;Weight shifting training/pressure relief  Sit to Stand: Minimum assistance;Assist X1;Additional time;Contact-guard assistance  Stand to Sit: Minimum assistance;Assist X1;Additional time  Bed to Chair: Minimum assistance      Balance:     Balance  Sitting: Intact  Standing - Static: Fair;Constant support  Standing - Dynamic: Fair;Poor;Constant support           EXERCISE    Sets    Reps    Active  Active Assist    Passive  Self ROM    Comments    Scapular Elevation/Depression 1 10 [x]   []   []   []      Scapular Protraction/Retraction 1 10 [x]   []   []   []      Shoulder Flexion 1 10 [x]                            []                             []                             []                                Shoulder Abduction/Adduction 1 10 [x]                             []                             []                             []                                Shoulder Internal/External Rotation 1 10 [x]                             []                              []                             []                                Elbow Flexion/Extension 1 10 [x]                             []                             []                             []                                Wrist

## 2023-09-25 NOTE — PROGRESS NOTES
packet  1 packet Oral TID    dutasteride (AVODART) capsule 0.5 mg  0.5 mg Oral Daily    DULoxetine (CYMBALTA) extended release capsule 30 mg  30 mg Oral QHS    pravastatin (PRAVACHOL) tablet 20 mg  20 mg Oral Nightly    sodium chloride flush 0.9 % injection 5-40 mL  5-40 mL IntraVENous 2 times per day    sodium chloride flush 0.9 % injection 5-40 mL  5-40 mL IntraVENous PRN    0.9 % sodium chloride infusion   IntraVENous PRN    enoxaparin (LOVENOX) injection 40 mg  40 mg SubCUTAneous Daily    ondansetron (ZOFRAN-ODT) disintegrating tablet 4 mg  4 mg Oral Q8H PRN    Or    ondansetron (ZOFRAN) injection 4 mg  4 mg IntraVENous Q6H PRN    polyethylene glycol (GLYCOLAX) packet 17 g  17 g Oral Daily PRN    acetaminophen (TYLENOL) tablet 650 mg  650 mg Oral Q6H PRN    Or    acetaminophen (TYLENOL) suppository 650 mg  650 mg Rectal Q6H PRN    aspirin chewable tablet 81 mg  81 mg Oral Daily        Lab Data Reviewed: (see below)  Lab Review:     Recent Labs     09/23/23  0556   WBC 7.7   HGB 12.2   HCT 39.2        Recent Labs     09/23/23  0556 09/24/23  0143   NA  --  140   K  --  4.1   CL  --  108   CO2  --  28   GLUCOSE  --  106*   BUN  --  8   CREATININE  --  1.14   CALCIUM  --  8.8   MG 2.1  --    PHOS 4.2  --    LABALBU  --  3.1*   AST  --  10*   ALT  --  17     Lab Results   Component Value Date/Time    GLUCOSE 106 09/24/2023 01:43 AM    GLUCOSE 112 09/22/2023 01:38 AM    GLUCOSE 98 09/21/2023 05:15 AM    GLUCOSE 117 09/20/2023 06:15 AM    GLUCOSE 124 09/19/2023 02:24 AM     No results for input(s): \"PH\", \"PCO2\", \"PO2\", \"HCO3\", \"FIO2\" in the last 72 hours. No results for input(s): \"INR\" in the last 72 hours.   Results       Procedure Component Value Units Date/Time    Culture, Urine [[id number]] Collected: 09/13/23 2310    Order Status: Completed Specimen: Urine, clean catch Updated: 09/15/23 0721     Special Requests NO SPECIAL REQUESTS        Culture No growth (<1,000 CFU/ML)               Other pertinent lab: none    Total time spent with patient: 30 Minutes I personally reviewed chart, notes, data and current medications in the medical record. I have personally examined and treated the patient at bedside during this period.                   Care Plan discussed with: Patient, Care Manager, Nursing Staff, Consultant/Specialist, and >50% of time spent in counseling and coordination of care    Discussed:  Care Plan and D/C Planning    Prophylaxis:  Lovenox and H2B/PPI    Disposition:   PT, OT, RN           ___________________________________________________    Attending Physician: Deep Yadav MD

## 2023-09-26 LAB
ALBUMIN SERPL-MCNC: 2.6 G/DL (ref 3.5–5)
ALBUMIN/GLOB SERPL: 0.7 (ref 1.1–2.2)
ALP SERPL-CCNC: 75 U/L (ref 45–117)
ALT SERPL-CCNC: 16 U/L (ref 12–78)
ANION GAP SERPL CALC-SCNC: 5 MMOL/L (ref 5–15)
AST SERPL-CCNC: 11 U/L (ref 15–37)
BILIRUB SERPL-MCNC: 1.1 MG/DL (ref 0.2–1)
BUN SERPL-MCNC: 9 MG/DL (ref 6–20)
BUN/CREAT SERPL: 8 (ref 12–20)
CALCIUM SERPL-MCNC: 8.1 MG/DL (ref 8.5–10.1)
CHLORIDE SERPL-SCNC: 110 MMOL/L (ref 97–108)
CO2 SERPL-SCNC: 26 MMOL/L (ref 21–32)
CREAT SERPL-MCNC: 1.07 MG/DL (ref 0.7–1.3)
ERYTHROCYTE [DISTWIDTH] IN BLOOD BY AUTOMATED COUNT: 16.3 % (ref 11.5–14.5)
GLOBULIN SER CALC-MCNC: 3.5 G/DL (ref 2–4)
GLUCOSE SERPL-MCNC: 142 MG/DL (ref 65–100)
HCT VFR BLD AUTO: 36.5 % (ref 36.6–50.3)
HGB BLD-MCNC: 11.3 G/DL (ref 12.1–17)
MAGNESIUM SERPL-MCNC: 2.2 MG/DL (ref 1.6–2.4)
MCH RBC QN AUTO: 27.4 PG (ref 26–34)
MCHC RBC AUTO-ENTMCNC: 31 G/DL (ref 30–36.5)
MCV RBC AUTO: 88.4 FL (ref 80–99)
NRBC # BLD: 0 K/UL (ref 0–0.01)
NRBC BLD-RTO: 0 PER 100 WBC
PHOSPHATE SERPL-MCNC: 2.7 MG/DL (ref 2.6–4.7)
PLATELET # BLD AUTO: 190 K/UL (ref 150–400)
PMV BLD AUTO: 11.1 FL (ref 8.9–12.9)
POTASSIUM SERPL-SCNC: 3.7 MMOL/L (ref 3.5–5.1)
PROCALCITONIN SERPL-MCNC: <0.05 NG/ML
PROT SERPL-MCNC: 6.1 G/DL (ref 6.4–8.2)
RBC # BLD AUTO: 4.13 M/UL (ref 4.1–5.7)
SODIUM SERPL-SCNC: 141 MMOL/L (ref 136–145)
WBC # BLD AUTO: 8.3 K/UL (ref 4.1–11.1)

## 2023-09-26 PROCEDURE — 36415 COLL VENOUS BLD VENIPUNCTURE: CPT

## 2023-09-26 PROCEDURE — 80053 COMPREHEN METABOLIC PANEL: CPT

## 2023-09-26 PROCEDURE — 6370000000 HC RX 637 (ALT 250 FOR IP): Performed by: UROLOGY

## 2023-09-26 PROCEDURE — 84100 ASSAY OF PHOSPHORUS: CPT

## 2023-09-26 PROCEDURE — 6370000000 HC RX 637 (ALT 250 FOR IP): Performed by: INTERNAL MEDICINE

## 2023-09-26 PROCEDURE — 1100000000 HC RM PRIVATE

## 2023-09-26 PROCEDURE — 85027 COMPLETE CBC AUTOMATED: CPT

## 2023-09-26 PROCEDURE — 84145 PROCALCITONIN (PCT): CPT

## 2023-09-26 PROCEDURE — 94761 N-INVAS EAR/PLS OXIMETRY MLT: CPT

## 2023-09-26 PROCEDURE — 83735 ASSAY OF MAGNESIUM: CPT

## 2023-09-26 PROCEDURE — 86235 NUCLEAR ANTIGEN ANTIBODY: CPT

## 2023-09-26 PROCEDURE — 97530 THERAPEUTIC ACTIVITIES: CPT

## 2023-09-26 PROCEDURE — 2580000003 HC RX 258: Performed by: INTERNAL MEDICINE

## 2023-09-26 PROCEDURE — 6360000002 HC RX W HCPCS: Performed by: UROLOGY

## 2023-09-26 PROCEDURE — 2580000003 HC RX 258: Performed by: UROLOGY

## 2023-09-26 PROCEDURE — 6370000000 HC RX 637 (ALT 250 FOR IP): Performed by: STUDENT IN AN ORGANIZED HEALTH CARE EDUCATION/TRAINING PROGRAM

## 2023-09-26 PROCEDURE — 97116 GAIT TRAINING THERAPY: CPT

## 2023-09-26 RX ADMIN — DEXTROSE AND SODIUM CHLORIDE: 5; 450 INJECTION, SOLUTION INTRAVENOUS at 00:44

## 2023-09-26 RX ADMIN — MIDODRINE HYDROCHLORIDE 2.5 MG: 5 TABLET ORAL at 11:54

## 2023-09-26 RX ADMIN — ASPIRIN 81 MG: 81 TABLET, CHEWABLE ORAL at 09:17

## 2023-09-26 RX ADMIN — SENNOSIDES 8.6 MG: 8.6 TABLET, FILM COATED ORAL at 09:17

## 2023-09-26 RX ADMIN — ENOXAPARIN SODIUM 40 MG: 100 INJECTION SUBCUTANEOUS at 09:20

## 2023-09-26 RX ADMIN — DEXTROSE AND SODIUM CHLORIDE: 5; 450 INJECTION, SOLUTION INTRAVENOUS at 13:09

## 2023-09-26 RX ADMIN — MIDODRINE HYDROCHLORIDE 2.5 MG: 5 TABLET ORAL at 17:40

## 2023-09-26 RX ADMIN — Medication 1 PACKET: at 22:01

## 2023-09-26 RX ADMIN — PRAVASTATIN SODIUM 20 MG: 20 TABLET ORAL at 22:01

## 2023-09-26 RX ADMIN — MIDODRINE HYDROCHLORIDE 2.5 MG: 5 TABLET ORAL at 09:19

## 2023-09-26 RX ADMIN — DUTASTERIDE 0.5 MG: 0.5 CAPSULE, LIQUID FILLED ORAL at 09:18

## 2023-09-26 RX ADMIN — ONDANSETRON 4 MG: 2 INJECTION INTRAMUSCULAR; INTRAVENOUS at 11:59

## 2023-09-26 RX ADMIN — Medication 1 PACKET: at 09:19

## 2023-09-26 RX ADMIN — PANTOPRAZOLE SODIUM 40 MG: 40 TABLET, DELAYED RELEASE ORAL at 06:42

## 2023-09-26 RX ADMIN — PANTOPRAZOLE SODIUM 40 MG: 40 TABLET, DELAYED RELEASE ORAL at 16:04

## 2023-09-26 RX ADMIN — DULOXETINE HYDROCHLORIDE 30 MG: 30 CAPSULE, DELAYED RELEASE ORAL at 22:01

## 2023-09-26 RX ADMIN — Medication 1 PACKET: at 16:16

## 2023-09-26 RX ADMIN — FLUDROCORTISONE ACETATE 0.1 MG: 0.1 TABLET ORAL at 22:01

## 2023-09-26 RX ADMIN — PREDNISONE 10 MG: 10 TABLET ORAL at 09:18

## 2023-09-26 RX ADMIN — SODIUM CHLORIDE, PRESERVATIVE FREE 10 ML: 5 INJECTION INTRAVENOUS at 22:03

## 2023-09-26 RX ADMIN — SODIUM CHLORIDE, PRESERVATIVE FREE 10 ML: 5 INJECTION INTRAVENOUS at 09:20

## 2023-09-26 ASSESSMENT — PAIN SCALES - GENERAL
PAINLEVEL_OUTOF10: 0

## 2023-09-26 NOTE — PLAN OF CARE
minimal assistance within 7 day(s). Outcome: Progressing   PHYSICAL THERAPY TREATMENT    Patient: Chika Herrera (74 y.o. male)  Date: 9/26/2023  Diagnosis: Urinary frequency [R35.0]  Generalized weakness [R53.1]  Intractable nausea and vomiting [R11.2]  Vomiting without nausea, unspecified vomiting type [R11.11] Intractable nausea and vomiting  Procedure(s) (LRB):  EGD ESOPHAGOGASTRODUODENOSCOPY (N/A)  EGD BIOPSY (N/A) 5 Days Post-Op  Precautions: Fall Risk                    ASSESSMENT:  Patient continues to benefit from skilled PT services and is progressing towards goals. Received new order to apply Jobst compression stocking. Measured and gave  medium size Jobst. Educate and instruct patient and spouse about Jobst compression stocking verbalized understanding. However patient and spouse may not be able to mery and doff it at home. Showed to spouse and practice putting it in spouse said its too hard for her to put it on. No complains of any pain while donning it, patient said he will try it for now but definitely will not be able to do it by himself or her spouse. Ambulate patient in the room and around the bed multiple times with rolling walker no complains of any dizziness during and after ambulation. OOB to chair as tolerated performed some active range of motion exercise on both LE all planes. Educated and instructed patient to continue to do active range of motion exercise on both LE multiple times as tolerated while on bed and while on the chair. Recommend to be up on the chair at least every meal times when tolerated. Activated chair alarm and notified nurse who agreed to monitor patient. BP sitting  142/669  BP standing 94/55  BP standing and after standing exercise while holding on the rolling walker 95/68  BP sitting after therapy 142/67  Patient asymptomatic no complains of any dizziness or pain during and after ambulation with therapy.      PLAN:  Patient continues to benefit from skilled intervention to address the above impairments. Continue treatment per established plan of care. Recommendation for discharge: (in order for the patient to meet his/her long term goals): Therapy up to 5 days/week in Skilled nursing facility    Other factors to consider for discharge: no additional factors    IF patient discharges home will need the following DME: patient owns DME required for discharge       SUBJECTIVE:   Patient stated, \"We can try that But I will not be able to put it on. \"    OBJECTIVE DATA SUMMARY:   Critical Behavior:          Functional Mobility Training:  Bed Mobility:  Bed Mobility Training  Bed Mobility Training: Yes  Overall Level of Assistance: Stand-by assistance  Interventions: Safety awareness training;Verbal cues  Rolling: Stand-by assistance  Supine to Sit: Stand-by assistance  Sit to Supine: Stand-by assistance  Scooting: Stand-by assistance  Transfers:  Transfer Training  Transfer Training: Yes  Overall Level of Assistance: Minimum assistance; Additional time  Interventions: Safety awareness training;Verbal cues  Sit to Stand: Minimum assistance; Additional time  Stand to Sit: Minimum assistance; Additional time  Stand Pivot Transfers: Minimum assistance; Additional time  Bed to Chair: Minimum assistance; Additional time  Balance:  Balance  Sitting: High guard  Standing: Impaired (with rolling walker)  Standing - Static: Fair  Standing - Dynamic: Fair   Ambulation/Gait Training:     Gait  Overall Level of Assistance: Minimum assistance; Additional time  Interventions: Safety awareness training;Verbal cues  Base of Support: Widened  Speed/Kelly: Fluctuations; Slow  Step Length: Right shortened;Left shortened  Gait Abnormalities: Path deviations; Step to gait  Distance (ft): 30 Feet (with rolling walker spouse will bring rollator from home)  Assistive Device: Walker, rolling;Gait belt  Neuro Re-Education:                    Pain Ratin/10   Pain Intervention(s):   nursing notified

## 2023-09-26 NOTE — PLAN OF CARE
Problem: Discharge Planning  Goal: Discharge to home or other facility with appropriate resources  Outcome: Progressing     Problem: ABCDS Injury Assessment  Goal: Absence of physical injury  Outcome: Progressing     Problem: Safety - Adult  Goal: Free from fall injury  Outcome: Progressing     Problem: Physical Therapy - Adult  Goal: By Discharge: Performs mobility at highest level of function for planned discharge setting. See evaluation for individualized goals. Description: FUNCTIONAL STATUS PRIOR TO ADMISSION: Patient was recently discharged from [facility] to [facility] and was home for 1 week with HHPT/OT prior to this admission. Patient reports that his wife provides physical assistance (at the least, supervision) with a gait belt for functional mobility and occasionally assists with ADL's. Patient uses a RW at all times and also utilizes a wheelchair and transport chair when needed. HOME SUPPORT PRIOR TO ADMISSION: The patient lived with his wife who performed IADL's. Physical Therapy Goals  Initiated 9/21/2023, downgraded:  1. Patient will move from supine to sit and sit to supine, scoot up and down, and roll side to side in bed with independence within 7 day(s). 2.  Patient will perform sit to stand with contact guard assist within 7 day(s). 3.  Patient will transfer from bed to chair and chair to bed with contact guard assist using the least restrictive device within 7 day(s). 4.  Patient will ambulate with minimal assistance for 15 feet with the least restrictive device within 7 day(s). 5.  Patient will ascend/descend 2 stairs with B handrail(s) with minimal assistance within 7 day(s). Physical Therapy Goals  Initiated 9/14/2023  1. Patient will move from supine to sit and sit to supine, scoot up and down, and roll side to side in bed with independence within 7 day(s). 2.  Patient will perform sit to stand with supervision/set-up within 7 day(s).   3.  Patient will transfer

## 2023-09-26 NOTE — PROGRESS NOTES
79 y/o male with known BPH s/p TURP, incomplete bladder emptying, Sjogren's syndrome and CKD. Admitted for generalized weakness, nausea and vomiting. Seen in consultation per patient and wife's request to address concern for UTI, of note UA not consistent with UTI, Ucx negative. Patient and family requested inpatient cystoscopy, cystoscopy was unremarkable. Remains inpatient due to nausea and vomiting.      Patient reevaluated at wife's request     VSS, afebrile  Labs stable   Voiding w/o difficulty    Plan  No indication for  intervention   - patient to scheduled OP FU at [facility]

## 2023-09-26 NOTE — PROGRESS NOTES
[address] [place] [address] [phone]    Hospitalist Progress Note    NAME: Bekah Rashid   :  1940  MRM:  [id number]    Date/Time of service 2023  9:56 AM    To assist coordination of care and communication with nursing and staff, this note may be preliminary early in the day, but finalized by end of the day. Assessment and Plan:     Parkinson's Disease / Debility - POA, new Dx, consulted neurology, who document the parkinsonian features. He is declining to start Sinemet. He wants only outpatient neuro follow up. Merged with [facility] PT OT. This is a bad decision. He needs SNF and treatment. I informed him I fear he will fall and be injured. Orthostatic hypotension / Adrenal insufficiency / Autonomic dysfunction - POA due to Parkinson's and/or Lewy Body dementia, and chronic disease. Now has compression stockings. Resumed prednisone and added florinef and midodrine. Anxiety and depression - Continue duloxetine. I suspect Lewy Body Dementia. He has word finding difficulty, perseveration, and has poor executive decision making ability. His wife states he has active dreams when asleep. He also has parkinson features, autonomic instability. MRI shows \"Chronic small vessel ischemic disease noted with cerebral volume loss\". He would benefit from outpatient neuropsych eval.  .       Nausea / Vomiting / Constipation / GERD / Barretts esophagus - POA. Chronic. I wonder if autonomic issue? GI consulted. EGD  showed Tay's esophagus, hiatal hernia. Continue PPI BID, Miralax and senna. Zofran PRN. Outpatient GI follow up     Pseudomonas UTI / BPH with recent urethral trauma - POA. Urology on ID consulted. CT and cystoscopy unremarkable. Cx negative. Completed Levaquin. Continue dutasteride. Acute kidney injury / dehydration - POA due to poor PO intake. Better after IVF     Sjogrens syndrome - POA.  Chronic, Dx made 7 years ago based

## 2023-09-26 NOTE — CARE COORDINATION
9/26/23  2:03 PM    Care Management Daily Progress Note:    1. Generalized weakness    2. Urinary frequency    3. Vomiting without nausea, unspecified vomiting type      RUR: 20%  LOS: 13D    CM reviewed EMR- patient declining SNF and prefers home with home health- [facility] is arranged. Transition of Care:  1). Awaiting medical stability- likely ready tomorrow  2). PT recommending SNF- family and patient prefer HH, decline SNF  3). TBD on transport at dc  4).  CM following for dc needs    [facility]

## 2023-09-27 LAB
ENA SS-A AB SER-ACNC: <0.2 AI (ref 0–0.9)
ENA SS-B AB SER-ACNC: 0.5 AI (ref 0–0.9)

## 2023-09-27 PROCEDURE — 94761 N-INVAS EAR/PLS OXIMETRY MLT: CPT

## 2023-09-27 PROCEDURE — 2580000003 HC RX 258: Performed by: INTERNAL MEDICINE

## 2023-09-27 PROCEDURE — 97116 GAIT TRAINING THERAPY: CPT

## 2023-09-27 PROCEDURE — 1100000000 HC RM PRIVATE

## 2023-09-27 PROCEDURE — 97530 THERAPEUTIC ACTIVITIES: CPT

## 2023-09-27 PROCEDURE — 6360000002 HC RX W HCPCS: Performed by: UROLOGY

## 2023-09-27 PROCEDURE — 6370000000 HC RX 637 (ALT 250 FOR IP): Performed by: INTERNAL MEDICINE

## 2023-09-27 PROCEDURE — 6370000000 HC RX 637 (ALT 250 FOR IP): Performed by: STUDENT IN AN ORGANIZED HEALTH CARE EDUCATION/TRAINING PROGRAM

## 2023-09-27 PROCEDURE — 99233 SBSQ HOSP IP/OBS HIGH 50: CPT | Performed by: PSYCHIATRY & NEUROLOGY

## 2023-09-27 PROCEDURE — 2580000003 HC RX 258: Performed by: UROLOGY

## 2023-09-27 PROCEDURE — 6370000000 HC RX 637 (ALT 250 FOR IP): Performed by: UROLOGY

## 2023-09-27 RX ADMIN — DULOXETINE HYDROCHLORIDE 30 MG: 30 CAPSULE, DELAYED RELEASE ORAL at 20:01

## 2023-09-27 RX ADMIN — ASPIRIN 81 MG: 81 TABLET, CHEWABLE ORAL at 09:00

## 2023-09-27 RX ADMIN — PRAVASTATIN SODIUM 20 MG: 20 TABLET ORAL at 20:01

## 2023-09-27 RX ADMIN — MIDODRINE HYDROCHLORIDE 2.5 MG: 5 TABLET ORAL at 16:19

## 2023-09-27 RX ADMIN — SODIUM CHLORIDE, PRESERVATIVE FREE 10 ML: 5 INJECTION INTRAVENOUS at 09:01

## 2023-09-27 RX ADMIN — DEXTROSE AND SODIUM CHLORIDE: 5; 450 INJECTION, SOLUTION INTRAVENOUS at 01:53

## 2023-09-27 RX ADMIN — MIDODRINE HYDROCHLORIDE 2.5 MG: 5 TABLET ORAL at 09:00

## 2023-09-27 RX ADMIN — PREDNISONE 10 MG: 10 TABLET ORAL at 09:01

## 2023-09-27 RX ADMIN — FLUDROCORTISONE ACETATE 0.1 MG: 0.1 TABLET ORAL at 20:01

## 2023-09-27 RX ADMIN — SODIUM CHLORIDE, PRESERVATIVE FREE 10 ML: 5 INJECTION INTRAVENOUS at 20:02

## 2023-09-27 RX ADMIN — MIDODRINE HYDROCHLORIDE 2.5 MG: 5 TABLET ORAL at 11:52

## 2023-09-27 RX ADMIN — ENOXAPARIN SODIUM 40 MG: 100 INJECTION SUBCUTANEOUS at 09:00

## 2023-09-27 RX ADMIN — DUTASTERIDE 0.5 MG: 0.5 CAPSULE, LIQUID FILLED ORAL at 09:00

## 2023-09-27 RX ADMIN — PANTOPRAZOLE SODIUM 40 MG: 40 TABLET, DELAYED RELEASE ORAL at 06:21

## 2023-09-27 RX ADMIN — PANTOPRAZOLE SODIUM 40 MG: 40 TABLET, DELAYED RELEASE ORAL at 16:19

## 2023-09-27 NOTE — PROGRESS NOTES
Attempted to work with the patient for Physical Therapy, chart reviewed and discussed with nurse cleared for therapy. Patient sitting on the recliner with spouse at bedside but declining to do therapy now. Patient stated he just got his prednisone and it need to be in his system for at least 2 and a half hours before he can  do therapy. Because he will be unsteady if the prednisone not on his system. Communicated with nurse and agreed to monitor patient . We will continue to follow up with the patient for therapy.

## 2023-09-27 NOTE — PROGRESS NOTES
LABALBU 2.6*   AST 11*   ALT 16       Lab Results   Component Value Date/Time    GLUCOSE 142 09/26/2023 02:19 AM    GLUCOSE 106 09/24/2023 01:43 AM    GLUCOSE 112 09/22/2023 01:38 AM    GLUCOSE 98 09/21/2023 05:15 AM    GLUCOSE 117 09/20/2023 06:15 AM     No results for input(s): \"PH\", \"PCO2\", \"PO2\", \"HCO3\", \"FIO2\" in the last 72 hours. No results for input(s): \"INR\" in the last 72 hours. Results       Procedure Component Value Units Date/Time    Culture, Urine [[id number]] Collected: 09/13/23 2310    Order Status: Completed Specimen: Urine, clean catch Updated: 09/15/23 0721     Special Requests NO SPECIAL REQUESTS        Culture No growth (<1,000 CFU/ML)               Other pertinent lab: none    Total time spent with patient: 30 Minutes I personally reviewed chart, notes, data and current medications in the medical record. I have personally examined and treated the patient at bedside during this period.                   Care Plan discussed with: Patient, Care Manager, Nursing Staff, Consultant/Specialist, and >50% of time spent in counseling and coordination of care    Discussed:  Care Plan and D/C Planning    Prophylaxis:  Lovenox and H2B/PPI    Disposition:  [facility] PT, OT, RN           ___________________________________________________    Attending Physician: Deep Yadav MD

## 2023-09-27 NOTE — PLAN OF CARE
Problem: Physical Therapy - Adult  Goal: By Discharge: Performs mobility at highest level of function for planned discharge setting. See evaluation for individualized goals. Description: FUNCTIONAL STATUS PRIOR TO ADMISSION: Patient was recently discharged from [facility] to [facility] and was home for 1 week with HHPT/OT prior to this admission. Patient reports that his wife provides physical assistance (at the least, supervision) with a gait belt for functional mobility and occasionally assists with ADL's. Patient uses a RW at all times and also utilizes a wheelchair and transport chair when needed. HOME SUPPORT PRIOR TO ADMISSION: The patient lived with his wife who performed IADL's. Physical Therapy Goals  Initiated 9/21/2023, downgraded:  1. Patient will move from supine to sit and sit to supine, scoot up and down, and roll side to side in bed with independence within 7 day(s). 2.  Patient will perform sit to stand with contact guard assist within 7 day(s). 3.  Patient will transfer from bed to chair and chair to bed with contact guard assist using the least restrictive device within 7 day(s). 4.  Patient will ambulate with minimal assistance for 15 feet with the least restrictive device within 7 day(s). 5.  Patient will ascend/descend 2 stairs with B handrail(s) with minimal assistance within 7 day(s). Physical Therapy Goals  Initiated 9/14/2023  1. Patient will move from supine to sit and sit to supine, scoot up and down, and roll side to side in bed with independence within 7 day(s). 2.  Patient will perform sit to stand with supervision/set-up within 7 day(s). 3.  Patient will transfer from bed to chair and chair to bed with supervision/set-up using the least restrictive device within 7 day(s). 4.  Patient will ambulate with supervision/set-up for 75 feet with the least restrictive device within 7 day(s).    5.  Patient will ascend/descend 2 stairs with B handrail(s) with

## 2023-09-27 NOTE — PROGRESS NOTES
INPATIENT NEUROLOGY FOLLOW-UP NOTE    NAME:  Hoang Houston  MRN:  [id number]    ADMISSION DATE:  9/13/2023  9:46 AM    REASON FOR FOLLOW UP:  Parkinson's features    INTERVAL HX (09/27/23): Neurology Service is asked to re-visit patient at pt / Family request regarding new diagnosis of Parkinson's during this admission. Chart was reviewed. Neurology NP Neva Valle saw patient on 9-18-23 regarding gait instability, orthostasis, tremors. Wife told her that patient had long hx/ several years of orthostatic hypotension and gait instability and that he had seen many different specialists over the years and nobody had a definitive diagnosis. Says 10 yrs ago he was evaluated for Parkinson's and was not dx with it at that time. She added that the gait disturbance was for many years and that over the last 1-2 years he developed resting tremor and gait had become more short-stepped. On NP [facility] exam, she noted pt to have: masked facies, cogwheel rigidity, bradykinesia, mild resting tremor only brought out through distraction. She felt he may have a for of Parkinson's and offered for pt to be started on Sinemet low dose 12.5/ 25 one tab TID. Family wanted to hold off on Sinemet trial as pt was to have a procedure the next day. MRI Brain, Cervical and Lumbar MRIs done on 9-18-23. Other than chronic small vessel ischemic changes, no significant abnormality seen no Brain MRI. No spinal cord abnormality on Cervical Spine MRI (moderate spinal stenosis at C5-6, no cord compression). No severe lumbar canal stenosis seen on L-spine MRI. She returned on 9-20-23 and examined patient approx 1 hr after he had received one dose of Sinemet. Her note indicates his cogwheel rigidity was noticeably better and didn't seem to have as much masked facial appearance. Wife was concerned that Sinemet itself could exacerbate his orthostasis. They discussed that the Parkinson's itself can be a cause of orthostasis.  She left triceps and pectoralis muscle tear. Intact LT in both arms and thighs (wearing compression stockings). 5/5 biceps and handgrips. 5/5 hip flexion, knee extension and dorsiflexion. DTRs 1+ biceps and patellars. VERY UNSTEADY WHEN STANDING. Ambulates with Rolator, no shuffling but difficulty lifting left leg (attributes this to his chronic left leg injury/ problem)    Spoke with Wife. She clarifies that pt was only dx with orthostatic hypotension since a hospital admission here in August (brought to ER due to passing out and having UTI). She says he has had stiffness/ balance issues for 10 years (pt said he was dx with Sjogren in 2016 per pt). Wife denies hallucinations. 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 She denies that he has been having hallucinations or delusions. She denies him having any progressive cognitive decline. She thinks his cognition is \"pretty good. \"  S       IMPRESSION/ PLAN:  Extensive chart review, discussion, exam as noted above. I do not see any resting tremor or cogwheel rigidity on exam.  No shuffling noted when walking, though very unsteady. Orthostasis is a relatively new dx per wife, though long-standing, unsteady gait. D/w Pt-Wife that I do not see much on exam to dx him with Parkinson's. Also, no cognitive decline/ hallucinations reported to make me think he has lewy body dementia. Recommended that she proceed with plan to see [facility]. She said she called them and was told that it would take a year before they could see Mr Keven Fermin. I suggested she contact [facility] to see if he could be seen there sooner. She asked whether he should start taking Sinemet.   I d/w her that because I don't see anything on exam suggestive of Parkinson's I don't think he should start on the C-L dopa as it may exacerbate his

## 2023-09-27 NOTE — CARE COORDINATION
9/27/23  3:53 PM    Care Management Daily Progress Note:     1. Generalized weakness    2. Urinary frequency    3. Vomiting without nausea, unspecified vomiting type       RUR: 20%  LOS: 14D     CM reviewed EMR- patient declining SNF and prefers home with home health- [facility] is arranged. Transition of Care:  1). Awaiting medical stability- likely ready tomorrow- neurology consulted  2). PT recommending SNF- family and patient prefer HH, decline SNF  3). TBD on transport at dc  4).  CM following for dc needs    [facility]

## 2023-09-28 PROCEDURE — 6370000000 HC RX 637 (ALT 250 FOR IP): Performed by: UROLOGY

## 2023-09-28 PROCEDURE — 6370000000 HC RX 637 (ALT 250 FOR IP): Performed by: INTERNAL MEDICINE

## 2023-09-28 PROCEDURE — 6370000000 HC RX 637 (ALT 250 FOR IP): Performed by: STUDENT IN AN ORGANIZED HEALTH CARE EDUCATION/TRAINING PROGRAM

## 2023-09-28 PROCEDURE — 6360000002 HC RX W HCPCS: Performed by: UROLOGY

## 2023-09-28 PROCEDURE — 1100000000 HC RM PRIVATE

## 2023-09-28 PROCEDURE — 2580000003 HC RX 258: Performed by: UROLOGY

## 2023-09-28 PROCEDURE — 94761 N-INVAS EAR/PLS OXIMETRY MLT: CPT

## 2023-09-28 RX ORDER — SENNOSIDES A AND B 8.6 MG/1
1 TABLET, FILM COATED ORAL 2 TIMES DAILY
Qty: 60 TABLET | Refills: 0 | Status: SHIPPED | OUTPATIENT
Start: 2023-09-28 | End: 2023-10-28

## 2023-09-28 RX ORDER — FLUDROCORTISONE ACETATE 0.1 MG/1
0.1 TABLET ORAL
Qty: 30 TABLET | Refills: 0 | Status: SHIPPED | OUTPATIENT
Start: 2023-09-28 | End: 2023-10-28

## 2023-09-28 RX ORDER — DUTASTERIDE 0.5 MG/1
0.5 CAPSULE, LIQUID FILLED ORAL DAILY
Qty: 30 CAPSULE | Refills: 3 | Status: SHIPPED | OUTPATIENT
Start: 2023-09-29

## 2023-09-28 RX ORDER — MIDODRINE HYDROCHLORIDE 2.5 MG/1
2.5 TABLET ORAL
Qty: 90 TABLET | Refills: 3 | Status: SHIPPED | OUTPATIENT
Start: 2023-09-28

## 2023-09-28 RX ORDER — LACTOBACILLUS ACIDOPHILUS / LACTOBACILLUS BULGARICUS 100 MILLION CFU STRENGTH
1 GRANULES ORAL 3 TIMES DAILY
Qty: 90 PACKET | Refills: 0 | Status: SHIPPED | OUTPATIENT
Start: 2023-09-28 | End: 2023-10-28

## 2023-09-28 RX ADMIN — SENNOSIDES 8.6 MG: 8.6 TABLET, FILM COATED ORAL at 08:57

## 2023-09-28 RX ADMIN — ONDANSETRON 4 MG: 4 TABLET, ORALLY DISINTEGRATING ORAL at 14:25

## 2023-09-28 RX ADMIN — Medication 1 PACKET: at 14:25

## 2023-09-28 RX ADMIN — SODIUM CHLORIDE, PRESERVATIVE FREE 10 ML: 5 INJECTION INTRAVENOUS at 09:00

## 2023-09-28 RX ADMIN — POLYETHYLENE GLYCOL 3350 17 G: 17 POWDER, FOR SOLUTION ORAL at 19:56

## 2023-09-28 RX ADMIN — PANTOPRAZOLE SODIUM 40 MG: 40 TABLET, DELAYED RELEASE ORAL at 05:55

## 2023-09-28 RX ADMIN — DUTASTERIDE 0.5 MG: 0.5 CAPSULE, LIQUID FILLED ORAL at 09:08

## 2023-09-28 RX ADMIN — PANTOPRAZOLE SODIUM 40 MG: 40 TABLET, DELAYED RELEASE ORAL at 14:25

## 2023-09-28 RX ADMIN — MIDODRINE HYDROCHLORIDE 2.5 MG: 5 TABLET ORAL at 17:47

## 2023-09-28 RX ADMIN — SENNOSIDES 8.6 MG: 8.6 TABLET, FILM COATED ORAL at 19:55

## 2023-09-28 RX ADMIN — PRAVASTATIN SODIUM 20 MG: 20 TABLET ORAL at 19:56

## 2023-09-28 RX ADMIN — DULOXETINE HYDROCHLORIDE 30 MG: 30 CAPSULE, DELAYED RELEASE ORAL at 19:55

## 2023-09-28 RX ADMIN — POLYETHYLENE GLYCOL 3350 17 G: 17 POWDER, FOR SOLUTION ORAL at 08:57

## 2023-09-28 RX ADMIN — ENOXAPARIN SODIUM 40 MG: 100 INJECTION SUBCUTANEOUS at 08:59

## 2023-09-28 RX ADMIN — FLUDROCORTISONE ACETATE 0.1 MG: 0.1 TABLET ORAL at 19:56

## 2023-09-28 RX ADMIN — PREDNISONE 10 MG: 10 TABLET ORAL at 08:57

## 2023-09-28 RX ADMIN — SODIUM CHLORIDE, PRESERVATIVE FREE 10 ML: 5 INJECTION INTRAVENOUS at 20:00

## 2023-09-28 RX ADMIN — ASPIRIN 81 MG: 81 TABLET, CHEWABLE ORAL at 08:57

## 2023-09-28 RX ADMIN — MIDODRINE HYDROCHLORIDE 2.5 MG: 5 TABLET ORAL at 08:57

## 2023-09-28 RX ADMIN — Medication 1 PACKET: at 08:57

## 2023-09-28 RX ADMIN — MIDODRINE HYDROCHLORIDE 2.5 MG: 5 TABLET ORAL at 11:58

## 2023-09-28 NOTE — PROGRESS NOTES
[address]   [address] 17[address] [phone]    Hospitalist Progress Note    NAME: Kierra Byrd   :  1940  MRM:  [id number]    Date/Time of service 2023  9:32 AM    To assist coordination of care and communication with nursing and staff, this note may be preliminary early in the day, but finalized by end of the day. Assessment and Plan:     Parkinson's Disease / Jamila Baxter, new Dx, consulted neurology, who document the parkinsonian features at first, and then on later exam find less evidence. He wants only outpatient neuro follow up. He wants HH PT OT. This is a bad decision. He needs SNF. I informed him I fear he will fall and be injured. Orthostatic hypotension / Adrenal insufficiency / Autonomic dysfunction - POA due to chronic disease. Now has compression stockings. Resumed prednisone and added florinef and midodrine. BP much better. Anxiety and depression - Continue duloxetine. At times, he has word finding difficulty, perseveration, and has poor executive decision-making ability. His wife states he has active dreams when asleep. MRI shows \"Chronic small vessel ischemic disease noted with cerebral volume loss\". He would benefit from outpatient neuropsych eval.  LBD would be a grim prognosis. Nausea / Vomiting / Constipation / GERD / Barretts esophagus - POA. Chronic. I wonder if autonomic issue? GI consulted. EGD  showed Tay's esophagus, hiatal hernia. Continue PPI BID, Miralax and senna. Zofran PRN. Outpatient GI follow up     Pseudomonas UTI / BPH with recent urethral trauma - POA. Urology on ID consulted. CT and cystoscopy unremarkable. Cx negative. Completed Levaquin. Continue dutasteride. Acute kidney injury / dehydration - POA due to poor PO intake. Better after IVF     Sjogrens syndrome - POA.  Chronic, Dx made 7 years ago based on rigidity and dry mouth, then reported positive below)  Medications:     Current Facility-Administered Medications   Medication Dose Route Frequency    midodrine (PROAMATINE) tablet 2.5 mg  2.5 mg Oral TID WC    polyethylene glycol (GLYCOLAX) packet 17 g  17 g Oral BID    dextrose 5 % and 0.45 % sodium chloride infusion   IntraVENous Continuous    predniSONE (DELTASONE) tablet 10 mg  10 mg Oral Daily    polyethylene glycol (GLYCOLAX) packet 17 g  17 g Oral Q1H PRN    fludrocortisone (FLORINEF) tablet 0.1 mg  0.1 mg Oral QHS    senna (SENOKOT) tablet 8.6 mg  1 tablet Oral BID    [Held by provider] carbidopa-levodopa (SINEMET)  MG per tablet 1 tablet  1 tablet Oral TID    pantoprazole (PROTONIX) tablet 40 mg  40 mg Oral BID AC    lactobacillus acidophilus (FLORANEX) 1 packet  1 packet Oral TID    dutasteride (AVODART) capsule 0.5 mg  0.5 mg Oral Daily    DULoxetine (CYMBALTA) extended release capsule 30 mg  30 mg Oral QHS    pravastatin (PRAVACHOL) tablet 20 mg  20 mg Oral Nightly    sodium chloride flush 0.9 % injection 5-40 mL  5-40 mL IntraVENous 2 times per day    sodium chloride flush 0.9 % injection 5-40 mL  5-40 mL IntraVENous PRN    0.9 % sodium chloride infusion   IntraVENous PRN    enoxaparin (LOVENOX) injection 40 mg  40 mg SubCUTAneous Daily    ondansetron (ZOFRAN-ODT) disintegrating tablet 4 mg  4 mg Oral Q8H PRN    Or    ondansetron (ZOFRAN) injection 4 mg  4 mg IntraVENous Q6H PRN    acetaminophen (TYLENOL) tablet 650 mg  650 mg Oral Q6H PRN    Or    acetaminophen (TYLENOL) suppository 650 mg  650 mg Rectal Q6H PRN    aspirin chewable tablet 81 mg  81 mg Oral Daily        Lab Data Reviewed: (see below)  Lab Review:     Recent Labs     09/26/23 0219   WBC 8.3   HGB 11.3*   HCT 36.5*          Recent Labs     09/26/23 0219      K 3.7   *   CO2 26   GLUCOSE 142*   BUN 9   CREATININE 1.07   CALCIUM 8.1*   MG 2.2   PHOS 2.7   LABALBU 2.6*   AST 11*   ALT 16       Lab Results   Component Value Date/Time    GLUCOSE 142 09/26/2023

## 2023-09-28 NOTE — CARE COORDINATION
9/28/23  12:05 PM    CM noted dc order. CM sent [employer] dc summary and AVS. Patients spouse to transport him home tonight after 6:00 PM.     No further CM needs noted.     [facility]

## 2023-09-28 NOTE — PROGRESS NOTES
Occupational therapy Note: Attempted OT however pt stated feeling \"queasy\" and recently had medicine for this.  Stated sat in chair 4 hours today and thought that was 'too much.'

## 2023-09-28 NOTE — DISCHARGE INSTRUCTIONS
Patient Discharge Instructions    Slick Rivera / [id number] : 1940    Admitted 2023 Discharged: 2023     Primary Diagnoses  @Rprob@    Take Home Medications     It is important that you take the medication exactly as they are prescribed. Keep your medication in the bottles provided by the pharmacist and keep a list of the medication names, dosages, and times to be taken in your wallet. Do not take other medications without consulting your doctor. What to do at Home    Recommended diet: regular diet    Recommended activity: activity as tolerated    If you experience worse symptoms, please follow up with your PCP. Follow-up with your PCP in a few weeks    [unfilled]     Information obtained by :  I understand that if any problems occur once I am at home I am to contact my physician. I understand and acknowledge receipt of the instructions indicated above.                                                                                                                                            Physician's or R.N.'s Signature                                                                  Date/Time                                                                                                                                              Patient or Representative Signature                                                          Date/Time

## 2023-09-29 VITALS
WEIGHT: 175 LBS | HEIGHT: 71 IN | BODY MASS INDEX: 24.5 KG/M2 | DIASTOLIC BLOOD PRESSURE: 77 MMHG | TEMPERATURE: 97.7 F | SYSTOLIC BLOOD PRESSURE: 141 MMHG | RESPIRATION RATE: 18 BRPM | HEART RATE: 85 BPM | OXYGEN SATURATION: 99 %

## 2023-09-29 PROCEDURE — 6370000000 HC RX 637 (ALT 250 FOR IP): Performed by: INTERNAL MEDICINE

## 2023-09-29 PROCEDURE — 6370000000 HC RX 637 (ALT 250 FOR IP): Performed by: UROLOGY

## 2023-09-29 PROCEDURE — 6370000000 HC RX 637 (ALT 250 FOR IP): Performed by: STUDENT IN AN ORGANIZED HEALTH CARE EDUCATION/TRAINING PROGRAM

## 2023-09-29 PROCEDURE — 94761 N-INVAS EAR/PLS OXIMETRY MLT: CPT

## 2023-09-29 PROCEDURE — 6360000002 HC RX W HCPCS: Performed by: UROLOGY

## 2023-09-29 RX ADMIN — SENNOSIDES 8.6 MG: 8.6 TABLET, FILM COATED ORAL at 08:34

## 2023-09-29 RX ADMIN — PREDNISONE 10 MG: 10 TABLET ORAL at 08:34

## 2023-09-29 RX ADMIN — ENOXAPARIN SODIUM 40 MG: 100 INJECTION SUBCUTANEOUS at 08:34

## 2023-09-29 RX ADMIN — PANTOPRAZOLE SODIUM 40 MG: 40 TABLET, DELAYED RELEASE ORAL at 06:19

## 2023-09-29 RX ADMIN — DUTASTERIDE 0.5 MG: 0.5 CAPSULE, LIQUID FILLED ORAL at 08:35

## 2023-09-29 RX ADMIN — MIDODRINE HYDROCHLORIDE 2.5 MG: 5 TABLET ORAL at 08:34

## 2023-09-29 RX ADMIN — ASPIRIN 81 MG: 81 TABLET, CHEWABLE ORAL at 08:34

## 2023-09-29 RX ADMIN — MIDODRINE HYDROCHLORIDE 2.5 MG: 5 TABLET ORAL at 11:38

## 2023-09-29 RX ADMIN — Medication 1 PACKET: at 08:34

## 2023-09-29 NOTE — CARE COORDINATION
Transition of Care Plan - RUR 16%:  Medical management continues  Await medical stability   PT/OT following- had recommended SNF, family has declined  CM following - plan is for patient to return home with family and [facility] which has been arranged with [place]  Wife to transport at d/c  Outpatient follow-up

## 2023-09-29 NOTE — PROGRESS NOTES
[address] [place] [address] 1788 [phone]    Hospitalist Progress Note    NAME: Duyen Peoples   :  1940  MRM:  [id number]    Date/Time of service 2023  11:19 AM    To assist coordination of care and communication with nursing and staff, this note may be preliminary early in the day, but finalized by end of the day. Assessment and Plan:     Parkinson's Disease / Vickie Barrett, new Dx, consulted neurology, who document the parkinsonian features at first, and then on later exam find less evidence. He wants only outpatient neuro follow up. He wants HH PT OT. This is a bad decision. He needs SNF. I informed him I fear he will fall and be injured. Orthostatic hypotension / Adrenal insufficiency / Autonomic dysfunction - POA due to chronic disease. Now has compression stockings. Resumed prednisone and added florinef and midodrine. BP much better. Anxiety and depression - Continue duloxetine. At times, he has word finding difficulty, perseveration, and has poor executive decision-making ability. His wife states he has active dreams when asleep. MRI shows \"Chronic small vessel ischemic disease noted with cerebral volume loss\". He would benefit from outpatient neuropsych eval.  LBD would be a grim prognosis. Nausea / Vomiting / Constipation / GERD / Barretts esophagus - POA. Chronic. I wonder if autonomic issue? GI consulted. EGD  showed Tay's esophagus, hiatal hernia. Continue PPI BID, Miralax and senna. Zofran PRN. Outpatient GI follow up     Pseudomonas UTI / BPH with recent urethral trauma - POA. Urology on ID consulted. CT and cystoscopy unremarkable. Cx negative. Completed Levaquin. Continue dutasteride. Acute kidney injury / dehydration - POA due to poor PO intake. Better after IVF     Sjogrens syndrome - POA.  Chronic, Dx made 7 years ago based on rigidity and dry mouth, then reported positive

## 2023-10-04 NOTE — PROGRESS NOTES
Awaiting urine culture however UA is bland. No acute urinary tract   abnormalities seen on CT. Patient afebrile and WBC is within normal limits. Suspect we will need to look for noninfectious causes of patient's symptoms   but will await urine culture results. Continue levofloxacin Intractable   nausea and vomiting. Ongoing x2 weeks. CT abdomen pelvis negative for any   acute findings. May need GI consultation\"; from 09/29 DC summary:   \"Pseudomonas UTI / BPH with recent urethral trauma - POA. Urology on ID   consulted. CT and cystoscopy unremarkable. Cx negative. Completed Levaquin. Continue dutasteride. \"    Treatment: U/A, Ucx, Urology consult, ID consult, Cystoscopy, IV Levaquin  Options provided:  -- UTI present as evidenced by, Please document evidence. -- UTI was ruled out  -- Other - I will add my own diagnosis  -- Disagree - Not applicable / Not valid  -- Disagree - Clinically unable to determine / Unknown  -- Refer to Clinical Documentation Reviewer    PROVIDER RESPONSE TEXT:    UTI was ruled out after study.     Query created by: Victorino Moore on 10/2/2023 11:57 AM      Electronically signed by:  Ileana Workman MD 10/4/2023 12:40 PM

## 2024-02-29 NOTE — ED PROVIDER NOTES
The patient is a 66-year-old male with a past medical history significant for a CKD, urinary retention, Sjögren syndrome status post appendectomy, cervical discectomy, zygomatic arch repair, abdominal hernia repair, vasectomy, fractured tibia and fibula, who presents to the and states that he fell last night and landed on the left side. He was initially able to walk, however, the pain has progressively worsened to the point that he is able to put any weight on his left side. The patient denies hitting his head on the ground. He denies any headache, neck pain, back pain, chest pain, shortness of breath, vomiting, diarrhea, constipation, dysuria, hematuria, dizziness, extremity weakness or numbness, sick contact at home, skin rash, prior history of the same. Past Medical History:   Diagnosis Date    CKD (chronic kidney disease), stage III ([facility] [address]     Sjoegren syndrome 04/2016    Urine retention        Past Surgical History:   Procedure Laterality Date    HX APPENDECTOMY      HX CERVICAL DISKECTOMY      HX HEENT      Zygomatic arch repair    HX HERNIA REPAIR      HX ORTHOPAEDIC      Broken tibia and fibula    HX VASECTOMY           History reviewed. No pertinent family history.     Social History     Socioeconomic History    Marital status:      Spouse name: Not on file    Number of children: Not on file    Years of education: Not on file    Highest education level: Not on file   Occupational History    Not on file   Social Needs    Financial resource strain: Not on file    Food insecurity:     Worry: Not on file     Inability: Not on file    Transportation needs:     Medical: Not on file     Non-medical: Not on file   Tobacco Use    Smoking status: Current Every Day Smoker     Packs/day: 0.50     Years: 40.00     Pack years: 20.00    Smokeless tobacco: Never Used   Substance and Sexual Activity    Alcohol use: No    Drug use: No    Sexual activity: Not on file   Lifestyle    Writer encourage Pt to take her medications as prescribed and keep all of her scheduled appointments with her outpatient service providers.  Writer recommended Pt to continue with her current outpatient individual therapy service and engaged in new outpatient family therapy with her parents to address their relationship issues.  Writer also recommended Pt to engage in IOP Day treatment to improve coping skills.  DEC coordinator will contact Pt within next 1 or 2 business days to ensure coordination of care and provide assistance with appointments.      Pt has her new outpatient in-person family therapy appointment scheduled with a following service provider:  Date: Monday, 3/4/2024  Time: 4:00 pm - 5:00 pm  Provider: Hellen Roberson MA  LM  Location: [employer], [facility], [address] [place] [address]  Phone: [phone]  Type: Therapy - Initial (In-Person)    Pt has her new virtual IOP Day treatment service intake appointment scheduled with a following service provider:  Date: Wednesday, 3/6/2024  Time: 11:00 am - 12:00 pm  Provider: TRACE [facility] [place] (Adolescent Intensive Outpatient Treatment)  Location: [place] [address]  Phone: [phone]  Type: Day Treatment    The ASTAT program is a short-term intensive outpatient program for adolescents ages 13 to 18.  APPOINTMENTS ARE VIA TELEHEALTH AT THIS TIME    Below is a list of FREE Mental Health Options in the [facility] Area:    [facility] ([facility])  Serves those in emotional crisis with 24-hour, seven-day-a-week crisis counseling, assessment, referral, and medication management.   Suicidal: [phone] Consultation: [phone]  [address] [place] [address] 24/7 Crisis Intervention Center     [facility]  [phone]  Serves those in need of free outpatient mental health care  Hours: Mon, Wed, Fri 1-3pm; Mon-Thurs 6:30-8:30pm    [facility]  Physical activity:     Days per week: Not on file     Minutes per session: Not on file    Stress: Not on file   Relationships    Social connections:     Talks on phone: Not on file     Gets together: Not on file     Attends Christianity service: Not on file     Active member of club or organization: Not on file     Attends meetings of clubs or organizations: Not on file     Relationship status: Not on file    Intimate partner violence:     Fear of current or ex partner: Not on file     Emotionally abused: Not on file     Physically abused: Not on file     Forced sexual activity: Not on file   Other Topics Concern    Not on file   Social History Narrative    Not on file         ALLERGIES: Flomax [tamsulosin] and Rapaflo [silodosin]    Review of Systems   All other systems reviewed and are negative. Vitals:    06/11/19 0539   BP: 145/89   Resp: 16   Temp: 98.2 °F (36.8 °C)   SpO2: 93%   Weight: 83.9 kg (185 lb)   Height: 6' 1\" (1.854 m)            Physical Exam   Nursing note and vitals reviewed. CONSTITUTIONAL: Well-appearing; well-nourished; in mild distress  HEAD: Normocephalic; atraumatic  EYES: PERRL; EOM intact; conjunctiva and sclera are clear bilaterally. ENT: No rhinorrhea; normal pharynx with no tonsillar hypertrophy; mucous membranes pink/moist, no erythema, no exudate. NECK: Supple; non-tender; no cervical lymphadenopathy  CARD: Normal S1, S2; no murmurs, rubs, or gallops. Regular rate and rhythm. RESP: Normal respiratory effort; breath sounds clear and equal bilaterally; no wheezes, rhonchi, or rales. ABD: Normal bowel sounds; non-distended; non-tender; no palpable organomegaly, no masses, no bruits. Back Exam: Normal inspection; no vertebral point tenderness, no CVA tenderness. Normal range of motion. EXT: Normal ROM in all four extremities; non-tender to palpation; no swelling or deformity; distal pulses are normal, no edema. SKIN: Warm; dry; no rash.   NEURO:Alert and oriented x 3, [facility]  [address] [place] [address] [id number] [phone]    coherent, TIANA-XII grossly intact, sensory and motor are non-focal.        MDM  Number of Diagnoses or Management Options  Closed left hip fracture, initial encounter [facility]):   Fall, initial encounter:   Diagnosis management comments:   Assessment: Left hip injury status post fall rule out fracture    Plan: EKG/lab/ IVF/analgesia and antiemetic/x-ray of the left hip and chest/ serial exam/ monitor and reevaluate. Amount and/or Complexity of Data Reviewed  Clinical lab tests: ordered and reviewed  Tests in the radiology section of CPT®: ordered and reviewed  Tests in the medicine section of CPT®: reviewed and ordered  Discussion of test results with the performing providers: yes  Decide to obtain previous medical records or to obtain history from someone other than the patient: yes  Obtain history from someone other than the patient: yes  Review and summarize past medical records: yes  Discuss the patient with other providers: yes  Independent visualization of images, tracings, or specimens: yes    Risk of Complications, Morbidity, and/or Mortality  Presenting problems: moderate  Diagnostic procedures: moderate  Management options: moderate           Procedures     ED EKG interpretation:  Rhythm: normal sinus rhythm; and regular . Rate (approx.): 76; Axis: left axis deviation; P wave: normal; QRS interval: normal ; ST/T wave: non-specific changes; in  Lead: Diffusely; Other findings: abnormal ekg. This EKG was interpreted by Cheryl Woods MD,ED Provider. XRAY INTERPRETATION (ED MD)  Chest Xray  No acute process seen. Normal heart size. No bony abnormalities. No infiltrate. Yifan Andrews MD 5:45 AM      Sim Abler INTERPRETATION (ED MD)  Xray of left hip shows a mildly displaced femoral neck fracture. No subluxation/dislocation. Yifan Andrews MD 5:45 AM    PROGRESS NOTE:  Pt has been reexamined by Yifan Andrews MD all available results have been reviewed with pt and any available family.  Pt understands sx, dx, and tx in ED. Care plan has been outlined and questions have been answered. Pt and any available family understands and agrees to need for admission to hospital for further tx not available in ED. Pt is ready for admission. Will consult hospitalist  and  orthopedist    Written by Connie Livingston MD,  6:22 AM    Hospitalist Trell for Admission  6:22 AM    ED Room Number: OM70/24  Patient Name and age:  Dexter Minus 78 y.o.  male  Working Diagnosis:   1. Fall, initial encounter    2. Closed left hip fracture, initial encounter ([facility] [address]    acute mildly displaced. Readmission: no  Isolation Requirements:  no  Recommended Level of Care:  med/surg  Code Status:  Full  Other:  History Sjogren syndrome, on chronic steroid    CONSULT NOTE:  I spoke with Dr. Lorenza Ziegler of Ortho. Discussed patient's presentation, history, physical assessment, and available diagnostic results. Will come and see the patient in the ED. 06:10 AM  .   CONSULT NOTE:  I spoke with Dr. Gene Khan of the adult hospitalist team. Discussed patient's presentation, history, physical assessment, and available diagnostic results.  He will evaluate, write orders and admit the patient to the hospital. 06:25 AM

## 2024-07-17 NOTE — ED NOTES
TRANSFER - OUT REPORT:    Verbal report given to [facility] on Alexis Dyson  being transferred to [facility] for routine progression of patient care       Report consisted of patient's Situation, Background, Assessment and   Recommendations(SBAR). Information from the following report(s) ED Encounter Summary, ED SBAR, [facility], and Recent Results was reviewed with the receiving nurse. Kinder Fall Assessment:                           Lines:   Peripheral IV 08/10/23 Distal;Left;Posterior Forearm (Active)   Site Assessment Clean, dry & intact 08/10/23 1000   Phlebitis Assessment No symptoms 08/10/23 1000   Infiltration Assessment 0 08/10/23 1000       Peripheral IV 08/10/23 Left Antecubital (Active)   Site Assessment Clean, dry & intact 08/10/23 1201   Phlebitis Assessment No symptoms 08/10/23 1201   Infiltration Assessment 0 08/10/23 1201       Peripheral IV 08/10/23 Right;Upper Arm (Active)        Opportunity for questions and clarification was provided.       Patient transported with:  Sakina Mallory RN  08/10/23 7813 Continue Regimen: Aklief 0.005 % topical cream \\nQuantity: 45.0 g  Days Supply: 30\\nSig: Apply to entire face QHS starting TIW working up to nightly\\n\\nWinlevi 1 % topical cream \\nQuantity: 60.0 g  Days Supply: 30\\nSig: Apply to entire face QAM Detail Level: Zone Initiate Treatment: spironolactone 50 mg tablet QD\\nQuantity: 30.0 Tablet  Days Supply: 30\\nSig: Take 50mg qd. Render In Strict Bullet Format?: No

## 2025-03-07 ENCOUNTER — APPOINTMENT (OUTPATIENT)
Facility: HOSPITAL | Age: 85
DRG: 546 | End: 2025-03-07
Payer: MEDICARE

## 2025-03-07 ENCOUNTER — HOSPITAL ENCOUNTER (INPATIENT)
Facility: HOSPITAL | Age: 85
LOS: 4 days | Discharge: NURSING, REHAB OR LONG-TERM CARE | DRG: 546 | End: 2025-03-12
Attending: EMERGENCY MEDICINE | Admitting: HOSPITALIST
Payer: MEDICARE

## 2025-03-07 DIAGNOSIS — R53.1 GENERALIZED WEAKNESS: ICD-10-CM

## 2025-03-07 DIAGNOSIS — B37.41 YEAST CYSTITIS: ICD-10-CM

## 2025-03-07 DIAGNOSIS — R26.81 UNSTEADY GAIT: ICD-10-CM

## 2025-03-07 DIAGNOSIS — R11.2 NAUSEA AND VOMITING, UNSPECIFIED VOMITING TYPE: Primary | ICD-10-CM

## 2025-03-07 DIAGNOSIS — R26.81 GAIT INSTABILITY: ICD-10-CM

## 2025-03-07 DIAGNOSIS — E86.0 DEHYDRATION: ICD-10-CM

## 2025-03-07 DIAGNOSIS — R26.2 UNABLE TO AMBULATE: ICD-10-CM

## 2025-03-07 LAB
ALBUMIN SERPL-MCNC: 2.8 G/DL (ref 3.5–5)
ALBUMIN/GLOB SERPL: 0.7 (ref 1.1–2.2)
ALP SERPL-CCNC: 73 U/L (ref 45–117)
ALT SERPL-CCNC: 12 U/L (ref 12–78)
ANION GAP SERPL CALC-SCNC: 13 MMOL/L (ref 2–12)
AST SERPL-CCNC: 16 U/L (ref 15–37)
BASOPHILS # BLD: 0.04 K/UL (ref 0–0.1)
BASOPHILS NFR BLD: 0.3 % (ref 0–1)
BILIRUB SERPL-MCNC: 0.9 MG/DL (ref 0.2–1)
BUN SERPL-MCNC: 19 MG/DL (ref 6–20)
BUN/CREAT SERPL: 16 (ref 12–20)
CALCIUM SERPL-MCNC: 8.4 MG/DL (ref 8.5–10.1)
CHLORIDE SERPL-SCNC: 107 MMOL/L (ref 97–108)
CK SERPL-CCNC: 38 U/L (ref 39–308)
CO2 SERPL-SCNC: 19 MMOL/L (ref 21–32)
COMMENT:: NORMAL
CREAT SERPL-MCNC: 1.19 MG/DL (ref 0.7–1.3)
CRP SERPL-MCNC: 16 MG/DL (ref 0–0.3)
DIFFERENTIAL METHOD BLD: ABNORMAL
EOSINOPHIL # BLD: 0 K/UL (ref 0–0.4)
EOSINOPHIL NFR BLD: 0 % (ref 0–7)
ERYTHROCYTE [DISTWIDTH] IN BLOOD BY AUTOMATED COUNT: 15.4 % (ref 11.5–14.5)
FLUAV RNA SPEC QL NAA+PROBE: NOT DETECTED
FLUBV RNA SPEC QL NAA+PROBE: NOT DETECTED
GLOBULIN SER CALC-MCNC: 4 G/DL (ref 2–4)
GLUCOSE SERPL-MCNC: 118 MG/DL (ref 65–100)
HCT VFR BLD AUTO: 31.8 % (ref 36.6–50.3)
HGB BLD-MCNC: 9.8 G/DL (ref 12.1–17)
IMM GRANULOCYTES # BLD AUTO: 0.06 K/UL (ref 0–0.04)
IMM GRANULOCYTES NFR BLD AUTO: 0.4 % (ref 0–0.5)
LIPASE SERPL-CCNC: 19 U/L (ref 13–75)
LYMPHOCYTES # BLD: 0.9 K/UL (ref 0.8–3.5)
LYMPHOCYTES NFR BLD: 6.7 % (ref 12–49)
MAGNESIUM SERPL-MCNC: 1.9 MG/DL (ref 1.6–2.4)
MCH RBC QN AUTO: 26.7 PG (ref 26–34)
MCHC RBC AUTO-ENTMCNC: 30.8 G/DL (ref 30–36.5)
MCV RBC AUTO: 86.6 FL (ref 80–99)
MONOCYTES # BLD: 1.51 K/UL (ref 0–1)
MONOCYTES NFR BLD: 11.3 % (ref 5–13)
NEUTS SEG # BLD: 10.84 K/UL (ref 1.8–8)
NEUTS SEG NFR BLD: 81.3 % (ref 32–75)
NRBC # BLD: 0 K/UL (ref 0–0.01)
NRBC BLD-RTO: 0 PER 100 WBC
PHOSPHATE SERPL-MCNC: 2.4 MG/DL (ref 2.6–4.7)
PLATELET # BLD AUTO: 220 K/UL (ref 150–400)
PMV BLD AUTO: 10.7 FL (ref 8.9–12.9)
POTASSIUM SERPL-SCNC: 3.7 MMOL/L (ref 3.5–5.1)
PROT SERPL-MCNC: 6.8 G/DL (ref 6.4–8.2)
RBC # BLD AUTO: 3.67 M/UL (ref 4.1–5.7)
SARS-COV-2 RNA RESP QL NAA+PROBE: NOT DETECTED
SODIUM SERPL-SCNC: 139 MMOL/L (ref 136–145)
SOURCE: NORMAL
SPECIMEN HOLD: NORMAL
TROPONIN I SERPL HS-MCNC: 5 NG/L (ref 0–76)
WBC # BLD AUTO: 13.4 K/UL (ref 4.1–11.1)

## 2025-03-07 PROCEDURE — 71045 X-RAY EXAM CHEST 1 VIEW: CPT

## 2025-03-07 PROCEDURE — 93005 ELECTROCARDIOGRAM TRACING: CPT | Performed by: EMERGENCY MEDICINE

## 2025-03-07 PROCEDURE — 85025 COMPLETE CBC W/AUTO DIFF WBC: CPT

## 2025-03-07 PROCEDURE — 84100 ASSAY OF PHOSPHORUS: CPT

## 2025-03-07 PROCEDURE — 6360000002 HC RX W HCPCS: Performed by: EMERGENCY MEDICINE

## 2025-03-07 PROCEDURE — 2500000003 HC RX 250 WO HCPCS: Performed by: EMERGENCY MEDICINE

## 2025-03-07 PROCEDURE — 36415 COLL VENOUS BLD VENIPUNCTURE: CPT

## 2025-03-07 PROCEDURE — 96374 THER/PROPH/DIAG INJ IV PUSH: CPT

## 2025-03-07 PROCEDURE — 82550 ASSAY OF CK (CPK): CPT

## 2025-03-07 PROCEDURE — 96375 TX/PRO/DX INJ NEW DRUG ADDON: CPT

## 2025-03-07 PROCEDURE — 86140 C-REACTIVE PROTEIN: CPT

## 2025-03-07 PROCEDURE — 99285 EMERGENCY DEPT VISIT HI MDM: CPT

## 2025-03-07 PROCEDURE — 80053 COMPREHEN METABOLIC PANEL: CPT

## 2025-03-07 PROCEDURE — 83690 ASSAY OF LIPASE: CPT

## 2025-03-07 PROCEDURE — 74177 CT ABD & PELVIS W/CONTRAST: CPT

## 2025-03-07 PROCEDURE — 84484 ASSAY OF TROPONIN QUANT: CPT

## 2025-03-07 PROCEDURE — 87636 SARSCOV2 & INF A&B AMP PRB: CPT

## 2025-03-07 PROCEDURE — 6360000004 HC RX CONTRAST MEDICATION: Performed by: EMERGENCY MEDICINE

## 2025-03-07 PROCEDURE — 2580000003 HC RX 258: Performed by: EMERGENCY MEDICINE

## 2025-03-07 PROCEDURE — 83735 ASSAY OF MAGNESIUM: CPT

## 2025-03-07 RX ORDER — ONDANSETRON 2 MG/ML
8 INJECTION INTRAMUSCULAR; INTRAVENOUS ONCE
Status: COMPLETED | OUTPATIENT
Start: 2025-03-07 | End: 2025-03-07

## 2025-03-07 RX ORDER — IOPAMIDOL 755 MG/ML
100 INJECTION, SOLUTION INTRAVASCULAR
Status: COMPLETED | OUTPATIENT
Start: 2025-03-07 | End: 2025-03-07

## 2025-03-07 RX ORDER — 0.9 % SODIUM CHLORIDE 0.9 %
1000 INTRAVENOUS SOLUTION INTRAVENOUS ONCE
Status: COMPLETED | OUTPATIENT
Start: 2025-03-07 | End: 2025-03-08

## 2025-03-07 RX ADMIN — SODIUM CHLORIDE 1000 ML: 0.9 INJECTION, SOLUTION INTRAVENOUS at 22:37

## 2025-03-07 RX ADMIN — FAMOTIDINE 20 MG: 10 INJECTION, SOLUTION INTRAVENOUS at 22:38

## 2025-03-07 RX ADMIN — IOPAMIDOL 100 ML: 755 INJECTION, SOLUTION INTRAVENOUS at 23:33

## 2025-03-07 RX ADMIN — ONDANSETRON 8 MG: 2 INJECTION, SOLUTION INTRAMUSCULAR; INTRAVENOUS at 22:37

## 2025-03-07 ASSESSMENT — PAIN SCALES - GENERAL: PAINLEVEL_OUTOF10: 0

## 2025-03-07 ASSESSMENT — PAIN - FUNCTIONAL ASSESSMENT: PAIN_FUNCTIONAL_ASSESSMENT: 0-10

## 2025-03-08 ENCOUNTER — APPOINTMENT (OUTPATIENT)
Facility: HOSPITAL | Age: 85
DRG: 546 | End: 2025-03-08
Payer: MEDICARE

## 2025-03-08 PROBLEM — R26.81 UNSTEADY GAIT: Status: ACTIVE | Noted: 2025-03-08

## 2025-03-08 PROBLEM — R29.6 RECURRENT FALLS: Status: ACTIVE | Noted: 2025-03-08

## 2025-03-08 LAB
ANION GAP SERPL CALC-SCNC: 8 MMOL/L (ref 2–12)
APPEARANCE UR: CLEAR
BACTERIA URNS QL MICRO: NEGATIVE /HPF
BILIRUB UR QL: NEGATIVE
BUN SERPL-MCNC: 14 MG/DL (ref 6–20)
BUN/CREAT SERPL: 14 (ref 12–20)
CALCIUM SERPL-MCNC: 8.2 MG/DL (ref 8.5–10.1)
CHLORIDE SERPL-SCNC: 108 MMOL/L (ref 97–108)
CO2 SERPL-SCNC: 24 MMOL/L (ref 21–32)
COLOR UR: ABNORMAL
CREAT SERPL-MCNC: 1.03 MG/DL (ref 0.7–1.3)
EPITH CASTS URNS QL MICRO: ABNORMAL /LPF
GLUCOSE SERPL-MCNC: 146 MG/DL (ref 65–100)
GLUCOSE UR STRIP.AUTO-MCNC: NEGATIVE MG/DL
HGB UR QL STRIP: NEGATIVE
KETONES UR QL STRIP.AUTO: NEGATIVE MG/DL
LEUKOCYTE ESTERASE UR QL STRIP.AUTO: ABNORMAL
NITRITE UR QL STRIP.AUTO: NEGATIVE
PH UR STRIP: 5.5 (ref 5–8)
POTASSIUM SERPL-SCNC: 4 MMOL/L (ref 3.5–5.1)
PROT UR STRIP-MCNC: NEGATIVE MG/DL
RBC #/AREA URNS HPF: ABNORMAL /HPF (ref 0–5)
SODIUM SERPL-SCNC: 140 MMOL/L (ref 136–145)
SP GR UR REFRACTOMETRY: <1.005 (ref 1–1.03)
URINE CULTURE IF INDICATED: ABNORMAL
UROBILINOGEN UR QL STRIP.AUTO: 0.2 EU/DL (ref 0.2–1)
WBC URNS QL MICRO: ABNORMAL /HPF (ref 0–4)
YEAST BUDDING URNS QL: PRESENT

## 2025-03-08 PROCEDURE — 36415 COLL VENOUS BLD VENIPUNCTURE: CPT

## 2025-03-08 PROCEDURE — 97163 PT EVAL HIGH COMPLEX 45 MIN: CPT

## 2025-03-08 PROCEDURE — 6360000002 HC RX W HCPCS: Performed by: EMERGENCY MEDICINE

## 2025-03-08 PROCEDURE — 2500000003 HC RX 250 WO HCPCS: Performed by: HOSPITALIST

## 2025-03-08 PROCEDURE — 80048 BASIC METABOLIC PNL TOTAL CA: CPT

## 2025-03-08 PROCEDURE — 70551 MRI BRAIN STEM W/O DYE: CPT

## 2025-03-08 PROCEDURE — 94761 N-INVAS EAR/PLS OXIMETRY MLT: CPT

## 2025-03-08 PROCEDURE — 2580000003 HC RX 258: Performed by: HOSPITALIST

## 2025-03-08 PROCEDURE — 2500000003 HC RX 250 WO HCPCS: Performed by: EMERGENCY MEDICINE

## 2025-03-08 PROCEDURE — 72156 MRI NECK SPINE W/O & W/DYE: CPT

## 2025-03-08 PROCEDURE — 92610 EVALUATE SWALLOWING FUNCTION: CPT

## 2025-03-08 PROCEDURE — 81001 URINALYSIS AUTO W/SCOPE: CPT

## 2025-03-08 PROCEDURE — 6360000004 HC RX CONTRAST MEDICATION: Performed by: RADIOLOGY

## 2025-03-08 PROCEDURE — 1100000003 HC PRIVATE W/ TELEMETRY

## 2025-03-08 PROCEDURE — 6370000000 HC RX 637 (ALT 250 FOR IP): Performed by: HOSPITALIST

## 2025-03-08 PROCEDURE — A9579 GAD-BASE MR CONTRAST NOS,1ML: HCPCS | Performed by: RADIOLOGY

## 2025-03-08 PROCEDURE — 93005 ELECTROCARDIOGRAM TRACING: CPT | Performed by: EMERGENCY MEDICINE

## 2025-03-08 PROCEDURE — 96375 TX/PRO/DX INJ NEW DRUG ADDON: CPT

## 2025-03-08 PROCEDURE — 70450 CT HEAD/BRAIN W/O DYE: CPT

## 2025-03-08 PROCEDURE — 99223 1ST HOSP IP/OBS HIGH 75: CPT | Performed by: NURSE PRACTITIONER

## 2025-03-08 PROCEDURE — 97530 THERAPEUTIC ACTIVITIES: CPT

## 2025-03-08 RX ORDER — MICONAZOLE NITRATE 20 MG/G
CREAM TOPICAL 2 TIMES DAILY
Status: DISCONTINUED | OUTPATIENT
Start: 2025-03-08 | End: 2025-03-12 | Stop reason: HOSPADM

## 2025-03-08 RX ORDER — ROSUVASTATIN CALCIUM 10 MG/1
40 TABLET, COATED ORAL NIGHTLY
Status: DISCONTINUED | OUTPATIENT
Start: 2025-03-08 | End: 2025-03-12 | Stop reason: HOSPADM

## 2025-03-08 RX ORDER — SODIUM CHLORIDE 0.9 % (FLUSH) 0.9 %
5-40 SYRINGE (ML) INJECTION PRN
Status: DISCONTINUED | OUTPATIENT
Start: 2025-03-08 | End: 2025-03-12 | Stop reason: HOSPADM

## 2025-03-08 RX ORDER — PREDNISONE 20 MG/1
20 TABLET ORAL DAILY
Status: DISCONTINUED | OUTPATIENT
Start: 2025-03-09 | End: 2025-03-10

## 2025-03-08 RX ORDER — POLYETHYLENE GLYCOL 3350 17 G/17G
17 POWDER, FOR SOLUTION ORAL 2 TIMES DAILY
Status: DISCONTINUED | OUTPATIENT
Start: 2025-03-08 | End: 2025-03-12 | Stop reason: HOSPADM

## 2025-03-08 RX ORDER — CLOPIDOGREL BISULFATE 75 MG/1
75 TABLET ORAL DAILY
COMMUNITY

## 2025-03-08 RX ORDER — ASPIRIN 81 MG/1
81 TABLET ORAL DAILY
Status: DISCONTINUED | OUTPATIENT
Start: 2025-03-08 | End: 2025-03-12 | Stop reason: HOSPADM

## 2025-03-08 RX ORDER — SENNA AND DOCUSATE SODIUM 50; 8.6 MG/1; MG/1
2 TABLET, FILM COATED ORAL DAILY
COMMUNITY

## 2025-03-08 RX ORDER — CLOPIDOGREL BISULFATE 75 MG/1
75 TABLET ORAL DAILY
Status: DISCONTINUED | OUTPATIENT
Start: 2025-03-08 | End: 2025-03-12 | Stop reason: HOSPADM

## 2025-03-08 RX ORDER — ONDANSETRON 4 MG/1
4 TABLET, ORALLY DISINTEGRATING ORAL EVERY 8 HOURS PRN
Status: DISCONTINUED | OUTPATIENT
Start: 2025-03-08 | End: 2025-03-12 | Stop reason: HOSPADM

## 2025-03-08 RX ORDER — ACETAMINOPHEN 325 MG/1
650 TABLET ORAL EVERY 6 HOURS PRN
Status: DISCONTINUED | OUTPATIENT
Start: 2025-03-08 | End: 2025-03-12 | Stop reason: HOSPADM

## 2025-03-08 RX ORDER — SODIUM CHLORIDE 9 MG/ML
INJECTION, SOLUTION INTRAVENOUS PRN
Status: DISCONTINUED | OUTPATIENT
Start: 2025-03-08 | End: 2025-03-12 | Stop reason: HOSPADM

## 2025-03-08 RX ORDER — LACTULOSE 10 G/15ML
20 SOLUTION ORAL 2 TIMES DAILY
Status: COMPLETED | OUTPATIENT
Start: 2025-03-08 | End: 2025-03-08

## 2025-03-08 RX ORDER — FLUCONAZOLE 100 MG/1
100 TABLET ORAL DAILY
Qty: 7 TABLET | Refills: 0 | Status: SHIPPED | OUTPATIENT
Start: 2025-03-08 | End: 2025-03-15

## 2025-03-08 RX ORDER — SENNA AND DOCUSATE SODIUM 50; 8.6 MG/1; MG/1
2 TABLET, FILM COATED ORAL 2 TIMES DAILY
Status: DISCONTINUED | OUTPATIENT
Start: 2025-03-08 | End: 2025-03-12 | Stop reason: HOSPADM

## 2025-03-08 RX ORDER — PANTOPRAZOLE SODIUM 40 MG/1
40 TABLET, DELAYED RELEASE ORAL
Status: DISCONTINUED | OUTPATIENT
Start: 2025-03-08 | End: 2025-03-12 | Stop reason: HOSPADM

## 2025-03-08 RX ORDER — ONDANSETRON 4 MG/1
4 TABLET, ORALLY DISINTEGRATING ORAL 3 TIMES DAILY PRN
Qty: 21 TABLET | Refills: 0 | Status: SHIPPED | OUTPATIENT
Start: 2025-03-08

## 2025-03-08 RX ORDER — SODIUM CHLORIDE 0.9 % (FLUSH) 0.9 %
5-40 SYRINGE (ML) INJECTION EVERY 12 HOURS SCHEDULED
Status: DISCONTINUED | OUTPATIENT
Start: 2025-03-08 | End: 2025-03-12 | Stop reason: HOSPADM

## 2025-03-08 RX ORDER — ONDANSETRON 2 MG/ML
4 INJECTION INTRAMUSCULAR; INTRAVENOUS EVERY 6 HOURS PRN
Status: DISCONTINUED | OUTPATIENT
Start: 2025-03-08 | End: 2025-03-12 | Stop reason: HOSPADM

## 2025-03-08 RX ORDER — POLYETHYLENE GLYCOL 3350 17 G/17G
17 POWDER, FOR SOLUTION ORAL DAILY PRN
Status: DISCONTINUED | OUTPATIENT
Start: 2025-03-08 | End: 2025-03-08

## 2025-03-08 RX ORDER — ASPIRIN 81 MG/1
81 TABLET ORAL DAILY
COMMUNITY

## 2025-03-08 RX ORDER — DULOXETIN HYDROCHLORIDE 30 MG/1
30 CAPSULE, DELAYED RELEASE ORAL NIGHTLY
Status: DISCONTINUED | OUTPATIENT
Start: 2025-03-08 | End: 2025-03-12 | Stop reason: HOSPADM

## 2025-03-08 RX ADMIN — LACTULOSE 20 G: 10 SOLUTION ORAL at 22:43

## 2025-03-08 RX ADMIN — SODIUM CHLORIDE, PRESERVATIVE FREE 10 ML: 5 INJECTION INTRAVENOUS at 22:44

## 2025-03-08 RX ADMIN — POLYETHYLENE GLYCOL 3350 17 G: 17 POWDER, FOR SOLUTION ORAL at 08:48

## 2025-03-08 RX ADMIN — PANTOPRAZOLE SODIUM 40 MG: 40 TABLET, DELAYED RELEASE ORAL at 16:35

## 2025-03-08 RX ADMIN — MICONAZOLE NITRATE: 20 CREAM TOPICAL at 22:46

## 2025-03-08 RX ADMIN — DULOXETINE 30 MG: 30 CAPSULE, DELAYED RELEASE ORAL at 22:42

## 2025-03-08 RX ADMIN — ROSUVASTATIN CALCIUM 40 MG: 10 TABLET, FILM COATED ORAL at 22:42

## 2025-03-08 RX ADMIN — WATER 40 MG: 1 INJECTION INTRAMUSCULAR; INTRAVENOUS; SUBCUTANEOUS at 03:28

## 2025-03-08 RX ADMIN — POLYETHYLENE GLYCOL 3350 17 G: 17 POWDER, FOR SOLUTION ORAL at 22:43

## 2025-03-08 RX ADMIN — GADOTERIDOL 15 ML: 279.3 INJECTION, SOLUTION INTRAVENOUS at 21:08

## 2025-03-08 RX ADMIN — SODIUM CHLORIDE, PRESERVATIVE FREE 10 ML: 5 INJECTION INTRAVENOUS at 08:49

## 2025-03-08 RX ADMIN — LACTULOSE 20 G: 10 SOLUTION ORAL at 08:48

## 2025-03-08 RX ADMIN — POTASSIUM PHOSPHATE, MONOBASIC POTASSIUM PHOSPHATE, DIBASIC 15 MMOL: 224; 236 INJECTION, SOLUTION, CONCENTRATE INTRAVENOUS at 10:43

## 2025-03-08 RX ADMIN — ASPIRIN 81 MG: 81 TABLET, COATED ORAL at 08:47

## 2025-03-08 RX ADMIN — CLOPIDOGREL BISULFATE 75 MG: 75 TABLET ORAL at 08:47

## 2025-03-08 RX ADMIN — SENNOSIDES AND DOCUSATE SODIUM 2 TABLET: 50; 8.6 TABLET ORAL at 08:47

## 2025-03-08 RX ADMIN — SENNOSIDES AND DOCUSATE SODIUM 2 TABLET: 50; 8.6 TABLET ORAL at 22:42

## 2025-03-08 RX ADMIN — PANTOPRAZOLE SODIUM 40 MG: 40 TABLET, DELAYED RELEASE ORAL at 08:47

## 2025-03-08 RX ADMIN — MICONAZOLE NITRATE: 20 CREAM TOPICAL at 10:55

## 2025-03-08 NOTE — ED NOTES
TRANSFER - OUT REPORT:    Verbal report given to Xin  on Lance Morocho  being transferred to [facility] for routine progression of patient care       Report consisted of patient's Situation, Background, Assessment and   Recommendations(SBAR).     Information from the following report(s) Nurse Handoff Report, ED Encounter Summary, ED SBAR, Intake/Output, MAR, Recent Results, Cardiac Rhythm NSR, and Neuro Assessment was reviewed with the receiving nurse.    [place] Fall Assessment:    Presents to emergency department  because of falls (Syncope, seizure, or loss of consciousness): No  Age > 70: Yes  Altered Mental Status, Intoxication with alcohol or substance confusion (Disorientation, impaired judgment, poor safety awaremess, or inability to follow instructions): No  Impaired Mobility: Ambulates or transfers with assistive devices or assistance; Unable to ambulate or transer.: Yes  Nursing Judgement: Yes          Lines:   Peripheral IV 03/07/25 Left;Proximal;Anterior Forearm (Active)   Site Assessment Clean, dry & intact 03/08/25 0336   Line Status Brisk blood return;Flushed;Normal saline locked 03/08/25 0336   Line Care Connections checked and tightened;Line pulled back 03/08/25 0336   Phlebitis Assessment No symptoms 03/08/25 0336   Infiltration Assessment 0 03/08/25 0336   Dressing Status New dressing applied;Clean, dry & intact 03/08/25 0336   Dressing Type Transparent;Other (Comment) 03/08/25 0336   Dressing Intervention New;Dressing changed 03/08/25 0336        Opportunity for questions and clarification was provided.      Patient transported with:  Monitor and Tech

## 2025-03-08 NOTE — ED TRIAGE NOTES
Pt arrives from home for n/v x3 days.  Pt was found by EMS very fatigued.  500 mL's NS given in route.  Pt arrives more alert per EMS.      PMH afib, 3 ablations     Pt's wife wanted to mention that there were mice in the house in the beginning of February.

## 2025-03-08 NOTE — H&P
stasis    PAD s/p stent left leg 1/25 at [facility]  --cont plavix, aspirin.  Need further stenting in b/l legs per wife.  FU with his cardiovascular specialist at [facility]    P. Afib s/p watchman    Hyperlipidemia  --change pravastatin to crestor as above          Medical Decision Making:   I have personally reviewed the radiographs, laboratory data in Epic and decisions and statements above are based partially on this personal interpretation.      Code Status: DNR/DNI, dw patient and wife at bedside during discussion.  Surrogate decision maker wife [place]  DVT Prophylaxis: Lovenox  GI Prophylaxis: PPI         Subjective:   CHIEF COMPLAINT: \"recurrent falls, generalized weakness, n/v\"    HISTORY OF PRESENT ILLNESS:     Lance Morocho is a 84 y.o.  male with PMHx PAD s/p stent left leg 1/25, BPH s/p TURP, hx pseudomonas uti 9/23, orthostatic hypotension no longer on midodrine, Sjogren's disease on chronic prednisone, p. Afib s/p Watchman, Tay's esophagus, hyperlipidemia presents from home with n/v x 3 days, recurrent falls x 2 in past 24 hours wife needing to call EMS to help pick him up, generalized weakness.  His legs are giving out and he is collapsing to floor, no syncope, dizziness.    Per wife, chronically in bed past 2 months, walks a little with walker, and use wheelchair.  Home pt/OT started 3 weeks ago to help mobility.  In hospital January at [facility] for leg claudication, had stent to left leg but needs further intervention in both legs per his cardiovascular surgeon at [facility].  No fever, chills.  No dizziness, lightheaded, chest pain, SOB.  Has been eating laying down.  No constipation, diarrhea.  Has skin tears that bleed easily due to being on plavix and aspirin.    Passed PO challenge in ER after IVF, pepcid, zofran but referred for admission due to unsteady gait.  Denies any prior hx of stroke    Hospitalized 3 weeks 9/23 after presenting with n/v for orthostatic hypotension, pseudomonas uti, new

## 2025-03-08 NOTE — ED PROVIDER NOTES
[facility] B[facility] INTERMEDIATE CARE UNIT  EMERGENCY DEPARTMENT ENCOUNTER      Pt Name: Lance Morocho  MRN: [id number]  Birthdate 1940  Date of evaluation: 3/7/2025  Provider: Mauricio Clements MD    CHIEF COMPLAINT       Chief Complaint   Patient presents with    Vomiting         HISTORY OF PRESENT ILLNESS   (Location/Symptom, Timing/Onset, Context/Setting, Quality, Duration, Modifying Factors, Severity)  Note limiting factors.   84-year-old male with a past medical history significant for chronic kidney disease, Sjogren's syndrome, urinary retention, status post cervical discectomy, cystoscopy, appendectomy, zygomatic arch repair, vasectomy, ventral hernia repair, TURP, total hip arthroplasty who presents to the ER accompanied by his wife by EMS for evaluation for nausea and vomiting which were malaise and weakness that began this evening.  EMS stated they were called to bedside for evaluation for generalized weakness with nausea and vomiting and decreased activity and unable to keep anything down in the last 12 hours.  The patient denies any headache, chest pain shortness of breath, abdominal pain, neck and back pain, sick contact, skin rash, recent travel, unusual food sources.  The wife arrived to the bedside a while later and stated that the patient has been increasingly weak and unable to ambulate per her usual.  He is usually able to stand, transfer and walk with his walker and has not done so for the past 2 to 3 days.  He collapsed 3 times today and was unable to get back up and EMS 9 was notified and able to get him back up.  Wife assisted at the patient complained of epigastric and breathing discomfort and has been eating laying down.  He appears to be weaker than usual.  He denies any headache, neck and back pain, dizziness, localized weakness and numbness to upper or lower extremity at this time.            Review of External Medical Records:     Nursing Notes were reviewed.    REVIEW OF SYSTEMS    (2-9

## 2025-03-08 NOTE — PLAN OF CARE
Problem: Physical Therapy - Adult  Goal: By Discharge: Performs mobility at highest level of function for planned discharge setting.  See evaluation for individualized goals.  Description: FUNCTIONAL STATUS PRIOR TO ADMISSION: Patient was modified independent using a rollator and independent with stand pivot transfers to/from wheelchair for functional mobility. 2 falls in the day preceding admission.    HOME SUPPORT PRIOR TO ADMISSION: The patient lived with his wife who per chart review is unable to provide his current level of support.    Physical Therapy Goals  Initiated 3/8/2025  1.  Patient will move from supine to sit and sit to supine, scoot up and down, and roll side to side in bed with independence within 7 day(s).    2.  Patient will perform sit to stand with modified independence within 7 day(s).  3.  Patient will transfer from bed to chair and chair to bed with supervision/set-up using the least restrictive device within 7 day(s).  4.  Patient will ambulate with supervision/set-up for 75 feet with the least restrictive device within 7 day(s).   5.  Patient will ascend/descend 3 stairs with 1-2 handrail(s) with supervision/set-up within 7 day(s).    Outcome: Progressing   PHYSICAL THERAPY EVALUATION    Patient: Lance Morocho (84 y.o. male)  Date: 3/8/2025  Primary Diagnosis: Dehydration [E86.0]  Unsteady gait [R26.81]  Gait instability [R26.81]  Generalized weakness [R53.1]  Unable to ambulate [R26.2]  Yeast cystitis [B37.41]  Recurrent falls [R29.6]  Nausea and vomiting, unspecified vomiting type [R11.2]       Precautions: Restrictions/Precautions  Restrictions/Precautions: Fall Risk            ASSESSMENT :   DEFICITS/IMPAIRMENTS:   The patient is limited by decreased functional mobility, independence in ADLs, strength, attention/concentration, balance, orthostatic hypotension in the setting of hospital admission for recurrent falls and weakness. CT head -, MRI pending at time of evaluation. Patient

## 2025-03-08 NOTE — CONSULTS
[facility]: [facility]    Mindy Liao, JANAA, CNRN, ACNP-BC  [facility] Neurology  [address]  [phone]        Name:   Lance Morocho   Medical record #: [id number]  Admission Date: 3/7/2025       Consult requested by: Tana Montesinos MD     Reason for Consult:  Unsteady gait, Sojourn's disease      HISTORY OF PRESENT ILLNESS:     Lance Morocho is a 84-year-old male with a past medical history of chronic kidney disease, Sojourn's syndrome, urinary retention, cervical discectomy, appendectomy, ventral hernia repair, TURP who presented to the ED on 3/7/2025 with .  A 12-hour history of generalized weakness with nausea and vomiting.  His wife stated he has been increasingly weak and unable to ambulate and that over the past 2 to 3 days he has been unable to stand transfer or walk with his walker.  She reported that he had collapsed multiple times that day requiring EMS assistance to get back up.  Upon arrival to the ED the provider found him to be ANO x 3, horizontal nystagmus, coherent with a nonfocal exam and a blood pressure of 129/80, afebrile, review of admission labs shows a presenting sodium of 139, creatinine of 1.19, glucose of 118, CRP of 16, elevated white blood cell count at 13.4, negative for flu or COVID-19, urine with budding yeast and leukocytes.  In discussion with Mr. Morocho he tells me that he had surgery on his legs a few days ago to remove blood clots and that he has had difficulty with his right leg \"collapsing\" since that time.  The Neurology Service is asked to evaluate for unsteady gait.    Patient was last seen by the [facility] team 9/7/2023 by Dr. Frost for Parkinson's features.  At that time his wife had stated that he had a long history of orthostatic hypotension and gait instability.  At that time he was offered a trial of Sinemet but refused and was referred to [facility] neurology for further workup.      Neuro-imaging:     CT Head: No

## 2025-03-09 LAB
ANION GAP SERPL CALC-SCNC: 7 MMOL/L (ref 2–12)
BASOPHILS # BLD: 0.02 K/UL (ref 0–0.1)
BASOPHILS NFR BLD: 0.2 % (ref 0–1)
BUN SERPL-MCNC: 17 MG/DL (ref 6–20)
BUN/CREAT SERPL: 16 (ref 12–20)
CALCIUM SERPL-MCNC: 9 MG/DL (ref 8.5–10.1)
CHLORIDE SERPL-SCNC: 111 MMOL/L (ref 97–108)
CHOLEST SERPL-MCNC: 115 MG/DL
CO2 SERPL-SCNC: 23 MMOL/L (ref 21–32)
CREAT SERPL-MCNC: 1.07 MG/DL (ref 0.7–1.3)
CRP SERPL-MCNC: 8.2 MG/DL (ref 0–0.3)
DIFFERENTIAL METHOD BLD: ABNORMAL
EKG ATRIAL RATE: 97 BPM
EKG DIAGNOSIS: NORMAL
EKG DIAGNOSIS: NORMAL
EKG P AXIS: 67 DEGREES
EKG P-R INTERVAL: 252 MS
EKG Q-T INTERVAL: 360 MS
EKG Q-T INTERVAL: 372 MS
EKG QRS DURATION: 76 MS
EKG QRS DURATION: 78 MS
EKG QTC CALCULATION (BAZETT): 472 MS
EKG QTC CALCULATION (BAZETT): 489 MS
EKG R AXIS: -23 DEGREES
EKG R AXIS: -8 DEGREES
EKG T AXIS: 38 DEGREES
EKG T AXIS: 40 DEGREES
EKG VENTRICULAR RATE: 111 BPM
EKG VENTRICULAR RATE: 97 BPM
EOSINOPHIL # BLD: 0.01 K/UL (ref 0–0.4)
EOSINOPHIL NFR BLD: 0.1 % (ref 0–7)
ERYTHROCYTE [DISTWIDTH] IN BLOOD BY AUTOMATED COUNT: 15.4 % (ref 11.5–14.5)
EST. AVERAGE GLUCOSE BLD GHB EST-MCNC: 108 MG/DL
FERRITIN SERPL-MCNC: 56 NG/ML (ref 26–388)
FOLATE SERPL-MCNC: 10.7 NG/ML (ref 5–21)
GLUCOSE SERPL-MCNC: 136 MG/DL (ref 65–100)
HBA1C MFR BLD: 5.4 % (ref 4–5.6)
HCT VFR BLD AUTO: 31.2 % (ref 36.6–50.3)
HDLC SERPL-MCNC: 58 MG/DL
HDLC SERPL: 2 (ref 0–5)
HGB BLD-MCNC: 9.6 G/DL (ref 12.1–17)
IMM GRANULOCYTES # BLD AUTO: 0.07 K/UL (ref 0–0.04)
IMM GRANULOCYTES NFR BLD AUTO: 0.7 % (ref 0–0.5)
IRON SATN MFR SERPL: 5 % (ref 20–50)
IRON SERPL-MCNC: 14 UG/DL (ref 35–150)
LDLC SERPL CALC-MCNC: 46.8 MG/DL (ref 0–100)
LYMPHOCYTES # BLD: 0.95 K/UL (ref 0.8–3.5)
LYMPHOCYTES NFR BLD: 9.7 % (ref 12–49)
MAGNESIUM SERPL-MCNC: 2.4 MG/DL (ref 1.6–2.4)
MCH RBC QN AUTO: 26.9 PG (ref 26–34)
MCHC RBC AUTO-ENTMCNC: 30.8 G/DL (ref 30–36.5)
MCV RBC AUTO: 87.4 FL (ref 80–99)
MONOCYTES # BLD: 1.12 K/UL (ref 0–1)
MONOCYTES NFR BLD: 11.4 % (ref 5–13)
NEUTS SEG # BLD: 7.67 K/UL (ref 1.8–8)
NEUTS SEG NFR BLD: 77.9 % (ref 32–75)
NRBC # BLD: 0 K/UL (ref 0–0.01)
NRBC BLD-RTO: 0 PER 100 WBC
PHOSPHATE SERPL-MCNC: 3.4 MG/DL (ref 2.6–4.7)
PLATELET # BLD AUTO: 231 K/UL (ref 150–400)
PMV BLD AUTO: 11.3 FL (ref 8.9–12.9)
POTASSIUM SERPL-SCNC: 3.6 MMOL/L (ref 3.5–5.1)
RBC # BLD AUTO: 3.57 M/UL (ref 4.1–5.7)
RETICS # AUTO: 0.06 M/UL (ref 0.03–0.1)
RETICS/RBC NFR AUTO: 1.8 % (ref 0.7–2.1)
SODIUM SERPL-SCNC: 141 MMOL/L (ref 136–145)
TIBC SERPL-MCNC: 292 UG/DL (ref 250–450)
TRIGL SERPL-MCNC: 51 MG/DL
VIT B12 SERPL-MCNC: 220 PG/ML (ref 193–986)
VLDLC SERPL CALC-MCNC: 10.2 MG/DL
WBC # BLD AUTO: 9.8 K/UL (ref 4.1–11.1)

## 2025-03-09 PROCEDURE — 1100000000 HC RM PRIVATE

## 2025-03-09 PROCEDURE — 93010 ELECTROCARDIOGRAM REPORT: CPT | Performed by: STUDENT IN AN ORGANIZED HEALTH CARE EDUCATION/TRAINING PROGRAM

## 2025-03-09 PROCEDURE — 36415 COLL VENOUS BLD VENIPUNCTURE: CPT

## 2025-03-09 PROCEDURE — 83735 ASSAY OF MAGNESIUM: CPT

## 2025-03-09 PROCEDURE — 2500000003 HC RX 250 WO HCPCS: Performed by: HOSPITALIST

## 2025-03-09 PROCEDURE — 82607 VITAMIN B-12: CPT

## 2025-03-09 PROCEDURE — 82746 ASSAY OF FOLIC ACID SERUM: CPT

## 2025-03-09 PROCEDURE — 85045 AUTOMATED RETICULOCYTE COUNT: CPT

## 2025-03-09 PROCEDURE — 97166 OT EVAL MOD COMPLEX 45 MIN: CPT

## 2025-03-09 PROCEDURE — 82728 ASSAY OF FERRITIN: CPT

## 2025-03-09 PROCEDURE — 86140 C-REACTIVE PROTEIN: CPT

## 2025-03-09 PROCEDURE — 83550 IRON BINDING TEST: CPT

## 2025-03-09 PROCEDURE — 83540 ASSAY OF IRON: CPT

## 2025-03-09 PROCEDURE — 6370000000 HC RX 637 (ALT 250 FOR IP): Performed by: HOSPITALIST

## 2025-03-09 PROCEDURE — 84100 ASSAY OF PHOSPHORUS: CPT

## 2025-03-09 PROCEDURE — 83036 HEMOGLOBIN GLYCOSYLATED A1C: CPT

## 2025-03-09 PROCEDURE — 80061 LIPID PANEL: CPT

## 2025-03-09 PROCEDURE — 80048 BASIC METABOLIC PNL TOTAL CA: CPT

## 2025-03-09 PROCEDURE — 85025 COMPLETE CBC W/AUTO DIFF WBC: CPT

## 2025-03-09 PROCEDURE — 97535 SELF CARE MNGMENT TRAINING: CPT

## 2025-03-09 RX ADMIN — CLOPIDOGREL BISULFATE 75 MG: 75 TABLET ORAL at 08:35

## 2025-03-09 RX ADMIN — PANTOPRAZOLE SODIUM 40 MG: 40 TABLET, DELAYED RELEASE ORAL at 06:03

## 2025-03-09 RX ADMIN — ASPIRIN 81 MG: 81 TABLET, COATED ORAL at 08:35

## 2025-03-09 RX ADMIN — PREDNISONE 20 MG: 20 TABLET ORAL at 08:35

## 2025-03-09 RX ADMIN — MICONAZOLE NITRATE: 20 CREAM TOPICAL at 21:24

## 2025-03-09 RX ADMIN — POLYETHYLENE GLYCOL 3350 17 G: 17 POWDER, FOR SOLUTION ORAL at 21:23

## 2025-03-09 RX ADMIN — SODIUM CHLORIDE, PRESERVATIVE FREE 10 ML: 5 INJECTION INTRAVENOUS at 08:36

## 2025-03-09 RX ADMIN — PANTOPRAZOLE SODIUM 40 MG: 40 TABLET, DELAYED RELEASE ORAL at 15:22

## 2025-03-09 RX ADMIN — SENNOSIDES AND DOCUSATE SODIUM 2 TABLET: 50; 8.6 TABLET ORAL at 08:35

## 2025-03-09 RX ADMIN — SODIUM CHLORIDE, PRESERVATIVE FREE 10 ML: 5 INJECTION INTRAVENOUS at 21:23

## 2025-03-09 RX ADMIN — MICONAZOLE NITRATE: 20 CREAM TOPICAL at 08:36

## 2025-03-09 RX ADMIN — ROSUVASTATIN CALCIUM 40 MG: 10 TABLET, FILM COATED ORAL at 21:22

## 2025-03-09 RX ADMIN — SENNOSIDES AND DOCUSATE SODIUM 2 TABLET: 50; 8.6 TABLET ORAL at 21:23

## 2025-03-09 RX ADMIN — DULOXETINE 30 MG: 30 CAPSULE, DELAYED RELEASE ORAL at 21:22

## 2025-03-09 RX ADMIN — POLYETHYLENE GLYCOL 3350 17 G: 17 POWDER, FOR SOLUTION ORAL at 08:35

## 2025-03-09 NOTE — CARE COORDINATION
Care Management Initial Assessment  3/9/2025 12:49 PM  If patient is discharged prior to next notation, then this note serves as note for discharge by case management.    Reason for Admission:   Dehydration [E86.0]  Unsteady gait [R26.81]  Gait instability [R26.81]  Generalized weakness [R53.1]  Unable to ambulate [R26.2]  Yeast cystitis [B37.41]  Recurrent falls [R29.6]  Nausea and vomiting, unspecified vomiting type [R11.2]         Patient Admission Status: Inpatient  Date Admitted to INP: 3/8/2025  RUR: Readmission Risk Score: 14.6    Hospitalization in the last 30 days (Readmission):  No        Advance Care Planning:  Code Status: DNR  Primary Healthcare Decision Maker: Legal Next of Kin (Laurel [phone] (spouse))   Advance Directive: has an advanced directive - a copy HAS NOT been provided.     __________________________________________________________________________  Assessment:     Pt was admitted on 3/8/2025 for generalized weakness. CM met with Pt to complete initial assessment. CM introduced self, CM role, and verified demographics.     Hx at [facility] 3x in the past per spouse.        03/09/25 1243   Service Assessment   Patient Orientation Alert and Oriented  (A&Ox2)   Cognition Alert   History Provided By Patient  (Laurel [phone])   Primary Caregiver Spouse   Accompanied By/Relationship spouse   Support Systems Spouse/Significant Other  (Laurel [phone])   Patient's Healthcare Decision Maker is: Legal Next of Kin  (Laurel [phone] (spouse))   PCP Verified by CM Yes  (Brook Mccarthy MD [phone])   Last Visit to PCP Within last 3 months   Prior Functional Level Assistance with the following:;Housework;Shopping;Mobility;Bathing;Cooking   Current Functional Level Assistance with the following:;Bathing;Housework;Shopping;Mobility;Cooking   Can patient return to prior living arrangement No   Ability to make needs known: Fair   Family able to

## 2025-03-09 NOTE — PLAN OF CARE
Problem: Occupational Therapy - Adult  Goal: By Discharge: Performs self-care activities at highest level of function for planned discharge setting.  See evaluation for individualized goals.  Description: FUNCTIONAL STATUS PRIOR TO ADMISSION:  Patient lives with spouse in 2 level house. For last 3 months, patient has not been able to safely ambulate and is using w/c for mobility. W/c will not fit in bathroom- he has been relying on BSC, and cannot shower. Was independent with ADL and mobility at rollator level prior to this.   Patient has history of Sjogren's disease   Receives Help From: Family, Prior Level of Assist for ADLs: Needs assistance,  ,  ,  ,  , Toileting: Needs assistance,  , Ambulation Assistance: Needs assistance, Prior Level of Assist for Transfers: Independent, Active : No     Occupational Therapy Goals:  Initiated 3/9/2025  1.  Patient will perform grooming in stand with Stand by Assist within 7 day(s).  2.  Patient will perform bathing with Stand by Assist within 7 day(s).  3.  Patient will perform lower body dressing with Stand by Assist within 7 day(s).  4.  Patient will perform toilet transfers with Stand by Assist  within 7 day(s).  5.  Patient will perform all aspects of toileting with Stand by Assist within 7 day(s).  Outcome: Progressing  OCCUPATIONAL THERAPY EVALUATION    Patient: Lance Morocho (84 y.o. male)  Date: 3/9/2025  Primary Diagnosis: Dehydration [E86.0]  Unsteady gait [R26.81]  Gait instability [R26.81]  Generalized weakness [R53.1]  Unable to ambulate [R26.2]  Yeast cystitis [B37.41]  Recurrent falls [R29.6]  Nausea and vomiting, unspecified vomiting type [R11.2]         Precautions: Fall Risk                  ASSESSMENT :  Patient currently requires minimal assistance to complete basic ADL tasks and min to mod assist for related mobility. The patient is limited by decreased functional mobility, independence in ADLs, strength, body mechanics, activity tolerance,

## 2025-03-10 LAB
ANION GAP SERPL CALC-SCNC: 6 MMOL/L (ref 2–12)
APTT PPP: 25.6 SEC (ref 22.1–31)
BASOPHILS # BLD: 0.04 K/UL (ref 0–0.1)
BASOPHILS NFR BLD: 0.4 % (ref 0–1)
BUN SERPL-MCNC: 17 MG/DL (ref 6–20)
BUN/CREAT SERPL: 16 (ref 12–20)
CALCIUM SERPL-MCNC: 8.5 MG/DL (ref 8.5–10.1)
CHLORIDE SERPL-SCNC: 111 MMOL/L (ref 97–108)
CO2 SERPL-SCNC: 24 MMOL/L (ref 21–32)
CREAT SERPL-MCNC: 1.04 MG/DL (ref 0.7–1.3)
CRP SERPL-MCNC: 4.69 MG/DL (ref 0–0.3)
DAT POLY-SP REAG RBC QL: NORMAL
DIFFERENTIAL METHOD BLD: ABNORMAL
EOSINOPHIL # BLD: 0.07 K/UL (ref 0–0.4)
EOSINOPHIL NFR BLD: 0.7 % (ref 0–7)
ERYTHROCYTE [DISTWIDTH] IN BLOOD BY AUTOMATED COUNT: 15.5 % (ref 11.5–14.5)
GLUCOSE SERPL-MCNC: 102 MG/DL (ref 65–100)
HAPTOGLOB SERPL-MCNC: 201 MG/DL (ref 30–200)
HCT VFR BLD AUTO: 29.3 % (ref 36.6–50.3)
HGB BLD-MCNC: 9 G/DL (ref 12.1–17)
IMM GRANULOCYTES # BLD AUTO: 0.07 K/UL (ref 0–0.04)
IMM GRANULOCYTES NFR BLD AUTO: 0.7 % (ref 0–0.5)
INR PPP: 1.1 (ref 0.9–1.1)
LDH SERPL L TO P-CCNC: 146 U/L (ref 85–241)
LYMPHOCYTES # BLD: 1.51 K/UL (ref 0.8–3.5)
LYMPHOCYTES NFR BLD: 15.1 % (ref 12–49)
MAGNESIUM SERPL-MCNC: 2.1 MG/DL (ref 1.6–2.4)
MCH RBC QN AUTO: 26.9 PG (ref 26–34)
MCHC RBC AUTO-ENTMCNC: 30.7 G/DL (ref 30–36.5)
MCV RBC AUTO: 87.5 FL (ref 80–99)
MONOCYTES # BLD: 0.99 K/UL (ref 0–1)
MONOCYTES NFR BLD: 9.9 % (ref 5–13)
NEUTS SEG # BLD: 7.35 K/UL (ref 1.8–8)
NEUTS SEG NFR BLD: 73.2 % (ref 32–75)
NRBC # BLD: 0 K/UL (ref 0–0.01)
NRBC BLD-RTO: 0 PER 100 WBC
PERIPHERAL SMEAR, MD REVIEW: NORMAL
PHOSPHATE SERPL-MCNC: 3.2 MG/DL (ref 2.6–4.7)
PLATELET # BLD AUTO: 232 K/UL (ref 150–400)
PMV BLD AUTO: 10.9 FL (ref 8.9–12.9)
POTASSIUM SERPL-SCNC: 3.7 MMOL/L (ref 3.5–5.1)
PROTHROMBIN TIME: 11.1 SEC (ref 9.2–11.2)
RBC # BLD AUTO: 3.35 M/UL (ref 4.1–5.7)
SODIUM SERPL-SCNC: 141 MMOL/L (ref 136–145)
THERAPEUTIC RANGE: NORMAL SECS (ref 58–77)
WBC # BLD AUTO: 10 K/UL (ref 4.1–11.1)

## 2025-03-10 PROCEDURE — 85610 PROTHROMBIN TIME: CPT

## 2025-03-10 PROCEDURE — 6360000002 HC RX W HCPCS: Performed by: STUDENT IN AN ORGANIZED HEALTH CARE EDUCATION/TRAINING PROGRAM

## 2025-03-10 PROCEDURE — 83735 ASSAY OF MAGNESIUM: CPT

## 2025-03-10 PROCEDURE — 97530 THERAPEUTIC ACTIVITIES: CPT

## 2025-03-10 PROCEDURE — 97535 SELF CARE MNGMENT TRAINING: CPT

## 2025-03-10 PROCEDURE — 97116 GAIT TRAINING THERAPY: CPT

## 2025-03-10 PROCEDURE — 92526 ORAL FUNCTION THERAPY: CPT

## 2025-03-10 PROCEDURE — 6370000000 HC RX 637 (ALT 250 FOR IP): Performed by: STUDENT IN AN ORGANIZED HEALTH CARE EDUCATION/TRAINING PROGRAM

## 2025-03-10 PROCEDURE — 94761 N-INVAS EAR/PLS OXIMETRY MLT: CPT

## 2025-03-10 PROCEDURE — 36415 COLL VENOUS BLD VENIPUNCTURE: CPT

## 2025-03-10 PROCEDURE — 86880 COOMBS TEST DIRECT: CPT

## 2025-03-10 PROCEDURE — 83010 ASSAY OF HAPTOGLOBIN QUANT: CPT

## 2025-03-10 PROCEDURE — 6370000000 HC RX 637 (ALT 250 FOR IP): Performed by: HOSPITALIST

## 2025-03-10 PROCEDURE — 84100 ASSAY OF PHOSPHORUS: CPT

## 2025-03-10 PROCEDURE — 86140 C-REACTIVE PROTEIN: CPT

## 2025-03-10 PROCEDURE — 85025 COMPLETE CBC W/AUTO DIFF WBC: CPT

## 2025-03-10 PROCEDURE — 1100000000 HC RM PRIVATE

## 2025-03-10 PROCEDURE — 83615 LACTATE (LD) (LDH) ENZYME: CPT

## 2025-03-10 PROCEDURE — 85730 THROMBOPLASTIN TIME PARTIAL: CPT

## 2025-03-10 PROCEDURE — 2500000003 HC RX 250 WO HCPCS: Performed by: HOSPITALIST

## 2025-03-10 PROCEDURE — 80048 BASIC METABOLIC PNL TOTAL CA: CPT

## 2025-03-10 PROCEDURE — 2580000003 HC RX 258: Performed by: STUDENT IN AN ORGANIZED HEALTH CARE EDUCATION/TRAINING PROGRAM

## 2025-03-10 RX ORDER — PREDNISONE 10 MG/1
10 TABLET ORAL DAILY
Status: DISCONTINUED | OUTPATIENT
Start: 2025-03-16 | End: 2025-03-10

## 2025-03-10 RX ORDER — PREDNISONE 10 MG/1
10 TABLET ORAL DAILY
Status: DISCONTINUED | OUTPATIENT
Start: 2025-03-12 | End: 2025-03-12 | Stop reason: HOSPADM

## 2025-03-10 RX ORDER — PREDNISONE 20 MG/1
20 TABLET ORAL DAILY
Status: DISCONTINUED | OUTPATIENT
Start: 2025-03-10 | End: 2025-03-10

## 2025-03-10 RX ORDER — PREDNISONE 5 MG/1
5 TABLET ORAL DAILY
Status: DISCONTINUED | OUTPATIENT
Start: 2025-03-19 | End: 2025-03-10

## 2025-03-10 RX ORDER — BISACODYL 10 MG
10 SUPPOSITORY, RECTAL RECTAL DAILY PRN
Status: DISCONTINUED | OUTPATIENT
Start: 2025-03-10 | End: 2025-03-12 | Stop reason: HOSPADM

## 2025-03-10 RX ORDER — PREDNISONE 5 MG/1
5 TABLET ORAL DAILY
Status: DISCONTINUED | OUTPATIENT
Start: 2025-03-14 | End: 2025-03-12 | Stop reason: HOSPADM

## 2025-03-10 RX ORDER — PREDNISONE 5 MG/1
5 TABLET ORAL DAILY
Status: DISCONTINUED | OUTPATIENT
Start: 2025-03-16 | End: 2025-03-12 | Stop reason: HOSPADM

## 2025-03-10 RX ORDER — FERROUS SULFATE 325(65) MG
325 TABLET ORAL EVERY OTHER DAY
Status: DISCONTINUED | OUTPATIENT
Start: 2025-03-10 | End: 2025-03-12 | Stop reason: HOSPADM

## 2025-03-10 RX ADMIN — SODIUM CHLORIDE, PRESERVATIVE FREE 10 ML: 5 INJECTION INTRAVENOUS at 21:29

## 2025-03-10 RX ADMIN — ROSUVASTATIN CALCIUM 40 MG: 10 TABLET, FILM COATED ORAL at 21:29

## 2025-03-10 RX ADMIN — POLYETHYLENE GLYCOL 3350 17 G: 17 POWDER, FOR SOLUTION ORAL at 21:29

## 2025-03-10 RX ADMIN — SODIUM CHLORIDE 300 MG: 9 INJECTION, SOLUTION INTRAVENOUS at 21:30

## 2025-03-10 RX ADMIN — FERROUS SULFATE TAB 325 MG (65 MG ELEMENTAL FE) 325 MG: 325 (65 FE) TAB at 18:44

## 2025-03-10 RX ADMIN — DULOXETINE 30 MG: 30 CAPSULE, DELAYED RELEASE ORAL at 21:29

## 2025-03-10 RX ADMIN — SENNOSIDES AND DOCUSATE SODIUM 2 TABLET: 50; 8.6 TABLET ORAL at 21:29

## 2025-03-10 RX ADMIN — PANTOPRAZOLE SODIUM 40 MG: 40 TABLET, DELAYED RELEASE ORAL at 15:12

## 2025-03-10 RX ADMIN — CLOPIDOGREL BISULFATE 75 MG: 75 TABLET ORAL at 10:17

## 2025-03-10 RX ADMIN — PANTOPRAZOLE SODIUM 40 MG: 40 TABLET, DELAYED RELEASE ORAL at 05:21

## 2025-03-10 RX ADMIN — PREDNISONE 20 MG: 20 TABLET ORAL at 10:17

## 2025-03-10 RX ADMIN — MICONAZOLE NITRATE: 20 CREAM TOPICAL at 21:29

## 2025-03-10 RX ADMIN — ASPIRIN 81 MG: 81 TABLET, COATED ORAL at 10:17

## 2025-03-10 RX ADMIN — MICONAZOLE NITRATE: 20 CREAM TOPICAL at 10:20

## 2025-03-10 NOTE — PLAN OF CARE
Problem: Pain  Goal: Verbalizes/displays adequate comfort level or baseline comfort level  3/10/2025 0145 by Hyun Holland, RN  Outcome: Progressing  3/9/2025 1329 by Wallace Salazar, RN  Outcome: Progressing     Problem: Safety - Adult  Goal: Free from fall injury  3/10/2025 0145 by Hyun Holland, RN  Outcome: Progressing  3/9/2025 1329 by Wallace Salazar, RN  Outcome: Progressing

## 2025-03-10 NOTE — PLAN OF CARE
Speech LAnguage Pathology TREATMENT    Patient: Lance Morocho (84 y.o. male)  Date: 3/10/2025  Primary Diagnosis: Dehydration [E86.0]  Unsteady gait [R26.81]  Gait instability [R26.81]  Generalized weakness [R53.1]  Unable to ambulate [R26.2]  Yeast cystitis [B37.41]  Recurrent falls [R29.6]  Nausea and vomiting, unspecified vomiting type [R11.2]       Precautions:  Fall Risk             aspiration     ASSESSMENT :  Patient with functional swallow. His speech is delayed. Possible PD diagnosis.     Patient will benefit from skilled intervention to address the above impairments.     PLAN :  Recommendations and Planned Interventions:  Diet: Regular and thin liquids  Upright for all PO         Acute SLP Services: Yes, SLP will continue to follow per plan of care.  Discharge Recommendations: No, additional SLP treatment not indicated at discharge     SUBJECTIVE:   Patient stated, “My wife and I work b/c we are both soiled people.”    OBJECTIVE:     Past Medical History:   Diagnosis Date    CKD (chronic kidney disease), stage III (HCC)     Sjoegren syndrome 04/2016    Urine retention      Past Surgical History:   Procedure Laterality Date    APPENDECTOMY      CERVICAL DISCECTOMY      CYSTOSCOPY N/A 9/19/2023    CYSTOSCOPY performed by Dale Jerome MD at [facility] MAIN OR    HEENT      Zygomatic arch repair    HERNIA REPAIR      ORTHOPEDIC SURGERY      Broken tibia and fibula    PROSTATE SURGERY      TURP    TOTAL HIP ARTHROPLASTY Left 06/11/2019    UPPER GASTROINTESTINAL ENDOSCOPY N/A 9/21/2023    EGD ESOPHAGOGASTRODUODENOSCOPY performed by Benjamin You MD at [facility] ENDOSCOPY    UPPER GASTROINTESTINAL ENDOSCOPY N/A 9/21/2023    EGD BIOPSY performed by Benjamin You MD at [facility] ENDOSCOPY    VASECTOMY       Prior Level of Function/Home Situation:   Social/Functional History  Lives With: Spouse  Type of Home: House  Home Layout: Two level  Home Access: Stairs to enter with rails  Entrance Stairs - Number of Steps: 3-4  Entrance Stairs

## 2025-03-10 NOTE — CONSULTS
ORTHOPAEDIC SURGERY CONSULT NOTE    Subjective:     Date of Consultation:  March 9, 2025      Lance Morocho is a 84 y.o. male who is being seen for cervical stenosis.  Patient has a significant past medical history of PAD s/p stent left leg 1/25, BPH s/p TURP, hx pseudomonas uti 9/23, orthostatic hypotension no longer on midodrine, Sjogren's disease on chronic prednisone, p. Afib s/p Watchman, Tay's esophagus, hyperlipidemia.  He presented from home due to falls x2-3 at home.  Patient reports he was recently diagnosed with influenza and is recovering as well as recovering from stenting to his left leg done at [facility].  He reports progressively worsening ability to ambulate over the past 7-14 days.  This was more pronounced 2-3 days prior to admission.  He reports no neck pain, no lumbar pain, no paraesthesias, no urinary/bowel incontinence, denies saddle paraesthesia, and denies recent cervical/thoracic/lumbar surgery.  Per notes, chronically in bed past 2 months, walks a little with walker, and use wheelchair. Home pt/OT started 3 weeks ago to help mobility. In hospital January at [facility] for leg claudication, had stent to left leg but needs further intervention in both legs per his cardiovascular surgeon at [facility].    Patient Active Problem List    Diagnosis Date Noted    Unsteady gait 03/08/2025    Recurrent falls 03/08/2025    Gait instability 09/22/2023    Generalized weakness 09/14/2023    Urinary frequency 09/14/2023    Vomiting without nausea 09/14/2023    Pyuria 09/14/2023    Intractable nausea and vomiting 09/13/2023    Syncope and collapse 08/10/2023    Pneumonia 06/25/2019    Fracture of femoral neck, left, closed ([facility]) 06/11/2019    CKD (chronic kidney disease), stage III ([facility])     Sepsis ([facility]) 10/12/2017    Dehydration 10/12/2017    UTI (urinary tract infection) 10/12/2017    Sinus tachycardia 10/12/2017    Renal insufficiency 10/12/2017    Hypoxia 10/12/2017    Leukocytosis 10/12/2017

## 2025-03-10 NOTE — PLAN OF CARE
Problem: Physical Therapy - Adult  Goal: By Discharge: Performs mobility at highest level of function for planned discharge setting.  See evaluation for individualized goals.  Description: FUNCTIONAL STATUS PRIOR TO ADMISSION: Patient was modified independent using a rollator and independent with stand pivot transfers to/from wheelchair for functional mobility. 2 falls in the day preceding admission.    HOME SUPPORT PRIOR TO ADMISSION: The patient lived with his wife who per chart review is unable to provide his current level of support.    Physical Therapy Goals  Initiated 3/8/2025  1.  Patient will move from supine to sit and sit to supine, scoot up and down, and roll side to side in bed with independence within 7 day(s).    2.  Patient will perform sit to stand with modified independence within 7 day(s).  3.  Patient will transfer from bed to chair and chair to bed with supervision/set-up using the least restrictive device within 7 day(s).  4.  Patient will ambulate with supervision/set-up for 75 feet with the least restrictive device within 7 day(s).   5.  Patient will ascend/descend 3 stairs with 1-2 handrail(s) with supervision/set-up within 7 day(s).    Outcome: Progressing   PHYSICAL THERAPY TREATMENT    Patient: Lance Morocho (84 y.o. male)  Date: 3/10/2025  Diagnosis: Dehydration [E86.0]  Unsteady gait [R26.81]  Gait instability [R26.81]  Generalized weakness [R53.1]  Unable to ambulate [R26.2]  Yeast cystitis [B37.41]  Recurrent falls [R29.6]  Nausea and vomiting, unspecified vomiting type [R11.2] Unsteady gait      Precautions: Restrictions/Precautions  Restrictions/Precautions: Fall Risk            ASSESSMENT:  Patient continues to benefit from skilled PT services and is slowly progressing towards goals. Pt limited by impaired balance, coordination, decreased strength and activity tolerance. BLE weakness R>L in standing with RW support min A x 1 for steadying with increased fatigue requiring seated

## 2025-03-10 NOTE — CARE COORDINATION
3/10/2025    1:03 PM  CM received additional SNF preferences from pt's wife - [facility], [facility], and [facility]. CM submitted referral via [employer] to [facility] and awaiting responses with [facility]. Pt's wife requested CM send referrals to [facility] and [facility] only if [facility] and [facility] denies as she has concerns re distance to these facilities.     12:54 PM  Care Management Progress Note    Reason for Admission:   Dehydration [E86.0]  Unsteady gait [R26.81]  Gait instability [R26.81]  Generalized weakness [R53.1]  Unable to ambulate [R26.2]  Yeast cystitis [B37.41]  Recurrent falls [R29.6]  Nausea and vomiting, unspecified vomiting type [R11.2]       Patient Admission Status: Inpatient  RUR:   Hospitalization in the last 30 days (Readmission):  No        Transition of care plan:  Discharge pending medical clearance. Therapy following.   SNF - CM notified that [facility] unable [facility] accept. CM spoke with pt's wife via p/c who provided [facility] as another preference. CM submitted referral via [employer]. Pt's wife will call with additional preferences. 1 more midnight required   If SNF or IPR:  SNF  Date FOC offered:   Accepting facility: TBD  Date authorization started with reference number: NA  Date authorization received and expires: NA  Discharge plan communicated with patient and/or discharge caregiver: Yes - wife    Date 1st IMM letter given: 3/9/25  Outpatient follow-up.  Transport at discharge: TBD       03/09/25 1243   Service Assessment   Patient Orientation Alert and Oriented  (A&Ox2)   Cognition Alert   History Provided By Patient  (Laurel [phone])   Primary Caregiver Spouse   Accompanied By/Relationship spouse   Support Systems Spouse/Significant Other  (Laurel [phone])   Patient's Healthcare Decision Maker is: Legal Next of Kin  (Laurel [phone] (spouse))   PCP Verified

## 2025-03-10 NOTE — PLAN OF CARE
Problem: Occupational Therapy - Adult  Goal: By Discharge: Performs self-care activities at highest level of function for planned discharge setting.  See evaluation for individualized goals.  Description: FUNCTIONAL STATUS PRIOR TO ADMISSION:  Patient lives with spouse in 2 level house. For last 3 months, patient has not been able to safely ambulate and is using w/c for mobility. W/c will not fit in bathroom- he has been relying on BSC, and cannot shower. Was independent with ADL and mobility at rollator level prior to this.   Patient has history of Sjogren's disease   Receives Help From: Family, Prior Level of Assist for ADLs: Needs assistance,  ,  ,  ,  , Toileting: Needs assistance,  , Ambulation Assistance: Needs assistance, Prior Level of Assist for Transfers: Independent, Active : No     Occupational Therapy Goals:  Initiated 3/9/2025  1.  Patient will perform grooming in stand with Stand by Assist within 7 day(s).  2.  Patient will perform bathing with Stand by Assist within 7 day(s).  3.  Patient will perform lower body dressing with Stand by Assist within 7 day(s).  4.  Patient will perform toilet transfers with Stand by Assist  within 7 day(s).  5.  Patient will perform all aspects of toileting with Stand by Assist within 7 day(s).  Outcome: Progressing   OCCUPATIONAL THERAPY TREATMENT  Patient: Lance Morocho (84 y.o. male)  Date: 3/10/2025  Primary Diagnosis: Dehydration [E86.0]  Unsteady gait [R26.81]  Gait instability [R26.81]  Generalized weakness [R53.1]  Unable to ambulate [R26.2]  Yeast cystitis [B37.41]  Recurrent falls [R29.6]  Nausea and vomiting, unspecified vomiting type [R11.2]       Precautions: Fall Risk                Chart, occupational therapy assessment, plan of care, and goals were reviewed.    ASSESSMENT  Patient continues to benefit from skilled OT services and is progressing towards goals. Pt limited by impaired balance, coordination, decreased strength and activity

## 2025-03-11 LAB
ANION GAP SERPL CALC-SCNC: 9 MMOL/L (ref 2–12)
BASOPHILS # BLD: 0.01 K/UL (ref 0–0.1)
BASOPHILS NFR BLD: 0.1 % (ref 0–1)
BUN SERPL-MCNC: 13 MG/DL (ref 6–20)
BUN/CREAT SERPL: 11 (ref 12–20)
CALCIUM SERPL-MCNC: 8.6 MG/DL (ref 8.5–10.1)
CHLORIDE SERPL-SCNC: 108 MMOL/L (ref 97–108)
CO2 SERPL-SCNC: 25 MMOL/L (ref 21–32)
CREAT SERPL-MCNC: 1.23 MG/DL (ref 0.7–1.3)
DIFFERENTIAL METHOD BLD: ABNORMAL
EOSINOPHIL # BLD: 0.01 K/UL (ref 0–0.4)
EOSINOPHIL NFR BLD: 0.1 % (ref 0–7)
ERYTHROCYTE [DISTWIDTH] IN BLOOD BY AUTOMATED COUNT: 15.2 % (ref 11.5–14.5)
GLUCOSE SERPL-MCNC: 112 MG/DL (ref 65–100)
HCT VFR BLD AUTO: 35.4 % (ref 36.6–50.3)
HGB BLD-MCNC: 11 G/DL (ref 12.1–17)
IMM GRANULOCYTES # BLD AUTO: 0.08 K/UL (ref 0–0.04)
IMM GRANULOCYTES NFR BLD AUTO: 0.7 % (ref 0–0.5)
LYMPHOCYTES # BLD: 1.29 K/UL (ref 0.8–3.5)
LYMPHOCYTES NFR BLD: 11.7 % (ref 12–49)
MAGNESIUM SERPL-MCNC: 2.3 MG/DL (ref 1.6–2.4)
MCH RBC QN AUTO: 27.1 PG (ref 26–34)
MCHC RBC AUTO-ENTMCNC: 31.1 G/DL (ref 30–36.5)
MCV RBC AUTO: 87.2 FL (ref 80–99)
MONOCYTES # BLD: 0.75 K/UL (ref 0–1)
MONOCYTES NFR BLD: 6.8 % (ref 5–13)
NEUTS SEG # BLD: 8.92 K/UL (ref 1.8–8)
NEUTS SEG NFR BLD: 80.6 % (ref 32–75)
NRBC # BLD: 0 K/UL (ref 0–0.01)
NRBC BLD-RTO: 0 PER 100 WBC
PHOSPHATE SERPL-MCNC: 4 MG/DL (ref 2.6–4.7)
PLATELET # BLD AUTO: 295 K/UL (ref 150–400)
PMV BLD AUTO: 10.6 FL (ref 8.9–12.9)
POTASSIUM SERPL-SCNC: 4 MMOL/L (ref 3.5–5.1)
RBC # BLD AUTO: 4.06 M/UL (ref 4.1–5.7)
SODIUM SERPL-SCNC: 142 MMOL/L (ref 136–145)
WBC # BLD AUTO: 11.1 K/UL (ref 4.1–11.1)

## 2025-03-11 PROCEDURE — 94761 N-INVAS EAR/PLS OXIMETRY MLT: CPT

## 2025-03-11 PROCEDURE — 85025 COMPLETE CBC W/AUTO DIFF WBC: CPT

## 2025-03-11 PROCEDURE — 97535 SELF CARE MNGMENT TRAINING: CPT

## 2025-03-11 PROCEDURE — 97530 THERAPEUTIC ACTIVITIES: CPT

## 2025-03-11 PROCEDURE — 83735 ASSAY OF MAGNESIUM: CPT

## 2025-03-11 PROCEDURE — 6370000000 HC RX 637 (ALT 250 FOR IP): Performed by: STUDENT IN AN ORGANIZED HEALTH CARE EDUCATION/TRAINING PROGRAM

## 2025-03-11 PROCEDURE — 6370000000 HC RX 637 (ALT 250 FOR IP): Performed by: HOSPITALIST

## 2025-03-11 PROCEDURE — 2580000003 HC RX 258: Performed by: STUDENT IN AN ORGANIZED HEALTH CARE EDUCATION/TRAINING PROGRAM

## 2025-03-11 PROCEDURE — 6360000002 HC RX W HCPCS: Performed by: STUDENT IN AN ORGANIZED HEALTH CARE EDUCATION/TRAINING PROGRAM

## 2025-03-11 PROCEDURE — 36415 COLL VENOUS BLD VENIPUNCTURE: CPT

## 2025-03-11 PROCEDURE — 97116 GAIT TRAINING THERAPY: CPT

## 2025-03-11 PROCEDURE — 84100 ASSAY OF PHOSPHORUS: CPT

## 2025-03-11 PROCEDURE — 1100000000 HC RM PRIVATE

## 2025-03-11 PROCEDURE — 2500000003 HC RX 250 WO HCPCS: Performed by: HOSPITALIST

## 2025-03-11 PROCEDURE — 80048 BASIC METABOLIC PNL TOTAL CA: CPT

## 2025-03-11 RX ADMIN — SENNOSIDES AND DOCUSATE SODIUM 2 TABLET: 50; 8.6 TABLET ORAL at 10:31

## 2025-03-11 RX ADMIN — POLYETHYLENE GLYCOL 3350 17 G: 17 POWDER, FOR SOLUTION ORAL at 10:32

## 2025-03-11 RX ADMIN — SODIUM CHLORIDE 300 MG: 9 INJECTION, SOLUTION INTRAVENOUS at 18:29

## 2025-03-11 RX ADMIN — PANTOPRAZOLE SODIUM 40 MG: 40 TABLET, DELAYED RELEASE ORAL at 17:10

## 2025-03-11 RX ADMIN — ROSUVASTATIN CALCIUM 40 MG: 10 TABLET, FILM COATED ORAL at 21:12

## 2025-03-11 RX ADMIN — MICONAZOLE NITRATE: 20 CREAM TOPICAL at 10:35

## 2025-03-11 RX ADMIN — PREDNISONE 15 MG: 10 TABLET ORAL at 00:01

## 2025-03-11 RX ADMIN — SENNOSIDES AND DOCUSATE SODIUM 2 TABLET: 50; 8.6 TABLET ORAL at 21:12

## 2025-03-11 RX ADMIN — PANTOPRAZOLE SODIUM 40 MG: 40 TABLET, DELAYED RELEASE ORAL at 06:05

## 2025-03-11 RX ADMIN — POLYETHYLENE GLYCOL 3350 17 G: 17 POWDER, FOR SOLUTION ORAL at 21:12

## 2025-03-11 RX ADMIN — SODIUM CHLORIDE, PRESERVATIVE FREE 10 ML: 5 INJECTION INTRAVENOUS at 10:32

## 2025-03-11 RX ADMIN — ASPIRIN 81 MG: 81 TABLET, COATED ORAL at 10:32

## 2025-03-11 RX ADMIN — DULOXETINE 30 MG: 30 CAPSULE, DELAYED RELEASE ORAL at 21:12

## 2025-03-11 RX ADMIN — CLOPIDOGREL BISULFATE 75 MG: 75 TABLET ORAL at 10:32

## 2025-03-11 RX ADMIN — MICONAZOLE NITRATE: 20 CREAM TOPICAL at 21:13

## 2025-03-11 RX ADMIN — PREDNISONE 15 MG: 10 TABLET ORAL at 10:31

## 2025-03-11 NOTE — PLAN OF CARE
Problem: Physical Therapy - Adult  Goal: By Discharge: Performs mobility at highest level of function for planned discharge setting.  See evaluation for individualized goals.  Description: FUNCTIONAL STATUS PRIOR TO ADMISSION: Patient was modified independent using a rollator and independent with stand pivot transfers to/from wheelchair for functional mobility. 2 falls in the day preceding admission.    HOME SUPPORT PRIOR TO ADMISSION: The patient lived with his wife who per chart review is unable to provide his current level of support.    Physical Therapy Goals  Initiated 3/8/2025  1.  Patient will move from supine to sit and sit to supine, scoot up and down, and roll side to side in bed with independence within 7 day(s).    2.  Patient will perform sit to stand with modified independence within 7 day(s).  3.  Patient will transfer from bed to chair and chair to bed with supervision/set-up using the least restrictive device within 7 day(s).  4.  Patient will ambulate with supervision/set-up for 75 feet with the least restrictive device within 7 day(s).   5.  Patient will ascend/descend 3 stairs with 1-2 handrail(s) with supervision/set-up within 7 day(s).    Outcome: Progressing   PHYSICAL THERAPY TREATMENT    Patient: Lance Morocho (84 y.o. male)  Date: 3/11/2025  Diagnosis: Dehydration [E86.0]  Unsteady gait [R26.81]  Gait instability [R26.81]  Generalized weakness [R53.1]  Unable to ambulate [R26.2]  Yeast cystitis [B37.41]  Recurrent falls [R29.6]  Nausea and vomiting, unspecified vomiting type [R11.2] Unsteady gait      Precautions: Restrictions/Precautions  Restrictions/Precautions: Fall Risk            ASSESSMENT:  Patient continues to benefit from skilled PT services.Pt supine to sit with min assist.Pt sit to stand with min assist.Pt ambulated 40ft with RW min assist.Pt with sit down brake before finishing ambulation.Pt with ataxic like gait that smoothed out with increased distance.Pt was assisted

## 2025-03-11 NOTE — PLAN OF CARE
Problem: Discharge Planning  Goal: Discharge to home or other facility with appropriate resources  3/11/2025 1308 by Xiomara Leiva LPN  Outcome: Progressing  3/11/2025 0500 by Bianca Art, RN  Outcome: Progressing     Problem: Pain  Goal: Verbalizes/displays adequate comfort level or baseline comfort level  3/11/2025 1308 by Xiomara Leiva LPN  Outcome: Progressing  3/11/2025 0500 by Bianca Art RN  Outcome: Progressing     Problem: Safety - Adult  Goal: Free from fall injury  3/11/2025 1308 by Xiomara Leiva LPN  Outcome: Progressing  3/11/2025 0500 by Bianca Art RN  Outcome: Progressing     Problem: Skin/Tissue Integrity  Goal: Skin integrity remains intact  Description: 1.  Monitor for areas of redness and/or skin breakdown  2.  Assess vascular access sites hourly  3.  Every 4-6 hours minimum:  Change oxygen saturation probe site  4.  Every 4-6 hours:  If on nasal continuous positive airway pressure, respiratory therapy assess nares and determine need for appliance change or resting period  3/11/2025 1308 by Xiomara Leiva LPN  Outcome: Progressing  Flowsheets (Taken 3/11/2025 0800)  Skin Integrity Remains Intact: Monitor for areas of redness and/or skin breakdown  3/11/2025 0500 by Bianca Art, RN  Outcome: Progressing  Flowsheets (Taken 3/10/2025 2000)  Skin Integrity Remains Intact: Monitor for areas of redness and/or skin breakdown

## 2025-03-11 NOTE — PLAN OF CARE
Problem: Discharge Planning  Goal: Discharge to home or other facility with appropriate resources  Outcome: Progressing     Problem: Pain  Goal: Verbalizes/displays adequate comfort level or baseline comfort level  Outcome: Progressing     Problem: Safety - Adult  Goal: Free from fall injury  Outcome: Progressing     Problem: Physical Therapy - Adult  Goal: By Discharge: Performs mobility at highest level of function for planned discharge setting.  See evaluation for individualized goals.  Description: FUNCTIONAL STATUS PRIOR TO ADMISSION: Patient was modified independent using a rollator and independent with stand pivot transfers to/from wheelchair for functional mobility. 2 falls in the day preceding admission.    HOME SUPPORT PRIOR TO ADMISSION: The patient lived with his wife who per chart review is unable to provide his current level of support.    Physical Therapy Goals  Initiated 3/8/2025  1.  Patient will move from supine to sit and sit to supine, scoot up and down, and roll side to side in bed with independence within 7 day(s).    2.  Patient will perform sit to stand with modified independence within 7 day(s).  3.  Patient will transfer from bed to chair and chair to bed with supervision/set-up using the least restrictive device within 7 day(s).  4.  Patient will ambulate with supervision/set-up for 75 feet with the least restrictive device within 7 day(s).   5.  Patient will ascend/descend 3 stairs with 1-2 handrail(s) with supervision/set-up within 7 day(s).    3/10/2025 1553 by Leslye Mujica, PTA  Outcome: Progressing     Problem: Occupational Therapy - Adult  Goal: By Discharge: Performs self-care activities at highest level of function for planned discharge setting.  See evaluation for individualized goals.  Description: FUNCTIONAL STATUS PRIOR TO ADMISSION:  Patient lives with spouse in 2 level house. For last 3 months, patient has not been able to safely ambulate and is using w/c for

## 2025-03-11 NOTE — CARE COORDINATION
3/11/2025    3:44 PM  Care Management Progress Note    Reason for Admission:   Dehydration [E86.0]  Unsteady gait [R26.81]  Gait instability [R26.81]  Generalized weakness [R53.1]  Unable to ambulate [R26.2]  Yeast cystitis [B37.41]  Recurrent falls [R29.6]  Nausea and vomiting, unspecified vomiting type [R11.2]       Patient Admission Status: Inpatient  RUR: 13%  Hospitalization in the last 30 days (Readmission):  No        Transition of care plan:  Discharge pending medical clearance. Therapy following. Vascular consulted.  SNF - CM received acceptance from [facility]. Antoinette Gonzalez following for Vascular consult outcome. CM spoke with pt's wife and provided and update.   If SNF or IPR:  SNF  Date FOC offered:   Accepting facility: D  Date authorization started with reference number: NA  Date authorization received and expires: NA  Discharge plan communicated with patient and/or discharge caregiver: Yes - wife    Date 1st IMM letter given: 3/9/25  Outpatient follow-up.  Transport at discharge: TBD       03/09/25 1243   Service Assessment   Patient Orientation Alert and Oriented  (A&Ox2)   Cognition Alert   History Provided By Patient  (Laurel Morocho [phone])   Primary Caregiver Spouse   Accompanied By/Relationship spouse   Support Systems Spouse/Significant Other  (Laurel [phone])   Patient's Healthcare Decision Maker is: Legal Next of Kin  (Laurel [phone] (spouse))   PCP Verified by CM Yes  (Brook Mccarthy MD [phone])   Last Visit to PCP Within last 3 months   Prior Functional Level Assistance with the following:;Housework;Shopping;Mobility;Bathing;Cooking   Current Functional Level Assistance with the following:;Bathing;Housework;Shopping;Mobility;Cooking   Can patient return to prior living arrangement No   Ability to make needs known: Fair   Family able to assist with home care needs: No   Would you like for me to discuss the discharge plan

## 2025-03-11 NOTE — CONSULTS
Vascular Surgery Consult Note  3/11/2025    Subjective:     Lance Morocho is a 84 y.o.  male with a history of PVD status post some type of intervention at an outside hospital  He was scheduled for repeat intervention this week but was hospitalized for other reasons.  His primary complaint is weakness and frequent falls.  Denies rest pain perse .  No claudication at his current alevel of activity.      Past Medical History:   Diagnosis Date    CKD (chronic kidney disease), stage III (HCC)     Sjoegren syndrome 04/2016    Urine retention       Past Surgical History:   Procedure Laterality Date    APPENDECTOMY      CERVICAL DISCECTOMY      CYSTOSCOPY N/A 9/19/2023    CYSTOSCOPY performed by Dale Jerome MD at [facility] MAIN OR    HEENT      Zygomatic arch repair    HERNIA REPAIR      ORTHOPEDIC SURGERY      Broken tibia and fibula    PROSTATE SURGERY      TURP    TOTAL HIP ARTHROPLASTY Left 06/11/2019    UPPER GASTROINTESTINAL ENDOSCOPY N/A 9/21/2023    EGD ESOPHAGOGASTRODUODENOSCOPY performed by Benjamin You MD at [facility] ENDOSCOPY    UPPER GASTROINTESTINAL ENDOSCOPY N/A 9/21/2023    EGD BIOPSY performed by Benjamin You MD at [facility] ENDOSCOPY    VASECTOMY       No family history on file.   Social History     Tobacco Use    Smoking status: Every Day     Current packs/day: 0.50     Types: Cigarettes    Smokeless tobacco: Never   Substance Use Topics    Alcohol use: No       Prior to Admission medications    Medication Sig Start Date End Date Taking? Authorizing Provider   ondansetron (ZOFRAN-ODT) 4 MG disintegrating tablet Take 1 tablet by mouth 3 times daily as needed for Nausea or Vomiting 3/8/25  Yes Mauricio Clements MD   fluconazole (DIFLUCAN) 100 MG tablet Take 1 tablet by mouth daily for 7 days 3/8/25 3/15/25 Yes Mauricio Clements MD   aspirin 81 MG EC tablet Take 1 tablet by mouth daily   Yes Rosalia Packer MD   clopidogrel (PLAVIX) 75 MG tablet Take 1 tablet by mouth daily   Yes Rosalia Packer MD

## 2025-03-11 NOTE — PLAN OF CARE
Problem: Occupational Therapy - Adult  Goal: By Discharge: Performs self-care activities at highest level of function for planned discharge setting.  See evaluation for individualized goals.  Description: FUNCTIONAL STATUS PRIOR TO ADMISSION:  Patient lives with spouse in 2 level house. For last 3 months, patient has not been able to safely ambulate and is using w/c for mobility. W/c will not fit in bathroom- he has been relying on BSC, and cannot shower. Was independent with ADL and mobility at rollator level prior to this.   Patient has history of Sjogren's disease   Receives Help From: Family, Prior Level of Assist for ADLs: Needs assistance,  ,  ,  ,  , Toileting: Needs assistance,  , Ambulation Assistance: Needs assistance, Prior Level of Assist for Transfers: Independent, Active : No     Occupational Therapy Goals:  Initiated 3/9/2025  1.  Patient will perform grooming in stand with Stand by Assist within 7 day(s).  2.  Patient will perform bathing with Stand by Assist within 7 day(s).  3.  Patient will perform lower body dressing with Stand by Assist within 7 day(s).  4.  Patient will perform toilet transfers with Stand by Assist  within 7 day(s).  5.  Patient will perform all aspects of toileting with Stand by Assist within 7 day(s).  Outcome: Progressing   OCCUPATIONAL THERAPY TREATMENT  Patient: Lance Morocho (84 y.o. male)  Date: 3/11/2025  Primary Diagnosis: Dehydration [E86.0]  Unsteady gait [R26.81]  Gait instability [R26.81]  Generalized weakness [R53.1]  Unable to ambulate [R26.2]  Yeast cystitis [B37.41]  Recurrent falls [R29.6]  Nausea and vomiting, unspecified vomiting type [R11.2]       Precautions: Fall Risk                Chart, occupational therapy assessment, plan of care, and goals were reviewed.    ASSESSMENT  Patient continues to benefit from skilled OT services and is progressing towards goals. Pt donned R sock bed level than after sitting edge of bed min assist to don L sock.

## 2025-03-12 ENCOUNTER — APPOINTMENT (OUTPATIENT)
Dept: VASCULAR SURGERY | Facility: HOSPITAL | Age: 85
DRG: 546 | End: 2025-03-12
Payer: MEDICARE

## 2025-03-12 VITALS
DIASTOLIC BLOOD PRESSURE: 70 MMHG | BODY MASS INDEX: 24.5 KG/M2 | WEIGHT: 175 LBS | SYSTOLIC BLOOD PRESSURE: 126 MMHG | OXYGEN SATURATION: 96 % | RESPIRATION RATE: 18 BRPM | HEART RATE: 103 BPM | HEIGHT: 71 IN | TEMPERATURE: 98.2 F

## 2025-03-12 LAB
ALBUMIN SERPL-MCNC: 2.7 G/DL (ref 3.5–5)
ALBUMIN/GLOB SERPL: 0.8 (ref 1.1–2.2)
ALP SERPL-CCNC: 65 U/L (ref 45–117)
ALT SERPL-CCNC: 15 U/L (ref 12–78)
ANION GAP SERPL CALC-SCNC: 7 MMOL/L (ref 2–12)
AST SERPL-CCNC: 8 U/L (ref 15–37)
BASOPHILS # BLD: 0.01 K/UL (ref 0–0.1)
BASOPHILS NFR BLD: 0.1 % (ref 0–1)
BILIRUB SERPL-MCNC: 0.5 MG/DL (ref 0.2–1)
BUN SERPL-MCNC: 13 MG/DL (ref 6–20)
BUN/CREAT SERPL: 13 (ref 12–20)
CALCIUM SERPL-MCNC: 8.4 MG/DL (ref 8.5–10.1)
CHLORIDE SERPL-SCNC: 111 MMOL/L (ref 97–108)
CO2 SERPL-SCNC: 24 MMOL/L (ref 21–32)
CREAT SERPL-MCNC: 1.02 MG/DL (ref 0.7–1.3)
DIFFERENTIAL METHOD BLD: ABNORMAL
ECHO BSA: 1.99 M2
EOSINOPHIL # BLD: 0.05 K/UL (ref 0–0.4)
EOSINOPHIL NFR BLD: 0.5 % (ref 0–7)
ERYTHROCYTE [DISTWIDTH] IN BLOOD BY AUTOMATED COUNT: 15.2 % (ref 11.5–14.5)
GLOBULIN SER CALC-MCNC: 3.5 G/DL (ref 2–4)
GLUCOSE SERPL-MCNC: 87 MG/DL (ref 65–100)
HCT VFR BLD AUTO: 31 % (ref 36.6–50.3)
HGB BLD-MCNC: 9.6 G/DL (ref 12.1–17)
IMM GRANULOCYTES # BLD AUTO: 0.08 K/UL (ref 0–0.04)
IMM GRANULOCYTES NFR BLD AUTO: 0.8 % (ref 0–0.5)
LYMPHOCYTES # BLD: 1.29 K/UL (ref 0.8–3.5)
LYMPHOCYTES NFR BLD: 12.9 % (ref 12–49)
MAGNESIUM SERPL-MCNC: 2.2 MG/DL (ref 1.6–2.4)
MCH RBC QN AUTO: 26.6 PG (ref 26–34)
MCHC RBC AUTO-ENTMCNC: 31 G/DL (ref 30–36.5)
MCV RBC AUTO: 85.9 FL (ref 80–99)
MONOCYTES # BLD: 0.86 K/UL (ref 0–1)
MONOCYTES NFR BLD: 8.6 % (ref 5–13)
NEUTS SEG # BLD: 7.71 K/UL (ref 1.8–8)
NEUTS SEG NFR BLD: 77.1 % (ref 32–75)
NRBC # BLD: 0 K/UL (ref 0–0.01)
NRBC BLD-RTO: 0 PER 100 WBC
PHOSPHATE SERPL-MCNC: 3.9 MG/DL (ref 2.6–4.7)
PLATELET # BLD AUTO: 242 K/UL (ref 150–400)
PMV BLD AUTO: 10.7 FL (ref 8.9–12.9)
POTASSIUM SERPL-SCNC: 3.4 MMOL/L (ref 3.5–5.1)
PROT SERPL-MCNC: 6.2 G/DL (ref 6.4–8.2)
RBC # BLD AUTO: 3.61 M/UL (ref 4.1–5.7)
SODIUM SERPL-SCNC: 142 MMOL/L (ref 136–145)
VAS LEFT ATA DIST PSV: 49.9 CM/S
VAS LEFT CFA PROX PSV: 99.1 CM/S
VAS LEFT POP A DIST PSV: 50.8 CM/S
VAS LEFT POP A PROX PSV: 95.4 CM/S
VAS LEFT POP A PROX VEL RATIO: 2.39
VAS LEFT SFA DIST PSV: 40 CM/S
VAS LEFT SFA DIST VEL RATIO: 0.35
VAS LEFT SFA MID PSV: 113.6 CM/S
VAS LEFT SFA MID VEL RATIO: 1.15
VAS LEFT SFA PROX PSV: 99 CM/S
VAS LEFT SFA PROX VEL RATIO: 1
VAS RIGHT ATA DIST PSV: 53 CM/S
VAS RIGHT CFA PROX PSV: 61.8 CM/S
VAS RIGHT POP A DIST PSV: 33.2 CM/S
VAS RIGHT POP A PROX PSV: 44.2 CM/S
VAS RIGHT POP A PROX VEL RATIO: 0.73
VAS RIGHT SFA DIST PSV: 60.7 CM/S
VAS RIGHT SFA DIST VEL RATIO: 0.3
VAS RIGHT SFA MID PSV: 200.1 CM/S
VAS RIGHT SFA MID VEL RATIO: 2.1
VAS RIGHT SFA PROX PSV: 94.8 CM/S
VAS RIGHT SFA PROX VEL RATIO: 1.5
WBC # BLD AUTO: 10 K/UL (ref 4.1–11.1)

## 2025-03-12 PROCEDURE — 6370000000 HC RX 637 (ALT 250 FOR IP): Performed by: STUDENT IN AN ORGANIZED HEALTH CARE EDUCATION/TRAINING PROGRAM

## 2025-03-12 PROCEDURE — 6370000000 HC RX 637 (ALT 250 FOR IP): Performed by: HOSPITALIST

## 2025-03-12 PROCEDURE — 83735 ASSAY OF MAGNESIUM: CPT

## 2025-03-12 PROCEDURE — 2500000003 HC RX 250 WO HCPCS: Performed by: HOSPITALIST

## 2025-03-12 PROCEDURE — 94761 N-INVAS EAR/PLS OXIMETRY MLT: CPT

## 2025-03-12 PROCEDURE — 85025 COMPLETE CBC W/AUTO DIFF WBC: CPT

## 2025-03-12 PROCEDURE — 84100 ASSAY OF PHOSPHORUS: CPT

## 2025-03-12 PROCEDURE — 93925 LOWER EXTREMITY STUDY: CPT

## 2025-03-12 PROCEDURE — 80053 COMPREHEN METABOLIC PANEL: CPT

## 2025-03-12 RX ORDER — FERROUS SULFATE 325(65) MG
325 TABLET ORAL EVERY OTHER DAY
Qty: 30 TABLET | Refills: 3 | Status: SHIPPED
Start: 2025-03-14

## 2025-03-12 RX ORDER — MICONAZOLE NITRATE 20 MG/G
CREAM TOPICAL
Qty: 35 G | Refills: 1 | Status: SHIPPED
Start: 2025-03-12

## 2025-03-12 RX ORDER — PREDNISONE 5 MG/1
TABLET ORAL
Qty: 34 TABLET | Refills: 0 | Status: SHIPPED
Start: 2025-03-13 | End: 2025-04-15

## 2025-03-12 RX ADMIN — SENNOSIDES AND DOCUSATE SODIUM 2 TABLET: 50; 8.6 TABLET ORAL at 10:07

## 2025-03-12 RX ADMIN — POLYETHYLENE GLYCOL 3350 17 G: 17 POWDER, FOR SOLUTION ORAL at 10:08

## 2025-03-12 RX ADMIN — PANTOPRAZOLE SODIUM 40 MG: 40 TABLET, DELAYED RELEASE ORAL at 16:32

## 2025-03-12 RX ADMIN — FERROUS SULFATE TAB 325 MG (65 MG ELEMENTAL FE) 325 MG: 325 (65 FE) TAB at 10:11

## 2025-03-12 RX ADMIN — SODIUM CHLORIDE, PRESERVATIVE FREE 10 ML: 5 INJECTION INTRAVENOUS at 10:08

## 2025-03-12 RX ADMIN — PREDNISONE 10 MG: 10 TABLET ORAL at 10:07

## 2025-03-12 RX ADMIN — CLOPIDOGREL BISULFATE 75 MG: 75 TABLET ORAL at 10:07

## 2025-03-12 RX ADMIN — ASPIRIN 81 MG: 81 TABLET, COATED ORAL at 10:07

## 2025-03-12 RX ADMIN — MICONAZOLE NITRATE: 20 CREAM TOPICAL at 10:08

## 2025-03-12 RX ADMIN — PANTOPRAZOLE SODIUM 40 MG: 40 TABLET, DELAYED RELEASE ORAL at 06:26

## 2025-03-12 NOTE — PLAN OF CARE
Problem: Discharge Planning  Goal: Discharge to home or other facility with appropriate resources  Outcome: Progressing     Problem: Pain  Goal: Verbalizes/displays adequate comfort level or baseline comfort level  Outcome: Progressing     Problem: Safety - Adult  Goal: Free from fall injury  Outcome: Progressing     Problem: Skin/Tissue Integrity  Goal: Skin integrity remains intact  Description: 1.  Monitor for areas of redness and/or skin breakdown  2.  Assess vascular access sites hourly  3.  Every 4-6 hours minimum:  Change oxygen saturation probe site  4.  Every 4-6 hours:  If on nasal continuous positive airway pressure, respiratory therapy assess nares and determine need for appliance change or resting period  Outcome: Progressing  Flowsheets (Taken 3/12/2025 2880)  Skin Integrity Remains Intact: Monitor for areas of redness and/or skin breakdown

## 2025-03-12 NOTE — PLAN OF CARE
Problem: Discharge Planning  Goal: Discharge to home or other facility with appropriate resources  3/12/2025 0128 by Bianca Art RN  Outcome: Progressing  3/11/2025 1308 by Xiomara Leiva LPN  Outcome: Progressing     Problem: Pain  Goal: Verbalizes/displays adequate comfort level or baseline comfort level  3/12/2025 0128 by Bianca Art RN  Outcome: Progressing  3/11/2025 1308 by Xiomara Leiva LPN  Outcome: Progressing     Problem: Safety - Adult  Goal: Free from fall injury  3/12/2025 0128 by Bianca Art RN  Outcome: Progressing  3/11/2025 1308 by Xiomara Leiva LPN  Outcome: Progressing     Problem: Physical Therapy - Adult  Goal: By Discharge: Performs mobility at highest level of function for planned discharge setting.  See evaluation for individualized goals.  Description: FUNCTIONAL STATUS PRIOR TO ADMISSION: Patient was modified independent using a rollator and independent with stand pivot transfers to/from wheelchair for functional mobility. 2 falls in the day preceding admission.    HOME SUPPORT PRIOR TO ADMISSION: The patient lived with his wife who per chart review is unable to provide his current level of support.    Physical Therapy Goals  Initiated 3/8/2025  1.  Patient will move from supine to sit and sit to supine, scoot up and down, and roll side to side in bed with independence within 7 day(s).    2.  Patient will perform sit to stand with modified independence within 7 day(s).  3.  Patient will transfer from bed to chair and chair to bed with supervision/set-up using the least restrictive device within 7 day(s).  4.  Patient will ambulate with supervision/set-up for 75 feet with the least restrictive device within 7 day(s).   5.  Patient will ascend/descend 3 stairs with 1-2 handrail(s) with supervision/set-up within 7 day(s).    3/11/2025 1411 by Dakota Dash PTA  Outcome: Progressing     Problem: Occupational Therapy - Adult  Goal: By Discharge: Performs self-care

## 2025-03-12 NOTE — PROGRESS NOTES
[facility]                                                                               Hospitalist Progress Note  EDILIA CAMPUZANO MD          Date of Service:  3/9/2025  NAME:  Lance Morocho  :  1940  MRN:  [id number]    Please note that this dictation was completed with [employer], the computer voice recognition software.  Quite often unanticipated grammatical, syntax, homophones, and other interpretive errors are inadvertently transcribed by the computer software.  Please disregard these errors.  Please excuse any errors that have escaped final proofreading.    Admission Summary:   84 y.o.  male with PMHx PAD s/p stent left leg , BPH s/p TURP, hx pseudomonas uti , orthostatic hypotension no longer on midodrine, Sjogren's disease on chronic prednisone, p. Afib s/p Watchman, Tay's esophagus, hyperlipidemia presents from home with n/v x 3 days, recurrent falls x 2 in past 24 hours wife needing to call EMS to help pick him up, generalized weakness.        Interval history / Subjective:     Wife at the bedside.  No acute issues overnight     Assessment & Plan:     Anticipated discharge date : 3/10  Anticipated disposition : SNF  Barriers to discharge : Placement       Unsteady gait, recurrent falls  Horizontal nystagmus, POA  suspect due to dehydration and Sjogrens.  Has horizontal nystagmus on exam.    MRI brain did not show stroke. MRI C Spine shows DJD, spinal stenosis and foraminal stenosis.   Orthostatic negative.     -cont home ASA and plavix, statin  -Ortho consult for cervical canal stenosis.  -D/OT following recommending SNF     Sjogren's disease  Marked elevated CRP 16  --increase home prednisone from 5mg to 20mg for now for few days and then reduce back to maintenance 5mg daily.  --FU with his rheumatologist Dr. Shetty   -Repeat CRP tomorrow        Normochromic normocytic anemia  Patient had a skin tear 2 weeks ago and apparently it \"bled 
Occupational Therapy Note:Pt declined activity this AM, stating \" I cannot do anything between 9 and 11 am because that is when I get my steroid.Having that makes me feel like I have had a six pack of beer.\"  Pt agreed to try later. Will cont to follow.  
Occupational therapy note: Pt unavailable for tx as he is having a test at bedside. Will attempt OT later as able.  
Ortho update:                       Orthopaedic Progress Note  Post Op day: * No surgery found *    March 10, 2025 1:24 PM     Patient: Lance Morocho MRN: [id number]  SSN: [id number]    YOB: 1940  Age: 84 y.o.  Sex: male      Admit date:  3/7/2025  Date of Surgery:  [unfilled]   Procedures:    Admitting Physician:  Anjali Gayle MD   Surgeon:  * Surgery not found *    Consulting Physician(s): Treatment Team:   Shahid García MD Graupman, Matthew S, Shilpa Hunter, Minerva Fischer, SLP  Emily Garcia, Leslye Tran, Kerri Glover Trinia, Isadora Méndez              SUBJECTIVE:     Lance Morocho is a 84 y.o. male resting in bed with minimal complaints of pain. I introduced myself to the patient and he told me I was 'wasting my time\" seeing him for any orthopedic needs. He will not proceed with MRI of thoracic or lumbar spine and states his \"neck has been this way for 30 years\". Pt is anxious to get the stent placed in his right LLE asap due to his PAD.  OBJECTIVE:       Physical Exam:  General: Alert, cooperative, no distress.    Respiratory: Respirations unlabored  Neurological:  Neurovascular exam within normal limits.  Motor: + DF/PF.   Musculoskeletal: Bilateral UE strength testing 5/5 tri's, bi's, abd/add,  strength. BLE: 5/5 KE, KF, PF, DF, EHL, FHL. Pulse faint RLE. Negative ankle clonus.  SILT with BCR in toes  Dressing/Wound:  Clean, dry and intact. No significant erythema or swelling.      Vital Signs:      Patient Vitals for the past 8 hrs:   BP Temp Temp src Pulse Resp SpO2   03/10/25 1204 (!) 140/78 97.7 °F (36.5 °C) Oral 65 15 97 %   03/10/25 0748 (!) 142/81 97.7 °F (36.5 °C) Oral 69 16 94 %                                          Temp (24hrs), Av.8 °F (36.6 °C), Min:97.7 °F (36.5 °C), Max:98.1 °F (36.7 °C)      Labs:        Recent Labs     03/10/25  0330 03/10/25  0655   HCT  --  29.3*   HGB  --  9.0*   INR 1.1  --  
Pass    Prandial Aspiration Risk Factors: acute neurologic changes, Parkinson's disease(?)  Silent Aspiration Risk Factors: none apparent on chart review (again, questionable Parkinson's disease)  Post-Prandial Aspiration Risk Factors: Tay's esophagus    Pertinent Medications:     Pertinent Imaging and Labs:  CT Head: no acute abnormality   XR Chest: Trace left basilar subsegmental atelectasis   Urine groups: small leukocyte Esterase, Budding yeast present    WBC: 13.4   Other important labs or tests:   EGD 9/21/2023 as follows:  Esophagus:Evidence of salmon colored mucosa seen extending from the GE-junction as two mucosal tongues, one is less than a centimeter, the other one is about 2 cm in length and 1 cm in width, no lesions or ulcers seen. Otherwise mucosa within normal. These findings are consistent with previously diagnosed Tay's esophagus, C0M2.  Stomach: Moderate size hiatal hernia, otherwise mucosa within normal  Duodenum/jejunum: normal    Previously seen by SLP:  n/a  Reason for consult: Stroke/TIA    Stroke:  Admitting NIHSS score: not documented?  Current NIHSS score: 0  TICI Score:   ICH Score:     Cognitive and Communication Status:  Neurologic State: Alert  Orientation Level: Oriented to person  Cognition: Decreased attention/concentration and Memory loss    Dysphagia:  Oral Assessment:  Oral Motor   Labial: No impairment  Dentition: Upper dentures;Limited;Natural  Oral Hygiene: Moist  Lingual: No impairment  Velum: No Impairment  Mandible: No impairment  P.O. Trials:  PO Trials  Assessment Method(s): Observation  Patient Position: upright in bed  Vocal Quality: No Impairment  Consistency Presented: Regular;Thin  How Presented: Self-fed/presented;Straw;Successive Swallows  Bolus Acceptance: No impairment  Bolus Formation/Control: No impairment  Oral Residue: None  Aspiration Signs/Symptoms: None  Oral Phase  Oral Phase - Comment: no apparent deficits  Pharyngeal Phase Comment: grossly 
acute distress,   HEENT: PEERL, EOMI, moist mucus membrane  Neck: supple, no JVD  Chest: Clear to auscultation bilaterally   CVS: S1 S2 heard, Capillary refill less than 2 seconds  Abd: soft/ non tender, non distended, BS physiological,   Ext: no clubbing, no cyanosis, no edema, brisk 2+ DP pulses  Neuro/Psych: pleasant mood and affect, moving all extremities spontaneously, no focal neurological deficit  Skin: warm     Data Review:    Review and/or order of clinical lab test  Review and/or order of tests in the radiology section of CPT  Review and/or order of tests in the medicine section of CPT      I have personally and independently reviewed all pertinent labs, diagnostic studies, imaging, and have provided independent interpretation of the same.     Labs:     Recent Labs     03/09/25  0305 03/10/25  0655   WBC 9.8 10.0   HGB 9.6* 9.0*   HCT 31.2* 29.3*    232     Recent Labs     03/09/25  0305 03/10/25  0330    141   K 3.6 3.7   * 111*   CO2 23 24   BUN 17 17   MG 2.4 2.1   PHOS 3.4 3.2     No results for input(s): \"ALT\", \"TP\", \"GLOB\", \"GGT\" in the last 72 hours.    Invalid input(s): \"SGOT\", \"GPT\", \"AP\", \"TBIL\", \"TBILI\", \"ALB\", \"AML\", \"AMYP\", \"LPSE\", \"HLPSE\"    Recent Labs     03/10/25  0330   INR 1.1   APTT 25.6      Recent Labs     03/09/25  1326   TIBC 292      No results found for: \"RBCF\"   No results for input(s): \"PH\", \"PCO2\", \"PO2\" in the last 72 hours.  No results for input(s): \"CPK\" in the last 72 hours.    Invalid input(s): \"CPKMB\", \"CKNDX\", \"TROIQ\"  Lab Results   Component Value Date/Time    CHOL 115 03/09/2025 03:05 AM    HDL 58 03/09/2025 03:05 AM    LDL 46.8 03/09/2025 03:05 AM    LDL 40.8 06/26/2019 02:19 AM     No results found for: \"GLUCPOC\"  [unfilled]    Notes reviewed from all clinical/nonclinical/nursing services involved in patient's clinical care. Care coordination discussions were held with appropriate clinical/nonclinical/ nursing providers based on care coordination 
        Patients current active Medications were reviewed, considered, added and adjusted based on the clinical condition today.      Home Medications were reconciled to the best of my ability given all available resources at the time of admission. Route is PO if not otherwise noted.      Admission Status:[id number]:::1}      Medications Reviewed:     Current Facility-Administered Medications   Medication Dose Route Frequency    bisacodyl (DULCOLAX) suppository 10 mg  10 mg Rectal Daily PRN    phenylephrine-mineral oil-petrolatum (PREPARATION H) rectal ointment   Rectal 4x Daily PRN    sodium chloride flush 0.9 % injection 5-40 mL  5-40 mL IntraVENous 2 times per day    sodium chloride flush 0.9 % injection 5-40 mL  5-40 mL IntraVENous PRN    0.9 % sodium chloride infusion   IntraVENous PRN    ondansetron (ZOFRAN-ODT) disintegrating tablet 4 mg  4 mg Oral Q8H PRN    Or    ondansetron (ZOFRAN) injection 4 mg  4 mg IntraVENous Q6H PRN    clopidogrel (PLAVIX) tablet 75 mg  75 mg Oral Daily    rosuvastatin (CRESTOR) tablet 40 mg  40 mg Oral Nightly    aspirin EC tablet 81 mg  81 mg Oral Daily    miconazole (MICOTIN) 2 % cream   Topical BID    predniSONE (DELTASONE) tablet 20 mg  20 mg Oral Daily    polyethylene glycol (GLYCOLAX) packet 17 g  17 g Oral BID    DULoxetine (CYMBALTA) extended release capsule 30 mg  30 mg Oral Nightly    pantoprazole (PROTONIX) tablet 40 mg  40 mg Oral BID AC    sennosides-docusate sodium (SENOKOT-S) 8.6-50 MG tablet 2 tablet  2 tablet Oral BID    acetaminophen (TYLENOL) tablet 650 mg  650 mg Oral Q6H PRN     ______________________________________________________________________  EXPECTED LENGTH OF STAY: 4  ACTUAL LENGTH OF STAY:          2                 Shahid García MD

## 2025-03-12 NOTE — DISCHARGE INSTRUCTIONS
HOSPITALIST DISCHARGE INSTRUCTIONS  NAME:  Lance Morocho   :  1940   MRN:  [id number]     Date/Time:  3/12/2025 1:02 PM    ADMIT DATE: 3/7/2025     DISCHARGE DATE: 3/12/2025     DISCHARGE DIAGNOSIS:  Falls, weakness  Peripheral Vascular disease    DISCHARGE INSTRUCTIONS:  Thank you for allowing us to participate in your care. Your discharging Hospitalist is Shahid Meek MD. You were admitted for evaluation and treatment of the above. You will discharge to rehab to get stronger. You were started on a slightly higher dose of steroids to help address your auto-immune disease.      MEDICATIONS:    It is important that you take the medication exactly as they are prescribed.   Keep your medication in the bottles provided by the pharmacist and keep a list of the medication names, dosages, and times to be taken in your wallet.   Do not take other medications without consulting your doctor.             If you experience any of the following symptoms then please call your primary care physician or return to the emergency room if you cannot get hold of your doctor:  Fever, chills, nausea, vomiting, diarrhea, change in mentation, falling, bleeding, shortness of breath    Follow Up:  Please call the below provider to arrange hospital follow up appointment      Brook Mccarthy MD  [id number] [facility] [id number] [phone]    Schedule an appointment as soon as possible for a visit in 1 week  for reevaluation and further treatment as needed      For questions regarding your Hospitalization or to contact the Hospital Medicine team, please call [phone].      Information obtained by :  I understand that if any problems occur once I am at home I am to contact my physician.    I understand and acknowledge receipt of the instructions indicated above.

## 2025-03-12 NOTE — PLAN OF CARE
D/c paperwork given to transport. Vss. Piv removed. Attempted to give report, called 5 times and no one picked up the phone.     Pt left in w/c with [facility] at 4:53 pm.   Problem: Discharge Planning  Goal: Discharge to home or other facility with appropriate resources  3/12/2025 1627 by Xiomara Leiva LPN  Outcome: Completed  3/12/2025 1540 by Xiomara Leiva LPN  Outcome: Progressing     Problem: Pain  Goal: Verbalizes/displays adequate comfort level or baseline comfort level  3/12/2025 1627 by Xiomara Leiva LPN  Outcome: Completed  3/12/2025 1540 by Xiomara Leiva LPN  Outcome: Progressing     Problem: Safety - Adult  Goal: Free from fall injury  3/12/2025 1627 by Xiomara Leiva LPN  Outcome: Completed  3/12/2025 1540 by Xiomara Leiva LPN  Outcome: Progressing     Problem: Skin/Tissue Integrity  Goal: Skin integrity remains intact  Description: 1.  Monitor for areas of redness and/or skin breakdown  2.  Assess vascular access sites hourly  3.  Every 4-6 hours minimum:  Change oxygen saturation probe site  4.  Every 4-6 hours:  If on nasal continuous positive airway pressure, respiratory therapy assess nares and determine need for appliance change or resting period  3/12/2025 1627 by Xiomara Leiva LPN  Outcome: Completed  3/12/2025 1540 by Xiomara Leiva LPN  Outcome: Progressing  Flowsheets (Taken 3/12/2025 0750)  Skin Integrity Remains Intact: Monitor for areas of redness and/or skin breakdown

## 2025-03-12 NOTE — DISCHARGE SUMMARY
Hospitalist Discharge Summary     Patient ID:  Lance Morocho  [id number]  84 y.o.  1940    Admit date: 3/7/2025    Discharge date and time: 3/12/2025    Admission Diagnoses: Dehydration [E86.0]  Unsteady gait [R26.81]  Gait instability [R26.81]  Generalized weakness [R53.1]  Unable to ambulate [R26.2]  Yeast cystitis [B37.41]  Recurrent falls [R29.6]  Nausea and vomiting, unspecified vomiting type [R11.2]    Discharge Diagnoses:    Principal Problem:    Unsteady gait  Active Problems:    Recurrent falls  Resolved Problems:    * No resolved hospital problems. *         Hospital Course:   84 y.o. male with PMHx PAD s/p stent left leg 1/25, BPH s/p TURP, hx pseudomonas uti 9/23, orthostatic hypotension no longer on midodrine, Sjogren's disease on chronic prednisone, p. Afib s/p Watchman, Tay's esophagus, hyperlipidemia presents from home with n/v x 3 days, recurrent falls x 2 in past 24 hours wife needing to call EMS to help pick him up. He was admitted for further evaluation and treatment of the following:        Unsteady gait, recurrent falls  Horizontal nystagmus, POA  suspect due to dehydration and Sjogrens.  MRI brain did not show stroke. MRI C Spine shows DJD, spinal stenosis and foraminal stenosis. Ortho consult for cervical canal stenosis. Ortho does not think weakness is coming from this primarily. Cont home ASA and plavix, statin     Sjogren's disease  Marked elevated CRP 16  --increase home prednisone from 5mg to 20mg for now for few days and then reduce back to maintenance 5mg daily. Taper ordered  --FU with his rheumatologist Dr. Shetty           Normochromic normocytic anemia  Patient had a skin tear 2 weeks ago and apparently it \"bled for 4 hours \"per the wife.  - Anemia workup  - iron transfusion 3/10  - ferrous sulfate on discharge  -colonoscopy 2024 without cancer per wife     Scrotal yeast skin infection  --miconazole cream bid.  UA with yeast in urine.     Tay's

## 2025-05-20 ENCOUNTER — APPOINTMENT (OUTPATIENT)
Facility: HOSPITAL | Age: 85
End: 2025-05-20
Payer: MEDICARE

## 2025-05-20 ENCOUNTER — HOSPITAL ENCOUNTER (EMERGENCY)
Facility: HOSPITAL | Age: 85
Discharge: HOME | End: 2025-05-20
Attending: EMERGENCY MEDICINE
Payer: MEDICARE

## 2025-05-20 VITALS
WEIGHT: 160 LBS | OXYGEN SATURATION: 97 % | HEART RATE: 79 BPM | DIASTOLIC BLOOD PRESSURE: 76 MMHG | BODY MASS INDEX: 21.2 KG/M2 | HEIGHT: 73 IN | SYSTOLIC BLOOD PRESSURE: 143 MMHG | RESPIRATION RATE: 16 BRPM | TEMPERATURE: 97.8 F
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
## 2025-05-20 DIAGNOSIS — S01.112A EYEBROW LACERATION, LEFT, INITIAL ENCOUNTER: ICD-10-CM

## 2025-05-20 PROCEDURE — 99284 EMERGENCY DEPT VISIT MOD MDM: CPT

## 2025-05-20 PROCEDURE — 12052 INTMD RPR FACE/MM 2.6-5.0 CM: CPT

## 2025-05-20 PROCEDURE — 90471 IMMUNIZATION ADMIN: CPT

## 2025-05-20 PROCEDURE — 90714 TD VACC NO PRESV 7 YRS+ IM: CPT

## 2025-05-20 PROCEDURE — 72125 CT NECK SPINE W/O DYE: CPT

## 2025-05-20 PROCEDURE — 6360000002 HC RX W HCPCS

## 2025-05-20 PROCEDURE — 6370000000 HC RX 637 (ALT 250 FOR IP)

## 2025-05-20 PROCEDURE — 70450 CT HEAD/BRAIN W/O DYE: CPT

## 2025-05-20 RX ORDER — ACETAMINOPHEN 500 MG
1000 TABLET ORAL
Status: COMPLETED | OUTPATIENT
Start: 2025-05-20 | End: 2025-05-20

## 2025-05-20 RX ORDER — GINSENG 100 MG
CAPSULE ORAL
Status: COMPLETED | OUTPATIENT
Start: 2025-05-20 | End: 2025-05-20

## 2025-05-20 RX ORDER — LIDOCAINE HYDROCHLORIDE AND EPINEPHRINE BITARTRATE 20; .01 MG/ML; MG/ML
20 INJECTION, SOLUTION SUBCUTANEOUS ONCE
Status: COMPLETED | OUTPATIENT
Start: 2025-05-20 | End: 2025-05-20

## 2025-05-20 RX ADMIN — CLOSTRIDIUM TETANI TOXOID ANTIGEN (FORMALDEHYDE INACTIVATED) AND CORYNEBACTERIUM DIPHTHERIAE TOXOID ANTIGEN (FORMALDEHYDE INACTIVATED) 0.5 ML: 5; 2 INJECTION, SUSPENSION INTRAMUSCULAR at 14:13

## 2025-05-20 RX ADMIN — ACETAMINOPHEN 1000 MG: 500 TABLET ORAL at 13:11

## 2025-05-20 RX ADMIN — LIDOCAINE HYDROCHLORIDE,EPINEPHRINE BITARTRATE 20 ML: 20; .01 INJECTION, SOLUTION INFILTRATION; PERINEURAL at 12:56

## 2025-05-20 RX ADMIN — BACITRACIN 1 PACKET: 500 OINTMENT TOPICAL at 12:56

## 2025-05-20 ASSESSMENT — PAIN SCALES - GENERAL
PAINLEVEL_OUTOF10: 3
PAINLEVEL_OUTOF10: 3

## 2025-05-20 ASSESSMENT — PAIN DESCRIPTION - LOCATION: LOCATION: FACE

## 2025-05-20 ASSESSMENT — PAIN - FUNCTIONAL ASSESSMENT
PAIN_FUNCTIONAL_ASSESSMENT: 0-10
PAIN_FUNCTIONAL_ASSESSMENT: ACTIVITIES ARE NOT PREVENTED

## 2025-05-20 ASSESSMENT — PAIN DESCRIPTION - ORIENTATION: ORIENTATION: LEFT;UPPER

## 2025-05-20 ASSESSMENT — PAIN DESCRIPTION - PAIN TYPE: TYPE: ACUTE PAIN

## 2025-05-20 NOTE — DISCHARGE INSTRUCTIONS
Thank you for allowing us to provide you with medical care today.  We realize that you have many choices for your emergency care needs.  We thank you for choosing [facility].  Please choose us in the future for any continued health care needs.     We hope we addressed all of your medical concerns. We strive to provide excellent quality care in the Emergency Department.  Anything less than excellent does not meet our expectations.     The exam and treatment you received in the Emergency Department were for an emergent problem and are not intended as complete care. It is important that you follow up with a doctor, nurse practitioner, or physician’s assistant for ongoing care. If your symptoms worsen or you do not improve as expected and you are unable to reach your usual health care provider, you should return to the Emergency Department. We are available 24 hours a day.     Take this sheet with you when you go to your follow-up visit.     If you have any problem arranging the follow-up visit, contact the Emergency Department immediately.     Make an appointment your family doctor for follow up of this visit. Return to the ER if you are unable to be seen in a timely manner.     Below is a summary of your results.    Labs  No results found for this or any previous visit (from the past 12 hours).    Radiologic Studies  CT HEAD WO CONTRAST   Final Result   No acute intracranial hemorrhage, mass or infarct.             INDICATION: Fall       Exam: Noncontrast CT of the cervical spine is performed with 2.5 mm collimation.   Sagittal and coronal reformatted images were also performed.      CT dose reduction was achieved through use of a standardized protocol tailored   for this examination and automatic exposure control for dose modulation.      FINDINGS: There is no acute fracture or subluxation. The prevertebral soft   tissues are within normal limits. Bones are diffusely demineralized. Multilevel

## 2025-05-20 NOTE — ED TRIAGE NOTES
Pt wheeled to treatment area c/o laceration to left eye brow post glf. Pt states he fell forward off the edge of his bed while attempting to grab a phone off the table. Pt denies LOC or other injury from the fall. Pt reports being on Plavix and aspirin. Large lac noted above left eyebrow.

## 2025-05-20 NOTE — ED PROVIDER NOTES
solution:  Sterile saline    Irrigation volume:  300    Irrigation method:  Pressure wash  Skin repair:     Repair method:  Sutures    Suture size:  4-0    Suture material:  Nylon    Suture technique:  Simple interrupted and figure eight    Number of sutures:  6  Approximation:     Approximation:  Close  Repair type:     Repair type:  Intermediate  Post-procedure details:     Dressing:  Antibiotic ointment and non-adherent dressing    Procedure completion:  Tolerated well, no immediate complications        CRITICAL CARE TIME   Patient does not meet Critical Care Time, 0 minutes    FINAL IMPRESSION     1. Fall, initial encounter    2. Eyebrow laceration, left, initial encounter          DISPOSITION / PLAN     DISPOSITION Decision To Discharge 05/20/2025 01:58:00 PM   DISPOSITION CONDITION Stable       Discharge Note: The patient is stable for discharge home. The signs, symptoms, diagnosis, and discharge instructions have been discussed, understanding conveyed, and agreed upon. The patient is to follow up as recommended or return to ER should their symptoms worsen.     Prescription Drug Management Considerations: BEERS criteria    PATIENT REFERRED TO:  [facility]  [address]  [place] [address] [phone]    If symptoms worsen, For wound re-check, For suture removal 5-7 days    Brook Mccarthy MD  [id number] [facility] [address] [phone]      For wound re-check, For suture removal 5-7 days      DISCHARGE MEDICATIONS:  New Prescriptions    No medications on file     (Please note that parts of this dictation were completed with voice recognition software. Quite often unanticipated grammatical, syntax, homophones, and other interpretive errors are inadvertently transcribed by the computer software. Efforts were made to edit the dictation but occasionally words remain mis-transcribed.)    SHEILA Michele - NP (electronically

## (undated) DEVICE — COVER LT HNDL PLAS RIG 1 PER PK

## (undated) DEVICE — REM POLYHESIVE ADULT PATIENT RETURN ELECTRODE: Brand: VALLEYLAB

## (undated) DEVICE — STRAP,POSITIONING,KNEE/BODY,FOAM,4X60": Brand: MEDLINE

## (undated) DEVICE — HOOD: Brand: FLYTE

## (undated) DEVICE — SKIN MARKER,REGULAR TIP WITH RULER AND LABELS: Brand: DEVON

## (undated) DEVICE — PREP KIT PEEL PTCH POVIDONE IOD

## (undated) DEVICE — CATHETER IV 22GA L1IN OD0.8382-0.9144MM ID0.6096-0.6858MM 382523

## (undated) DEVICE — (D)PREP SKN CHLRAPRP APPL 26ML -- CONVERT TO ITEM 371833

## (undated) DEVICE — SUTURE VCRL SZ 1 L36IN ABSRB UD L36MM CT-1 1/2 CIR J947H

## (undated) DEVICE — GOWN,PREVENTION PLUS,XLN/XL,ST,24/CS: Brand: MEDLINE

## (undated) DEVICE — 4-PORT MANIFOLD: Brand: NEPTUNE 2

## (undated) DEVICE — SUTURE STRATAFIX SYMMETRIC SZ 1 L18IN ABSRB VLT CT1 L36CM SXPP1A404

## (undated) DEVICE — BAG COLLECTION FLD OR-TBL NS --

## (undated) DEVICE — BITEBLOCK ENDOSCP 60FR MAXI WHT POLYETH STURDY W/ VELC WVN

## (undated) DEVICE — CATHETER PLUG WITH CAP: Brand: DOVER

## (undated) DEVICE — STERILE POLYISOPRENE POWDER-FREE SURGICAL GLOVES WITH EMOLLIENT COATING: Brand: PROTEXIS

## (undated) DEVICE — STERILE POLYISOPRENE POWDER-FREE SURGICAL GLOVES: Brand: PROTEXIS

## (undated) DEVICE — HANDPIECE SET WITH COAXIAL HIGH FLOW TIP AND SUCTION TUBE: Brand: INTERPULSE

## (undated) DEVICE — SKN PREP SPNG STKS PVP PNT STR: Brand: MEDLINE INDUSTRIES, INC.

## (undated) DEVICE — BLADE ELECTRODE: Brand: EDGE

## (undated) DEVICE — SET ADMIN 16ML TBNG L100IN 2 Y INJ SITE IV PIGGY BK DISP (ORDER IN MULIPLES OF 48)

## (undated) DEVICE — DRSG AQUACEL SURG 3.5 X 10IN -- CONVERT TO ITEM 370183

## (undated) DEVICE — CYSTO-SFMC: Brand: MEDLINE INDUSTRIES, INC.

## (undated) DEVICE — SOLUTION IRRIG 1000ML STRL H2O USP PLAS POUR BTL

## (undated) DEVICE — SUTURE VCRL SZ 2-0 L36IN ABSRB UD L36MM CT-1 1/2 CIR J945H

## (undated) DEVICE — 3M™ STERI-DRAPE™ U-DRAPE 1015: Brand: STERI-DRAPE™

## (undated) DEVICE — INTENDED FOR TISSUE SEPARATION, AND OTHER PROCEDURES THAT REQUIRE A SHARP SURGICAL BLADE TO PUNCTURE OR CUT.: Brand: BARD-PARKER ® CARBON RIB-BACK BLADES

## (undated) DEVICE — KIT POS FOAM HANA TBL

## (undated) DEVICE — SOLUTION IRRIGATION H2O 0797305] ICU MEDICAL INC]

## (undated) DEVICE — SOL IRR SOD CL 0.9% 1000ML BTL --

## (undated) DEVICE — SYR 50ML LR LCK 1ML GRAD NSAF --

## (undated) DEVICE — GOWN,PLEAT,SPECIALTY,XL,STRL: Brand: MEDLINE

## (undated) DEVICE — CYSTOSCOPY PACK: Brand: CONVERTORS

## (undated) DEVICE — Z DISCONTINUEDSOLUTION PREP 2OZ 10% POVIDONE IOD SCR CAP BTL

## (undated) DEVICE — MEDI-VAC NON-CONDUCTIVE SUCTION TUBING: Brand: CARDINAL HEALTH

## (undated) DEVICE — STRYKER PERFORMANCE SERIES SAGITTAL BLADE: Brand: STRYKER PERFORMANCE SERIES

## (undated) DEVICE — BLUNTFILL: Brand: MONOJECT

## (undated) DEVICE — SYR LR LCK 1ML GRAD NSAF 30ML --

## (undated) DEVICE — 1200 GUARD II KIT W/5MM TUBE W/O VAC TUBE: Brand: GUARDIAN

## (undated) DEVICE — DRESSING PETRO W3XL8IN N ADH OIL EMUL GZ CURAD

## (undated) DEVICE — KENDALL SCD EXPRESS SLEEVES, KNEE LENGTH, MEDIUM: Brand: KENDALL SCD

## (undated) DEVICE — CONTAINER,SPECIMEN,3OZ,OR STRL: Brand: MEDLINE

## (undated) DEVICE — SPONGE GZ W4XL4IN COT 12 PLY TYP VII WVN C FLD DSGN

## (undated) DEVICE — BAG DRAIN URIN 2000ML LF STRL -- CONVERT TO ITEM 363123

## (undated) DEVICE — Device

## (undated) DEVICE — IV STRT KT 3282] LSL INDUSTRIES INC]

## (undated) DEVICE — SINGLE BASIN: Brand: CARDINAL HEALTH

## (undated) DEVICE — CATHETER URETH 22FR 30CC BLLN F 3 W SPEC M RND TIP TWO

## (undated) DEVICE — SYRINGE MED 3ML CLR PLAS STD N CTRL LUERLOCK TIP DISP

## (undated) DEVICE — SOLIDIFIER FLD 2OZ 1500CC N DISINF IN BTL DISP SAFESORB

## (undated) DEVICE — Z DISCONTINUED LINER BOOT TRACTION NS LINDY LF

## (undated) DEVICE — GOWN,SIRUS,FABRNF,XL,20/CS: Brand: MEDLINE

## (undated) DEVICE — DEVON™ KNEE AND BODY STRAP 60" X 3" (1.5 M X 7.6 CM): Brand: DEVON

## (undated) DEVICE — Y-TYPE TUR IRRIGATION SET

## (undated) DEVICE — NEEDLE HYPO 18GA L1.5IN PNK S STL HUB POLYPR SHLD REG BVL

## (undated) DEVICE — KIT COLON W/ 1.1OZ LUB AND 2 END

## (undated) DEVICE — CANNULA CUSH AD W/ 14FT TBG

## (undated) DEVICE — DRAPE XR C ARM 41X74IN LF --

## (undated) DEVICE — APPLICATOR BNDG 1MM ADH PREMIERPRO EXOFIN

## (undated) DEVICE — TUBING, SUCTION, 1/4" X 12', STRAIGHT: Brand: MEDLINE

## (undated) DEVICE — YANKAUER OPEN TIP, NO VENT: Brand: ARGYLE

## (undated) DEVICE — ELECTRODE,RADIOTRANSLUCENT,FOAM,3PK: Brand: MEDLINE

## (undated) DEVICE — SOLUTION IRRIG 1000ML H2O STRL BLT

## (undated) DEVICE — (D)SYR 10ML 1/5ML GRAD NSAF -- PKGING CHANGE USE ITEM 338027

## (undated) DEVICE — SYRINGE MED 5ML STD CLR PLAS LUERLOCK TIP N CTRL DISP

## (undated) DEVICE — SHEET, DRAPE, SPLIT, STERILE: Brand: MEDLINE

## (undated) DEVICE — JELLY,LUBE,STERILE,FLIP TOP,TUBE,4-OZ: Brand: MEDLINE

## (undated) DEVICE — GAUZE SPONGES,12 PLY: Brand: CURITY

## (undated) DEVICE — BLUNTFILL WITH FILTER: Brand: MONOJECT

## (undated) DEVICE — SUTURE STRATAFIX SPRL MCRYL + SZ 3-0 L24IN ABSRB UD PS-2 SXMP1B108

## (undated) DEVICE — SOLUTION IRRIG 3000ML 0.9% SOD CHL FLX CONT 0797208] ICU MEDICAL INC]

## (undated) DEVICE — GLOVE ORANGE PI 7 1/2   MSG9075

## (undated) DEVICE — 6619 2 PTNT ISO SYS INCISE AREA&LT;(&GT;&&LT;)&GT;P: Brand: STERI-DRAPE™ IOBAN™ 2

## (undated) DEVICE — DEVICE SECUREMENT AD W/ TRICOT ANCHR PD FOR F LTX SIL CATH

## (undated) DEVICE — SUTURE ABSRB BRAID COAT UD CT NO 1 36IN VCRL J959H

## (undated) DEVICE — SOL INJ SOD CL 0.9% 1000ML BG --